# Patient Record
Sex: MALE | Race: WHITE | Employment: OTHER | URBAN - METROPOLITAN AREA
[De-identification: names, ages, dates, MRNs, and addresses within clinical notes are randomized per-mention and may not be internally consistent; named-entity substitution may affect disease eponyms.]

---

## 2017-01-20 ENCOUNTER — OFFICE VISIT (OUTPATIENT)
Dept: INTERNAL MEDICINE | Age: 82
End: 2017-01-20

## 2017-01-20 VITALS
BODY MASS INDEX: 30.11 KG/M2 | WEIGHT: 170 LBS | HEART RATE: 69 BPM | SYSTOLIC BLOOD PRESSURE: 124 MMHG | DIASTOLIC BLOOD PRESSURE: 62 MMHG | OXYGEN SATURATION: 97 %

## 2017-01-20 DIAGNOSIS — N40.1 BENIGN NON-NODULAR PROSTATIC HYPERPLASIA WITH LOWER URINARY TRACT SYMPTOMS: ICD-10-CM

## 2017-01-20 DIAGNOSIS — E78.5 HYPERLIPIDEMIA, UNSPECIFIED HYPERLIPIDEMIA TYPE: Chronic | ICD-10-CM

## 2017-01-20 DIAGNOSIS — E11.9 TYPE 2 DIABETES MELLITUS WITHOUT COMPLICATION, WITHOUT LONG-TERM CURRENT USE OF INSULIN (HCC): Primary | Chronic | ICD-10-CM

## 2017-01-20 DIAGNOSIS — I25.118 CORONARY ARTERY DISEASE OF NATIVE ARTERY OF NATIVE HEART WITH STABLE ANGINA PECTORIS (HCC): ICD-10-CM

## 2017-01-20 DIAGNOSIS — E11.9 TYPE 2 DIABETES MELLITUS WITHOUT COMPLICATION, WITHOUT LONG-TERM CURRENT USE OF INSULIN (HCC): Chronic | ICD-10-CM

## 2017-01-20 DIAGNOSIS — I10 ESSENTIAL HYPERTENSION: Chronic | ICD-10-CM

## 2017-01-20 PROCEDURE — 99214 OFFICE O/P EST MOD 30 MIN: CPT | Performed by: INTERNAL MEDICINE

## 2017-01-20 RX ORDER — CARVEDILOL 6.25 MG/1
6.25 TABLET ORAL 2 TIMES DAILY
Qty: 180 TABLET | Refills: 3 | Status: SHIPPED | OUTPATIENT
Start: 2017-01-20 | End: 2017-04-21 | Stop reason: SDUPTHER

## 2017-01-20 RX ORDER — TAMSULOSIN HYDROCHLORIDE 0.4 MG/1
0.8 CAPSULE ORAL NIGHTLY
Qty: 180 CAPSULE | Refills: 3 | Status: SHIPPED | OUTPATIENT
Start: 2017-01-20 | End: 2017-04-21 | Stop reason: SDUPTHER

## 2017-01-20 RX ORDER — GLIPIZIDE 5 MG/1
5 TABLET ORAL DAILY
Qty: 90 TABLET | Refills: 3 | Status: SHIPPED | OUTPATIENT
Start: 2017-01-20 | End: 2017-04-21 | Stop reason: SDUPTHER

## 2017-01-20 RX ORDER — ATORVASTATIN CALCIUM 40 MG/1
40 TABLET, FILM COATED ORAL DAILY
Qty: 90 TABLET | Refills: 3 | Status: SHIPPED | OUTPATIENT
Start: 2017-01-20 | End: 2017-04-21 | Stop reason: SDUPTHER

## 2017-01-20 RX ORDER — TAMSULOSIN HYDROCHLORIDE 0.4 MG/1
0.4 CAPSULE ORAL NIGHTLY
Qty: 90 CAPSULE | Refills: 3 | Status: SHIPPED | OUTPATIENT
Start: 2017-01-20 | End: 2017-01-20 | Stop reason: SDUPTHER

## 2017-01-20 RX ORDER — FINASTERIDE 5 MG/1
5 TABLET, FILM COATED ORAL DAILY
Qty: 30 TABLET | Refills: 12 | Status: SHIPPED | OUTPATIENT
Start: 2017-01-20 | End: 2017-04-21 | Stop reason: SDUPTHER

## 2017-01-20 RX ORDER — FLUTICASONE PROPIONATE 110 UG/1
2 AEROSOL, METERED RESPIRATORY (INHALATION) 2 TIMES DAILY
Qty: 1 INHALER | Refills: 12 | Status: SHIPPED | OUTPATIENT
Start: 2017-01-20 | End: 2019-04-09

## 2017-01-29 ASSESSMENT — ENCOUNTER SYMPTOMS
GASTROINTESTINAL NEGATIVE: 1
RESPIRATORY NEGATIVE: 1
EYES NEGATIVE: 1

## 2017-03-15 ENCOUNTER — TELEPHONE (OUTPATIENT)
Dept: INTERNAL MEDICINE | Age: 82
End: 2017-03-15

## 2017-03-15 DIAGNOSIS — E11.9 TYPE 2 DIABETES MELLITUS WITHOUT COMPLICATION, WITHOUT LONG-TERM CURRENT USE OF INSULIN (HCC): Chronic | ICD-10-CM

## 2017-03-22 DIAGNOSIS — E11.9 TYPE 2 DIABETES MELLITUS WITHOUT COMPLICATION, WITHOUT LONG-TERM CURRENT USE OF INSULIN (HCC): Chronic | ICD-10-CM

## 2017-04-14 LAB
BILIRUBIN URINE: NEGATIVE
BLOOD, URINE: NEGATIVE
CLARITY: CLEAR
COLOR: YELLOW
CREATININE URINE: 116.5 MG/DL (ref 39–259)
ESTIMATED AVERAGE GLUCOSE: 159.9 MG/DL
GLUCOSE URINE: NEGATIVE MG/DL
HBA1C MFR BLD: 7.2 %
KETONES, URINE: ABNORMAL MG/DL
LEUKOCYTE ESTERASE, URINE: NEGATIVE
MICROALBUMIN UR-MCNC: 1.7 MG/DL
MICROALBUMIN/CREAT UR-RTO: 14.6 MG/G (ref 0–30)
MICROSCOPIC EXAMINATION: ABNORMAL
NITRITE, URINE: NEGATIVE
PH UA: 6
PROTEIN UA: NEGATIVE MG/DL
SPECIFIC GRAVITY UA: 1.02
URINE TYPE: ABNORMAL
UROBILINOGEN, URINE: 0.2 E.U./DL

## 2017-04-17 RX ORDER — LANCETS
EACH MISCELLANEOUS
Qty: 100 EACH | Refills: 0 | Status: SHIPPED | OUTPATIENT
Start: 2017-04-17 | End: 2017-04-21 | Stop reason: SDUPTHER

## 2017-04-21 ENCOUNTER — OFFICE VISIT (OUTPATIENT)
Dept: INTERNAL MEDICINE | Age: 82
End: 2017-04-21

## 2017-04-21 VITALS
SYSTOLIC BLOOD PRESSURE: 116 MMHG | WEIGHT: 166 LBS | HEART RATE: 66 BPM | OXYGEN SATURATION: 98 % | DIASTOLIC BLOOD PRESSURE: 62 MMHG | BODY MASS INDEX: 29.41 KG/M2

## 2017-04-21 DIAGNOSIS — N40.1 BENIGN NON-NODULAR PROSTATIC HYPERPLASIA WITH LOWER URINARY TRACT SYMPTOMS: ICD-10-CM

## 2017-04-21 DIAGNOSIS — Z12.5 SPECIAL SCREENING FOR MALIGNANT NEOPLASM OF PROSTATE: Primary | ICD-10-CM

## 2017-04-21 DIAGNOSIS — E11.9 TYPE 2 DIABETES MELLITUS WITHOUT COMPLICATION, WITHOUT LONG-TERM CURRENT USE OF INSULIN (HCC): Chronic | ICD-10-CM

## 2017-04-21 DIAGNOSIS — E78.5 HYPERLIPIDEMIA, UNSPECIFIED HYPERLIPIDEMIA TYPE: Chronic | ICD-10-CM

## 2017-04-21 DIAGNOSIS — I10 ESSENTIAL HYPERTENSION: Chronic | ICD-10-CM

## 2017-04-21 DIAGNOSIS — I25.118 CORONARY ARTERY DISEASE OF NATIVE ARTERY OF NATIVE HEART WITH STABLE ANGINA PECTORIS (HCC): ICD-10-CM

## 2017-04-21 PROCEDURE — 99214 OFFICE O/P EST MOD 30 MIN: CPT | Performed by: INTERNAL MEDICINE

## 2017-04-21 PROCEDURE — G0444 DEPRESSION SCREEN ANNUAL: HCPCS | Performed by: INTERNAL MEDICINE

## 2017-04-21 RX ORDER — FINASTERIDE 5 MG/1
5 TABLET, FILM COATED ORAL DAILY
Qty: 90 TABLET | Refills: 3 | Status: SHIPPED | OUTPATIENT
Start: 2017-04-21 | End: 2018-05-03 | Stop reason: SDUPTHER

## 2017-04-21 RX ORDER — GLIPIZIDE 5 MG/1
5 TABLET ORAL DAILY
Qty: 90 TABLET | Refills: 3 | Status: SHIPPED | OUTPATIENT
Start: 2017-04-21 | End: 2017-08-18 | Stop reason: SDUPTHER

## 2017-04-21 RX ORDER — CARVEDILOL 6.25 MG/1
6.25 TABLET ORAL 2 TIMES DAILY
Qty: 180 TABLET | Refills: 3 | Status: SHIPPED | OUTPATIENT
Start: 2017-04-21 | End: 2017-12-19 | Stop reason: SDUPTHER

## 2017-04-21 RX ORDER — ATORVASTATIN CALCIUM 40 MG/1
40 TABLET, FILM COATED ORAL DAILY
Qty: 90 TABLET | Refills: 3 | Status: ON HOLD | OUTPATIENT
Start: 2017-04-21 | End: 2021-01-01 | Stop reason: HOSPADM

## 2017-04-21 RX ORDER — TAMSULOSIN HYDROCHLORIDE 0.4 MG/1
0.8 CAPSULE ORAL NIGHTLY
Qty: 180 CAPSULE | Refills: 3 | Status: SHIPPED | OUTPATIENT
Start: 2017-04-21 | End: 2018-06-06 | Stop reason: SDUPTHER

## 2017-04-21 RX ORDER — LANCETS
EACH MISCELLANEOUS
Qty: 100 EACH | Refills: 3 | Status: SHIPPED | OUTPATIENT
Start: 2017-04-21 | End: 2017-12-15 | Stop reason: CLARIF

## 2017-04-21 ASSESSMENT — PATIENT HEALTH QUESTIONNAIRE - PHQ9
4. FEELING TIRED OR HAVING LITTLE ENERGY: 3
6. FEELING BAD ABOUT YOURSELF - OR THAT YOU ARE A FAILURE OR HAVE LET YOURSELF OR YOUR FAMILY DOWN: 0
SUM OF ALL RESPONSES TO PHQ9 QUESTIONS 1 & 2: 6
5. POOR APPETITE OR OVEREATING: 0
8. MOVING OR SPEAKING SO SLOWLY THAT OTHER PEOPLE COULD HAVE NOTICED. OR THE OPPOSITE, BEING SO FIGETY OR RESTLESS THAT YOU HAVE BEEN MOVING AROUND A LOT MORE THAN USUAL: 0
SUM OF ALL RESPONSES TO PHQ QUESTIONS 1-9: 13
2. FEELING DOWN, DEPRESSED OR HOPELESS: 3
3. TROUBLE FALLING OR STAYING ASLEEP: 3
1. LITTLE INTEREST OR PLEASURE IN DOING THINGS: 3
10. IF YOU CHECKED OFF ANY PROBLEMS, HOW DIFFICULT HAVE THESE PROBLEMS MADE IT FOR YOU TO DO YOUR WORK, TAKE CARE OF THINGS AT HOME, OR GET ALONG WITH OTHER PEOPLE: 1
9. THOUGHTS THAT YOU WOULD BE BETTER OFF DEAD, OR OF HURTING YOURSELF: 0
7. TROUBLE CONCENTRATING ON THINGS, SUCH AS READING THE NEWSPAPER OR WATCHING TELEVISION: 1

## 2017-04-21 ASSESSMENT — ENCOUNTER SYMPTOMS
RESPIRATORY NEGATIVE: 1
EYES NEGATIVE: 1
GASTROINTESTINAL NEGATIVE: 1

## 2017-04-29 ASSESSMENT — ENCOUNTER SYMPTOMS
SHORTNESS OF BREATH: 0
ORTHOPNEA: 0
BLURRED VISION: 0

## 2017-05-22 ENCOUNTER — TELEPHONE (OUTPATIENT)
Dept: INTERNAL MEDICINE | Age: 82
End: 2017-05-22

## 2017-07-31 RX ORDER — LANCETS
EACH MISCELLANEOUS
Qty: 100 EACH | Refills: 0 | Status: SHIPPED | OUTPATIENT
Start: 2017-07-31 | End: 2017-12-15 | Stop reason: CLARIF

## 2017-08-10 DIAGNOSIS — Z12.5 SPECIAL SCREENING FOR MALIGNANT NEOPLASM OF PROSTATE: ICD-10-CM

## 2017-08-10 DIAGNOSIS — E78.5 HYPERLIPIDEMIA, UNSPECIFIED HYPERLIPIDEMIA TYPE: Chronic | ICD-10-CM

## 2017-08-10 DIAGNOSIS — I10 ESSENTIAL HYPERTENSION: Chronic | ICD-10-CM

## 2017-08-10 DIAGNOSIS — E11.9 TYPE 2 DIABETES MELLITUS WITHOUT COMPLICATION, WITHOUT LONG-TERM CURRENT USE OF INSULIN (HCC): Chronic | ICD-10-CM

## 2017-08-10 LAB
ALBUMIN SERPL-MCNC: 4.1 G/DL (ref 3.4–5)
ALP BLD-CCNC: 60 U/L (ref 40–129)
ALT SERPL-CCNC: 14 U/L (ref 10–40)
ANION GAP SERPL CALCULATED.3IONS-SCNC: 13 MMOL/L (ref 3–16)
AST SERPL-CCNC: 13 U/L (ref 15–37)
BASOPHILS ABSOLUTE: 0 K/UL (ref 0–0.2)
BASOPHILS RELATIVE PERCENT: 0.5 %
BILIRUB SERPL-MCNC: 0.7 MG/DL (ref 0–1)
BILIRUBIN DIRECT: <0.2 MG/DL (ref 0–0.3)
BILIRUBIN URINE: NEGATIVE
BILIRUBIN, INDIRECT: ABNORMAL MG/DL (ref 0–1)
BLOOD, URINE: NEGATIVE
BUN BLDV-MCNC: 23 MG/DL (ref 7–20)
CALCIUM SERPL-MCNC: 9.4 MG/DL (ref 8.3–10.6)
CHLORIDE BLD-SCNC: 101 MMOL/L (ref 99–110)
CHOLESTEROL, TOTAL: 155 MG/DL (ref 0–199)
CLARITY: CLEAR
CO2: 26 MMOL/L (ref 21–32)
COLOR: YELLOW
CREAT SERPL-MCNC: 1 MG/DL (ref 0.8–1.3)
CREATININE URINE: 167.3 MG/DL (ref 39–259)
EOSINOPHILS ABSOLUTE: 0.3 K/UL (ref 0–0.6)
EOSINOPHILS RELATIVE PERCENT: 3.5 %
ESTIMATED AVERAGE GLUCOSE: 154.2 MG/DL
GFR AFRICAN AMERICAN: >60
GFR NON-AFRICAN AMERICAN: >60
GLUCOSE BLD-MCNC: 154 MG/DL (ref 70–99)
GLUCOSE URINE: NEGATIVE MG/DL
HBA1C MFR BLD: 7 %
HCT VFR BLD CALC: 41.6 % (ref 40.5–52.5)
HDLC SERPL-MCNC: 53 MG/DL (ref 40–60)
HEMOGLOBIN: 14.3 G/DL (ref 13.5–17.5)
KETONES, URINE: NEGATIVE MG/DL
LDL CHOLESTEROL CALCULATED: 82 MG/DL
LEUKOCYTE ESTERASE, URINE: NEGATIVE
LYMPHOCYTES ABSOLUTE: 1.5 K/UL (ref 1–5.1)
LYMPHOCYTES RELATIVE PERCENT: 19.7 %
MCH RBC QN AUTO: 33 PG (ref 26–34)
MCHC RBC AUTO-ENTMCNC: 34.3 G/DL (ref 31–36)
MCV RBC AUTO: 96 FL (ref 80–100)
MICROALBUMIN UR-MCNC: 1.8 MG/DL
MICROALBUMIN/CREAT UR-RTO: 10.8 MG/G (ref 0–30)
MICROSCOPIC EXAMINATION: NORMAL
MONOCYTES ABSOLUTE: 0.5 K/UL (ref 0–1.3)
MONOCYTES RELATIVE PERCENT: 6.6 %
NEUTROPHILS ABSOLUTE: 5.3 K/UL (ref 1.7–7.7)
NEUTROPHILS RELATIVE PERCENT: 69.7 %
NITRITE, URINE: NEGATIVE
PDW BLD-RTO: 14 % (ref 12.4–15.4)
PH UA: 5
PHOSPHORUS: 3.8 MG/DL (ref 2.5–4.9)
PLATELET # BLD: 145 K/UL (ref 135–450)
PMV BLD AUTO: 8.6 FL (ref 5–10.5)
POTASSIUM SERPL-SCNC: 4.2 MMOL/L (ref 3.5–5.1)
PROSTATE SPECIFIC ANTIGEN: 1.57 NG/ML (ref 0–4)
PROTEIN UA: NEGATIVE MG/DL
RBC # BLD: 4.33 M/UL (ref 4.2–5.9)
SODIUM BLD-SCNC: 140 MMOL/L (ref 136–145)
SPECIFIC GRAVITY UA: 1.02
TOTAL PROTEIN: 6.6 G/DL (ref 6.4–8.2)
TRIGL SERPL-MCNC: 100 MG/DL (ref 0–150)
URINE TYPE: NORMAL
UROBILINOGEN, URINE: 0.2 E.U./DL
VLDLC SERPL CALC-MCNC: 20 MG/DL
WBC # BLD: 7.6 K/UL (ref 4–11)

## 2017-08-11 LAB — TSH, 3RD GENERATION: 2.03 MU/L (ref 0.3–4)

## 2017-08-18 ENCOUNTER — OFFICE VISIT (OUTPATIENT)
Dept: INTERNAL MEDICINE | Age: 82
End: 2017-08-18

## 2017-08-18 VITALS
DIASTOLIC BLOOD PRESSURE: 70 MMHG | BODY MASS INDEX: 29.59 KG/M2 | OXYGEN SATURATION: 98 % | HEIGHT: 63 IN | WEIGHT: 167 LBS | HEART RATE: 65 BPM | SYSTOLIC BLOOD PRESSURE: 110 MMHG

## 2017-08-18 DIAGNOSIS — E78.5 HYPERLIPIDEMIA, UNSPECIFIED HYPERLIPIDEMIA TYPE: Chronic | ICD-10-CM

## 2017-08-18 DIAGNOSIS — E11.9 TYPE 2 DIABETES MELLITUS WITHOUT COMPLICATION, WITHOUT LONG-TERM CURRENT USE OF INSULIN (HCC): Primary | Chronic | ICD-10-CM

## 2017-08-18 DIAGNOSIS — I10 ESSENTIAL HYPERTENSION: Chronic | ICD-10-CM

## 2017-08-18 PROCEDURE — 99214 OFFICE O/P EST MOD 30 MIN: CPT | Performed by: INTERNAL MEDICINE

## 2017-08-18 RX ORDER — GLIPIZIDE 5 MG/1
2.5 TABLET ORAL DAILY
Qty: 45 TABLET | Refills: 3 | Status: SHIPPED | OUTPATIENT
Start: 2017-08-18 | End: 2018-08-23 | Stop reason: SDUPTHER

## 2017-08-18 ASSESSMENT — ENCOUNTER SYMPTOMS
RESPIRATORY NEGATIVE: 1
ORTHOPNEA: 0
EYES NEGATIVE: 1
SHORTNESS OF BREATH: 0
BLURRED VISION: 0
GASTROINTESTINAL NEGATIVE: 1

## 2017-10-09 ENCOUNTER — TELEPHONE (OUTPATIENT)
Dept: INTERNAL MEDICINE | Age: 82
End: 2017-10-09

## 2017-10-09 DIAGNOSIS — M54.41 BILATERAL LOW BACK PAIN WITH RIGHT-SIDED SCIATICA, UNSPECIFIED CHRONICITY: ICD-10-CM

## 2017-11-13 DIAGNOSIS — E11.9 TYPE 2 DIABETES MELLITUS WITHOUT COMPLICATION, WITHOUT LONG-TERM CURRENT USE OF INSULIN (HCC): Chronic | ICD-10-CM

## 2017-11-17 ENCOUNTER — TELEPHONE (OUTPATIENT)
Dept: INTERNAL MEDICINE | Age: 82
End: 2017-11-17

## 2017-12-15 ENCOUNTER — OFFICE VISIT (OUTPATIENT)
Dept: INTERNAL MEDICINE | Age: 82
End: 2017-12-15

## 2017-12-15 VITALS
WEIGHT: 172 LBS | HEIGHT: 63 IN | BODY MASS INDEX: 30.48 KG/M2 | DIASTOLIC BLOOD PRESSURE: 70 MMHG | SYSTOLIC BLOOD PRESSURE: 130 MMHG

## 2017-12-15 DIAGNOSIS — Z12.5 PROSTATE CANCER SCREENING: ICD-10-CM

## 2017-12-15 DIAGNOSIS — N40.1 BENIGN PROSTATIC HYPERPLASIA WITH URINARY FREQUENCY: Chronic | ICD-10-CM

## 2017-12-15 DIAGNOSIS — E11.9 TYPE 2 DIABETES MELLITUS WITHOUT COMPLICATION, WITHOUT LONG-TERM CURRENT USE OF INSULIN (HCC): Primary | Chronic | ICD-10-CM

## 2017-12-15 DIAGNOSIS — E78.2 MIXED HYPERLIPIDEMIA: Chronic | ICD-10-CM

## 2017-12-15 DIAGNOSIS — R35.0 BENIGN PROSTATIC HYPERPLASIA WITH URINARY FREQUENCY: Chronic | ICD-10-CM

## 2017-12-15 DIAGNOSIS — I10 ESSENTIAL HYPERTENSION: Chronic | ICD-10-CM

## 2017-12-15 DIAGNOSIS — L03.032 CELLULITIS OF GREAT TOE OF LEFT FOOT: ICD-10-CM

## 2017-12-15 DIAGNOSIS — Z23 NEED FOR INFLUENZA VACCINATION: ICD-10-CM

## 2017-12-15 PROCEDURE — 90662 IIV NO PRSV INCREASED AG IM: CPT | Performed by: HOSPITALIST

## 2017-12-15 PROCEDURE — 99214 OFFICE O/P EST MOD 30 MIN: CPT | Performed by: HOSPITALIST

## 2017-12-15 PROCEDURE — G0439 PPPS, SUBSEQ VISIT: HCPCS | Performed by: HOSPITALIST

## 2017-12-15 PROCEDURE — G0008 ADMIN INFLUENZA VIRUS VAC: HCPCS | Performed by: HOSPITALIST

## 2017-12-15 RX ORDER — AMOXICILLIN AND CLAVULANATE POTASSIUM 875; 125 MG/1; MG/1
1 TABLET, FILM COATED ORAL 2 TIMES DAILY
Qty: 20 TABLET | Refills: 1 | Status: SHIPPED | OUTPATIENT
Start: 2017-12-15 | End: 2017-12-25

## 2017-12-15 ASSESSMENT — PATIENT HEALTH QUESTIONNAIRE - PHQ9: SUM OF ALL RESPONSES TO PHQ QUESTIONS 1-9: 2

## 2017-12-15 ASSESSMENT — ANXIETY QUESTIONNAIRES: GAD7 TOTAL SCORE: 2

## 2017-12-15 NOTE — PROGRESS NOTES
Medicare Annual Wellness Visit - Subsequent    Name: Frederick Prieto Date: 12/15/2017   MRN: [id number] Sex: Male   Age: 81 y.o. Ethnicity: Non-/Non    : 1935 Race: Shahla Daniel is here for   Chief Complaint   Patient presents with    Medicare AWV        Screenings for behavioral, psychosocial and functional/safety risks, and cognitive dysfunction are all negative except as indicated below. These results, as well as other patient data from the [address] form, are documented in Flowsheets linked to this Encounter. Liana Goodwin presents for AWV and establish his care. His previous PCP was Dr Tania Lopez, who recently resigned from our practice. His blood glucose is well controlled and ranging between 98 and 141 mg/dL. He is trying to adhere to low fat/low cholesterol diet. Checks his BP daily; SBP ranging between 116 and 152 mg/dL. No CP/SOB; no heart palpitations. + urinary frequency/urgency; follows up with Dr Tina Banerjee for BPH. Takes all his scheduled meds regularly. No Known Allergies    Prior to Visit Medications    Medication Sig Taking?  Authorizing Provider   diclofenac sodium (VOLTAREN) 1 % GEL Apply 3 g topically 3 times daily as needed for Pain Yes Nhan Blunt MD   glipiZIDE (GLUCOTROL) 5 MG tablet Take 0.5 tablets by mouth daily Yes Elie Titus MD   Liraglutide (VICTOZA) 18 MG/3ML SOPN SC injection Inject 1.2 mg into the skin daily  Patient taking differently: Inject 1.8 mg into the skin daily  Yes Elie Titus MD   carvedilol (COREG) 6.25 MG tablet Take 1 tablet by mouth 2 times daily Yes Elie Titus MD   finasteride (PROSCAR) 5 MG tablet Take 1 tablet by mouth daily Yes Elie Titus MD   tamsulosin (FLOMAX) 0.4 MG capsule Take 2 capsules by mouth nightly Yes Elie Titus MD   atorvastatin (LIPITOR) 40 MG tablet Take 1 tablet by mouth daily Yes Elie Titus MD   metFORMIN (GLUCOPHAGE) 500 MG tablet Take 2 tablets by mouth 2 times TURP       Family History   Problem Relation Age of Onset    Diabetes Mother     Diabetes Brother        CareTeam (Including outside providers/suppliers regularly involved in providing care):   Patient Care Team:  Tomas Connors MD as PCP - General (Internal Medicine)  MD Alexus Villarreal (Ophthalmology)  Florina Bird MD (Urology)  Mile Montilla DPM (Podiatry)  Sandra Gamez (Optometry)  Marycruz Reyes MD (Dermatology)    Wt Readings from Last 3 Encounters:   12/15/17 172 lb (78 kg)   08/18/17 167 lb (75.8 kg)   04/21/17 166 lb (75.3 kg)     Vitals:    12/15/17 1001   BP: 130/70   Weight: 172 lb (78 kg)   Height: 5' 3\" (1.6 m)       General Appearance: alert and oriented to person, place and time, well developed and well- nourished, in no acute distress  Skin: warm and dry, no rash or erythema  Head: normocephalic and atraumatic  Eyes: pupils equal, round, and reactive to light, extraocular eye movements intact, conjunctivae normal  ENT: tympanic membrane, external ear and ear canal normal bilaterally, nose without deformity, nasal mucosa and turbinates normal without polyps  Neck: supple and non-tender without mass, no thyromegaly or thyroid nodules, no cervical lymphadenopathy  Pulmonary/Chest: clear to auscultation bilaterally- no wheezes, rales or rhonchi, normal air movement, no respiratory distress  Cardiovascular: normal rate, regular rhythm, normal S1 and S2, no murmurs, rubs, clicks, or gallops, distal pulses intact, no carotid bruits  Abdomen: soft, non-tender, non-distended, normal bowel sounds, no masses or organomegaly  Extremities: no cyanosis, clubbing or edema. Some redness and tenderness to palpation of L great toe tip area.   Musculoskeletal: normal range of motion, no joint swelling, deformity or tenderness  Neurologic: reflexes normal and symmetric, no cranial nerve deficit, gait, coordination and speech normal    The following problems were reviewed today and where

## 2017-12-19 DIAGNOSIS — I10 ESSENTIAL HYPERTENSION: Chronic | ICD-10-CM

## 2017-12-19 DIAGNOSIS — I25.118 CORONARY ARTERY DISEASE OF NATIVE ARTERY OF NATIVE HEART WITH STABLE ANGINA PECTORIS (HCC): ICD-10-CM

## 2017-12-19 RX ORDER — CARVEDILOL 6.25 MG/1
6.25 TABLET ORAL 2 TIMES DAILY
Qty: 180 TABLET | Refills: 0 | Status: SHIPPED | OUTPATIENT
Start: 2017-12-19 | End: 2018-03-26 | Stop reason: SDUPTHER

## 2018-02-12 ENCOUNTER — TELEPHONE (OUTPATIENT)
Dept: INTERNAL MEDICINE | Age: 83
End: 2018-02-12

## 2018-02-12 RX ORDER — AMLODIPINE BESYLATE 2.5 MG/1
2.5 TABLET ORAL 2 TIMES DAILY
Qty: 60 TABLET | Refills: 3 | Status: SHIPPED | OUTPATIENT
Start: 2018-02-12 | End: 2018-02-12 | Stop reason: SDUPTHER

## 2018-02-13 RX ORDER — AMLODIPINE BESYLATE 2.5 MG/1
TABLET ORAL
Qty: 180 TABLET | Refills: 3 | Status: SHIPPED | OUTPATIENT
Start: 2018-02-13 | End: 2019-04-09

## 2018-03-06 ENCOUNTER — TELEPHONE (OUTPATIENT)
Dept: INTERNAL MEDICINE | Age: 83
End: 2018-03-06

## 2018-03-06 DIAGNOSIS — E11.9 TYPE 2 DIABETES MELLITUS WITHOUT COMPLICATION, WITHOUT LONG-TERM CURRENT USE OF INSULIN (HCC): Chronic | ICD-10-CM

## 2018-03-06 DIAGNOSIS — I10 ESSENTIAL HYPERTENSION: Chronic | ICD-10-CM

## 2018-03-06 DIAGNOSIS — E78.2 MIXED HYPERLIPIDEMIA: Primary | Chronic | ICD-10-CM

## 2018-03-09 DIAGNOSIS — M54.41 BILATERAL LOW BACK PAIN WITH RIGHT-SIDED SCIATICA, UNSPECIFIED CHRONICITY: ICD-10-CM

## 2018-03-12 DIAGNOSIS — E78.2 MIXED HYPERLIPIDEMIA: Chronic | ICD-10-CM

## 2018-03-12 DIAGNOSIS — E11.9 TYPE 2 DIABETES MELLITUS WITHOUT COMPLICATION, WITHOUT LONG-TERM CURRENT USE OF INSULIN (HCC): Chronic | ICD-10-CM

## 2018-03-12 DIAGNOSIS — I10 ESSENTIAL HYPERTENSION: Chronic | ICD-10-CM

## 2018-03-12 LAB
A/G RATIO: 1.8 (ref 1.1–2.2)
ALBUMIN SERPL-MCNC: 4.2 G/DL (ref 3.4–5)
ALP BLD-CCNC: 84 U/L (ref 40–129)
ALT SERPL-CCNC: 14 U/L (ref 10–40)
ANION GAP SERPL CALCULATED.3IONS-SCNC: 12 MMOL/L (ref 3–16)
AST SERPL-CCNC: 15 U/L (ref 15–37)
BASOPHILS ABSOLUTE: 0 K/UL (ref 0–0.2)
BASOPHILS RELATIVE PERCENT: 0.4 %
BILIRUB SERPL-MCNC: 0.5 MG/DL (ref 0–1)
BUN BLDV-MCNC: 21 MG/DL (ref 7–20)
CALCIUM SERPL-MCNC: 9.1 MG/DL (ref 8.3–10.6)
CHLORIDE BLD-SCNC: 103 MMOL/L (ref 99–110)
CHOLESTEROL, TOTAL: 174 MG/DL (ref 0–199)
CO2: 28 MMOL/L (ref 21–32)
CREAT SERPL-MCNC: 0.8 MG/DL (ref 0.8–1.3)
EOSINOPHILS ABSOLUTE: 0.3 K/UL (ref 0–0.6)
EOSINOPHILS RELATIVE PERCENT: 5.1 %
GFR AFRICAN AMERICAN: >60
GFR NON-AFRICAN AMERICAN: >60
GLOBULIN: 2.3 G/DL
GLUCOSE BLD-MCNC: 157 MG/DL (ref 70–99)
HCT VFR BLD CALC: 42.1 % (ref 40.5–52.5)
HDLC SERPL-MCNC: 51 MG/DL (ref 40–60)
HEMOGLOBIN: 14.2 G/DL (ref 13.5–17.5)
LDL CHOLESTEROL CALCULATED: 97 MG/DL
LYMPHOCYTES ABSOLUTE: 1.9 K/UL (ref 1–5.1)
LYMPHOCYTES RELATIVE PERCENT: 35.1 %
MCH RBC QN AUTO: 32.6 PG (ref 26–34)
MCHC RBC AUTO-ENTMCNC: 33.8 G/DL (ref 31–36)
MCV RBC AUTO: 96.4 FL (ref 80–100)
MONOCYTES ABSOLUTE: 0.3 K/UL (ref 0–1.3)
MONOCYTES RELATIVE PERCENT: 6.1 %
NEUTROPHILS ABSOLUTE: 2.9 K/UL (ref 1.7–7.7)
NEUTROPHILS RELATIVE PERCENT: 53.3 %
PDW BLD-RTO: 14.2 % (ref 12.4–15.4)
PLATELET # BLD: 145 K/UL (ref 135–450)
PMV BLD AUTO: 9.2 FL (ref 5–10.5)
POTASSIUM SERPL-SCNC: 4.4 MMOL/L (ref 3.5–5.1)
RBC # BLD: 4.37 M/UL (ref 4.2–5.9)
SODIUM BLD-SCNC: 143 MMOL/L (ref 136–145)
TOTAL PROTEIN: 6.5 G/DL (ref 6.4–8.2)
TRIGL SERPL-MCNC: 132 MG/DL (ref 0–150)
VLDLC SERPL CALC-MCNC: 26 MG/DL
WBC # BLD: 5.4 K/UL (ref 4–11)

## 2018-03-13 ENCOUNTER — TELEPHONE (OUTPATIENT)
Dept: INTERNAL MEDICINE | Age: 83
End: 2018-03-13

## 2018-03-13 LAB
ESTIMATED AVERAGE GLUCOSE: 162.8 MG/DL
HBA1C MFR BLD: 7.3 %

## 2018-03-16 ENCOUNTER — OFFICE VISIT (OUTPATIENT)
Dept: INTERNAL MEDICINE | Age: 83
End: 2018-03-16

## 2018-03-16 ENCOUNTER — TELEPHONE (OUTPATIENT)
Dept: ORTHOPEDIC SURGERY | Age: 83
End: 2018-03-16

## 2018-03-16 VITALS
WEIGHT: 167 LBS | SYSTOLIC BLOOD PRESSURE: 112 MMHG | DIASTOLIC BLOOD PRESSURE: 64 MMHG | BODY MASS INDEX: 29.59 KG/M2 | HEIGHT: 63 IN

## 2018-03-16 DIAGNOSIS — I10 ESSENTIAL HYPERTENSION: ICD-10-CM

## 2018-03-16 DIAGNOSIS — E78.2 MIXED HYPERLIPIDEMIA: Chronic | ICD-10-CM

## 2018-03-16 DIAGNOSIS — L03.032 CELLULITIS OF GREAT TOE OF LEFT FOOT: Primary | ICD-10-CM

## 2018-03-16 DIAGNOSIS — E11.9 TYPE 2 DIABETES MELLITUS WITHOUT COMPLICATION, WITHOUT LONG-TERM CURRENT USE OF INSULIN (HCC): Chronic | ICD-10-CM

## 2018-03-16 PROCEDURE — 99214 OFFICE O/P EST MOD 30 MIN: CPT | Performed by: HOSPITALIST

## 2018-03-16 ASSESSMENT — PATIENT HEALTH QUESTIONNAIRE - PHQ9
2. FEELING DOWN, DEPRESSED OR HOPELESS: 0
SUM OF ALL RESPONSES TO PHQ QUESTIONS 1-9: 0
SUM OF ALL RESPONSES TO PHQ9 QUESTIONS 1 & 2: 0
1. LITTLE INTEREST OR PLEASURE IN DOING THINGS: 0

## 2018-03-16 NOTE — PROGRESS NOTES
Follow Up Visit Established Patient Visit    Patient:  Joey Paris                                               : 1935  Age: 81 y.o. MRN: [id number]  Date : 3/16/2018    Joey Paris is a 81 y.o. male who presents for :  Scheduled follow-up appointment on his chronic medical problems, including diabetes mellitus type 2 and hypertension. Chief Complaint   Patient presents with    Diabetes    Hypertension     He is blood glucose ranging between 101 130 mg/dL. He doesn't check his blood pressure at home. Adheres to low-fat/low-cholesterol diet. Has ongoing left great toe cellulitis. Received several courses of oral antibiotics in the last 3 months, including cephalexin and doxycycline. He had x-ray of his left toe on 18 in [facility]:    No radiographic findings to suggest underlying osteomyelitis. Osteoarthritic degenerative changes are present involving the first MTP joint manifested by mild joint space narrowing, sclerosis and hypertrophic changes.   Minimal hallux valgus deformity present.  No acute fracture identified.  Atherosclerotic   calcification present. He was evaluated for this problem recently by his podiatrist who recommended to start Augmentin. Roberto reports insomnia; melatonin provides minimal symptom relief.     Past Medical History:   Diagnosis Date    Allergic rhinitis 2010    Back pain     Bilateral cataracts     Bilateral low back pain with right-sided sciatica 2015    BPH (benign prostatic hypertrophy)     Chest pain     Degeneration of lumbar or lumbosacral intervertebral disc     Diabetes mellitus, type 2 ([address] 2010    Diverticulosis     Eczema 3/11/2011    Essential hypertension 2015    Hyperlipidemia     Hypertension     Insomnia     Lumbar disc disease     Microalbuminuria 2013    Neck pain 2012    Obesity     Obstructive sleep apnea     Osteoarthritis     Post herpetic neuralgia 6/15/2012    Right hip pain 12/14/2012    Right shoulder pain 6/17/2011    Rosacea     Spinal stenosis     Transient global amnesia     [facility] 62/62/7180    Vitamin D deficiency 3/17/2012       Past Surgical History:   Procedure Laterality Date    PROSTATE SURGERY  June 13, 2000    TURP       Current Outpatient Prescriptions   Medication Sig Dispense Refill    diclofenac sodium 1 % GEL APPLY 3GRAMS TO THE AFFECTED AREA THREE TIMES DAILY AS NEEDED FOR PAIN 500 g 0    amLODIPine (NORVASC) 2.5 MG tablet TAKE 1 TABLET BY MOUTH TWICE DAILY HOLD FOR SYSTOLIC BLOOD PRESSURE LESS THAN 140 MM  tablet 3    carvedilol (COREG) 6.25 MG tablet Take 1 tablet by mouth 2 times daily 180 tablet 0    Liraglutide (VICTOZA) 18 MG/3ML SOPN SC injection Inject 1.8 mg into the skin daily 9 mL 5    glipiZIDE (GLUCOTROL) 5 MG tablet Take 0.5 tablets by mouth daily 45 tablet 3    finasteride (PROSCAR) 5 MG tablet Take 1 tablet by mouth daily 90 tablet 3    tamsulosin (FLOMAX) 0.4 MG capsule Take 2 capsules by mouth nightly 180 capsule 3    atorvastatin (LIPITOR) 40 MG tablet Take 1 tablet by mouth daily 90 tablet 3    metFORMIN (GLUCOPHAGE) 500 MG tablet Take 2 tablets by mouth 2 times daily (with meals) 360 tablet 3    VENTOLIN  (90 BASE) MCG/ACT inhaler Inhale 2 puffs into the lungs every 6 hours as needed for Wheezing or Shortness of Breath 1 Inhaler 12    fluticasone (FLOVENT HFA) 110 MCG/ACT inhaler Inhale 2 puffs into the lungs 2 times daily 1 Inhaler 12    nitroGLYCERIN (NITROSTAT) 0.4 MG SL tablet Place 1 tablet under the tongue every 5 minutes as needed for Chest pain May repeat every 5 minutes until relief is obtained. If pain persists after taking 3 tabs in a 15-minute period, call 9-1-1 immediately.  25 tablet 12    Cholecalciferol (VITAMIN D3) 5000 UNITS TABS Take 1 tablet by mouth daily 90 tablet 3    Melatonin 3 MG CAPS Take 1 capsule by mouth

## 2018-03-16 NOTE — TELEPHONE ENCOUNTER
Received a PA and was completed via CMM for:    Medication:Diclofenac Sodium     Dose: 1% gel    PA was completed on CMM     Date: 03/16/2018    PA Code: [email or web address]    Status: Pending

## 2018-03-23 ENCOUNTER — HOSPITAL ENCOUNTER (OUTPATIENT)
Dept: VASCULAR LAB | Age: 83
Discharge: HOME | End: 2018-03-23
Attending: INTERNAL MEDICINE | Admitting: INTERNAL MEDICINE

## 2018-03-23 ENCOUNTER — HOSPITAL ENCOUNTER (OUTPATIENT)
Dept: MRI IMAGING | Age: 83
Discharge: HOME | End: 2018-03-23
Attending: HOSPITALIST | Admitting: HOSPITALIST

## 2018-03-23 DIAGNOSIS — L03.032 CELLULITIS OF TOE OF LEFT FOOT: ICD-10-CM

## 2018-03-23 DIAGNOSIS — I73.9 PERIPHERAL VASCULAR DISEASE (HCC): ICD-10-CM

## 2018-03-23 DIAGNOSIS — L03.032 CELLULITIS OF GREAT TOE OF LEFT FOOT: ICD-10-CM

## 2018-03-26 DIAGNOSIS — I10 ESSENTIAL HYPERTENSION: Chronic | ICD-10-CM

## 2018-03-26 DIAGNOSIS — I25.118 CORONARY ARTERY DISEASE OF NATIVE ARTERY OF NATIVE HEART WITH STABLE ANGINA PECTORIS (HCC): ICD-10-CM

## 2018-03-26 RX ORDER — CARVEDILOL 6.25 MG/1
6.25 TABLET ORAL 2 TIMES DAILY
Qty: 180 TABLET | Refills: 1 | Status: SHIPPED | OUTPATIENT
Start: 2018-03-26 | End: 2018-10-01 | Stop reason: SDUPTHER

## 2018-03-30 ENCOUNTER — TELEPHONE (OUTPATIENT)
Dept: INTERNAL MEDICINE | Age: 83
End: 2018-03-30

## 2018-03-30 ENCOUNTER — TELEPHONE (OUTPATIENT)
Dept: ORTHOPEDIC SURGERY | Age: 83
End: 2018-03-30

## 2018-04-25 DIAGNOSIS — E11.9 TYPE 2 DIABETES MELLITUS WITHOUT COMPLICATION, WITHOUT LONG-TERM CURRENT USE OF INSULIN (HCC): Chronic | ICD-10-CM

## 2018-05-03 ENCOUNTER — TELEPHONE (OUTPATIENT)
Dept: INTERNAL MEDICINE | Age: 83
End: 2018-05-03

## 2018-05-03 DIAGNOSIS — N40.1 BENIGN NON-NODULAR PROSTATIC HYPERPLASIA WITH LOWER URINARY TRACT SYMPTOMS: ICD-10-CM

## 2018-05-03 RX ORDER — FINASTERIDE 5 MG/1
5 TABLET, FILM COATED ORAL DAILY
Qty: 90 TABLET | Refills: 3 | Status: SHIPPED | OUTPATIENT
Start: 2018-05-03 | End: 2019-05-17 | Stop reason: SDUPTHER

## 2018-05-11 DIAGNOSIS — E11.9 TYPE 2 DIABETES MELLITUS WITHOUT COMPLICATION, WITHOUT LONG-TERM CURRENT USE OF INSULIN (HCC): Chronic | ICD-10-CM

## 2018-05-31 DIAGNOSIS — M54.41 BILATERAL LOW BACK PAIN WITH RIGHT-SIDED SCIATICA, UNSPECIFIED CHRONICITY: ICD-10-CM

## 2018-06-06 DIAGNOSIS — N40.1 BENIGN NON-NODULAR PROSTATIC HYPERPLASIA WITH LOWER URINARY TRACT SYMPTOMS: ICD-10-CM

## 2018-06-07 ENCOUNTER — TELEPHONE (OUTPATIENT)
Dept: INTERNAL MEDICINE | Age: 83
End: 2018-06-07

## 2018-06-07 RX ORDER — TAMSULOSIN HYDROCHLORIDE 0.4 MG/1
0.8 CAPSULE ORAL NIGHTLY
Qty: 180 CAPSULE | Refills: 3 | Status: SHIPPED | OUTPATIENT
Start: 2018-06-07 | End: 2019-08-05 | Stop reason: SDUPTHER

## 2018-06-07 RX ORDER — LANCETS
EACH MISCELLANEOUS
Qty: 100 EACH | Refills: 3 | Status: SHIPPED | OUTPATIENT
Start: 2018-06-07 | End: 2018-06-12 | Stop reason: CLARIF

## 2018-06-12 ENCOUNTER — OFFICE VISIT (OUTPATIENT)
Dept: INTERNAL MEDICINE | Age: 83
End: 2018-06-12

## 2018-06-12 VITALS
DIASTOLIC BLOOD PRESSURE: 62 MMHG | BODY MASS INDEX: 29.95 KG/M2 | HEIGHT: 63 IN | SYSTOLIC BLOOD PRESSURE: 130 MMHG | WEIGHT: 169 LBS

## 2018-06-12 DIAGNOSIS — I10 ESSENTIAL HYPERTENSION: Chronic | ICD-10-CM

## 2018-06-12 DIAGNOSIS — G89.29 CHRONIC RIGHT-SIDED LOW BACK PAIN WITH RIGHT-SIDED SCIATICA: ICD-10-CM

## 2018-06-12 DIAGNOSIS — E11.9 TYPE 2 DIABETES MELLITUS WITHOUT COMPLICATION, WITHOUT LONG-TERM CURRENT USE OF INSULIN (HCC): Chronic | ICD-10-CM

## 2018-06-12 DIAGNOSIS — M54.41 CHRONIC RIGHT-SIDED LOW BACK PAIN WITH RIGHT-SIDED SCIATICA: ICD-10-CM

## 2018-06-12 DIAGNOSIS — L03.032 CELLULITIS OF GREAT TOE OF LEFT FOOT: Primary | ICD-10-CM

## 2018-06-12 PROCEDURE — 99214 OFFICE O/P EST MOD 30 MIN: CPT | Performed by: HOSPITALIST

## 2018-06-12 RX ORDER — DOXYCYCLINE HYCLATE 100 MG
100 TABLET ORAL 2 TIMES DAILY
Qty: 20 TABLET | Refills: 0 | Status: SHIPPED | OUTPATIENT
Start: 2018-06-12 | End: 2018-06-22

## 2018-06-21 DIAGNOSIS — E11.9 TYPE 2 DIABETES MELLITUS WITHOUT COMPLICATION, WITHOUT LONG-TERM CURRENT USE OF INSULIN (HCC): Chronic | ICD-10-CM

## 2018-08-13 DIAGNOSIS — E11.9 TYPE 2 DIABETES MELLITUS WITHOUT COMPLICATION, WITHOUT LONG-TERM CURRENT USE OF INSULIN (HCC): Chronic | ICD-10-CM

## 2018-08-21 DIAGNOSIS — E11.9 TYPE 2 DIABETES MELLITUS WITHOUT COMPLICATION, WITHOUT LONG-TERM CURRENT USE OF INSULIN (HCC): Chronic | ICD-10-CM

## 2018-08-21 RX ORDER — GLIPIZIDE 5 MG/1
TABLET ORAL
Qty: 45 TABLET | Refills: 0 | OUTPATIENT
Start: 2018-08-21

## 2018-08-23 ENCOUNTER — TELEPHONE (OUTPATIENT)
Dept: INTERNAL MEDICINE CLINIC | Age: 83
End: 2018-08-23

## 2018-08-23 DIAGNOSIS — E11.9 TYPE 2 DIABETES MELLITUS WITHOUT COMPLICATION, WITHOUT LONG-TERM CURRENT USE OF INSULIN (HCC): Chronic | ICD-10-CM

## 2018-08-23 RX ORDER — GLIPIZIDE 5 MG/1
2.5 TABLET ORAL DAILY
Qty: 45 TABLET | Refills: 1 | Status: SHIPPED | OUTPATIENT
Start: 2018-08-23 | End: 2018-10-05 | Stop reason: SDUPTHER

## 2018-08-28 DIAGNOSIS — M54.41 BILATERAL LOW BACK PAIN WITH RIGHT-SIDED SCIATICA, UNSPECIFIED CHRONICITY: ICD-10-CM

## 2018-09-14 ENCOUNTER — OFFICE VISIT (OUTPATIENT)
Dept: INTERNAL MEDICINE | Age: 83
End: 2018-09-14

## 2018-09-14 VITALS
WEIGHT: 173 LBS | BODY MASS INDEX: 30.65 KG/M2 | HEIGHT: 63 IN | DIASTOLIC BLOOD PRESSURE: 62 MMHG | TEMPERATURE: 98.1 F | SYSTOLIC BLOOD PRESSURE: 118 MMHG

## 2018-09-14 DIAGNOSIS — R06.2 WHEEZING: ICD-10-CM

## 2018-09-14 DIAGNOSIS — I10 ESSENTIAL HYPERTENSION: Chronic | ICD-10-CM

## 2018-09-14 DIAGNOSIS — E11.9 TYPE 2 DIABETES MELLITUS WITHOUT COMPLICATION, WITHOUT LONG-TERM CURRENT USE OF INSULIN (HCC): Primary | Chronic | ICD-10-CM

## 2018-09-14 DIAGNOSIS — E78.2 MIXED HYPERLIPIDEMIA: Chronic | ICD-10-CM

## 2018-09-14 PROCEDURE — 99214 OFFICE O/P EST MOD 30 MIN: CPT | Performed by: HOSPITALIST

## 2018-09-14 ASSESSMENT — PATIENT HEALTH QUESTIONNAIRE - PHQ9
SUM OF ALL RESPONSES TO PHQ QUESTIONS 1-9: 0
1. LITTLE INTEREST OR PLEASURE IN DOING THINGS: 0
SUM OF ALL RESPONSES TO PHQ QUESTIONS 1-9: 0
2. FEELING DOWN, DEPRESSED OR HOPELESS: 0
SUM OF ALL RESPONSES TO PHQ9 QUESTIONS 1 & 2: 0

## 2018-09-14 NOTE — PROGRESS NOTES
complication ([facility] 30/66/9560    Vitamin D deficiency 3/17/2012       Past Surgical History:   Procedure Laterality Date    PROSTATE SURGERY  June 13, 2000    TURP       Current Outpatient Prescriptions   Medication Sig Dispense Refill    diclofenac sodium 1 % GEL APPLY 3GRAMS TO THE AFFECTED AREA THREE TIMES DAILY AS NEEDED FOR PAIN 500 g 0    glipiZIDE (GLUCOTROL) 5 MG tablet Take 0.5 tablets by mouth daily 45 tablet 1    metFORMIN (GLUCOPHAGE) 500 MG tablet Take 2 tablets by mouth 2 times daily (with meals) 360 tablet 0    Liraglutide (VICTOZA) 18 MG/3ML SOPN SC injection Inject 1.8 mg into the skin daily 9 mL 5    tamsulosin (FLOMAX) 0.4 MG capsule Take 2 capsules by mouth nightly 180 capsule 3    finasteride (PROSCAR) 5 MG tablet Take 1 tablet by mouth daily 90 tablet 3    carvedilol (COREG) 6.25 MG tablet Take 1 tablet by mouth 2 times daily 180 tablet 1    amLODIPine (NORVASC) 2.5 MG tablet TAKE 1 TABLET BY MOUTH TWICE DAILY HOLD FOR SYSTOLIC BLOOD PRESSURE LESS THAN 140 MM  tablet 3    atorvastatin (LIPITOR) 40 MG tablet Take 1 tablet by mouth daily 90 tablet 3    VENTOLIN  (90 BASE) MCG/ACT inhaler Inhale 2 puffs into the lungs every 6 hours as needed for Wheezing or Shortness of Breath 1 Inhaler 12    fluticasone (FLOVENT HFA) 110 MCG/ACT inhaler Inhale 2 puffs into the lungs 2 times daily 1 Inhaler 12    nitroGLYCERIN (NITROSTAT) 0.4 MG SL tablet Place 1 tablet under the tongue every 5 minutes as needed for Chest pain May repeat every 5 minutes until relief is obtained. If pain persists after taking 3 tabs in a 15-minute period, call 9-1-1 immediately. 25 tablet 12    Cholecalciferol (VITAMIN D3) 5000 UNITS TABS Take 1 tablet by mouth daily 90 tablet 3    Melatonin 3 MG CAPS Take 1 capsule by mouth nightly as needed (for insomnia)      Naproxen Sodium (ALEVE) 220 MG CAPS Take 1 capsule by mouth daily. With food.  aspirin 81 MG EC tablet Take 81 mg by mouth daily. No current facility-administered medications for this visit. /62   Temp 98.1 °F (36.7 °C)   Ht 5' 3\" (1.6 m)   Wt 173 lb (78.5 kg)   BMI 30.65 kg/m²     Physical Exam  General Appearance: alert and oriented to person, place and time, well developed and well- nourished, in no acute distress  Skin: warm and dry, no rash or erythema  Head: normocephalic and atraumatic  Eyes: pupils equal, round, and reactive to light, extraocular eye movements intact, conjunctivae normal  ENT: tympanic membrane, external ear and ear canal normal bilaterally, nose without deformity, nasal mucosa and turbinates normal without polyps  Neck: supple and non-tender without mass, no thyromegaly or thyroid nodules, no cervical lymphadenopathy  Pulmonary/Chest: some scattered low pitch expiratory wheezes, normal air movement, no respiratory distress  Cardiovascular: normal rate, regular rhythm, normal S1 and S2, no murmurs, rubs, clicks, or gallops, distal pulses intact, no carotid bruits  Abdomen: soft, non-tender, non-distended, normal bowel sounds, no masses or organomegaly  Extremities: no cyanosis, clubbing or edema. Some redness and tenderness to palpation of distal phalang of the L great toe ( see a picture)  Musculoskeletal: reduced range of motion in L spine, no joint swelling, deformity or tenderness. Neurologic: reflexes normal and symmetric, no cranial nerve deficit, gait, coordination and speech normal  Foot exam benign; no signs of peripheral polyneuropathy; no skin ulcers/calluses    Assessment/ plan:     Kayli Fischer was seen today for diabetes, hypertension and hyperlipidemia. Diagnoses and all orders for this visit:    Type 2 diabetes mellitus without complication, without long-term current use of insulin (HCC)  -     Hemoglobin A1C; Future  -     Microalbumin / Creatinine Urine Ratio; Future  -     Comprehensive Metabolic Panel;  Future  -     CBC Auto Differential; Future    Mixed hyperlipidemia  - Lipid Panel; Future    Essential hypertension  -     Comprehensive Metabolic Panel;  Future  -     CBC Auto Differential; Future    Wheezing, possible asthma       -    Will start Breo Ellipta 100/25- 1 spray daily       -    Ventolin 2 sprays every 6 hours prn       -    Recommended to take Claritin 10 mg PO daily and Flonase nasal spray for nasal congestion    Follow up in 4 weeks      John Mcdonnell MD

## 2018-09-14 NOTE — LETTER
every 6 hours as needed for Wheezing or Shortness of Breath     fluticasone (FLOVENT HFA) 110 MCG/ACT inhaler Inhale 2 puffs into the lungs 2 times daily     nitroGLYCERIN (NITROSTAT) 0.4 MG SL tablet Place 1 tablet under the tongue every 5 minutes as needed for Chest pain May repeat every 5 minutes until relief is obtained. If pain persists after taking 3 tabs in a 15-minute period, call 9-1-1 immediately.  diclofenac sodium (VOLTAREN) 1 % GEL Apply 4 g topically 4 times daily     Cholecalciferol (VITAMIN D3) 5000 UNITS TABS Take 1 tablet by mouth daily     Melatonin 3 MG CAPS Take 1 capsule by mouth nightly as needed (for insomnia)     Naproxen Sodium (ALEVE) 220 MG CAPS Take 1 capsule by mouth daily. With food.  aspirin 81 MG EC tablet Take 81 mg by mouth daily.          If you have any questions or concerns, please don't hesitate to call.        Sincerely,     Felicity WILLS

## 2018-10-01 ENCOUNTER — TELEPHONE (OUTPATIENT)
Dept: INTERNAL MEDICINE CLINIC | Age: 83
End: 2018-10-01

## 2018-10-01 DIAGNOSIS — I10 ESSENTIAL HYPERTENSION: Chronic | ICD-10-CM

## 2018-10-01 DIAGNOSIS — I25.118 CORONARY ARTERY DISEASE OF NATIVE ARTERY OF NATIVE HEART WITH STABLE ANGINA PECTORIS (HCC): ICD-10-CM

## 2018-10-01 RX ORDER — CARVEDILOL 6.25 MG/1
6.25 TABLET ORAL 2 TIMES DAILY
Qty: 180 TABLET | Refills: 1 | Status: SHIPPED | OUTPATIENT
Start: 2018-10-01 | End: 2019-03-31 | Stop reason: SDUPTHER

## 2018-10-05 ENCOUNTER — OFFICE VISIT (OUTPATIENT)
Dept: INTERNAL MEDICINE CLINIC | Age: 83
End: 2018-10-05
Payer: COMMERCIAL

## 2018-10-05 VITALS — WEIGHT: 173 LBS | BODY MASS INDEX: 30.65 KG/M2 | SYSTOLIC BLOOD PRESSURE: 124 MMHG | DIASTOLIC BLOOD PRESSURE: 68 MMHG

## 2018-10-05 DIAGNOSIS — R07.9 CHEST PAIN, UNSPECIFIED TYPE: ICD-10-CM

## 2018-10-05 DIAGNOSIS — I25.118 CORONARY ARTERY DISEASE OF NATIVE ARTERY OF NATIVE HEART WITH STABLE ANGINA PECTORIS (HCC): ICD-10-CM

## 2018-10-05 DIAGNOSIS — E11.9 TYPE 2 DIABETES MELLITUS WITHOUT COMPLICATION, WITHOUT LONG-TERM CURRENT USE OF INSULIN (HCC): Primary | Chronic | ICD-10-CM

## 2018-10-05 DIAGNOSIS — B35.6 TINEA CRURIS: ICD-10-CM

## 2018-10-05 DIAGNOSIS — I10 ESSENTIAL HYPERTENSION: Chronic | ICD-10-CM

## 2018-10-05 DIAGNOSIS — J45.30 MILD PERSISTENT ASTHMA WITHOUT COMPLICATION: ICD-10-CM

## 2018-10-05 PROCEDURE — 99214 OFFICE O/P EST MOD 30 MIN: CPT | Performed by: HOSPITALIST

## 2018-10-05 PROCEDURE — 93000 ELECTROCARDIOGRAM COMPLETE: CPT | Performed by: HOSPITALIST

## 2018-10-05 RX ORDER — GLIPIZIDE 5 MG/1
2.5 TABLET ORAL DAILY
Qty: 45 TABLET | Refills: 5 | Status: SHIPPED | OUTPATIENT
Start: 2018-10-05 | End: 2019-11-13 | Stop reason: SDUPTHER

## 2018-10-05 RX ORDER — CLOTRIMAZOLE AND BETAMETHASONE DIPROPIONATE 10; .64 MG/G; MG/G
CREAM TOPICAL
Qty: 45 G | Refills: 1 | Status: SHIPPED | OUTPATIENT
Start: 2018-10-05 | End: 2019-04-09

## 2018-10-05 RX ORDER — FLUTICASONE PROPIONATE 50 MCG
2 SPRAY, SUSPENSION (ML) NASAL DAILY
Qty: 1 BOTTLE | Refills: 3 | Status: SHIPPED | OUTPATIENT
Start: 2018-10-05 | End: 2018-10-05 | Stop reason: SDUPTHER

## 2018-10-05 NOTE — PROGRESS NOTES
(ALEVE) 220 MG CAPS Take 1 capsule by mouth daily. With food.  aspirin 81 MG EC tablet Take 81 mg by mouth daily. No current facility-administered medications for this visit. /68   Wt 173 lb (78.5 kg)   BMI 30.65 kg/m²     Physical Exam  General Appearance: alert and oriented to person, place and time, well developed and well- nourished, in no acute distress  Skin: warm and dry, no rash or erythema  Head: normocephalic and atraumatic  Eyes: pupils equal, round, and reactive to light, extraocular eye movements intact, conjunctivae normal  ENT: tympanic membrane, external ear and ear canal normal bilaterally, nose without deformity, nasal mucosa is moderately swollen and somewhat cyanotic  Neck: supple and non-tender without mass, no thyromegaly or thyroid nodules, no cervical lymphadenopathy  Pulmonary/Chest: relatively CTA bilaterally, normal air movement, no respiratory distress  Cardiovascular: normal rate, regular rhythm, normal S1 and S2, no murmurs, rubs, clicks, or gallops, distal pulses intact, no carotid bruits  Abdomen: soft, non-tender, non-distended, normal bowel sounds, no masses or organomegaly  Extremities: no cyanosis, clubbing or edema. Some redness and tenderness to palpation of distal phalang of the L great toe and macular rash over greater saphenous vein distribution [id number] ( see a picture)  Musculoskeletal: reduced range of motion in L spine, no joint swelling, deformity or tenderness. Neurologic: reflexes normal and symmetric, no cranial nerve deficit, gait, coordination and speech normal  Foot exam benign; no signs of peripheral polyneuropathy; no skin ulcers/calluses      12 lead EKG- NSR, normal ECG    Assessment/ plan:     Yvonne Keith was seen today for other and rash. Diagnoses and all orders for this visit:    Type 2 diabetes mellitus without complication, without long-term current use of insulin (HCC)  -     metFORMIN (GLUCOPHAGE) 500 MG tablet;  Take 2 tablets by mouth 2 times daily (with meals)  -     glipiZIDE (GLUCOTROL) 5 MG tablet; Take 0.5 tablets by mouth daily  -     EKG 12 lead  -     Carb control diet + judicious exercise    Essential hypertension  -     EKG 12 lead  -     Continue Carvedilol and Amlodipine    Tinea cruris        -     Will try applications of Lotrisone BID    Mild persistent asthma without complication        -    Continue Breo Ellipta daily; Albuterol prn    Chest pain, unspecified type ( has history of CAD)- stable  -     EKG 12 lead  -     Continue anti-hypertensive meds  -     Continue  ASA 81 mg PO daily    Chronic allergic rhinitis  -     Flonase nasal spray; 2 sprays in each nostril daily    Other orders  -     clotrimazole-betamethasone (LOTRISONE) 1-0.05 % cream; Apply topically 2 times daily.   -     fluticasone (FLONASE) 50 MCG/ACT nasal spray; 2 sprays by Nasal route daily      Follow up in 3 months      Clemencia Black MD

## 2018-10-08 RX ORDER — FLUTICASONE PROPIONATE 50 MCG
SPRAY, SUSPENSION (ML) NASAL
Qty: 1 BOTTLE | Refills: 3 | Status: SHIPPED | OUTPATIENT
Start: 2018-10-08 | End: 2021-01-01 | Stop reason: CLARIF

## 2018-10-09 ENCOUNTER — TELEPHONE (OUTPATIENT)
Dept: INTERNAL MEDICINE CLINIC | Age: 83
End: 2018-10-09

## 2018-10-16 DIAGNOSIS — E55.9 VITAMIN D DEFICIENCY: Chronic | ICD-10-CM

## 2018-11-08 DIAGNOSIS — E11.9 TYPE 2 DIABETES MELLITUS WITHOUT COMPLICATION, WITHOUT LONG-TERM CURRENT USE OF INSULIN (HCC): Chronic | ICD-10-CM

## 2018-11-26 DIAGNOSIS — M54.41 BILATERAL LOW BACK PAIN WITH RIGHT-SIDED SCIATICA, UNSPECIFIED CHRONICITY: ICD-10-CM

## 2018-12-24 DIAGNOSIS — E11.9 TYPE 2 DIABETES MELLITUS WITHOUT COMPLICATION, WITHOUT LONG-TERM CURRENT USE OF INSULIN (HCC): Chronic | ICD-10-CM

## 2018-12-27 RX ORDER — LIRAGLUTIDE 6 MG/ML
INJECTION SUBCUTANEOUS
Qty: 9 ML | Refills: 0 | Status: SHIPPED | OUTPATIENT
Start: 2018-12-27 | End: 2019-01-31 | Stop reason: SDUPTHER

## 2019-01-15 DIAGNOSIS — E55.9 VITAMIN D DEFICIENCY: Chronic | ICD-10-CM

## 2019-01-25 ENCOUNTER — OFFICE VISIT (OUTPATIENT)
Dept: INTERNAL MEDICINE CLINIC | Age: 84
End: 2019-01-25
Payer: COMMERCIAL

## 2019-01-25 VITALS
BODY MASS INDEX: 31.54 KG/M2 | HEIGHT: 63 IN | WEIGHT: 178 LBS | DIASTOLIC BLOOD PRESSURE: 74 MMHG | SYSTOLIC BLOOD PRESSURE: 122 MMHG

## 2019-01-25 DIAGNOSIS — I25.118 CORONARY ARTERY DISEASE OF NATIVE ARTERY OF NATIVE HEART WITH STABLE ANGINA PECTORIS (HCC): ICD-10-CM

## 2019-01-25 DIAGNOSIS — E11.9 TYPE 2 DIABETES MELLITUS WITHOUT COMPLICATION, WITHOUT LONG-TERM CURRENT USE OF INSULIN (HCC): Primary | Chronic | ICD-10-CM

## 2019-01-25 DIAGNOSIS — I10 ESSENTIAL HYPERTENSION: Chronic | ICD-10-CM

## 2019-01-25 DIAGNOSIS — Z23 NEED FOR INFLUENZA VACCINATION: ICD-10-CM

## 2019-01-25 DIAGNOSIS — R35.0 BENIGN PROSTATIC HYPERPLASIA WITH URINARY FREQUENCY: Chronic | ICD-10-CM

## 2019-01-25 DIAGNOSIS — N40.1 BENIGN PROSTATIC HYPERPLASIA WITH URINARY FREQUENCY: Chronic | ICD-10-CM

## 2019-01-25 DIAGNOSIS — E78.2 MIXED HYPERLIPIDEMIA: Chronic | ICD-10-CM

## 2019-01-25 PROCEDURE — 90662 IIV NO PRSV INCREASED AG IM: CPT | Performed by: HOSPITALIST

## 2019-01-25 PROCEDURE — G0008 ADMIN INFLUENZA VIRUS VAC: HCPCS | Performed by: HOSPITALIST

## 2019-01-25 PROCEDURE — 99214 OFFICE O/P EST MOD 30 MIN: CPT | Performed by: HOSPITALIST

## 2019-01-25 RX ORDER — DOXYCYCLINE HYCLATE 100 MG/1
100 CAPSULE ORAL DAILY
COMMUNITY
End: 2019-03-29 | Stop reason: CLARIF

## 2019-01-25 ASSESSMENT — PATIENT HEALTH QUESTIONNAIRE - PHQ9
SUM OF ALL RESPONSES TO PHQ QUESTIONS 1-9: 0
SUM OF ALL RESPONSES TO PHQ9 QUESTIONS 1 & 2: 0
1. LITTLE INTEREST OR PLEASURE IN DOING THINGS: 0
2. FEELING DOWN, DEPRESSED OR HOPELESS: 0
SUM OF ALL RESPONSES TO PHQ QUESTIONS 1-9: 0

## 2019-01-31 DIAGNOSIS — E11.9 TYPE 2 DIABETES MELLITUS WITHOUT COMPLICATION, WITHOUT LONG-TERM CURRENT USE OF INSULIN (HCC): Chronic | ICD-10-CM

## 2019-02-01 RX ORDER — LIRAGLUTIDE 6 MG/ML
INJECTION SUBCUTANEOUS
Qty: 9 ML | Refills: 3 | Status: SHIPPED | OUTPATIENT
Start: 2019-02-01 | End: 2019-06-02 | Stop reason: SDUPTHER

## 2019-02-16 DIAGNOSIS — M54.41 BILATERAL LOW BACK PAIN WITH RIGHT-SIDED SCIATICA, UNSPECIFIED CHRONICITY: ICD-10-CM

## 2019-03-25 DIAGNOSIS — E11.9 TYPE 2 DIABETES MELLITUS WITHOUT COMPLICATION, WITHOUT LONG-TERM CURRENT USE OF INSULIN (HCC): Chronic | ICD-10-CM

## 2019-03-25 DIAGNOSIS — E78.2 MIXED HYPERLIPIDEMIA: Chronic | ICD-10-CM

## 2019-03-25 DIAGNOSIS — I10 ESSENTIAL HYPERTENSION: Chronic | ICD-10-CM

## 2019-03-25 LAB
A/G RATIO: 1.4 (ref 1.1–2.2)
ALBUMIN SERPL-MCNC: 4 G/DL (ref 3.4–5)
ALP BLD-CCNC: 69 U/L (ref 40–129)
ALT SERPL-CCNC: 19 U/L (ref 10–40)
ANION GAP SERPL CALCULATED.3IONS-SCNC: 14 MMOL/L (ref 3–16)
AST SERPL-CCNC: 16 U/L (ref 15–37)
BASOPHILS ABSOLUTE: 0 K/UL (ref 0–0.2)
BASOPHILS RELATIVE PERCENT: 0.6 %
BILIRUB SERPL-MCNC: 0.4 MG/DL (ref 0–1)
BUN BLDV-MCNC: 23 MG/DL (ref 7–20)
CALCIUM SERPL-MCNC: 9.5 MG/DL (ref 8.3–10.6)
CHLORIDE BLD-SCNC: 106 MMOL/L (ref 99–110)
CHOLESTEROL, TOTAL: 204 MG/DL (ref 0–199)
CO2: 25 MMOL/L (ref 21–32)
CREAT SERPL-MCNC: 1 MG/DL (ref 0.8–1.3)
CREATININE URINE: 146.4 MG/DL (ref 39–259)
EOSINOPHILS ABSOLUTE: 0.3 K/UL (ref 0–0.6)
EOSINOPHILS RELATIVE PERCENT: 5.4 %
GFR AFRICAN AMERICAN: >60
GFR NON-AFRICAN AMERICAN: >60
GLOBULIN: 2.9 G/DL
GLUCOSE BLD-MCNC: 141 MG/DL (ref 70–99)
HCT VFR BLD CALC: 43.8 % (ref 40.5–52.5)
HDLC SERPL-MCNC: 40 MG/DL (ref 40–60)
HEMOGLOBIN: 14.8 G/DL (ref 13.5–17.5)
LDL CHOLESTEROL CALCULATED: 135 MG/DL
LYMPHOCYTES ABSOLUTE: 2 K/UL (ref 1–5.1)
LYMPHOCYTES RELATIVE PERCENT: 32.8 %
MCH RBC QN AUTO: 32.7 PG (ref 26–34)
MCHC RBC AUTO-ENTMCNC: 33.8 G/DL (ref 31–36)
MCV RBC AUTO: 96.9 FL (ref 80–100)
MICROALBUMIN UR-MCNC: 2.7 MG/DL
MICROALBUMIN/CREAT UR-RTO: 18.4 MG/G (ref 0–30)
MONOCYTES ABSOLUTE: 0.4 K/UL (ref 0–1.3)
MONOCYTES RELATIVE PERCENT: 5.7 %
NEUTROPHILS ABSOLUTE: 3.4 K/UL (ref 1.7–7.7)
NEUTROPHILS RELATIVE PERCENT: 55.5 %
PDW BLD-RTO: 14.1 % (ref 12.4–15.4)
PLATELET # BLD: 162 K/UL (ref 135–450)
PMV BLD AUTO: 9.1 FL (ref 5–10.5)
POTASSIUM SERPL-SCNC: 4.6 MMOL/L (ref 3.5–5.1)
RBC # BLD: 4.52 M/UL (ref 4.2–5.9)
SODIUM BLD-SCNC: 145 MMOL/L (ref 136–145)
TOTAL PROTEIN: 6.9 G/DL (ref 6.4–8.2)
TRIGL SERPL-MCNC: 147 MG/DL (ref 0–150)
VLDLC SERPL CALC-MCNC: 29 MG/DL
WBC # BLD: 6.2 K/UL (ref 4–11)

## 2019-03-26 LAB
ESTIMATED AVERAGE GLUCOSE: 162.8 MG/DL
HBA1C MFR BLD: 7.3 %

## 2019-03-29 ENCOUNTER — OFFICE VISIT (OUTPATIENT)
Dept: INTERNAL MEDICINE CLINIC | Age: 84
End: 2019-03-29
Payer: COMMERCIAL

## 2019-03-29 VITALS
HEIGHT: 63 IN | DIASTOLIC BLOOD PRESSURE: 70 MMHG | WEIGHT: 176 LBS | SYSTOLIC BLOOD PRESSURE: 122 MMHG | BODY MASS INDEX: 31.18 KG/M2

## 2019-03-29 DIAGNOSIS — I10 ESSENTIAL HYPERTENSION: ICD-10-CM

## 2019-03-29 DIAGNOSIS — E11.9 TYPE 2 DIABETES MELLITUS WITHOUT COMPLICATION, WITHOUT LONG-TERM CURRENT USE OF INSULIN (HCC): ICD-10-CM

## 2019-03-29 DIAGNOSIS — J45.30 MILD PERSISTENT ASTHMA WITHOUT COMPLICATION: ICD-10-CM

## 2019-03-29 DIAGNOSIS — R13.12 OROPHARYNGEAL DYSPHAGIA: ICD-10-CM

## 2019-03-29 DIAGNOSIS — R49.0 VOICE HOARSENESS: Primary | ICD-10-CM

## 2019-03-29 PROCEDURE — 99214 OFFICE O/P EST MOD 30 MIN: CPT | Performed by: HOSPITALIST

## 2019-03-29 ASSESSMENT — PATIENT HEALTH QUESTIONNAIRE - PHQ9
SUM OF ALL RESPONSES TO PHQ QUESTIONS 1-9: 0
SUM OF ALL RESPONSES TO PHQ9 QUESTIONS 1 & 2: 0
SUM OF ALL RESPONSES TO PHQ QUESTIONS 1-9: 0
1. LITTLE INTEREST OR PLEASURE IN DOING THINGS: 0
2. FEELING DOWN, DEPRESSED OR HOPELESS: 0

## 2019-03-31 DIAGNOSIS — I10 ESSENTIAL HYPERTENSION: Chronic | ICD-10-CM

## 2019-03-31 DIAGNOSIS — I25.118 CORONARY ARTERY DISEASE OF NATIVE ARTERY OF NATIVE HEART WITH STABLE ANGINA PECTORIS (HCC): ICD-10-CM

## 2019-04-01 RX ORDER — CARVEDILOL 6.25 MG/1
TABLET ORAL
Qty: 180 TABLET | Refills: 2 | Status: SHIPPED | OUTPATIENT
Start: 2019-04-01 | End: 2019-12-30 | Stop reason: SDUPTHER

## 2019-04-06 ENCOUNTER — HOSPITAL ENCOUNTER (OUTPATIENT)
Dept: GENERAL RADIOLOGY | Age: 84
Discharge: HOME | End: 2019-04-06
Payer: COMMERCIAL

## 2019-04-06 DIAGNOSIS — R13.12 OROPHARYNGEAL DYSPHAGIA: ICD-10-CM

## 2019-04-06 PROCEDURE — 74220 X-RAY XM ESOPHAGUS 1CNTRST: CPT

## 2019-04-07 ENCOUNTER — TELEPHONE (OUTPATIENT)
Dept: INTERNAL MEDICINE CLINIC | Age: 84
End: 2019-04-07

## 2019-04-09 ENCOUNTER — OFFICE VISIT (OUTPATIENT)
Dept: ENT CLINIC | Age: 84
End: 2019-04-09
Payer: COMMERCIAL

## 2019-04-09 VITALS
BODY MASS INDEX: 31.25 KG/M2 | HEIGHT: 63 IN | WEIGHT: 176.4 LBS | HEART RATE: 68 BPM | TEMPERATURE: 99.3 F | DIASTOLIC BLOOD PRESSURE: 65 MMHG | SYSTOLIC BLOOD PRESSURE: 123 MMHG

## 2019-04-09 DIAGNOSIS — R49.9 HOARSENESS OR CHANGING VOICE: Primary | ICD-10-CM

## 2019-04-09 DIAGNOSIS — R13.10 DYSPHAGIA, UNSPECIFIED TYPE: ICD-10-CM

## 2019-04-09 PROCEDURE — 99203 OFFICE O/P NEW LOW 30 MIN: CPT | Performed by: OTOLARYNGOLOGY

## 2019-04-09 PROCEDURE — 31575 DIAGNOSTIC LARYNGOSCOPY: CPT | Performed by: OTOLARYNGOLOGY

## 2019-04-09 NOTE — PROGRESS NOTES
CHIEF COMPLAINT: Hoarseness    HISTORY OF PRESENT ILLNESS:  81 y.o. male who presents with hoarseness of 3 months duration. Gets worse after a short period of voice use. Insidious onset, progressive. Has problems swallowing his own secretions,eats and drinks OK. No throat pain. No smoking. PAST MEDICAL HISTORY:   Social History     Tobacco Use   Smoking Status Never Smoker   Smokeless Tobacco Never Used                                                    Social History     Substance and Sexual Activity   Alcohol Use Yes    Comment: occasionally                                                    Current Outpatient Medications:     carvedilol (COREG) 6.25 MG tablet, TAKE 1 TABLET BY MOUTH TWICE DAILY, Disp: 180 tablet, Rfl: 2    blood glucose test strips (ALBERT CONTOUR TEST) strip, 1 each by In Vitro route daily As needed. , Disp: 100 each, Rfl: 3    diclofenac sodium 1 % GEL, APPLY 3 GRAMS EXTERNALLY TO THE AFFECTED AREA THREE TIMES DAILY AS NEEDED FOR PAIN, Disp: 500 g, Rfl: 2    VICTOZA 18 MG/3ML SOPN SC injection, ADMINISTER 1.8 MG UNDER THE SKIN DAILY, Disp: 9 mL, Rfl: 3    Cholecalciferol (VITAMIN D3) 5000 units CAPS, TAKE ONE CAPSULE BY MOUTH EVERY DAY, Disp: 90 capsule, Rfl: 0    fluticasone (FLONASE) 50 MCG/ACT nasal spray, SHAKE LIQUID AND USE 2 SPRAYS IN EACH NOSTRIL DAILY, Disp: 1 Bottle, Rfl: 3    metFORMIN (GLUCOPHAGE) 500 MG tablet, Take 2 tablets by mouth 2 times daily (with meals), Disp: 360 tablet, Rfl: 5    glipiZIDE (GLUCOTROL) 5 MG tablet, Take 0.5 tablets by mouth daily, Disp: 45 tablet, Rfl: 5    tamsulosin (FLOMAX) 0.4 MG capsule, Take 2 capsules by mouth nightly, Disp: 180 capsule, Rfl: 3    finasteride (PROSCAR) 5 MG tablet, Take 1 tablet by mouth daily, Disp: 90 tablet, Rfl: 3    atorvastatin (LIPITOR) 40 MG tablet, Take 1 tablet by mouth daily, Disp: 90 tablet, Rfl: 3    nitroGLYCERIN (NITROSTAT) 0.4 MG SL tablet, Place 1 tablet under the tongue every 5 minutes as needed for Chest pain May repeat every 5 minutes until relief is obtained. If pain persists after taking 3 tabs in a 15-minute period, call 9-1-1 immediately. , Disp: 25 tablet, Rfl: 12    Melatonin 3 MG CAPS, Take 1 capsule by mouth nightly as needed (for insomnia), Disp: , Rfl:     Naproxen Sodium (ALEVE) 220 MG CAPS, Take 1 capsule by mouth daily. With food. , Disp: , Rfl:     aspirin 81 MG EC tablet, Take 81 mg by mouth daily. , Disp: , Rfl:                                                  Past Medical History:   Diagnosis Date    Allergic rhinitis 8/27/2010    Asthma     Back pain     Bilateral cataracts     Bilateral low back pain with right-sided sciatica 12/18/2015    BPH (benign prostatic hypertrophy)     Chest pain     Degeneration of lumbar or lumbosacral intervertebral disc     Dental disease     Diabetes mellitus, type 2 ([address] 5/28/2010    Diverticulosis     Dizziness     Eczema 3/11/2011    Essential hypertension 9/18/2015    Hearing loss     Hyperlipidemia     Hypertension     Insomnia     Lumbar disc disease     Microalbuminuria 6/13/2013    Neck pain 12/14/2012    Nosebleed     Obesity     Obstructive sleep apnea     Osteoarthritis     Post herpetic neuralgia 6/15/2012    Recurrent upper respiratory infection (URI)     Right hip pain 12/14/2012    Right shoulder pain 6/17/2011    Rosacea     Spinal stenosis     Transient global amnesia     Type 2 diabetes mellitus without complication ([address] 07/43/5777    Vitamin D deficiency 3/17/2012                                                    Past Surgical History:   Procedure Laterality Date    PROSTATE SURGERY  June 13, 2000    TURP         REVIEW OF SYSTEMS:  All pertinent positive and negative review of systems included in HPI. Otherwise, all systems are reviewed and negative.     PHYSICAL EXAMINATION:   GENERAL: wdwn- no acute distress  COMMUNICATION :  Normal voice  MENTAL STATUS:  Normal  HEAD AND FACE:  Normal  EXTERNAL EARS

## 2019-04-16 DIAGNOSIS — E55.9 VITAMIN D DEFICIENCY: Chronic | ICD-10-CM

## 2019-04-16 NOTE — TELEPHONE ENCOUNTER
Ry Abbasi @ [facility] calling for a refill on patient's :    Cholecalciferol (VITAMIN D3) 5000 units CAPS [[id number]      [facility] [address], [address] [phone] - F [phone]  [phone]

## 2019-05-08 DIAGNOSIS — E11.9 TYPE 2 DIABETES MELLITUS WITHOUT COMPLICATION, WITHOUT LONG-TERM CURRENT USE OF INSULIN (HCC): Chronic | ICD-10-CM

## 2019-05-15 DIAGNOSIS — N40.1 BENIGN NON-NODULAR PROSTATIC HYPERPLASIA WITH LOWER URINARY TRACT SYMPTOMS: ICD-10-CM

## 2019-05-17 RX ORDER — FINASTERIDE 5 MG/1
5 TABLET, FILM COATED ORAL DAILY
Qty: 90 TABLET | Refills: 3 | Status: SHIPPED | OUTPATIENT
Start: 2019-05-17 | End: 2020-05-13

## 2019-05-20 DIAGNOSIS — L03.032 CELLULITIS OF GREAT TOE OF LEFT FOOT: Primary | ICD-10-CM

## 2019-05-20 RX ORDER — LINEZOLID 600 MG/1
600 TABLET, FILM COATED ORAL 2 TIMES DAILY
Qty: 28 TABLET | Refills: 1 | Status: SHIPPED | OUTPATIENT
Start: 2019-05-20 | End: 2019-06-03

## 2019-05-29 ENCOUNTER — APPOINTMENT (OUTPATIENT)
Dept: CT IMAGING | Age: 84
End: 2019-05-29
Payer: COMMERCIAL

## 2019-05-29 ENCOUNTER — HOSPITAL ENCOUNTER (EMERGENCY)
Age: 84
Discharge: HOME | End: 2019-05-29
Attending: EMERGENCY MEDICINE
Payer: COMMERCIAL

## 2019-05-29 ENCOUNTER — APPOINTMENT (OUTPATIENT)
Dept: GENERAL RADIOLOGY | Age: 84
End: 2019-05-29
Payer: COMMERCIAL

## 2019-05-29 VITALS
SYSTOLIC BLOOD PRESSURE: 148 MMHG | TEMPERATURE: 98.1 F | OXYGEN SATURATION: 94 % | RESPIRATION RATE: 17 BRPM | DIASTOLIC BLOOD PRESSURE: 83 MMHG | HEART RATE: 73 BPM | WEIGHT: 160 LBS | BODY MASS INDEX: 28.35 KG/M2 | HEIGHT: 63 IN

## 2019-05-29 DIAGNOSIS — R10.84 GENERALIZED ABDOMINAL PAIN: Primary | ICD-10-CM

## 2019-05-29 DIAGNOSIS — R11.2 NON-INTRACTABLE VOMITING WITH NAUSEA, UNSPECIFIED VOMITING TYPE: ICD-10-CM

## 2019-05-29 LAB
ALBUMIN SERPL-MCNC: 4.4 G/DL (ref 3.4–5)
ALP BLD-CCNC: 66 U/L (ref 40–129)
ALT SERPL-CCNC: 48 U/L (ref 10–40)
AMORPHOUS: ABNORMAL /HPF
ANION GAP SERPL CALCULATED.3IONS-SCNC: 13 MMOL/L (ref 3–16)
AST SERPL-CCNC: 36 U/L (ref 15–37)
BACTERIA: ABNORMAL /HPF
BASOPHILS ABSOLUTE: 0.3 K/UL (ref 0–0.2)
BASOPHILS RELATIVE PERCENT: 3.5 %
BILIRUB SERPL-MCNC: 0.7 MG/DL (ref 0–1)
BILIRUBIN DIRECT: <0.2 MG/DL (ref 0–0.3)
BILIRUBIN URINE: NEGATIVE
BILIRUBIN, INDIRECT: ABNORMAL MG/DL (ref 0–1)
BLOOD, URINE: ABNORMAL
BUN BLDV-MCNC: 20 MG/DL (ref 7–20)
CALCIUM SERPL-MCNC: 9.2 MG/DL (ref 8.3–10.6)
CHLORIDE BLD-SCNC: 105 MMOL/L (ref 99–110)
CLARITY: CLEAR
CO2: 23 MMOL/L (ref 21–32)
COLOR: YELLOW
CREAT SERPL-MCNC: 0.9 MG/DL (ref 0.8–1.3)
EOSINOPHILS ABSOLUTE: 0.2 K/UL (ref 0–0.6)
EOSINOPHILS RELATIVE PERCENT: 2.2 %
GFR AFRICAN AMERICAN: >60
GFR NON-AFRICAN AMERICAN: >60
GLUCOSE BLD-MCNC: 105 MG/DL (ref 70–99)
GLUCOSE URINE: NEGATIVE MG/DL
HCT VFR BLD CALC: 41 % (ref 40.5–52.5)
HEMOGLOBIN: 13.9 G/DL (ref 13.5–17.5)
KETONES, URINE: 40 MG/DL
LACTATE: 1.17 MMOL/L (ref 0.4–2)
LEUKOCYTE ESTERASE, URINE: NEGATIVE
LIPASE: 21 U/L (ref 13–60)
LYMPHOCYTES ABSOLUTE: 1 K/UL (ref 1–5.1)
LYMPHOCYTES RELATIVE PERCENT: 13.1 %
MCH RBC QN AUTO: 32.5 PG (ref 26–34)
MCHC RBC AUTO-ENTMCNC: 34 G/DL (ref 31–36)
MCV RBC AUTO: 95.7 FL (ref 80–100)
MICROSCOPIC EXAMINATION: YES
MONOCYTES ABSOLUTE: 0.3 K/UL (ref 0–1.3)
MONOCYTES RELATIVE PERCENT: 3.7 %
MUCUS: ABNORMAL /LPF
NEUTROPHILS ABSOLUTE: 6.2 K/UL (ref 1.7–7.7)
NEUTROPHILS RELATIVE PERCENT: 77.5 %
NITRITE, URINE: NEGATIVE
PDW BLD-RTO: 13.9 % (ref 12.4–15.4)
PERFORMED ON: NORMAL
PH UA: 5.5 (ref 5–8)
PLATELET # BLD: 142 K/UL (ref 135–450)
PMV BLD AUTO: 7.8 FL (ref 5–10.5)
POC SAMPLE TYPE: NORMAL
POTASSIUM REFLEX MAGNESIUM: 4 MMOL/L (ref 3.5–5.1)
PRO-BNP: 114 PG/ML (ref 0–449)
PROTEIN UA: ABNORMAL MG/DL
RBC # BLD: 4.28 M/UL (ref 4.2–5.9)
RBC UA: ABNORMAL /HPF (ref 0–2)
SODIUM BLD-SCNC: 141 MMOL/L (ref 136–145)
SPECIFIC GRAVITY UA: 1.02 (ref 1–1.03)
TOTAL PROTEIN: 6.9 G/DL (ref 6.4–8.2)
TROPONIN: <0.01 NG/ML
URINE TYPE: ABNORMAL
UROBILINOGEN, URINE: 0.2 E.U./DL
WBC # BLD: 7.9 K/UL (ref 4–11)
WBC UA: ABNORMAL /HPF (ref 0–5)

## 2019-05-29 PROCEDURE — 81001 URINALYSIS AUTO W/SCOPE: CPT

## 2019-05-29 PROCEDURE — 96365 THER/PROPH/DIAG IV INF INIT: CPT

## 2019-05-29 PROCEDURE — 6360000002 HC RX W HCPCS: Performed by: EMERGENCY MEDICINE

## 2019-05-29 PROCEDURE — 96375 TX/PRO/DX INJ NEW DRUG ADDON: CPT

## 2019-05-29 PROCEDURE — 2580000003 HC RX 258: Performed by: PHYSICIAN ASSISTANT

## 2019-05-29 PROCEDURE — 80048 BASIC METABOLIC PNL TOTAL CA: CPT

## 2019-05-29 PROCEDURE — 83880 ASSAY OF NATRIURETIC PEPTIDE: CPT

## 2019-05-29 PROCEDURE — 71045 X-RAY EXAM CHEST 1 VIEW: CPT

## 2019-05-29 PROCEDURE — 83690 ASSAY OF LIPASE: CPT

## 2019-05-29 PROCEDURE — 83605 ASSAY OF LACTIC ACID: CPT

## 2019-05-29 PROCEDURE — 85025 COMPLETE CBC W/AUTO DIFF WBC: CPT

## 2019-05-29 PROCEDURE — 71260 CT THORAX DX C+: CPT

## 2019-05-29 PROCEDURE — 84484 ASSAY OF TROPONIN QUANT: CPT

## 2019-05-29 PROCEDURE — 96366 THER/PROPH/DIAG IV INF ADDON: CPT

## 2019-05-29 PROCEDURE — 93005 ELECTROCARDIOGRAM TRACING: CPT | Performed by: PHYSICIAN ASSISTANT

## 2019-05-29 PROCEDURE — 6360000004 HC RX CONTRAST MEDICATION: Performed by: EMERGENCY MEDICINE

## 2019-05-29 PROCEDURE — 80076 HEPATIC FUNCTION PANEL: CPT

## 2019-05-29 PROCEDURE — 96376 TX/PRO/DX INJ SAME DRUG ADON: CPT

## 2019-05-29 PROCEDURE — 6360000002 HC RX W HCPCS: Performed by: PHYSICIAN ASSISTANT

## 2019-05-29 PROCEDURE — 99284 EMERGENCY DEPT VISIT MOD MDM: CPT

## 2019-05-29 RX ORDER — SODIUM CHLORIDE, SODIUM LACTATE, POTASSIUM CHLORIDE, CALCIUM CHLORIDE 600; 310; 30; 20 MG/100ML; MG/100ML; MG/100ML; MG/100ML
1000 INJECTION, SOLUTION INTRAVENOUS ONCE
Status: COMPLETED | OUTPATIENT
Start: 2019-05-29 | End: 2019-05-29

## 2019-05-29 RX ORDER — ONDANSETRON 2 MG/ML
4 INJECTION INTRAMUSCULAR; INTRAVENOUS ONCE
Status: COMPLETED | OUTPATIENT
Start: 2019-05-29 | End: 2019-05-29

## 2019-05-29 RX ORDER — MORPHINE SULFATE 2 MG/ML
2 INJECTION, SOLUTION INTRAMUSCULAR; INTRAVENOUS ONCE
Status: COMPLETED | OUTPATIENT
Start: 2019-05-29 | End: 2019-05-29

## 2019-05-29 RX ORDER — MORPHINE SULFATE 4 MG/ML
4 INJECTION, SOLUTION INTRAMUSCULAR; INTRAVENOUS ONCE
Status: COMPLETED | OUTPATIENT
Start: 2019-05-29 | End: 2019-05-29

## 2019-05-29 RX ORDER — ONDANSETRON 4 MG/1
4 TABLET, FILM COATED ORAL EVERY 8 HOURS PRN
Qty: 12 TABLET | Refills: 0 | Status: SHIPPED | OUTPATIENT
Start: 2019-05-29 | End: 2021-01-01 | Stop reason: CLARIF

## 2019-05-29 RX ADMIN — MORPHINE SULFATE 2 MG: 2 INJECTION, SOLUTION INTRAMUSCULAR; INTRAVENOUS at 18:36

## 2019-05-29 RX ADMIN — IOPAMIDOL 80 ML: 755 INJECTION, SOLUTION INTRAVENOUS at 19:28

## 2019-05-29 RX ADMIN — ONDANSETRON 4 MG: 2 INJECTION INTRAMUSCULAR; INTRAVENOUS at 21:55

## 2019-05-29 RX ADMIN — SODIUM CHLORIDE, POTASSIUM CHLORIDE, SODIUM LACTATE AND CALCIUM CHLORIDE 1000 ML: 600; 310; 30; 20 INJECTION, SOLUTION INTRAVENOUS at 18:36

## 2019-05-29 RX ADMIN — MORPHINE SULFATE 1 MG: 4 INJECTION INTRAVENOUS at 21:54

## 2019-05-29 RX ADMIN — ONDANSETRON 4 MG: 2 INJECTION INTRAMUSCULAR; INTRAVENOUS at 18:36

## 2019-05-29 ASSESSMENT — ENCOUNTER SYMPTOMS
RECTAL PAIN: 0
SHORTNESS OF BREATH: 0
WHEEZING: 0
NAUSEA: 1
PHOTOPHOBIA: 0
ABDOMINAL PAIN: 1
BACK PAIN: 0
VOMITING: 1
COUGH: 0
FACIAL SWELLING: 0
TROUBLE SWALLOWING: 0
CHEST TIGHTNESS: 0
DIARRHEA: 0
RHINORRHEA: 0

## 2019-05-29 ASSESSMENT — PAIN DESCRIPTION - PAIN TYPE: TYPE: ACUTE PAIN

## 2019-05-29 ASSESSMENT — PAIN SCALES - GENERAL
PAINLEVEL_OUTOF10: 8
PAINLEVEL_OUTOF10: 6

## 2019-05-29 ASSESSMENT — PAIN DESCRIPTION - DESCRIPTORS: DESCRIPTORS: ACHING

## 2019-05-29 ASSESSMENT — PAIN DESCRIPTION - ORIENTATION: ORIENTATION: LEFT;LOWER

## 2019-05-29 ASSESSMENT — PAIN DESCRIPTION - LOCATION: LOCATION: ABDOMEN

## 2019-05-30 LAB
EKG ATRIAL RATE: 76 BPM
EKG ATRIAL RATE: 88 BPM
EKG DIAGNOSIS: NORMAL
EKG DIAGNOSIS: NORMAL
EKG P AXIS: 34 DEGREES
EKG P AXIS: 54 DEGREES
EKG P-R INTERVAL: 142 MS
EKG P-R INTERVAL: 190 MS
EKG Q-T INTERVAL: 358 MS
EKG Q-T INTERVAL: 386 MS
EKG QRS DURATION: 82 MS
EKG QRS DURATION: 92 MS
EKG QTC CALCULATION (BAZETT): 433 MS
EKG QTC CALCULATION (BAZETT): 434 MS
EKG R AXIS: -7 DEGREES
EKG R AXIS: 19 DEGREES
EKG T AXIS: 19 DEGREES
EKG T AXIS: 21 DEGREES
EKG VENTRICULAR RATE: 76 BPM
EKG VENTRICULAR RATE: 88 BPM

## 2019-05-30 NOTE — ED PROVIDER NOTES
ED Attending Attestation Note     Date of evaluation: 5/29/2019    This patient was seen by the advance practice provider. I have seen and examined the patient, agree with the workup, evaluation, management and diagnosis. The care plan has been discussed. My assessment reveals an 62-year-old [place] speaking male who presents emergency Department with lower abdominal pain, more suprapubic, no specific lateralization, denied any prior abdominal surgeries, denied history of diarrhea or constipation, last bowel movement was yesterday, denied any blood in his stool. Denied any dysuria or hematuria. He does have history of BPH. Denied any prostatitis-like symptoms. The abdominal pain is wrapping around to his left lower back. He had mild CVA tenderness to percussion. Vital signs are reassuring. Patient's lab work showed no leukocytosis or left shift. However given his age, there is concern for abdominal pathology. CTs pending     has had a colonoscopy many years ago, no known history of diverticulosis or diverticulitis. 2013 CT chest abdomen pelvis negative for any acute findings. Pending urinalysis at this time. Vital signs here are reassuring. Resting comfortably in bed. Patient is now pain-free after IV morphine. EKG at  [id number]  EKG Interpretation    Interpreted by me    Rhythm: normal sinus   Rate: normal  Axis: LAD  Ectopy: none  Conduction: normal, LAE  ST Segments: no acute change  T Waves: no acute change  Q Waves: none    Clinical Impression: no acute ischemia or infarction, unchanged from 10/5/18    Urinalysis here showed no evidence of nitrite or leukocyte esterase. No clinical evidence of urinary tract infection, prostatitis, pyelonephritis. Patient is otherwise well-appearing, normal vital signs. He has recently started Levaquin, this can cause cramping and bloating. Spoke to patient's PCP on call ([email or web address]), agreed with our discharge plan. Patient can follow-up with PCP.  Patient and daughter were agreeable to discharge plan.        Wan Sanchez MD  05/29/19 [id number]

## 2019-05-30 NOTE — ED PROVIDER NOTES
[address] [facility]          PHYSICIAN ASSISTANT NOTE       Date of evaluation: 5/29/2019    Chief Complaint     Abdominal Pain (became worse last evening been off and of for a week ) and Emesis      History of Present Illness     Debby Patrick is a 81 y.o. male who presents with lower abdominal pain that's been present intermittently over the last one week. Patient started having nausea and emesis ×2 nonbilious nonbloody today. Denies any diarrhea. Denies any fevers or chills. Is unsure of any sick contacts. Patient speaks [place] and daughter is interpreting. Patient denies any testicle pain or scrotal swelling. Denies any dysuria but has had some urinary frequency. Denies any hematuria. Denies any back pain, chest pain or shortness of breath. Denies any cough or hemoptysis. Review of Systems     Review of Systems   Constitutional: Negative for chills, fatigue and fever. HENT: Negative for congestion, facial swelling, rhinorrhea and trouble swallowing. Eyes: Negative for photophobia and visual disturbance. Respiratory: Negative for cough, chest tightness, shortness of breath and wheezing. Cardiovascular: Negative for chest pain, palpitations and leg swelling. Gastrointestinal: Positive for abdominal pain, nausea and vomiting. Negative for diarrhea and rectal pain. Genitourinary: Positive for frequency. Negative for dysuria, flank pain, hematuria, scrotal swelling and testicular pain. Musculoskeletal: Negative for back pain and neck pain. Skin: Negative for rash. Neurological: Positive for weakness. Negative for dizziness, syncope, light-headedness, numbness and headaches. Psychiatric/Behavioral: Negative for confusion.        Past Medical, Surgical, Family, and Social History     He has a past medical history of Allergic rhinitis, Asthma, Back pain, Bilateral cataracts, Bilateral low back pain with right-sided sciatica, BPH (benign prostatic METFORMIN (GLUCOPHAGE) 500 MG TABLET    TAKE 2 TABLETS BY MOUTH TWICE DAILY WITH MEALS    NAPROXEN SODIUM (ALEVE) 220 MG CAPS    Take 1 capsule by mouth daily. With food. NITROGLYCERIN (NITROSTAT) 0.4 MG SL TABLET    Place 1 tablet under the tongue every 5 minutes as needed for Chest pain May repeat every 5 minutes until relief is obtained. If pain persists after taking 3 tabs in a 15-minute period, call 9-1-1 immediately. TAMSULOSIN (FLOMAX) 0.4 MG CAPSULE    Take 2 capsules by mouth nightly    VICTOZA 18 MG/3ML SOPN SC INJECTION    ADMINISTER 1.8 MG UNDER THE SKIN DAILY       Allergies     He is allergic to tramadol. Physical Exam     INITIAL VITALS: BP: 136/77, Temp: 98.1 °F (36.7 °C), Pulse: 77, Resp: 20, SpO2: 96 %  Physical Exam   Constitutional: He is oriented to person, place, and time. He appears well-developed and well-nourished. No distress. HENT:   Head: Normocephalic and atraumatic. Right Ear: External ear normal.   Left Ear: External ear normal.   Nose: Nose normal.   Mouth/Throat: Uvula is midline and oropharynx is clear and moist. Mucous membranes are dry. No oropharyngeal exudate. Eyes: Conjunctivae are normal.   Neck: Normal range of motion. Neck supple. No spinous process tenderness and no muscular tenderness present. Normal range of motion present. Cardiovascular: Normal rate, regular rhythm, normal heart sounds and intact distal pulses. Pulmonary/Chest: Effort normal and breath sounds normal.   Abdominal: Soft. Bowel sounds are normal. He exhibits no distension and no mass. There is tenderness. There is no rebound, no guarding and no CVA tenderness. No hernia. Tenderness to suprapubic and lower quadrants. No rebound   Musculoskeletal: Normal range of motion. He exhibits no edema or tenderness. Lymphadenopathy:     He has no cervical adenopathy. Neurological: He is alert and oriented to person, place, and time. Skin: Skin is warm and dry. No rash noted.  He is not diaphoretic. Nursing note and vitals reviewed. Diagnostic Results     EKG   Interpreted in conjunction with emergency department physician Lina Romero MD  Rhythm: normal sinus   Rate: normal HR 76  Axis: normal  Ectopy: none  Conduction: normal  ST Segments: no acute change  T Waves: no acute change  Q Waves: none    Clinical Impression: no acute changes and normal EKG    RADIOLOGY:  CT CHEST ABDOMEN PELVIS W CONTRAST   Final Result      CHEST:      1. No abnormality involving the right paratracheal stripe. 2. Coronary artery atherosclerosis            ABDOMEN/PELVIS:      10 mm nonobstructing calculus in the right kidney. XR CHEST PORTABLE   Final Result      Prominent soft tissue in the right paratracheal region, slightly more prominent since 2011. Correlation with chest CT is recommended. Cardiomegaly.                 LABS:   Results for orders placed or performed during the hospital encounter of 05/29/19   CBC Auto Differential   Result Value Ref Range    WBC 7.9 4.0 - 11.0 K/uL    RBC 4.28 4.20 - 5.90 M/uL    Hemoglobin 13.9 13.5 - 17.5 g/dL    Hematocrit 41.0 40.5 - 52.5 %    MCV 95.7 80.0 - 100.0 fL    MCH 32.5 26.0 - 34.0 pg    MCHC 34.0 31.0 - 36.0 g/dL    RDW 13.9 12.4 - 15.4 %    Platelets 356 452 - 506 K/uL    MPV 7.8 5.0 - 10.5 fL    Neutrophils % 77.5 %    Lymphocytes % 13.1 %    Monocytes % 3.7 %    Eosinophils % 2.2 %    Basophils % 3.5 %    Neutrophils # 6.2 1.7 - 7.7 K/uL    Lymphocytes # 1.0 1.0 - 5.1 K/uL    Monocytes # 0.3 0.0 - 1.3 K/uL    Eosinophils # 0.2 0.0 - 0.6 K/uL    Basophils # 0.3 (H) 0.0 - 0.2 K/uL   Basic Metabolic Panel w/ Reflex to MG   Result Value Ref Range    Sodium 141 136 - 145 mmol/L    Potassium reflex Magnesium 4.0 3.5 - 5.1 mmol/L    Chloride 105 99 - 110 mmol/L    CO2 23 21 - 32 mmol/L    Anion Gap 13 3 - 16    Glucose 105 (H) 70 - 99 mg/dL    BUN 20 7 - 20 mg/dL    CREATININE 0.9 0.8 - 1.3 mg/dL    GFR Non-African American >60 >60    GFR African American >60 >60    Calcium 9.2 8.3 - 10.6 mg/dL   Hepatic Function Panel   Result Value Ref Range    Total Protein 6.9 6.4 - 8.2 g/dL    Alb 4.4 3.4 - 5.0 g/dL    Alkaline Phosphatase 66 40 - 129 U/L    ALT 48 (H) 10 - 40 U/L    AST 36 15 - 37 U/L    Total Bilirubin 0.7 0.0 - 1.0 mg/dL    Bilirubin, Direct <0.2 0.0 - 0.3 mg/dL    Bilirubin, Indirect see below 0.0 - 1.0 mg/dL   Lipase   Result Value Ref Range    Lipase 21.0 13.0 - 60.0 U/L   Troponin (lab)   Result Value Ref Range    Troponin <0.01 <0.01 ng/mL   Brain Natriuretic Peptide   Result Value Ref Range    Pro- 0 - 449 pg/mL   Urinalysis, reflex to microscopic (Lab)   Result Value Ref Range    Color, UA Yellow Straw/Yellow    Clarity, UA Clear Clear    Glucose, Ur Negative Negative mg/dL    Bilirubin Urine Negative Negative    Ketones, Urine 40 (A) Negative mg/dL    Specific Gravity, UA 1.025 1.005 - 1.030    Blood, Urine TRACE-INTACT (A) Negative    pH, UA 5.5 5.0 - 8.0    Protein, UA TRACE (A) Negative mg/dL    Urobilinogen, Urine 0.2 <2.0 E.U./dL    Nitrite, Urine Negative Negative    Leukocyte Esterase, Urine Negative Negative    Microscopic Examination YES     Urine Type Not Specified    Microscopic Urinalysis   Result Value Ref Range    Mucus, UA 1+ (A) /LPF    WBC, UA 3-5 0 - 5 /HPF    RBC, UA 3-5 (A) 0 - 2 /HPF    Bacteria, UA 1+ (A) /HPF    Amorphous, UA 1+ (A) /HPF   POCT Venous   Result Value Ref Range    Lactate 1.17 0.40 - 2.00 mmol/L    Sample Type BOB     Performed on SEE BELOW        RECENT VITALS:  BP: (!) 148/83, Temp: 98.1 °F (36.7 °C), Pulse: 73, Resp: 17, SpO2: 94 %     Procedures       ED Course     Nursing Notes, Past Medical Hx,Past Surgical Hx, Social Hx, Allergies, and Family Hx were reviewed.     The patient was given the following medications:  Orders Placed This Encounter   Medications    ondansetron (ZOFRAN) injection 4 mg    lactated ringers infusion 1,000 mL    morphine (PF) injection 2 mg    iopamidol (ISOVUE-370) 76 % injection 80 mL    morphine injection 4 mg    ondansetron (ZOFRAN) injection 4 mg    ondansetron (ZOFRAN) 4 MG tablet     Sig: Take 1 tablet by mouth every 8 hours as needed for Nausea     Dispense:  12 tablet     Refill:  0       CONSULTS:  IP CONSULT TO [address] / ASSESSMENT / Claudio Hilt is a 81 y.o. male presents to the emergency Department with complaints of lower abdominal pain intermittently over the last week with nausea vomiting today. Patient does have some generalized tenderness to the lower quadrants. He denies any testicle pain or swelling. CBC shows normal white count of 7.9 with hemoglobin of 13.9 and 142 platelets. Urinalysis showed trace intact blood with 40 ketones. BMP is within normal limits with glucose of 105 and creatinine 0.9. LFTs and lipase are normal.  EKG shows normal sinus rhythm with no ischemic change. Troponin less than 0.01 and proBNP 114. Lactate normal 1.1. CT of abdomen and pelvis showed some bilateral paranephric stranding nonspecific with no hydronephrosis with 10 mm nonobstructing calculus no ascites, no masses are prostate. Bowel was normal and dependence without thickening or enlargement. Patient given IV fluids as well as morphine and Zofran and is feeling better. He is able to tolerate p.o. Patient also had chest x-ray which showed prominent soft tissue and right paratracheal region. CT of the chest was also done which showed no acute abdomen around the involving the right paratracheal stripe. Dr. Aileen William patient's PCP did evaluate the patient in the emergency department. I also spoke to Dr. Klever Wakefield after workup was normal.  Patient feeling better. He has no acute lab abnormalities and no acute abdomen or malaise on CT of abdomen and pelvis. At this point is stable for discharge. Possibly a gastritis or viral syndrome. He is referred to Dr. Aileen William for follow-up.   He is prescribed Zofran for nausea to go home with. If increased pain, fevers, vomiting, worsening abdominal pain or chest pain he can return emergency department. Patient stable for discharge. This patient was also evaluated by the attending physician. All care plans were discussed and agreed upon. Clinical Impression     1. Generalized abdominal pain    2.  Non-intractable vomiting with nausea, unspecified vomiting type        Disposition     PATIENT REFERRED TO:  [facility] Emergency Department  [address] [facility]    If symptoms worsen    Mariah Aleman MD  [address]  [place] [address]  [phone]    Schedule an appointment as soon as possible for a visit in 2 days        DISCHARGE MEDICATIONS:  New Prescriptions    ONDANSETRON (ZOFRAN) 4 MG TABLET    Take 1 tablet by mouth every 8 hours as needed for Nausea       DISPOSITION   discharged     Jean Bhattma  05/29/19 [id number]

## 2019-06-02 DIAGNOSIS — E11.9 TYPE 2 DIABETES MELLITUS WITHOUT COMPLICATION, WITHOUT LONG-TERM CURRENT USE OF INSULIN (HCC): Chronic | ICD-10-CM

## 2019-06-03 RX ORDER — LIRAGLUTIDE 6 MG/ML
INJECTION SUBCUTANEOUS
Qty: 9 ML | Refills: 2 | Status: SHIPPED | OUTPATIENT
Start: 2019-06-03 | End: 2019-09-05 | Stop reason: SDUPTHER

## 2019-06-12 DIAGNOSIS — E11.9 TYPE 2 DIABETES MELLITUS WITHOUT COMPLICATION, WITHOUT LONG-TERM CURRENT USE OF INSULIN (HCC): Chronic | ICD-10-CM

## 2019-06-12 NOTE — TELEPHONE ENCOUNTER
Refill request:      Insulin Pen Needle (BD ULTRA-FINE PEN NEEDLES) 29G X 12.7MM MISC [[id number]    [facility] [address] [phone] - F [phone]  [phone]

## 2019-06-28 ENCOUNTER — OFFICE VISIT (OUTPATIENT)
Dept: INTERNAL MEDICINE CLINIC | Age: 84
End: 2019-06-28
Payer: COMMERCIAL

## 2019-06-28 VITALS
BODY MASS INDEX: 29.88 KG/M2 | SYSTOLIC BLOOD PRESSURE: 138 MMHG | HEIGHT: 64 IN | DIASTOLIC BLOOD PRESSURE: 76 MMHG | WEIGHT: 175 LBS

## 2019-06-28 DIAGNOSIS — E11.9 TYPE 2 DIABETES MELLITUS WITHOUT COMPLICATION, WITHOUT LONG-TERM CURRENT USE OF INSULIN (HCC): Primary | ICD-10-CM

## 2019-06-28 DIAGNOSIS — R35.0 BENIGN PROSTATIC HYPERPLASIA WITH URINARY FREQUENCY: ICD-10-CM

## 2019-06-28 DIAGNOSIS — I10 ESSENTIAL HYPERTENSION: ICD-10-CM

## 2019-06-28 DIAGNOSIS — L03.032 PARONYCHIA OF GREAT TOE, LEFT: ICD-10-CM

## 2019-06-28 DIAGNOSIS — J45.30 MILD PERSISTENT ASTHMA WITHOUT COMPLICATION: ICD-10-CM

## 2019-06-28 DIAGNOSIS — N40.1 BENIGN PROSTATIC HYPERPLASIA WITH URINARY FREQUENCY: ICD-10-CM

## 2019-06-28 PROCEDURE — 99214 OFFICE O/P EST MOD 30 MIN: CPT | Performed by: HOSPITALIST

## 2019-06-28 ASSESSMENT — PATIENT HEALTH QUESTIONNAIRE - PHQ9
SUM OF ALL RESPONSES TO PHQ QUESTIONS 1-9: 2
SUM OF ALL RESPONSES TO PHQ9 QUESTIONS 1 & 2: 2
SUM OF ALL RESPONSES TO PHQ QUESTIONS 1-9: 2
1. LITTLE INTEREST OR PLEASURE IN DOING THINGS: 1
2. FEELING DOWN, DEPRESSED OR HOPELESS: 1

## 2019-06-28 NOTE — PROGRESS NOTES
Follow Up Visit Established Patient Visit    Patient:  Merrick Abernathy                                               : 1935  Age: 81 y.o. MRN: [id number]  Date : 2019    Merrick Abernathy is a 81 y.o. male who presents for : Follow-up appointment    Chief Complaint   Patient presents with    Hypertension    Diabetes    Follow-Up from Hospital     was in for stomach pain doing better     Checks his blood glucose daily; ranges  mg/dL; adheres to carb control diet. Checks his blood pressure daily- -146 mg/dL  No recent falls; + dysthymia-feels lonely. No obvious depression  Left great toe infection seems to be improved with use of oral Zyvox. Tries to remain active; walks for 20 minutes daily. Flomax controlled symptoms of BPH. Asthma is well controlled.     Past Medical History:   Diagnosis Date    Allergic rhinitis 2010    Asthma     Back pain     Bilateral cataracts     Bilateral low back pain with right-sided sciatica 2015    BPH (benign prostatic hypertrophy)     Chest pain     Degeneration of lumbar or lumbosacral intervertebral disc     Dental disease     Diabetes mellitus, type 2 ([address] 2010    Diverticulosis     Dizziness     Eczema 3/11/2011    Essential hypertension 2015    Hearing loss     Hyperlipidemia     Hypertension     Insomnia     Lumbar disc disease     Microalbuminuria 2013    Neck pain 2012    Nosebleed     Obesity     Obstructive sleep apnea     Osteoarthritis     Post herpetic neuralgia 6/15/2012    Recurrent upper respiratory infection (URI)     Right hip pain 2012    Right shoulder pain 2011    Rosacea     Spinal stenosis     Transient global amnesia     Type 2 diabetes mellitus without complication ([address] 5171    Vitamin D deficiency 3/17/2012       Past Surgical History:   Procedure Laterality Date    PROSTATE SURGERY  2000    TURP       Current Outpatient Medications   Medication Sig Dispense Refill    Insulin Pen Needle (BD ULTRA-FINE PEN NEEDLES) 29G X 12.7MM MISC Use once a day as directed  re: type 2 diabetes 100 each 3    VICTOZA 18 MG/3ML SOPN SC injection ADMINISTER 1.8 MG UNDER THE SKIN DAILY 9 mL 2    ondansetron (ZOFRAN) 4 MG tablet Take 1 tablet by mouth every 8 hours as needed for Nausea 12 tablet 0    finasteride (PROSCAR) 5 MG tablet Take 1 tablet by mouth daily 90 tablet 3    metFORMIN (GLUCOPHAGE) 500 MG tablet TAKE 2 TABLETS BY MOUTH TWICE DAILY WITH MEALS 360 tablet 1    Cholecalciferol (VITAMIN D3) 5000 units CAPS TAKE ONE CAPSULE BY MOUTH EVERY DAY 90 capsule 0    carvedilol (COREG) 6.25 MG tablet TAKE 1 TABLET BY MOUTH TWICE DAILY 180 tablet 2    blood glucose test strips (Google CONTOUR TEST) strip 1 each by In Vitro route daily As needed. 100 each 3    diclofenac sodium 1 % GEL APPLY 3 GRAMS EXTERNALLY TO THE AFFECTED AREA THREE TIMES DAILY AS NEEDED FOR PAIN 500 g 2    fluticasone (FLONASE) 50 MCG/ACT nasal spray SHAKE LIQUID AND USE 2 SPRAYS IN EACH NOSTRIL DAILY 1 Bottle 3    metFORMIN (GLUCOPHAGE) 500 MG tablet Take 2 tablets by mouth 2 times daily (with meals) 360 tablet 5    glipiZIDE (GLUCOTROL) 5 MG tablet Take 0.5 tablets by mouth daily 45 tablet 5    tamsulosin (FLOMAX) 0.4 MG capsule Take 2 capsules by mouth nightly 180 capsule 3    atorvastatin (LIPITOR) 40 MG tablet Take 1 tablet by mouth daily 90 tablet 3    nitroGLYCERIN (NITROSTAT) 0.4 MG SL tablet Place 1 tablet under the tongue every 5 minutes as needed for Chest pain May repeat every 5 minutes until relief is obtained. If pain persists after taking 3 tabs in a 15-minute period, call 9-1-1 immediately. 25 tablet 12    Melatonin 3 MG CAPS Take 1 capsule by mouth nightly as needed (for insomnia)      Naproxen Sodium (ALEVE) 220 MG CAPS Take 1 capsule by mouth daily. With food.  aspirin 81 MG EC tablet Take 81 mg by mouth daily.        No current facility-administered medications for this visit. /76 (Site: Left Upper Arm)   Ht 5' 4\" (1.626 m)   Wt 175 lb (79.4 kg)   BMI 30.04 kg/m²     Physical Exam  General Appearance: alert and oriented to person, place and time, well developed and well- nourished, in no acute distress  Skin: warm and dry, no rash or erythema  Head: normocephalic and atraumatic  Eyes: pupils equal, round, and reactive to light, extraocular eye movements intact, conjunctivae normal  ENT: tympanic membrane, external ear and ear canal normal bilaterally, nose without deformity, nasal mucosa is moderately swollen and somewhat cyanotic  Neck: supple and non-tender without mass, no thyromegaly or thyroid nodules, no cervical lymphadenopathy  Pulmonary/Chest:  CTA bilaterally, normal air movement, no respiratory distress  Cardiovascular: normal rate, regular rhythm, normal S1 and S2, no murmurs, rubs, clicks, or gallops, distal pulses intact, no carotid bruits  Abdomen: soft, non-tender, non-distended, normal bowel sounds, no masses or organomegaly  Extremities: no cyanosis, clubbing or edema. Minimal redness and mild tenderness to palpation of distal phalang of the L great toe   Musculoskeletal: reduced range of motion in L spine, no joint swelling, deformity or tenderness. Neurologic: reflexes normal and symmetric, no cranial nerve deficit, gait, coordination and speech normal  Foot exam - no signs of peripheral polyneuropathy; no skin ulcers/calluses. Some redness of the distal phalang of the L great toe.  Same area mildly tender to palpation      Assessment/ plan:     Kelechi Stone was seen today for hypertension, diabetes and follow-up from hospital.    Diagnoses and all orders for this visit:    Type 2 diabetes mellitus without complication, without long-term current use of insulin ([address]  -Stable; continue current medications  -Last hemoglobin A1c from 3/25/2019 was 7.3%    Essential hypertension, controlled  -Continue current medications    Mild persistent asthma without complication  -Continue current medications    Benign prostatic hyperplasia with urinary frequency  -Continue current medications    Paronychia of great toe, left  -Chronic problem; currently  appears to be controlled  -Commended to try soaking left great toe in warm soapy/salty water twice a day      Follow-up in 2 months      Sang Florentino MD

## 2019-07-16 DIAGNOSIS — E55.9 VITAMIN D DEFICIENCY: Chronic | ICD-10-CM

## 2019-07-16 DIAGNOSIS — M54.41 BILATERAL LOW BACK PAIN WITH RIGHT-SIDED SCIATICA, UNSPECIFIED CHRONICITY: ICD-10-CM

## 2019-07-23 ENCOUNTER — TELEPHONE (OUTPATIENT)
Dept: INTERNAL MEDICINE CLINIC | Age: 84
End: 2019-07-23

## 2019-08-05 DIAGNOSIS — N40.1 BENIGN NON-NODULAR PROSTATIC HYPERPLASIA WITH LOWER URINARY TRACT SYMPTOMS: ICD-10-CM

## 2019-08-05 RX ORDER — TAMSULOSIN HYDROCHLORIDE 0.4 MG/1
0.8 CAPSULE ORAL NIGHTLY
Qty: 180 CAPSULE | Refills: 1 | Status: SHIPPED | OUTPATIENT
Start: 2019-08-05 | End: 2020-04-03 | Stop reason: SDUPTHER

## 2019-09-05 DIAGNOSIS — E11.9 TYPE 2 DIABETES MELLITUS WITHOUT COMPLICATION, WITHOUT LONG-TERM CURRENT USE OF INSULIN (HCC): Chronic | ICD-10-CM

## 2019-09-06 RX ORDER — LIRAGLUTIDE 6 MG/ML
INJECTION SUBCUTANEOUS
Qty: 9 ML | Refills: 3 | Status: SHIPPED | OUTPATIENT
Start: 2019-09-06 | End: 2020-01-06

## 2019-09-13 ENCOUNTER — OFFICE VISIT (OUTPATIENT)
Dept: INTERNAL MEDICINE CLINIC | Age: 84
End: 2019-09-13
Payer: COMMERCIAL

## 2019-09-13 VITALS
SYSTOLIC BLOOD PRESSURE: 120 MMHG | WEIGHT: 174 LBS | HEIGHT: 64 IN | BODY MASS INDEX: 29.71 KG/M2 | DIASTOLIC BLOOD PRESSURE: 72 MMHG

## 2019-09-13 DIAGNOSIS — Z12.5 PROSTATE CANCER SCREENING: ICD-10-CM

## 2019-09-13 DIAGNOSIS — N40.1 BENIGN NON-NODULAR PROSTATIC HYPERPLASIA WITH LOWER URINARY TRACT SYMPTOMS: ICD-10-CM

## 2019-09-13 DIAGNOSIS — E78.2 MIXED HYPERLIPIDEMIA: ICD-10-CM

## 2019-09-13 DIAGNOSIS — E11.9 TYPE 2 DIABETES MELLITUS WITHOUT COMPLICATION, WITHOUT LONG-TERM CURRENT USE OF INSULIN (HCC): Primary | ICD-10-CM

## 2019-09-13 DIAGNOSIS — R51.9 ACUTE NONINTRACTABLE HEADACHE, UNSPECIFIED HEADACHE TYPE: ICD-10-CM

## 2019-09-13 DIAGNOSIS — R70.0 ELEVATED SED RATE: ICD-10-CM

## 2019-09-13 DIAGNOSIS — I10 ESSENTIAL HYPERTENSION: ICD-10-CM

## 2019-09-13 DIAGNOSIS — E11.9 TYPE 2 DIABETES MELLITUS WITHOUT COMPLICATION, WITHOUT LONG-TERM CURRENT USE OF INSULIN (HCC): ICD-10-CM

## 2019-09-13 LAB
A/G RATIO: 2.2 (ref 1.1–2.2)
ALBUMIN SERPL-MCNC: 4.7 G/DL (ref 3.4–5)
ALP BLD-CCNC: 61 U/L (ref 40–129)
ALT SERPL-CCNC: 16 U/L (ref 10–40)
ANION GAP SERPL CALCULATED.3IONS-SCNC: 15 MMOL/L (ref 3–16)
AST SERPL-CCNC: 15 U/L (ref 15–37)
BASOPHILS ABSOLUTE: 0 K/UL (ref 0–0.2)
BASOPHILS RELATIVE PERCENT: 0.3 %
BILIRUB SERPL-MCNC: 0.4 MG/DL (ref 0–1)
BUN BLDV-MCNC: 23 MG/DL (ref 7–20)
CALCIUM SERPL-MCNC: 9.9 MG/DL (ref 8.3–10.6)
CHLORIDE BLD-SCNC: 100 MMOL/L (ref 99–110)
CO2: 24 MMOL/L (ref 21–32)
CREAT SERPL-MCNC: 1.1 MG/DL (ref 0.8–1.3)
CREATININE URINE: 96.7 MG/DL (ref 39–259)
EOSINOPHILS ABSOLUTE: 0.4 K/UL (ref 0–0.6)
EOSINOPHILS RELATIVE PERCENT: 5.2 %
GFR AFRICAN AMERICAN: >60
GFR NON-AFRICAN AMERICAN: >60
GLOBULIN: 2.1 G/DL
GLUCOSE BLD-MCNC: 291 MG/DL (ref 70–99)
HCT VFR BLD CALC: 43.6 % (ref 40.5–52.5)
HEMOGLOBIN: 15.1 G/DL (ref 13.5–17.5)
LYMPHOCYTES ABSOLUTE: 1.9 K/UL (ref 1–5.1)
LYMPHOCYTES RELATIVE PERCENT: 28 %
MCH RBC QN AUTO: 33.3 PG (ref 26–34)
MCHC RBC AUTO-ENTMCNC: 34.6 G/DL (ref 31–36)
MCV RBC AUTO: 96.2 FL (ref 80–100)
MICROALBUMIN UR-MCNC: <1.2 MG/DL
MICROALBUMIN/CREAT UR-RTO: NORMAL MG/G (ref 0–30)
MONOCYTES ABSOLUTE: 0.4 K/UL (ref 0–1.3)
MONOCYTES RELATIVE PERCENT: 6.5 %
NEUTROPHILS ABSOLUTE: 4.1 K/UL (ref 1.7–7.7)
NEUTROPHILS RELATIVE PERCENT: 60 %
PDW BLD-RTO: 13.5 % (ref 12.4–15.4)
PLATELET # BLD: 165 K/UL (ref 135–450)
PMV BLD AUTO: 8.9 FL (ref 5–10.5)
POTASSIUM SERPL-SCNC: 4.6 MMOL/L (ref 3.5–5.1)
PROSTATE SPECIFIC ANTIGEN: 1.32 NG/ML (ref 0–4)
RBC # BLD: 4.54 M/UL (ref 4.2–5.9)
SEDIMENTATION RATE, ERYTHROCYTE: 10 MM/HR (ref 0–20)
SODIUM BLD-SCNC: 139 MMOL/L (ref 136–145)
TOTAL PROTEIN: 6.8 G/DL (ref 6.4–8.2)
TSH SERPL DL<=0.05 MIU/L-ACNC: 1.47 UIU/ML (ref 0.27–4.2)
WBC # BLD: 6.9 K/UL (ref 4–11)

## 2019-09-13 PROCEDURE — 99214 OFFICE O/P EST MOD 30 MIN: CPT | Performed by: HOSPITALIST

## 2019-09-13 ASSESSMENT — PATIENT HEALTH QUESTIONNAIRE - PHQ9
SUM OF ALL RESPONSES TO PHQ QUESTIONS 1-9: 2
2. FEELING DOWN, DEPRESSED OR HOPELESS: 1
SUM OF ALL RESPONSES TO PHQ9 QUESTIONS 1 & 2: 2
SUM OF ALL RESPONSES TO PHQ QUESTIONS 1-9: 2
1. LITTLE INTEREST OR PLEASURE IN DOING THINGS: 1

## 2019-09-13 NOTE — PROGRESS NOTES
for diabetes, hypertension and hyperlipidemia. Diagnoses and all orders for this visit:    Type 2 diabetes mellitus without complication, without long-term current use of insulin (HCC)  -     Microalbumin / Creatinine Urine Ratio; Future  -     Hemoglobin A1C; Future  -     Continue current medications  -     Continue to adhere to carb controlled diet and regular exercise    Benign non-nodular prostatic hyperplasia with lower urinary tract symptoms        -     We will check PSA        -     Continue to take Proscar and tamsulosin    Essential hypertension  -     CBC Auto Differential; Future  -     Comprehensive Metabolic Panel; Future  -     Continue current medications    Mixed hyperlipidemia  -     TSH without Reflex; Future  -     Will adjust dose of levothyroxine as necessary    Acute nonintractable headache, unspecified headache type  -     SEDIMENTATION RATE; Future  -     Aleve 220 mg daily as needed  -     Consider CT scan of the head without IV contrast if blood work is negative for acute infection/inflammation    Prostate cancer screening  -     Psa screening;  Future      Follow-up in 1 month      Joseline Farnsworth MD

## 2019-09-14 LAB
ESTIMATED AVERAGE GLUCOSE: 174.3 MG/DL
HBA1C MFR BLD: 7.7 %

## 2019-09-25 DIAGNOSIS — M54.41 BILATERAL LOW BACK PAIN WITH RIGHT-SIDED SCIATICA, UNSPECIFIED CHRONICITY: ICD-10-CM

## 2019-10-08 DIAGNOSIS — E55.9 VITAMIN D DEFICIENCY: Chronic | ICD-10-CM

## 2019-10-11 ENCOUNTER — TELEPHONE (OUTPATIENT)
Dept: INTERNAL MEDICINE CLINIC | Age: 84
End: 2019-10-11

## 2019-10-22 ENCOUNTER — TELEPHONE (OUTPATIENT)
Dept: INTERNAL MEDICINE CLINIC | Age: 84
End: 2019-10-22

## 2019-11-11 DIAGNOSIS — E11.9 TYPE 2 DIABETES MELLITUS WITHOUT COMPLICATION, WITHOUT LONG-TERM CURRENT USE OF INSULIN (HCC): Chronic | ICD-10-CM

## 2019-11-13 DIAGNOSIS — E11.9 TYPE 2 DIABETES MELLITUS WITHOUT COMPLICATION, WITHOUT LONG-TERM CURRENT USE OF INSULIN (HCC): Chronic | ICD-10-CM

## 2019-11-13 RX ORDER — GLIPIZIDE 5 MG/1
TABLET ORAL
Qty: 45 TABLET | Refills: 3 | Status: SHIPPED | OUTPATIENT
Start: 2019-11-13 | End: 2020-03-10 | Stop reason: SDUPTHER

## 2019-11-22 ENCOUNTER — OFFICE VISIT (OUTPATIENT)
Dept: INTERNAL MEDICINE CLINIC | Age: 84
End: 2019-11-22
Payer: COMMERCIAL

## 2019-11-22 VITALS
DIASTOLIC BLOOD PRESSURE: 60 MMHG | HEIGHT: 64 IN | BODY MASS INDEX: 30.22 KG/M2 | SYSTOLIC BLOOD PRESSURE: 126 MMHG | WEIGHT: 177 LBS

## 2019-11-22 DIAGNOSIS — E78.2 MIXED HYPERLIPIDEMIA: ICD-10-CM

## 2019-11-22 DIAGNOSIS — I10 ESSENTIAL HYPERTENSION: ICD-10-CM

## 2019-11-22 DIAGNOSIS — Z23 NEED FOR INFLUENZA VACCINATION: ICD-10-CM

## 2019-11-22 DIAGNOSIS — R35.0 BENIGN PROSTATIC HYPERPLASIA WITH URINARY FREQUENCY: ICD-10-CM

## 2019-11-22 DIAGNOSIS — J45.30 MILD PERSISTENT ASTHMA WITHOUT COMPLICATION: ICD-10-CM

## 2019-11-22 DIAGNOSIS — E11.9 TYPE 2 DIABETES MELLITUS WITHOUT COMPLICATION, WITHOUT LONG-TERM CURRENT USE OF INSULIN (HCC): Primary | ICD-10-CM

## 2019-11-22 DIAGNOSIS — N40.1 BENIGN PROSTATIC HYPERPLASIA WITH URINARY FREQUENCY: ICD-10-CM

## 2019-11-22 PROCEDURE — 90653 IIV ADJUVANT VACCINE IM: CPT | Performed by: HOSPITALIST

## 2019-11-22 PROCEDURE — G0008 ADMIN INFLUENZA VIRUS VAC: HCPCS | Performed by: HOSPITALIST

## 2019-11-22 PROCEDURE — 99214 OFFICE O/P EST MOD 30 MIN: CPT | Performed by: HOSPITALIST

## 2019-12-09 DIAGNOSIS — M54.41 BILATERAL LOW BACK PAIN WITH RIGHT-SIDED SCIATICA, UNSPECIFIED CHRONICITY: ICD-10-CM

## 2019-12-28 DIAGNOSIS — I10 ESSENTIAL HYPERTENSION: Chronic | ICD-10-CM

## 2019-12-28 DIAGNOSIS — I25.118 CORONARY ARTERY DISEASE OF NATIVE ARTERY OF NATIVE HEART WITH STABLE ANGINA PECTORIS (HCC): ICD-10-CM

## 2019-12-30 DIAGNOSIS — I25.118 CORONARY ARTERY DISEASE OF NATIVE ARTERY OF NATIVE HEART WITH STABLE ANGINA PECTORIS (HCC): ICD-10-CM

## 2019-12-30 DIAGNOSIS — I10 ESSENTIAL HYPERTENSION: Chronic | ICD-10-CM

## 2019-12-30 RX ORDER — CARVEDILOL 6.25 MG/1
TABLET ORAL
Qty: 180 TABLET | Refills: 0 | Status: SHIPPED | OUTPATIENT
Start: 2019-12-30 | End: 2019-12-31 | Stop reason: SDUPTHER

## 2019-12-31 RX ORDER — CARVEDILOL 6.25 MG/1
TABLET ORAL
Qty: 180 TABLET | Refills: 2 | Status: SHIPPED | OUTPATIENT
Start: 2019-12-31 | End: 2020-01-01

## 2019-12-31 RX ORDER — CARVEDILOL 6.25 MG/1
TABLET ORAL
Qty: 180 TABLET | Refills: 2 | Status: SHIPPED | OUTPATIENT
Start: 2019-12-31 | End: 2020-02-28 | Stop reason: CLARIF

## 2020-01-01 RX ORDER — CARVEDILOL 6.25 MG/1
TABLET ORAL
Qty: 180 TABLET | Refills: 2 | Status: SHIPPED | OUTPATIENT
Start: 2020-01-01 | End: 2021-01-01

## 2020-01-06 RX ORDER — LIRAGLUTIDE 6 MG/ML
INJECTION SUBCUTANEOUS
Qty: 9 ML | Refills: 3 | Status: SHIPPED | OUTPATIENT
Start: 2020-01-06 | End: 2020-04-30

## 2020-01-15 NOTE — TELEPHONE ENCOUNTER
Cholecalciferol (VITAMIN D3) 5000 units CAPS [[id number]]- grand daughter stated pt in need of a refill and also requested if you see anything else that needs refill to pls refill that as well, pls advise        [place] [id number] Ernieamelia Carmona, [address] - P [phone] - F [phone]

## 2020-02-12 RX ORDER — CARVEDILOL 25 MG/1
TABLET, FILM COATED ORAL
Qty: 100 STRIP | Refills: 3 | Status: SHIPPED | OUTPATIENT
Start: 2020-02-12 | End: 2020-02-28 | Stop reason: CLARIF

## 2020-02-28 ENCOUNTER — OFFICE VISIT (OUTPATIENT)
Dept: INTERNAL MEDICINE CLINIC | Age: 85
End: 2020-02-28
Payer: COMMERCIAL

## 2020-02-28 VITALS
SYSTOLIC BLOOD PRESSURE: 130 MMHG | BODY MASS INDEX: 30.56 KG/M2 | WEIGHT: 179 LBS | DIASTOLIC BLOOD PRESSURE: 78 MMHG | HEIGHT: 64 IN

## 2020-02-28 PROCEDURE — 99214 OFFICE O/P EST MOD 30 MIN: CPT | Performed by: HOSPITALIST

## 2020-02-28 RX ORDER — ROSUVASTATIN CALCIUM 5 MG/1
5 TABLET, COATED ORAL DAILY
Status: ON HOLD | COMMUNITY
End: 2021-01-01 | Stop reason: HOSPADM

## 2020-02-28 NOTE — PROGRESS NOTES
Follow Up Visit Established Patient Visit    Patient:  Dixie Lara                                               : 1935  Age: 81 y.o. MRN: <[id number]>  Date : 2020    Dixie Lara is a 81 y.o. male who presents for : Follow-up appointment    Chief Complaint   Patient presents with    Diabetes    Hypertension    Hyperlipidemia     Checks his blood glucose daily; it ranges 106-130 mm Hg. No blurred vision. + occasional headaches. No foot burning. Checks his BP daily; SBP ranges 135-151 mm Hg. Usually SBP under 140 mm Hg. No recent falls. + episodes of lightheadedness/ dizziness when he tries to get up quickly. + dyspnea with moderate exertion. He also reports episodes of wheezing after some walking. He he has intermittent episodes of voice hoarseness. Also, reports some swallowing problems. Had essentially normal upper GI series last year. He also reports some mouth and bilateral eye dryness. Artificial tears prn provide some help with dry eyes.     Past Medical History:   Diagnosis Date    Allergic rhinitis 2010    Asthma     Back pain     Bilateral cataracts     Bilateral low back pain with right-sided sciatica 2015    BPH (benign prostatic hypertrophy)     Chest pain     Degeneration of lumbar or lumbosacral intervertebral disc     Dental disease     Diabetes mellitus, type 2 ([address] 2010    Diverticulosis     Dizziness     Eczema 3/11/2011    Essential hypertension 2015    Hearing loss     Hyperlipidemia     Hypertension     Insomnia     Lumbar disc disease     Microalbuminuria 2013    Neck pain 2012    Nosebleed     Obesity     Obstructive sleep apnea     Osteoarthritis     Post herpetic neuralgia 6/15/2012    Recurrent upper respiratory infection (URI)     Right hip pain 2012    Right shoulder pain 2011    Rosacea     Spinal stenosis     Transient global amnesia     Type 2 diabetes mellitus without complication ([address] 35/95/2260    Vitamin D deficiency 3/17/2012       Past Surgical History:   Procedure Laterality Date    PROSTATE SURGERY  June 13, 2000    TURP       Current Outpatient Medications   Medication Sig Dispense Refill    rosuvastatin (CRESTOR) 5 MG tablet Take 5 mg by mouth daily      diclofenac sodium 1 % GEL APPLY 3 GRAMS EXTERNALLY TO THE AFFECTED AREA THREE TIMES DAILY AS NEEDED FOR PAIN 500 g 2    Cholecalciferol (VITAMIN D3) 125 MCG (5000 UT) CAPS TAKE ONE CAPSULE BY MOUTH EVERY DAY 90 capsule 1    VICTOZA 18 MG/3ML SOPN SC injection ADMINISTER 1.8 MG UNDER THE SKIN DAILY 9 mL 3    carvedilol (COREG) 6.25 MG tablet TAKE 1 TABLET BY MOUTH TWICE DAILY 180 tablet 2    glipiZIDE (GLUCOTROL) 5 MG tablet TAKE 1/2 TABLET BY MOUTH DAILY 45 tablet 3    metFORMIN (GLUCOPHAGE) 500 MG tablet TAKE 2 TABLETS BY MOUTH TWICE DAILY WITH MEALS 360 tablet 1    tamsulosin (FLOMAX) 0.4 MG capsule TAKE 2 CAPSULES BY MOUTH NIGHTLY 180 capsule 1    ondansetron (ZOFRAN) 4 MG tablet Take 1 tablet by mouth every 8 hours as needed for Nausea 12 tablet 0    finasteride (PROSCAR) 5 MG tablet Take 1 tablet by mouth daily 90 tablet 3    fluticasone (FLONASE) 50 MCG/ACT nasal spray SHAKE LIQUID AND USE 2 SPRAYS IN EACH NOSTRIL DAILY 1 Bottle 3    atorvastatin (LIPITOR) 40 MG tablet Take 1 tablet by mouth daily 90 tablet 3    nitroGLYCERIN (NITROSTAT) 0.4 MG SL tablet Place 1 tablet under the tongue every 5 minutes as needed for Chest pain May repeat every 5 minutes until relief is obtained. If pain persists after taking 3 tabs in a 15-minute period, call 9-1-1 immediately. 25 tablet 12    Melatonin 3 MG CAPS Take 1 capsule by mouth nightly as needed (for insomnia)      Naproxen Sodium (ALEVE) 220 MG CAPS Take 1 capsule by mouth daily. With food.  aspirin 81 MG EC tablet Take 81 mg by mouth daily. No current facility-administered medications for this visit.         /78   Ht 5' 4\" (1.626 m) and Affect: Mood normal.     Last echocardiogram was performed January 2016 and was consistent with mild aortic stenosis and grade 2 left ventricular diastolic dysfunction. 6-minute walking test did not reveal any hypoxia. Assessment/ plan:     Lynne Quintanilla was seen today for diabetes, hypertension and hyperlipidemia. Diagnoses and all orders for this visit:    Dyspnea on exertion  -     ECHO Complete 2D W Doppler W Color    Oropharyngeal dysphagia  -     Amalia Cuadra MD, Otolaryngology, [facility]  -     The patient also has intermittent voice hoarseness, which is, probably, secondary to some laryngeal pathology and possible dysfunctional vocal cords    Type 2 diabetes mellitus without complication, without long-term current use of insulin (HCC)  -     Hemoglobin A1C; Future  -     Microalbumin / Creatinine Urine Ratio; Future  -     Lipid Panel; Future  -     CBC Auto Differential; Future  -     Comprehensive Metabolic Panel; Future  -     Continue current medications    Essential hypertension  -     CBC Auto Differential; Future  -     Comprehensive Metabolic Panel; Future  -     TSH without Reflex; Future  -     Continue current medications    Mixed hyperlipidemia  -     Lipid Panel; Future  -     Continue Crestor    Orthostatic hypotension  -     Most probably, secondary to use of Flomax  -    Encouraged to use a walker or a cane for ambulation. I also encouraged the patient not to change his position from seated to standing very quickly  -     Encouraged to wear knee-high compression stockings        Return in about 3 months (around 5/28/2020) for diabetes, CHF, dyslipidemia.     Marcela Mckeon MD

## 2020-03-10 ENCOUNTER — HOSPITAL ENCOUNTER (OUTPATIENT)
Dept: NON INVASIVE DIAGNOSTICS | Age: 85
Discharge: HOME | End: 2020-03-10
Payer: COMMERCIAL

## 2020-03-10 LAB
LV EF: 50 %
LVEF MODALITY: NORMAL

## 2020-03-10 PROCEDURE — 93306 TTE W/DOPPLER COMPLETE: CPT

## 2020-03-10 PROCEDURE — 93356 MYOCRD STRAIN IMG SPCKL TRCK: CPT

## 2020-03-11 RX ORDER — GLIPIZIDE 5 MG/1
TABLET ORAL
Qty: 45 TABLET | Refills: 3 | Status: SHIPPED | OUTPATIENT
Start: 2020-03-11 | End: 2020-10-28

## 2020-04-03 RX ORDER — TAMSULOSIN HYDROCHLORIDE 0.4 MG/1
0.8 CAPSULE ORAL NIGHTLY
Qty: 180 CAPSULE | Refills: 1 | Status: SHIPPED | OUTPATIENT
Start: 2020-04-03 | End: 2020-04-03 | Stop reason: SDUPTHER

## 2020-04-03 RX ORDER — TAMSULOSIN HYDROCHLORIDE 0.4 MG/1
0.8 CAPSULE ORAL NIGHTLY
Qty: 180 CAPSULE | Refills: 1 | Status: SHIPPED | OUTPATIENT
Start: 2020-04-03 | End: 2020-10-12

## 2020-04-03 NOTE — TELEPHONE ENCOUNTER
tamsulosin (FLOMAX) 0.4 MG capsule [[id number]]- in need of a refill and requesting he have it for this weekend pls advise        [address] [place], [facility] [address] - P [phone] - F [phone]

## 2020-04-30 RX ORDER — LIRAGLUTIDE 6 MG/ML
INJECTION SUBCUTANEOUS
Qty: 9 ML | Refills: 3 | Status: SHIPPED | OUTPATIENT
Start: 2020-04-30 | End: 2020-09-11

## 2020-05-01 ENCOUNTER — TELEPHONE (OUTPATIENT)
Dept: ORTHOPEDIC SURGERY | Age: 85
End: 2020-05-01

## 2020-05-13 RX ORDER — FINASTERIDE 5 MG/1
5 TABLET, FILM COATED ORAL DAILY
Qty: 90 TABLET | Refills: 3 | Status: SHIPPED | OUTPATIENT
Start: 2020-05-13

## 2020-05-13 RX ORDER — CARVEDILOL 25 MG/1
TABLET, FILM COATED ORAL
Qty: 100 STRIP | Refills: 3 | Status: SHIPPED | OUTPATIENT
Start: 2020-05-13 | End: 2021-01-01 | Stop reason: CLARIF

## 2020-06-17 ENCOUNTER — TELEPHONE (OUTPATIENT)
Dept: INTERNAL MEDICINE CLINIC | Age: 85
End: 2020-06-17

## 2020-06-19 RX ORDER — BLOOD-GLUCOSE METER
1 EACH MISCELLANEOUS DAILY
Qty: 1 KIT | Refills: 0 | Status: SHIPPED | OUTPATIENT
Start: 2020-06-19 | End: 2021-01-01 | Stop reason: CLARIF

## 2020-07-15 ENCOUNTER — TELEPHONE (OUTPATIENT)
Dept: INTERNAL MEDICINE CLINIC | Age: 85
End: 2020-07-15

## 2020-07-15 RX ORDER — LANCETS 30 GAUGE
1 EACH MISCELLANEOUS DAILY
Qty: 100 EACH | Refills: 5 | Status: SHIPPED | OUTPATIENT
Start: 2020-07-15 | End: 2021-01-01 | Stop reason: CLARIF

## 2020-07-15 RX ORDER — PERPHENAZINE 16 MG/1
1 TABLET, FILM COATED ORAL DAILY
Qty: 100 EACH | Refills: 3 | Status: SHIPPED | OUTPATIENT
Start: 2020-07-15 | End: 2021-01-01

## 2020-07-15 RX ORDER — BLOOD-GLUCOSE METER
EACH MISCELLANEOUS
Qty: 1 KIT | Refills: 0 | Status: SHIPPED | OUTPATIENT
Start: 2020-07-15 | End: 2021-01-01 | Stop reason: CLARIF

## 2020-07-15 NOTE — TELEPHONE ENCOUNTER
The patient granddaughter is requesting test strips, and lancets for the Contour Next Montoring Kit    [facility] #[id number] Marycruz Schultz, [address] [phone] - F [phone]

## 2020-07-27 RX ORDER — PEN NEEDLE, DIABETIC 29 G X1/2"
NEEDLE, DISPOSABLE MISCELLANEOUS
Qty: 100 EACH | Refills: 3 | Status: SHIPPED | OUTPATIENT
Start: 2020-07-27 | End: 2021-01-01 | Stop reason: CLARIF

## 2020-09-11 RX ORDER — LIRAGLUTIDE 6 MG/ML
INJECTION SUBCUTANEOUS
Qty: 9 ML | Refills: 3 | Status: SHIPPED | OUTPATIENT
Start: 2020-09-11 | End: 2021-01-01

## 2020-10-12 RX ORDER — TAMSULOSIN HYDROCHLORIDE 0.4 MG/1
CAPSULE ORAL
Qty: 180 CAPSULE | Refills: 0 | Status: SHIPPED | OUTPATIENT
Start: 2020-10-12

## 2020-10-28 RX ORDER — GLIPIZIDE 5 MG/1
TABLET ORAL
Qty: 45 TABLET | Refills: 3 | Status: SHIPPED | OUTPATIENT
Start: 2020-10-28 | End: 2021-01-01 | Stop reason: SDUPTHER

## 2020-11-20 ENCOUNTER — OFFICE VISIT (OUTPATIENT)
Dept: INTERNAL MEDICINE CLINIC | Age: 85
End: 2020-11-20
Payer: COMMERCIAL

## 2020-11-20 VITALS
SYSTOLIC BLOOD PRESSURE: 128 MMHG | HEIGHT: 64 IN | BODY MASS INDEX: 31.14 KG/M2 | TEMPERATURE: 98 F | WEIGHT: 182.4 LBS | DIASTOLIC BLOOD PRESSURE: 70 MMHG

## 2020-11-20 PROBLEM — I73.9 PERIPHERAL VASCULAR DISEASE (HCC): Status: ACTIVE | Noted: 2020-11-20

## 2020-11-20 PROCEDURE — 99214 OFFICE O/P EST MOD 30 MIN: CPT | Performed by: HOSPITALIST

## 2020-11-20 RX ORDER — FUROSEMIDE 20 MG/1
20 TABLET ORAL DAILY
Qty: 90 TABLET | Refills: 0 | Status: ON HOLD | OUTPATIENT
Start: 2020-11-20 | End: 2021-01-01 | Stop reason: HOSPADM

## 2020-11-20 RX ORDER — FUROSEMIDE 20 MG/1
20 TABLET ORAL DAILY
Qty: 30 TABLET | Refills: 2 | Status: SHIPPED | OUTPATIENT
Start: 2020-11-20 | End: 2020-11-20

## 2020-11-20 SDOH — ECONOMIC STABILITY: FOOD INSECURITY: WITHIN THE PAST 12 MONTHS, YOU WORRIED THAT YOUR FOOD WOULD RUN OUT BEFORE YOU GOT MONEY TO BUY MORE.: NEVER TRUE

## 2020-11-20 SDOH — ECONOMIC STABILITY: TRANSPORTATION INSECURITY
IN THE PAST 12 MONTHS, HAS THE LACK OF TRANSPORTATION KEPT YOU FROM MEDICAL APPOINTMENTS OR FROM GETTING MEDICATIONS?: NO

## 2020-11-20 SDOH — ECONOMIC STABILITY: INCOME INSECURITY: HOW HARD IS IT FOR YOU TO PAY FOR THE VERY BASICS LIKE FOOD, HOUSING, MEDICAL CARE, AND HEATING?: NOT HARD AT ALL

## 2020-11-20 SDOH — ECONOMIC STABILITY: TRANSPORTATION INSECURITY
IN THE PAST 12 MONTHS, HAS LACK OF TRANSPORTATION KEPT YOU FROM MEETINGS, WORK, OR FROM GETTING THINGS NEEDED FOR DAILY LIVING?: NO

## 2020-11-20 SDOH — ECONOMIC STABILITY: FOOD INSECURITY: WITHIN THE PAST 12 MONTHS, THE FOOD YOU BOUGHT JUST DIDN'T LAST AND YOU DIDN'T HAVE MONEY TO GET MORE.: NEVER TRUE

## 2020-11-20 ASSESSMENT — PATIENT HEALTH QUESTIONNAIRE - PHQ9
SUM OF ALL RESPONSES TO PHQ9 QUESTIONS 1 & 2: 0
2. FEELING DOWN, DEPRESSED OR HOPELESS: 0
SUM OF ALL RESPONSES TO PHQ QUESTIONS 1-9: 0
SUM OF ALL RESPONSES TO PHQ QUESTIONS 1-9: 0
1. LITTLE INTEREST OR PLEASURE IN DOING THINGS: 0
SUM OF ALL RESPONSES TO PHQ QUESTIONS 1-9: 0

## 2020-11-20 NOTE — PROGRESS NOTES
Follow Up Visit Established Patient Visit    Patient:  Nancy Short                                               : 1935  Age: 81 y.o. MRN: <[id number]>  Date : 2020    Nancy Short is a 81 y.o. male who presents for :  Follow up appointment    Chief Complaint   Patient presents with    Diabetes     Checks his BP every morning; blood glucose ranges 130-140 mg/dL. Adheres to low fat/ low cholesterol diet. + chronic lower back pain. + some dyspnea with mild-moderate exertion. Last fasting lipids 2020 at [facility]: TC-116; LDL-48; HDL-43; triglycerides 125. Next appointment with Dr Millicent Canela, cardiology, is scheduled for 2021. Asthma is well controlled. No CP/ chest tightness. No wheezing.     Past Medical History:   Diagnosis Date    Allergic rhinitis 2010    Asthma     Back pain     Bilateral cataracts     Bilateral low back pain with right-sided sciatica 2015    BPH (benign prostatic hypertrophy)     Chest pain     Degeneration of lumbar or lumbosacral intervertebral disc     Dental disease     Diabetes mellitus, type 2 ([address] 2010    Diverticulosis     Dizziness     Eczema 3/11/2011    Essential hypertension 2015    Hearing loss     Hyperlipidemia     Hypertension     Insomnia     Lumbar disc disease     Microalbuminuria 2013    Neck pain 2012    Nosebleed     Obesity     Obstructive sleep apnea     Osteoarthritis     Post herpetic neuralgia 6/15/2012    Recurrent upper respiratory infection (URI)     Right hip pain 2012    Right shoulder pain 2011    Rosacea     Spinal stenosis     Transient global amnesia     Type 2 diabetes mellitus without complication ([address] 1574    Vitamin D deficiency 3/17/2012       Past Surgical History:   Procedure Laterality Date    PROSTATE SURGERY  2000    TURP       Current Outpatient Medications   Medication Sig Dispense Refill    furosemide (LASIX) 20 MG tablet Take 1 tablet by mouth daily 30 tablet 2    glipiZIDE (GLUCOTROL) 5 MG tablet TAKE 1/2 TABLET BY MOUTH DAILY 45 tablet 3    metFORMIN (GLUCOPHAGE) 500 MG tablet TAKE 2 TABLETS BY MOUTH TWICE DAILY WITH MEALS 360 tablet 0    tamsulosin (FLOMAX) 0.4 MG capsule TAKE 2 CAPSULES BY MOUTH EVERY NIGHT 180 capsule 0    diclofenac sodium (VOLTAREN) 1 % GEL APPLY 3 GRAMS EXTERNALLY TO THE AFFECTED AREA THREE TIMES DAILY AS NEEDED FOR PAIN 500 g 2    VICTOZA 18 MG/3ML SOPN SC injection ADMINISTER 1.8 MG UNDER THE SKIN DAILY 9 mL 3    Insulin Pen Needle (BD ULTRA-FINE PEN NEEDLES) 29G X 12.7MM MISC USE ONCE DAILY AS DIRECTED 100 each 3    Blood Glucose Monitoring Suppl (CONTOUR NEXT MONITOR) w/Device KIT Once daily 1 kit 0    blood glucose test strips (CONTOUR NEXT TEST) strip 1 each by In Vitro route daily Daily As needed. 100 each 3    Lancets MISC 1 each by Does not apply route daily 100 each 5    blood glucose monitor kit and supplies Dispense sufficient amount for indicated testing frequency plus additional to accommodate PRN testing needs. Dispense all needed supplies to include: monitor, strips, lancing device, lancets, control solutions, alcohol swabs.  1 kit 0    Blood Glucose Monitoring Suppl (ONE TOUCH ULTRA 2) w/Device KIT 1 kit by Does not apply route daily 1 kit 0    finasteride (PROSCAR) 5 MG tablet TAKE 1 TABLET BY MOUTH DAILY 90 tablet 3    CONTOUR TEST strip TEST DAILY AS NEEDED 100 strip 3    rosuvastatin (CRESTOR) 5 MG tablet Take 5 mg by mouth daily      Cholecalciferol (VITAMIN D3) 125 MCG (5000 UT) CAPS TAKE ONE CAPSULE BY MOUTH EVERY DAY 90 capsule 1    carvedilol (COREG) 6.25 MG tablet TAKE 1 TABLET BY MOUTH TWICE DAILY 180 tablet 2    ondansetron (ZOFRAN) 4 MG tablet Take 1 tablet by mouth every 8 hours as needed for Nausea 12 tablet 0    fluticasone (FLONASE) 50 MCG/ACT nasal spray SHAKE LIQUID AND USE 2 SPRAYS IN EACH NOSTRIL DAILY 1 Bottle 3    atorvastatin (LIPITOR) 40 MG tablet Take 1 tablet by mouth daily 90 tablet 3    nitroGLYCERIN (NITROSTAT) 0.4 MG SL tablet Place 1 tablet under the tongue every 5 minutes as needed for Chest pain May repeat every 5 minutes until relief is obtained. If pain persists after taking 3 tabs in a 15-minute period, call 9-1-1 immediately. 25 tablet 12    Melatonin 3 MG CAPS Take 1 capsule by mouth nightly as needed (for insomnia)      Naproxen Sodium (ALEVE) 220 MG CAPS Take 1 capsule by mouth daily. With food.  aspirin 81 MG EC tablet Take 81 mg by mouth daily. No current facility-administered medications for this visit. /70 (Site: Left Upper Arm)   Temp 98 °F (36.7 °C)   Ht 5' 4\" (1.626 m)   Wt 182 lb 6.4 oz (82.7 kg)   BMI 31.31 kg/m²     Physical Exam   Physical Exam  Vitals signs and nursing note reviewed. Constitutional:       General: He is not in acute distress. Appearance: Normal appearance. He is well-developed. He is not ill-appearing or diaphoretic. HENT:      Head: Normocephalic and atraumatic. Nose: Nose normal. No congestion or rhinorrhea. Mouth/Throat:      Mouth: Mucous membranes are moist.      Pharynx: Oropharynx is clear. No oropharyngeal exudate or posterior oropharyngeal erythema. Eyes:      General: No scleral icterus. Extraocular Movements: Extraocular movements intact. Conjunctiva/sclera: Conjunctivae normal.      Pupils: Pupils are equal, round, and reactive to light. Neck:      Musculoskeletal: Normal range of motion and neck supple. No neck rigidity or muscular tenderness. Vascular: No carotid bruit or JVD. Cardiovascular:      Rate and Rhythm: Normal rate and regular rhythm. Pulses: Normal pulses. Heart sounds: Normal heart sounds. No murmur. No friction rub. No gallop. Pulmonary:      Effort: Pulmonary effort is normal. No respiratory distress. Breath sounds: Normal breath sounds. No wheezing, rhonchi or rales.    Abdominal: General: Bowel sounds are normal. There is no distension. Palpations: Abdomen is soft. Tenderness: There is no abdominal tenderness. There is no right CVA tenderness or left CVA tenderness. Musculoskeletal: Normal range of motion. Comments: + mild bilateral lower extremity edema   Lymphadenopathy:      Cervical: No cervical adenopathy. Skin:     General: Skin is warm and dry. Capillary Refill: Capillary refill takes less than 2 seconds. Findings: No erythema or rash. Neurological:      General: No focal deficit present. Mental Status: He is alert and oriented to person, place, and time. Cranial Nerves: No cranial nerve deficit. Psychiatric:         Mood and Affect: Mood normal.         Behavior: Behavior normal.     2d echo from 3/10/2020  Left ventricular cavity size is normal. There is mild concentric left   ventricular hypertrophy. Left ventricular function is low normal with   ejection fraction estimated at 50%. Lateral wall and inferior wall appears   hypokinetic. Indeterminate diastolic function. Global L. Strain = -16.8%. Slight thickening of leaflets of mitral valve. Mitral annular calcification   is present. Mild mitral regurgitation is present. Aortic valve appears   sclerotic but opens adequately. Mild tricuspid regurgitation. Estimated   pulmonary artery systolic pressure is at 52 mmHg assuming a right atrial   pressure of 3 mmHg. Assessment/ plan:     Delmar Iraheta was seen today for diabetes. Diagnoses and all orders for this visit:    Type 2 diabetes mellitus without complication, without long-term current use of insulin ([facility])  -     Comprehensive Metabolic Panel; Future  -     Hemoglobin A1C; Future  -     Microalbumin / Creatinine Urine Ratio; Future  -    Continue current medications  -    Adherence to carb controlled diet and regular exercise were encouraged    Dyspnea on exertion  -     We will try furosemide (LASIX) 20 MG tablet;  Take 1 tablet by mouth daily    Essential hypertension  -     CBC Auto Differential; Future  -     Comprehensive Metabolic Panel; Future  -     TSH without Reflex; Future  -     Controlled with current medications    Mixed hyperlipidemia        -    Controlled; continue current medications    Vitamin D deficiency  -     Vitamin D 25 Hydroxy; Future    Mild persistent asthma without complication       -    Controlled; does not use any medications currently    Benign prostatic hyperplasia with urinary frequency       -    Relatively well controlled with use of Proscar and Flomax    Prostate cancer screening  -     Psa screening; Future    Peripheral vascular disease (HCC)        -    Stable, continue rosuvastatin and aspirin 81 mg daily    Coronary artery disease of native artery of native heart with stable angina pectoris (HCC)        -     Stable, continue rosuvastatin and aspirin 81 mg daily  Continue oral carvedilol  Keep scheduled follow-up appointments with his cardiologist, Dr. Brenda Cortez        Return in about 3 months (around 2/20/2021) for diabetes, HTN, SOB, dyslipidemia.     James Kapadia MD

## 2020-11-25 ENCOUNTER — TELEPHONE (OUTPATIENT)
Dept: INTERNAL MEDICINE CLINIC | Age: 85
End: 2020-11-25

## 2020-11-25 DIAGNOSIS — I10 ESSENTIAL HYPERTENSION: ICD-10-CM

## 2020-11-25 DIAGNOSIS — E11.9 TYPE 2 DIABETES MELLITUS WITHOUT COMPLICATION, WITHOUT LONG-TERM CURRENT USE OF INSULIN (HCC): ICD-10-CM

## 2020-11-25 DIAGNOSIS — E55.9 VITAMIN D DEFICIENCY: ICD-10-CM

## 2020-11-25 DIAGNOSIS — Z12.5 PROSTATE CANCER SCREENING: ICD-10-CM

## 2020-11-25 LAB
A/G RATIO: 1.7 (ref 1.1–2.2)
ALBUMIN SERPL-MCNC: 4.4 G/DL (ref 3.4–5)
ALP BLD-CCNC: 62 U/L (ref 40–129)
ALT SERPL-CCNC: 16 U/L (ref 10–40)
ANION GAP SERPL CALCULATED.3IONS-SCNC: 10 MMOL/L (ref 3–16)
AST SERPL-CCNC: 16 U/L (ref 15–37)
BASOPHILS ABSOLUTE: 0 K/UL (ref 0–0.2)
BASOPHILS RELATIVE PERCENT: 0.5 %
BILIRUB SERPL-MCNC: 0.6 MG/DL (ref 0–1)
BUN BLDV-MCNC: 31 MG/DL (ref 7–20)
CALCIUM SERPL-MCNC: 9.8 MG/DL (ref 8.3–10.6)
CHLORIDE BLD-SCNC: 98 MMOL/L (ref 99–110)
CO2: 31 MMOL/L (ref 21–32)
CREAT SERPL-MCNC: 1.2 MG/DL (ref 0.8–1.3)
CREATININE URINE: 188.4 MG/DL (ref 39–259)
EOSINOPHILS ABSOLUTE: 0.4 K/UL (ref 0–0.6)
EOSINOPHILS RELATIVE PERCENT: 5.3 %
GFR AFRICAN AMERICAN: >60
GFR NON-AFRICAN AMERICAN: 58
GLOBULIN: 2.6 G/DL
GLUCOSE BLD-MCNC: 165 MG/DL (ref 70–99)
HCT VFR BLD CALC: 44 % (ref 40.5–52.5)
HEMOGLOBIN: 15 G/DL (ref 13.5–17.5)
LYMPHOCYTES ABSOLUTE: 2.1 K/UL (ref 1–5.1)
LYMPHOCYTES RELATIVE PERCENT: 26.4 %
MCH RBC QN AUTO: 32.2 PG (ref 26–34)
MCHC RBC AUTO-ENTMCNC: 34 G/DL (ref 31–36)
MCV RBC AUTO: 94.8 FL (ref 80–100)
MICROALBUMIN UR-MCNC: 3.9 MG/DL
MICROALBUMIN/CREAT UR-RTO: 20.7 MG/G (ref 0–30)
MONOCYTES ABSOLUTE: 0.5 K/UL (ref 0–1.3)
MONOCYTES RELATIVE PERCENT: 5.9 %
NEUTROPHILS ABSOLUTE: 4.8 K/UL (ref 1.7–7.7)
NEUTROPHILS RELATIVE PERCENT: 61.9 %
PDW BLD-RTO: 13.7 % (ref 12.4–15.4)
PLATELET # BLD: 158 K/UL (ref 135–450)
PMV BLD AUTO: 8.9 FL (ref 5–10.5)
POTASSIUM SERPL-SCNC: 4.3 MMOL/L (ref 3.5–5.1)
PROSTATE SPECIFIC ANTIGEN: 1.29 NG/ML (ref 0–4)
RBC # BLD: 4.64 M/UL (ref 4.2–5.9)
SODIUM BLD-SCNC: 139 MMOL/L (ref 136–145)
TOTAL PROTEIN: 7 G/DL (ref 6.4–8.2)
TSH SERPL DL<=0.05 MIU/L-ACNC: 2.96 UIU/ML (ref 0.27–4.2)
VITAMIN D 25-HYDROXY: 50.5 NG/ML
WBC # BLD: 7.8 K/UL (ref 4–11)

## 2020-11-26 LAB
ESTIMATED AVERAGE GLUCOSE: 177.2 MG/DL
HBA1C MFR BLD: 7.8 %

## 2021-01-01 ENCOUNTER — OFFICE VISIT (OUTPATIENT)
Dept: INTERNAL MEDICINE CLINIC | Age: 86
End: 2021-01-01
Payer: COMMERCIAL

## 2021-01-01 ENCOUNTER — APPOINTMENT (OUTPATIENT)
Dept: GENERAL RADIOLOGY | Age: 86
DRG: 177 | End: 2021-01-01
Payer: COMMERCIAL

## 2021-01-01 ENCOUNTER — HOSPITAL ENCOUNTER (OUTPATIENT)
Dept: ONCOLOGY | Age: 86
Setting detail: INFUSION SERIES
Discharge: HOME | End: 2021-11-05
Payer: COMMERCIAL

## 2021-01-01 ENCOUNTER — APPOINTMENT (OUTPATIENT)
Dept: GENERAL RADIOLOGY | Age: 86
DRG: 640 | End: 2021-01-01
Payer: COMMERCIAL

## 2021-01-01 ENCOUNTER — HOSPITAL ENCOUNTER (OUTPATIENT)
Dept: GENERAL RADIOLOGY | Age: 86
Discharge: HOME | End: 2021-11-02
Payer: COMMERCIAL

## 2021-01-01 ENCOUNTER — TELEPHONE (OUTPATIENT)
Dept: INTERNAL MEDICINE CLINIC | Age: 86
End: 2021-01-01

## 2021-01-01 ENCOUNTER — APPOINTMENT (OUTPATIENT)
Dept: CT IMAGING | Age: 86
DRG: 177 | End: 2021-01-01
Payer: COMMERCIAL

## 2021-01-01 ENCOUNTER — CARE COORDINATION (OUTPATIENT)
Dept: CASE MANAGEMENT | Age: 86
End: 2021-01-01

## 2021-01-01 ENCOUNTER — HOSPITAL ENCOUNTER (INPATIENT)
Age: 86
LOS: 22 days | Discharge: NURSING, REHAB OR LONG-TERM CARE | DRG: 177 | End: 2021-12-02
Attending: EMERGENCY MEDICINE | Admitting: HOSPITALIST
Payer: COMMERCIAL

## 2021-01-01 ENCOUNTER — APPOINTMENT (OUTPATIENT)
Dept: CT IMAGING | Age: 86
DRG: 640 | End: 2021-01-01
Payer: COMMERCIAL

## 2021-01-01 ENCOUNTER — HOSPITAL ENCOUNTER (INPATIENT)
Age: 86
LOS: 9 days | Discharge: HOME HEALTH | DRG: 177 | End: 2021-10-21
Attending: EMERGENCY MEDICINE | Admitting: HOSPITALIST
Payer: COMMERCIAL

## 2021-01-01 ENCOUNTER — CARE COORDINATION (OUTPATIENT)
Dept: CARE COORDINATION | Age: 86
End: 2021-01-01

## 2021-01-01 ENCOUNTER — HOSPITAL ENCOUNTER (OUTPATIENT)
Age: 86
Discharge: HOME | End: 2021-10-08
Payer: COMMERCIAL

## 2021-01-01 ENCOUNTER — HOSPITAL ENCOUNTER (INPATIENT)
Age: 86
LOS: 6 days | Discharge: NURSING, REHAB OR LONG-TERM CARE | DRG: 640 | End: 2021-08-27
Attending: EMERGENCY MEDICINE | Admitting: INTERNAL MEDICINE
Payer: COMMERCIAL

## 2021-01-01 ENCOUNTER — HOSPITAL ENCOUNTER (OUTPATIENT)
Dept: ONCOLOGY | Age: 86
Setting detail: INFUSION SERIES
Discharge: HOME | End: 2021-11-04
Payer: COMMERCIAL

## 2021-01-01 ENCOUNTER — TELEPHONE (OUTPATIENT)
Dept: ORTHOPEDIC SURGERY | Age: 86
End: 2021-01-01

## 2021-01-01 ENCOUNTER — HOSPITAL ENCOUNTER (OUTPATIENT)
Dept: GENERAL RADIOLOGY | Age: 86
Discharge: HOME | End: 2021-10-08
Payer: COMMERCIAL

## 2021-01-01 VITALS
WEIGHT: 180.3 LBS | BODY MASS INDEX: 30.78 KG/M2 | HEIGHT: 64 IN | TEMPERATURE: 97.3 F | DIASTOLIC BLOOD PRESSURE: 70 MMHG | SYSTOLIC BLOOD PRESSURE: 130 MMHG

## 2021-01-01 VITALS
TEMPERATURE: 97.5 F | BODY MASS INDEX: 30.86 KG/M2 | HEIGHT: 63 IN | RESPIRATION RATE: 22 BRPM | OXYGEN SATURATION: 98 % | WEIGHT: 174.16 LBS | SYSTOLIC BLOOD PRESSURE: 110 MMHG | HEART RATE: 69 BPM | DIASTOLIC BLOOD PRESSURE: 56 MMHG

## 2021-01-01 VITALS
SYSTOLIC BLOOD PRESSURE: 134 MMHG | HEART RATE: 73 BPM | TEMPERATURE: 97.9 F | OXYGEN SATURATION: 94 % | WEIGHT: 179.3 LBS | DIASTOLIC BLOOD PRESSURE: 80 MMHG | HEIGHT: 66 IN | BODY MASS INDEX: 28.82 KG/M2 | RESPIRATION RATE: 18 BRPM

## 2021-01-01 VITALS
HEIGHT: 64 IN | WEIGHT: 177 LBS | SYSTOLIC BLOOD PRESSURE: 119 MMHG | BODY MASS INDEX: 30.22 KG/M2 | DIASTOLIC BLOOD PRESSURE: 62 MMHG | HEART RATE: 63 BPM

## 2021-01-01 VITALS
DIASTOLIC BLOOD PRESSURE: 76 MMHG | TEMPERATURE: 97.6 F | HEART RATE: 65 BPM | RESPIRATION RATE: 16 BRPM | OXYGEN SATURATION: 92 % | WEIGHT: 147.49 LBS | SYSTOLIC BLOOD PRESSURE: 134 MMHG | HEIGHT: 62 IN | BODY MASS INDEX: 27.14 KG/M2

## 2021-01-01 VITALS
DIASTOLIC BLOOD PRESSURE: 54 MMHG | RESPIRATION RATE: 18 BRPM | OXYGEN SATURATION: 93 % | SYSTOLIC BLOOD PRESSURE: 103 MMHG | HEART RATE: 72 BPM | TEMPERATURE: 97.5 F

## 2021-01-01 VITALS
DIASTOLIC BLOOD PRESSURE: 58 MMHG | HEIGHT: 64 IN | SYSTOLIC BLOOD PRESSURE: 121 MMHG | BODY MASS INDEX: 30.78 KG/M2 | HEART RATE: 81 BPM

## 2021-01-01 VITALS
RESPIRATION RATE: 16 BRPM | TEMPERATURE: 97.4 F | HEART RATE: 71 BPM | SYSTOLIC BLOOD PRESSURE: 102 MMHG | DIASTOLIC BLOOD PRESSURE: 42 MMHG | OXYGEN SATURATION: 96 %

## 2021-01-01 VITALS
OXYGEN SATURATION: 97 % | HEIGHT: 64 IN | BODY MASS INDEX: 25.32 KG/M2 | SYSTOLIC BLOOD PRESSURE: 109 MMHG | DIASTOLIC BLOOD PRESSURE: 53 MMHG | HEART RATE: 83 BPM

## 2021-01-01 DIAGNOSIS — M19.90 OSTEOARTHRITIS, UNSPECIFIED OSTEOARTHRITIS TYPE, UNSPECIFIED SITE: Chronic | ICD-10-CM

## 2021-01-01 DIAGNOSIS — E11.9 TYPE 2 DIABETES MELLITUS WITHOUT COMPLICATION, WITHOUT LONG-TERM CURRENT USE OF INSULIN (HCC): Chronic | ICD-10-CM

## 2021-01-01 DIAGNOSIS — I10 ESSENTIAL HYPERTENSION: ICD-10-CM

## 2021-01-01 DIAGNOSIS — R41.82 ALTERED MENTAL STATUS, UNSPECIFIED ALTERED MENTAL STATUS TYPE: Primary | ICD-10-CM

## 2021-01-01 DIAGNOSIS — E86.0 DEHYDRATION: Primary | ICD-10-CM

## 2021-01-01 DIAGNOSIS — I73.9 PERIPHERAL VASCULAR DISEASE (HCC): ICD-10-CM

## 2021-01-01 DIAGNOSIS — I10 ESSENTIAL HYPERTENSION: Primary | ICD-10-CM

## 2021-01-01 DIAGNOSIS — F34.1 DYSTHYMIA: Primary | ICD-10-CM

## 2021-01-01 DIAGNOSIS — R06.02 SOB (SHORTNESS OF BREATH): ICD-10-CM

## 2021-01-01 DIAGNOSIS — E78.2 MIXED HYPERLIPIDEMIA: ICD-10-CM

## 2021-01-01 DIAGNOSIS — G89.29 CHRONIC RIGHT-SIDED LOW BACK PAIN WITH RIGHT-SIDED SCIATICA: ICD-10-CM

## 2021-01-01 DIAGNOSIS — R53.81 PHYSICAL DECONDITIONING: ICD-10-CM

## 2021-01-01 DIAGNOSIS — J96.01 ACUTE HYPOXEMIC RESPIRATORY FAILURE (HCC): ICD-10-CM

## 2021-01-01 DIAGNOSIS — E11.9 TYPE 2 DIABETES MELLITUS WITHOUT COMPLICATION, WITHOUT LONG-TERM CURRENT USE OF INSULIN (HCC): ICD-10-CM

## 2021-01-01 DIAGNOSIS — R29.6 MULTIPLE FALLS: ICD-10-CM

## 2021-01-01 DIAGNOSIS — M54.41 CHRONIC RIGHT-SIDED LOW BACK PAIN WITH RIGHT-SIDED SCIATICA: ICD-10-CM

## 2021-01-01 DIAGNOSIS — I25.118 CORONARY ARTERY DISEASE OF NATIVE ARTERY OF NATIVE HEART WITH STABLE ANGINA PECTORIS (HCC): ICD-10-CM

## 2021-01-01 DIAGNOSIS — I50.32 CHRONIC DIASTOLIC HF (HEART FAILURE) (HCC): Primary | ICD-10-CM

## 2021-01-01 DIAGNOSIS — J69.0 ASPIRATION PNEUMONIA OF BOTH LUNGS, UNSPECIFIED ASPIRATION PNEUMONIA TYPE, UNSPECIFIED PART OF LUNG (HCC): ICD-10-CM

## 2021-01-01 DIAGNOSIS — E11.9 TYPE 2 DIABETES MELLITUS WITHOUT COMPLICATION, WITHOUT LONG-TERM CURRENT USE OF INSULIN (HCC): Primary | ICD-10-CM

## 2021-01-01 DIAGNOSIS — Z09 HOSPITAL DISCHARGE FOLLOW-UP: Primary | ICD-10-CM

## 2021-01-01 DIAGNOSIS — J15.9 BACTERIAL PNEUMONIA: Primary | ICD-10-CM

## 2021-01-01 DIAGNOSIS — R06.02 SOB (SHORTNESS OF BREATH): Primary | ICD-10-CM

## 2021-01-01 DIAGNOSIS — E11.9 DIABETES MELLITUS TYPE 2 IN NONOBESE (HCC): ICD-10-CM

## 2021-01-01 DIAGNOSIS — N17.9 AKI (ACUTE KIDNEY INJURY) (HCC): ICD-10-CM

## 2021-01-01 DIAGNOSIS — Z00.00 ROUTINE GENERAL MEDICAL EXAMINATION AT A HEALTH CARE FACILITY: Primary | ICD-10-CM

## 2021-01-01 DIAGNOSIS — R63.0 POOR APPETITE: ICD-10-CM

## 2021-01-01 DIAGNOSIS — R09.02 HYPOXIA: ICD-10-CM

## 2021-01-01 DIAGNOSIS — B37.0 ORAL THRUSH: ICD-10-CM

## 2021-01-01 DIAGNOSIS — I10 ESSENTIAL HYPERTENSION: Chronic | ICD-10-CM

## 2021-01-01 DIAGNOSIS — F34.1 DYSTHYMIA: ICD-10-CM

## 2021-01-01 DIAGNOSIS — R41.82 ALTERED MENTAL STATUS, UNSPECIFIED ALTERED MENTAL STATUS TYPE: ICD-10-CM

## 2021-01-01 LAB
A/G RATIO: 0.7 (ref 1.1–2.2)
A/G RATIO: 0.8 (ref 1.1–2.2)
A/G RATIO: 1.3 (ref 1.1–2.2)
A/G RATIO: 2 (ref 1.1–2.2)
ALBUMIN SERPL-MCNC: 1.7 G/DL (ref 3.4–5)
ALBUMIN SERPL-MCNC: 2.2 G/DL (ref 3.4–5)
ALBUMIN SERPL-MCNC: 2.4 G/DL (ref 3.4–5)
ALBUMIN SERPL-MCNC: 2.6 G/DL (ref 3.4–5)
ALBUMIN SERPL-MCNC: 2.6 G/DL (ref 3.4–5)
ALBUMIN SERPL-MCNC: 3.2 G/DL (ref 3.4–5)
ALBUMIN SERPL-MCNC: 3.3 G/DL (ref 3.4–5)
ALBUMIN SERPL-MCNC: 3.4 G/DL (ref 3.4–5)
ALBUMIN SERPL-MCNC: 3.4 G/DL (ref 3.4–5)
ALBUMIN SERPL-MCNC: 3.5 G/DL (ref 3.4–5)
ALBUMIN SERPL-MCNC: 3.9 G/DL (ref 3.4–5)
ALBUMIN SERPL-MCNC: 4.4 G/DL (ref 3.4–5)
ALP BLD-CCNC: 130 U/L (ref 40–129)
ALP BLD-CCNC: 152 U/L (ref 40–129)
ALP BLD-CCNC: 51 U/L (ref 40–129)
ALP BLD-CCNC: 589 U/L (ref 40–129)
ALP BLD-CCNC: 67 U/L (ref 40–129)
ALP BLD-CCNC: 86 U/L (ref 40–129)
ALT SERPL-CCNC: 15 U/L (ref 10–40)
ALT SERPL-CCNC: 17 U/L (ref 10–40)
ALT SERPL-CCNC: 31 U/L (ref 10–40)
ALT SERPL-CCNC: 32 U/L (ref 10–40)
ALT SERPL-CCNC: 35 U/L (ref 10–40)
ALT SERPL-CCNC: 37 U/L (ref 10–40)
AMMONIA: 55 UMOL/L (ref 16–60)
AMORPHOUS: ABNORMAL /HPF
AMORPHOUS: ABNORMAL /HPF
ANION GAP SERPL CALCULATED.3IONS-SCNC: 10 MMOL/L (ref 3–16)
ANION GAP SERPL CALCULATED.3IONS-SCNC: 11 MMOL/L (ref 3–16)
ANION GAP SERPL CALCULATED.3IONS-SCNC: 12 MMOL/L (ref 3–16)
ANION GAP SERPL CALCULATED.3IONS-SCNC: 13 MMOL/L (ref 3–16)
ANION GAP SERPL CALCULATED.3IONS-SCNC: 14 MMOL/L (ref 3–16)
ANION GAP SERPL CALCULATED.3IONS-SCNC: 15 MMOL/L (ref 3–16)
ANION GAP SERPL CALCULATED.3IONS-SCNC: 15 MMOL/L (ref 3–16)
ANION GAP SERPL CALCULATED.3IONS-SCNC: 16 MMOL/L (ref 3–16)
ANION GAP SERPL CALCULATED.3IONS-SCNC: 4 MMOL/L (ref 3–16)
ANION GAP SERPL CALCULATED.3IONS-SCNC: 5 MMOL/L (ref 3–16)
ANION GAP SERPL CALCULATED.3IONS-SCNC: 6 MMOL/L (ref 3–16)
ANION GAP SERPL CALCULATED.3IONS-SCNC: 7 MMOL/L (ref 3–16)
ANION GAP SERPL CALCULATED.3IONS-SCNC: 8 MMOL/L (ref 3–16)
ANION GAP SERPL CALCULATED.3IONS-SCNC: 8 MMOL/L (ref 3–16)
ANION GAP SERPL CALCULATED.3IONS-SCNC: 9 MMOL/L (ref 3–16)
ANION GAP SERPL CALCULATED.3IONS-SCNC: 9 MMOL/L (ref 3–16)
ANISOCYTOSIS: ABNORMAL
ANISOCYTOSIS: ABNORMAL
APTT: 28.6 SEC (ref 26.2–38.6)
AST SERPL-CCNC: 14 U/L (ref 15–37)
AST SERPL-CCNC: 25 U/L (ref 15–37)
AST SERPL-CCNC: 27 U/L (ref 15–37)
AST SERPL-CCNC: 30 U/L (ref 15–37)
AST SERPL-CCNC: 39 U/L (ref 15–37)
AST SERPL-CCNC: 47 U/L (ref 15–37)
ATYPICAL LYMPHOCYTE RELATIVE PERCENT: 15 % (ref 0–6)
ATYPICAL LYMPHOCYTE RELATIVE PERCENT: 3 % (ref 0–6)
ATYPICAL LYMPHOCYTE RELATIVE PERCENT: 5 % (ref 0–6)
ATYPICAL LYMPHOCYTE RELATIVE PERCENT: 9 % (ref 0–6)
BACTERIA: ABNORMAL /HPF
BANDED NEUTROPHILS RELATIVE PERCENT: 1 % (ref 0–7)
BANDED NEUTROPHILS RELATIVE PERCENT: 14 % (ref 0–7)
BANDED NEUTROPHILS RELATIVE PERCENT: 6 % (ref 0–7)
BANDED NEUTROPHILS RELATIVE PERCENT: 8 % (ref 0–7)
BANDED NEUTROPHILS RELATIVE PERCENT: 9 % (ref 0–7)
BASE EXCESS ARTERIAL: 5.8 MMOL/L (ref -3–3)
BASE EXCESS VENOUS: 1.7 MMOL/L (ref -2–3)
BASE EXCESS VENOUS: 3.1 MMOL/L (ref -2–3)
BASE EXCESS VENOUS: 4.9 MMOL/L (ref -2–3)
BASOPHILS ABSOLUTE: 0 K/UL (ref 0–0.2)
BASOPHILS ABSOLUTE: 0.1 K/UL (ref 0–0.2)
BASOPHILS RELATIVE PERCENT: 0 %
BASOPHILS RELATIVE PERCENT: 0.1 %
BASOPHILS RELATIVE PERCENT: 0.3 %
BASOPHILS RELATIVE PERCENT: 0.4 %
BASOPHILS RELATIVE PERCENT: 0.5 %
BASOPHILS RELATIVE PERCENT: 0.6 %
BASOPHILS RELATIVE PERCENT: 0.7 %
BASOPHILS RELATIVE PERCENT: 0.8 %
BASOPHILS RELATIVE PERCENT: 0.8 %
BASOPHILS RELATIVE PERCENT: 0.9 %
BASOPHILS RELATIVE PERCENT: 1 %
BASOPHILS RELATIVE PERCENT: 1.1 %
BASOPHILS RELATIVE PERCENT: 1.1 %
BASOPHILS RELATIVE PERCENT: 2 %
BILIRUB SERPL-MCNC: 0.4 MG/DL (ref 0–1)
BILIRUB SERPL-MCNC: 0.4 MG/DL (ref 0–1)
BILIRUB SERPL-MCNC: 0.5 MG/DL (ref 0–1)
BILIRUB SERPL-MCNC: 0.6 MG/DL (ref 0–1)
BILIRUBIN DIRECT: <0.2 MG/DL (ref 0–0.3)
BILIRUBIN DIRECT: <0.2 MG/DL (ref 0–0.3)
BILIRUBIN URINE: NEGATIVE
BILIRUBIN, INDIRECT: ABNORMAL MG/DL (ref 0–1)
BILIRUBIN, INDIRECT: ABNORMAL MG/DL (ref 0–1)
BLOOD CULTURE, ROUTINE: NORMAL
BLOOD SMEAR REVIEW: NORMAL
BLOOD, URINE: ABNORMAL
BLOOD, URINE: NEGATIVE
BUN BLDV-MCNC: 16 MG/DL (ref 7–20)
BUN BLDV-MCNC: 16 MG/DL (ref 7–20)
BUN BLDV-MCNC: 18 MG/DL (ref 7–20)
BUN BLDV-MCNC: 18 MG/DL (ref 7–20)
BUN BLDV-MCNC: 19 MG/DL (ref 7–20)
BUN BLDV-MCNC: 19 MG/DL (ref 7–20)
BUN BLDV-MCNC: 21 MG/DL (ref 7–20)
BUN BLDV-MCNC: 21 MG/DL (ref 7–20)
BUN BLDV-MCNC: 22 MG/DL (ref 7–20)
BUN BLDV-MCNC: 23 MG/DL (ref 7–20)
BUN BLDV-MCNC: 23 MG/DL (ref 7–20)
BUN BLDV-MCNC: 24 MG/DL (ref 7–20)
BUN BLDV-MCNC: 25 MG/DL (ref 7–20)
BUN BLDV-MCNC: 26 MG/DL (ref 7–20)
BUN BLDV-MCNC: 27 MG/DL (ref 7–20)
BUN BLDV-MCNC: 28 MG/DL (ref 7–20)
BUN BLDV-MCNC: 28 MG/DL (ref 7–20)
BUN BLDV-MCNC: 29 MG/DL (ref 7–20)
BUN BLDV-MCNC: 30 MG/DL (ref 7–20)
BUN BLDV-MCNC: 31 MG/DL (ref 7–20)
BUN BLDV-MCNC: 32 MG/DL (ref 7–20)
BUN BLDV-MCNC: 34 MG/DL (ref 7–20)
BUN BLDV-MCNC: 37 MG/DL (ref 7–20)
BUN BLDV-MCNC: 39 MG/DL (ref 7–20)
BUN BLDV-MCNC: 39 MG/DL (ref 7–20)
BUN BLDV-MCNC: 41 MG/DL (ref 7–20)
BUN BLDV-MCNC: 47 MG/DL (ref 7–20)
C-REACTIVE PROTEIN: 116.5 MG/L (ref 0–5.1)
C-REACTIVE PROTEIN: 128.1 MG/L (ref 0–5.1)
CALCIUM SERPL-MCNC: 10 MG/DL (ref 8.3–10.6)
CALCIUM SERPL-MCNC: 10.1 MG/DL (ref 8.3–10.6)
CALCIUM SERPL-MCNC: 10.2 MG/DL (ref 8.3–10.6)
CALCIUM SERPL-MCNC: 10.3 MG/DL (ref 8.3–10.6)
CALCIUM SERPL-MCNC: 10.3 MG/DL (ref 8.3–10.6)
CALCIUM SERPL-MCNC: 10.4 MG/DL (ref 8.3–10.6)
CALCIUM SERPL-MCNC: 10.4 MG/DL (ref 8.3–10.6)
CALCIUM SERPL-MCNC: 10.5 MG/DL (ref 8.3–10.6)
CALCIUM SERPL-MCNC: 10.8 MG/DL (ref 8.3–10.6)
CALCIUM SERPL-MCNC: 10.9 MG/DL (ref 8.3–10.6)
CALCIUM SERPL-MCNC: 11.4 MG/DL (ref 8.3–10.6)
CALCIUM SERPL-MCNC: 12.3 MG/DL (ref 8.3–10.6)
CALCIUM SERPL-MCNC: 8.6 MG/DL (ref 8.3–10.6)
CALCIUM SERPL-MCNC: 8.7 MG/DL (ref 8.3–10.6)
CALCIUM SERPL-MCNC: 8.8 MG/DL (ref 8.3–10.6)
CALCIUM SERPL-MCNC: 9 MG/DL (ref 8.3–10.6)
CALCIUM SERPL-MCNC: 9 MG/DL (ref 8.3–10.6)
CALCIUM SERPL-MCNC: 9.1 MG/DL (ref 8.3–10.6)
CALCIUM SERPL-MCNC: 9.1 MG/DL (ref 8.3–10.6)
CALCIUM SERPL-MCNC: 9.3 MG/DL (ref 8.3–10.6)
CALCIUM SERPL-MCNC: 9.4 MG/DL (ref 8.3–10.6)
CALCIUM SERPL-MCNC: 9.4 MG/DL (ref 8.3–10.6)
CALCIUM SERPL-MCNC: 9.5 MG/DL (ref 8.3–10.6)
CALCIUM SERPL-MCNC: 9.5 MG/DL (ref 8.3–10.6)
CALCIUM SERPL-MCNC: 9.6 MG/DL (ref 8.3–10.6)
CALCIUM SERPL-MCNC: 9.7 MG/DL (ref 8.3–10.6)
CALCIUM SERPL-MCNC: 9.7 MG/DL (ref 8.3–10.6)
CALCIUM SERPL-MCNC: 9.8 MG/DL (ref 8.3–10.6)
CALCIUM SERPL-MCNC: 9.9 MG/DL (ref 8.3–10.6)
CARBOXYHEMOGLOBIN ARTERIAL: 1.3 % (ref 0–1.5)
CARBOXYHEMOGLOBIN: 1.1 % (ref 0–1.5)
CARBOXYHEMOGLOBIN: 1.3 % (ref 0–1.5)
CARBOXYHEMOGLOBIN: 1.5 % (ref 0–1.5)
CHLORIDE BLD-SCNC: 100 MMOL/L (ref 99–110)
CHLORIDE BLD-SCNC: 100 MMOL/L (ref 99–110)
CHLORIDE BLD-SCNC: 101 MMOL/L (ref 99–110)
CHLORIDE BLD-SCNC: 106 MMOL/L (ref 99–110)
CHLORIDE BLD-SCNC: 106 MMOL/L (ref 99–110)
CHLORIDE BLD-SCNC: 107 MMOL/L (ref 99–110)
CHLORIDE BLD-SCNC: 107 MMOL/L (ref 99–110)
CHLORIDE BLD-SCNC: 108 MMOL/L (ref 99–110)
CHLORIDE BLD-SCNC: 109 MMOL/L (ref 99–110)
CHLORIDE BLD-SCNC: 110 MMOL/L (ref 99–110)
CHLORIDE BLD-SCNC: 111 MMOL/L (ref 99–110)
CHLORIDE BLD-SCNC: 112 MMOL/L (ref 99–110)
CHLORIDE BLD-SCNC: 113 MMOL/L (ref 99–110)
CHLORIDE BLD-SCNC: 113 MMOL/L (ref 99–110)
CHLORIDE BLD-SCNC: 116 MMOL/L (ref 99–110)
CHLORIDE BLD-SCNC: 117 MMOL/L (ref 99–110)
CHLORIDE BLD-SCNC: 95 MMOL/L (ref 99–110)
CHLORIDE BLD-SCNC: 97 MMOL/L (ref 99–110)
CHLORIDE BLD-SCNC: 98 MMOL/L (ref 99–110)
CHLORIDE BLD-SCNC: 99 MMOL/L (ref 99–110)
CHLORIDE BLD-SCNC: 99 MMOL/L (ref 99–110)
CHOLESTEROL, TOTAL: 152 MG/DL (ref 0–199)
CLARITY: CLEAR
CO2: 21 MMOL/L (ref 21–32)
CO2: 22 MMOL/L (ref 21–32)
CO2: 23 MMOL/L (ref 21–32)
CO2: 24 MMOL/L (ref 21–32)
CO2: 25 MMOL/L (ref 21–32)
CO2: 26 MMOL/L (ref 21–32)
CO2: 27 MMOL/L (ref 21–32)
CO2: 28 MMOL/L (ref 21–32)
CO2: 29 MMOL/L (ref 21–32)
COLLAGEN ADENOSINE-5'-DIPHOSPHATE (ADP) TIME: 118 SEC (ref 56–110)
COLLAGEN ADENOSINE-5'-DIPHOSPHATE (ADP) TIME: 217 SEC (ref 56–110)
COLLAGEN ADENOSINE-5'-DIPHOSPHATE (ADP) TIME: 299 SEC (ref 56–110)
COLLAGEN EPINEPHRINE TIME: 104 SEC (ref 86–194)
COLLAGEN EPINEPHRINE TIME: 106 SEC (ref 86–194)
COLLAGEN EPINEPHRINE TIME: 75 SEC (ref 86–194)
COLOR: YELLOW
CORTISOL TOTAL: 17.2 UG/DL
CREAT SERPL-MCNC: 0.9 MG/DL (ref 0.8–1.3)
CREAT SERPL-MCNC: 1 MG/DL (ref 0.8–1.3)
CREAT SERPL-MCNC: 1.1 MG/DL (ref 0.8–1.3)
CREAT SERPL-MCNC: 1.2 MG/DL (ref 0.8–1.3)
CREAT SERPL-MCNC: 1.3 MG/DL (ref 0.8–1.3)
CREAT SERPL-MCNC: 1.4 MG/DL (ref 0.8–1.3)
CREAT SERPL-MCNC: 1.5 MG/DL (ref 0.8–1.3)
CREAT SERPL-MCNC: 1.6 MG/DL (ref 0.8–1.3)
CREAT SERPL-MCNC: 1.6 MG/DL (ref 0.8–1.3)
CREATININE URINE: 123.7 MG/DL (ref 39–259)
CRYSTALS, UA: ABNORMAL /HPF
CULTURE, BLOOD 2: NORMAL
D DIMER: 488 NG/ML DDU (ref 0–229)
D DIMER: 651 NG/ML DDU (ref 0–229)
DIGOXIN LEVEL: 1.6 NG/ML (ref 0.8–2)
EKG ATRIAL RATE: 122 BPM
EKG ATRIAL RATE: 192 BPM
EKG ATRIAL RATE: 300 BPM
EKG ATRIAL RATE: 77 BPM
EKG ATRIAL RATE: 83 BPM
EKG ATRIAL RATE: 86 BPM
EKG ATRIAL RATE: 88 BPM
EKG ATRIAL RATE: 95 BPM
EKG DIAGNOSIS: NORMAL
EKG P AXIS: 26 DEGREES
EKG P AXIS: 29 DEGREES
EKG P AXIS: 32 DEGREES
EKG P AXIS: 36 DEGREES
EKG P AXIS: 38 DEGREES
EKG P AXIS: 46 DEGREES
EKG P-R INTERVAL: 128 MS
EKG P-R INTERVAL: 130 MS
EKG P-R INTERVAL: 130 MS
EKG P-R INTERVAL: 142 MS
EKG P-R INTERVAL: 152 MS
EKG P-R INTERVAL: 160 MS
EKG Q-T INTERVAL: 240 MS
EKG Q-T INTERVAL: 292 MS
EKG Q-T INTERVAL: 324 MS
EKG Q-T INTERVAL: 326 MS
EKG Q-T INTERVAL: 348 MS
EKG Q-T INTERVAL: 356 MS
EKG Q-T INTERVAL: 374 MS
EKG Q-T INTERVAL: 382 MS
EKG QRS DURATION: 78 MS
EKG QRS DURATION: 78 MS
EKG QRS DURATION: 80 MS
EKG QRS DURATION: 82 MS
EKG QRS DURATION: 82 MS
EKG QRS DURATION: 88 MS
EKG QRS DURATION: 92 MS
EKG QRS DURATION: 96 MS
EKG QTC CALCULATION (BAZETT): 342 MS
EKG QTC CALCULATION (BAZETT): 394 MS
EKG QTC CALCULATION (BAZETT): 426 MS
EKG QTC CALCULATION (BAZETT): 432 MS
EKG QTC CALCULATION (BAZETT): 437 MS
EKG QTC CALCULATION (BAZETT): 438 MS
EKG QTC CALCULATION (BAZETT): 439 MS
EKG QTC CALCULATION (BAZETT): 487 MS
EKG R AXIS: -7 DEGREES
EKG R AXIS: 21 DEGREES
EKG R AXIS: 21 DEGREES
EKG R AXIS: 30 DEGREES
EKG R AXIS: 31 DEGREES
EKG R AXIS: 34 DEGREES
EKG R AXIS: 42 DEGREES
EKG R AXIS: 54 DEGREES
EKG T AXIS: -42 DEGREES
EKG T AXIS: 203 DEGREES
EKG T AXIS: 43 DEGREES
EKG T AXIS: 44 DEGREES
EKG T AXIS: 58 DEGREES
EKG T AXIS: 60 DEGREES
EKG T AXIS: 60 DEGREES
EKG T AXIS: 74 DEGREES
EKG VENTRICULAR RATE: 122 BPM
EKG VENTRICULAR RATE: 135 BPM
EKG VENTRICULAR RATE: 136 BPM
EKG VENTRICULAR RATE: 77 BPM
EKG VENTRICULAR RATE: 83 BPM
EKG VENTRICULAR RATE: 86 BPM
EKG VENTRICULAR RATE: 88 BPM
EKG VENTRICULAR RATE: 95 BPM
EOSINOPHILS ABSOLUTE: 0 K/UL (ref 0–0.6)
EOSINOPHILS ABSOLUTE: 0.1 K/UL (ref 0–0.6)
EOSINOPHILS ABSOLUTE: 0.2 K/UL (ref 0–0.6)
EOSINOPHILS ABSOLUTE: 0.3 K/UL (ref 0–0.6)
EOSINOPHILS ABSOLUTE: 0.4 K/UL (ref 0–0.6)
EOSINOPHILS RELATIVE PERCENT: 0 %
EOSINOPHILS RELATIVE PERCENT: 0.1 %
EOSINOPHILS RELATIVE PERCENT: 0.4 %
EOSINOPHILS RELATIVE PERCENT: 0.6 %
EOSINOPHILS RELATIVE PERCENT: 1 %
EOSINOPHILS RELATIVE PERCENT: 1.1 %
EOSINOPHILS RELATIVE PERCENT: 1.2 %
EOSINOPHILS RELATIVE PERCENT: 1.2 %
EOSINOPHILS RELATIVE PERCENT: 1.4 %
EOSINOPHILS RELATIVE PERCENT: 1.7 %
EOSINOPHILS RELATIVE PERCENT: 1.9 %
EOSINOPHILS RELATIVE PERCENT: 2 %
EOSINOPHILS RELATIVE PERCENT: 2.2 %
EOSINOPHILS RELATIVE PERCENT: 2.4 %
EOSINOPHILS RELATIVE PERCENT: 2.5 %
EOSINOPHILS RELATIVE PERCENT: 2.6 %
EOSINOPHILS RELATIVE PERCENT: 2.6 %
EOSINOPHILS RELATIVE PERCENT: 2.7 %
EOSINOPHILS RELATIVE PERCENT: 2.9 %
EOSINOPHILS RELATIVE PERCENT: 3 %
EOSINOPHILS RELATIVE PERCENT: 3 %
EOSINOPHILS RELATIVE PERCENT: 3.1 %
EOSINOPHILS RELATIVE PERCENT: 3.6 %
EOSINOPHILS RELATIVE PERCENT: 3.7 %
EOSINOPHILS RELATIVE PERCENT: 4.3 %
EOSINOPHILS RELATIVE PERCENT: 5.9 %
EOSINOPHILS RELATIVE PERCENT: 6.1 %
EOSINOPHILS RELATIVE PERCENT: 6.4 %
EOSINOPHILS RELATIVE PERCENT: 7.2 %
EOSINOPHILS RELATIVE PERCENT: 9.7 %
EPITHELIAL CELLS, UA: ABNORMAL /HPF (ref 0–5)
EPITHELIAL CELLS, UA: ABNORMAL /HPF (ref 0–5)
ESTIMATED AVERAGE GLUCOSE: 168.6 MG/DL
FERRITIN: 577.5 NG/ML (ref 30–400)
FIBRINOGEN: 139 MG/DL (ref 200–397)
GFR AFRICAN AMERICAN: 50
GFR AFRICAN AMERICAN: 50
GFR AFRICAN AMERICAN: 54
GFR AFRICAN AMERICAN: 58
GFR AFRICAN AMERICAN: >60
GFR NON-AFRICAN AMERICAN: 41
GFR NON-AFRICAN AMERICAN: 41
GFR NON-AFRICAN AMERICAN: 44
GFR NON-AFRICAN AMERICAN: 48
GFR NON-AFRICAN AMERICAN: 52
GFR NON-AFRICAN AMERICAN: 57
GFR NON-AFRICAN AMERICAN: >60
GLOBULIN: 2.2 G/DL
GLOBULIN: 3.1 G/DL
GLOBULIN: 3.9 G/DL
GLUCOSE BLD-MCNC: 101 MG/DL (ref 70–99)
GLUCOSE BLD-MCNC: 101 MG/DL (ref 70–99)
GLUCOSE BLD-MCNC: 103 MG/DL (ref 70–99)
GLUCOSE BLD-MCNC: 105 MG/DL (ref 70–99)
GLUCOSE BLD-MCNC: 105 MG/DL (ref 70–99)
GLUCOSE BLD-MCNC: 107 MG/DL (ref 70–99)
GLUCOSE BLD-MCNC: 108 MG/DL (ref 70–99)
GLUCOSE BLD-MCNC: 112 MG/DL (ref 70–99)
GLUCOSE BLD-MCNC: 115 MG/DL (ref 70–99)
GLUCOSE BLD-MCNC: 117 MG/DL (ref 70–99)
GLUCOSE BLD-MCNC: 125 MG/DL (ref 70–99)
GLUCOSE BLD-MCNC: 126 MG/DL (ref 70–99)
GLUCOSE BLD-MCNC: 127 MG/DL (ref 70–99)
GLUCOSE BLD-MCNC: 128 MG/DL (ref 70–99)
GLUCOSE BLD-MCNC: 128 MG/DL (ref 70–99)
GLUCOSE BLD-MCNC: 129 MG/DL (ref 70–99)
GLUCOSE BLD-MCNC: 129 MG/DL (ref 70–99)
GLUCOSE BLD-MCNC: 130 MG/DL (ref 70–99)
GLUCOSE BLD-MCNC: 131 MG/DL (ref 70–99)
GLUCOSE BLD-MCNC: 133 MG/DL (ref 70–99)
GLUCOSE BLD-MCNC: 133 MG/DL (ref 70–99)
GLUCOSE BLD-MCNC: 134 MG/DL (ref 70–99)
GLUCOSE BLD-MCNC: 136 MG/DL (ref 70–99)
GLUCOSE BLD-MCNC: 138 MG/DL (ref 70–99)
GLUCOSE BLD-MCNC: 138 MG/DL (ref 70–99)
GLUCOSE BLD-MCNC: 139 MG/DL (ref 70–99)
GLUCOSE BLD-MCNC: 141 MG/DL (ref 70–99)
GLUCOSE BLD-MCNC: 143 MG/DL (ref 70–99)
GLUCOSE BLD-MCNC: 143 MG/DL (ref 70–99)
GLUCOSE BLD-MCNC: 144 MG/DL (ref 70–99)
GLUCOSE BLD-MCNC: 144 MG/DL (ref 70–99)
GLUCOSE BLD-MCNC: 145 MG/DL (ref 70–99)
GLUCOSE BLD-MCNC: 146 MG/DL (ref 70–99)
GLUCOSE BLD-MCNC: 146 MG/DL (ref 70–99)
GLUCOSE BLD-MCNC: 147 MG/DL (ref 70–99)
GLUCOSE BLD-MCNC: 147 MG/DL (ref 70–99)
GLUCOSE BLD-MCNC: 148 MG/DL (ref 70–99)
GLUCOSE BLD-MCNC: 148 MG/DL (ref 70–99)
GLUCOSE BLD-MCNC: 149 MG/DL (ref 70–99)
GLUCOSE BLD-MCNC: 150 MG/DL (ref 70–99)
GLUCOSE BLD-MCNC: 152 MG/DL (ref 70–99)
GLUCOSE BLD-MCNC: 152 MG/DL (ref 70–99)
GLUCOSE BLD-MCNC: 153 MG/DL (ref 70–99)
GLUCOSE BLD-MCNC: 155 MG/DL (ref 70–99)
GLUCOSE BLD-MCNC: 155 MG/DL (ref 70–99)
GLUCOSE BLD-MCNC: 157 MG/DL (ref 70–99)
GLUCOSE BLD-MCNC: 158 MG/DL (ref 70–99)
GLUCOSE BLD-MCNC: 158 MG/DL (ref 70–99)
GLUCOSE BLD-MCNC: 159 MG/DL (ref 70–99)
GLUCOSE BLD-MCNC: 160 MG/DL (ref 70–99)
GLUCOSE BLD-MCNC: 161 MG/DL (ref 70–99)
GLUCOSE BLD-MCNC: 161 MG/DL (ref 70–99)
GLUCOSE BLD-MCNC: 162 MG/DL (ref 70–99)
GLUCOSE BLD-MCNC: 163 MG/DL (ref 70–99)
GLUCOSE BLD-MCNC: 163 MG/DL (ref 70–99)
GLUCOSE BLD-MCNC: 164 MG/DL (ref 70–99)
GLUCOSE BLD-MCNC: 164 MG/DL (ref 70–99)
GLUCOSE BLD-MCNC: 165 MG/DL (ref 70–99)
GLUCOSE BLD-MCNC: 167 MG/DL (ref 70–99)
GLUCOSE BLD-MCNC: 168 MG/DL (ref 70–99)
GLUCOSE BLD-MCNC: 168 MG/DL (ref 70–99)
GLUCOSE BLD-MCNC: 169 MG/DL (ref 70–99)
GLUCOSE BLD-MCNC: 169 MG/DL (ref 70–99)
GLUCOSE BLD-MCNC: 170 MG/DL (ref 70–99)
GLUCOSE BLD-MCNC: 173 MG/DL (ref 70–99)
GLUCOSE BLD-MCNC: 173 MG/DL (ref 70–99)
GLUCOSE BLD-MCNC: 177 MG/DL (ref 70–99)
GLUCOSE BLD-MCNC: 178 MG/DL (ref 70–99)
GLUCOSE BLD-MCNC: 179 MG/DL (ref 70–99)
GLUCOSE BLD-MCNC: 180 MG/DL (ref 70–99)
GLUCOSE BLD-MCNC: 181 MG/DL (ref 70–99)
GLUCOSE BLD-MCNC: 183 MG/DL (ref 70–99)
GLUCOSE BLD-MCNC: 183 MG/DL (ref 70–99)
GLUCOSE BLD-MCNC: 185 MG/DL (ref 70–99)
GLUCOSE BLD-MCNC: 189 MG/DL (ref 70–99)
GLUCOSE BLD-MCNC: 190 MG/DL (ref 70–99)
GLUCOSE BLD-MCNC: 191 MG/DL (ref 70–99)
GLUCOSE BLD-MCNC: 192 MG/DL (ref 70–99)
GLUCOSE BLD-MCNC: 192 MG/DL (ref 70–99)
GLUCOSE BLD-MCNC: 193 MG/DL (ref 70–99)
GLUCOSE BLD-MCNC: 194 MG/DL (ref 70–99)
GLUCOSE BLD-MCNC: 195 MG/DL (ref 70–99)
GLUCOSE BLD-MCNC: 195 MG/DL (ref 70–99)
GLUCOSE BLD-MCNC: 197 MG/DL (ref 70–99)
GLUCOSE BLD-MCNC: 198 MG/DL (ref 70–99)
GLUCOSE BLD-MCNC: 198 MG/DL (ref 70–99)
GLUCOSE BLD-MCNC: 199 MG/DL (ref 70–99)
GLUCOSE BLD-MCNC: 200 MG/DL (ref 70–99)
GLUCOSE BLD-MCNC: 201 MG/DL (ref 70–99)
GLUCOSE BLD-MCNC: 201 MG/DL (ref 70–99)
GLUCOSE BLD-MCNC: 202 MG/DL (ref 70–99)
GLUCOSE BLD-MCNC: 203 MG/DL (ref 70–99)
GLUCOSE BLD-MCNC: 204 MG/DL (ref 70–99)
GLUCOSE BLD-MCNC: 204 MG/DL (ref 70–99)
GLUCOSE BLD-MCNC: 205 MG/DL (ref 70–99)
GLUCOSE BLD-MCNC: 206 MG/DL (ref 70–99)
GLUCOSE BLD-MCNC: 207 MG/DL (ref 70–99)
GLUCOSE BLD-MCNC: 207 MG/DL (ref 70–99)
GLUCOSE BLD-MCNC: 208 MG/DL (ref 70–99)
GLUCOSE BLD-MCNC: 208 MG/DL (ref 70–99)
GLUCOSE BLD-MCNC: 209 MG/DL (ref 70–99)
GLUCOSE BLD-MCNC: 213 MG/DL (ref 70–99)
GLUCOSE BLD-MCNC: 214 MG/DL (ref 70–99)
GLUCOSE BLD-MCNC: 215 MG/DL (ref 70–99)
GLUCOSE BLD-MCNC: 216 MG/DL (ref 70–99)
GLUCOSE BLD-MCNC: 216 MG/DL (ref 70–99)
GLUCOSE BLD-MCNC: 217 MG/DL (ref 70–99)
GLUCOSE BLD-MCNC: 217 MG/DL (ref 70–99)
GLUCOSE BLD-MCNC: 218 MG/DL (ref 70–99)
GLUCOSE BLD-MCNC: 218 MG/DL (ref 70–99)
GLUCOSE BLD-MCNC: 219 MG/DL (ref 70–99)
GLUCOSE BLD-MCNC: 220 MG/DL (ref 70–99)
GLUCOSE BLD-MCNC: 221 MG/DL (ref 70–99)
GLUCOSE BLD-MCNC: 222 MG/DL (ref 70–99)
GLUCOSE BLD-MCNC: 223 MG/DL (ref 70–99)
GLUCOSE BLD-MCNC: 224 MG/DL (ref 70–99)
GLUCOSE BLD-MCNC: 225 MG/DL (ref 70–99)
GLUCOSE BLD-MCNC: 226 MG/DL (ref 70–99)
GLUCOSE BLD-MCNC: 227 MG/DL (ref 70–99)
GLUCOSE BLD-MCNC: 227 MG/DL (ref 70–99)
GLUCOSE BLD-MCNC: 228 MG/DL (ref 70–99)
GLUCOSE BLD-MCNC: 228 MG/DL (ref 70–99)
GLUCOSE BLD-MCNC: 229 MG/DL (ref 70–99)
GLUCOSE BLD-MCNC: 230 MG/DL (ref 70–99)
GLUCOSE BLD-MCNC: 231 MG/DL (ref 70–99)
GLUCOSE BLD-MCNC: 232 MG/DL (ref 70–99)
GLUCOSE BLD-MCNC: 233 MG/DL (ref 70–99)
GLUCOSE BLD-MCNC: 235 MG/DL (ref 70–99)
GLUCOSE BLD-MCNC: 235 MG/DL (ref 70–99)
GLUCOSE BLD-MCNC: 236 MG/DL (ref 70–99)
GLUCOSE BLD-MCNC: 237 MG/DL (ref 70–99)
GLUCOSE BLD-MCNC: 238 MG/DL (ref 70–99)
GLUCOSE BLD-MCNC: 238 MG/DL (ref 70–99)
GLUCOSE BLD-MCNC: 239 MG/DL (ref 70–99)
GLUCOSE BLD-MCNC: 239 MG/DL (ref 70–99)
GLUCOSE BLD-MCNC: 240 MG/DL (ref 70–99)
GLUCOSE BLD-MCNC: 242 MG/DL (ref 70–99)
GLUCOSE BLD-MCNC: 242 MG/DL (ref 70–99)
GLUCOSE BLD-MCNC: 245 MG/DL (ref 70–99)
GLUCOSE BLD-MCNC: 249 MG/DL (ref 70–99)
GLUCOSE BLD-MCNC: 250 MG/DL (ref 70–99)
GLUCOSE BLD-MCNC: 250 MG/DL (ref 70–99)
GLUCOSE BLD-MCNC: 254 MG/DL (ref 70–99)
GLUCOSE BLD-MCNC: 255 MG/DL (ref 70–99)
GLUCOSE BLD-MCNC: 257 MG/DL (ref 70–99)
GLUCOSE BLD-MCNC: 257 MG/DL (ref 70–99)
GLUCOSE BLD-MCNC: 262 MG/DL (ref 70–99)
GLUCOSE BLD-MCNC: 263 MG/DL (ref 70–99)
GLUCOSE BLD-MCNC: 264 MG/DL (ref 70–99)
GLUCOSE BLD-MCNC: 265 MG/DL (ref 70–99)
GLUCOSE BLD-MCNC: 266 MG/DL (ref 70–99)
GLUCOSE BLD-MCNC: 269 MG/DL (ref 70–99)
GLUCOSE BLD-MCNC: 272 MG/DL (ref 70–99)
GLUCOSE BLD-MCNC: 277 MG/DL (ref 70–99)
GLUCOSE BLD-MCNC: 277 MG/DL (ref 70–99)
GLUCOSE BLD-MCNC: 285 MG/DL (ref 70–99)
GLUCOSE BLD-MCNC: 297 MG/DL (ref 70–99)
GLUCOSE BLD-MCNC: 301 MG/DL (ref 70–99)
GLUCOSE BLD-MCNC: 304 MG/DL (ref 70–99)
GLUCOSE BLD-MCNC: 316 MG/DL (ref 70–99)
GLUCOSE BLD-MCNC: 320 MG/DL (ref 70–99)
GLUCOSE BLD-MCNC: 321 MG/DL (ref 70–99)
GLUCOSE BLD-MCNC: 332 MG/DL (ref 70–99)
GLUCOSE BLD-MCNC: 337 MG/DL (ref 70–99)
GLUCOSE BLD-MCNC: 345 MG/DL (ref 70–99)
GLUCOSE BLD-MCNC: 352 MG/DL (ref 70–99)
GLUCOSE BLD-MCNC: 88 MG/DL (ref 70–99)
GLUCOSE BLD-MCNC: 93 MG/DL (ref 70–99)
GLUCOSE BLD-MCNC: 95 MG/DL (ref 70–99)
GLUCOSE BLD-MCNC: 97 MG/DL (ref 70–99)
GLUCOSE BLD-MCNC: 99 MG/DL (ref 70–99)
GLUCOSE URINE: 500 MG/DL
GLUCOSE URINE: 500 MG/DL
GLUCOSE URINE: >=1000 MG/DL
GLUCOSE URINE: >=1000 MG/DL
GLUCOSE URINE: NEGATIVE MG/DL
GLUCOSE URINE: NEGATIVE MG/DL
HBA1C MFR BLD: 7.5 %
HCO3 ARTERIAL: 30 MMOL/L (ref 21–29)
HCO3 VENOUS: 26.3 MMOL/L (ref 24–28)
HCO3 VENOUS: 27.7 MMOL/L (ref 24–28)
HCO3 VENOUS: 28.5 MMOL/L (ref 24–28)
HCT VFR BLD CALC: 27.8 % (ref 40.5–52.5)
HCT VFR BLD CALC: 27.8 % (ref 40.5–52.5)
HCT VFR BLD CALC: 28 % (ref 40.5–52.5)
HCT VFR BLD CALC: 28.4 % (ref 40.5–52.5)
HCT VFR BLD CALC: 28.7 % (ref 40.5–52.5)
HCT VFR BLD CALC: 29.1 % (ref 40.5–52.5)
HCT VFR BLD CALC: 29.1 % (ref 40.5–52.5)
HCT VFR BLD CALC: 29.2 % (ref 40.5–52.5)
HCT VFR BLD CALC: 29.3 % (ref 40.5–52.5)
HCT VFR BLD CALC: 29.3 % (ref 40.5–52.5)
HCT VFR BLD CALC: 29.5 % (ref 40.5–52.5)
HCT VFR BLD CALC: 29.5 % (ref 40.5–52.5)
HCT VFR BLD CALC: 29.6 % (ref 40.5–52.5)
HCT VFR BLD CALC: 29.8 % (ref 40.5–52.5)
HCT VFR BLD CALC: 29.8 % (ref 40.5–52.5)
HCT VFR BLD CALC: 30.2 % (ref 40.5–52.5)
HCT VFR BLD CALC: 30.4 % (ref 40.5–52.5)
HCT VFR BLD CALC: 30.7 % (ref 40.5–52.5)
HCT VFR BLD CALC: 30.9 % (ref 40.5–52.5)
HCT VFR BLD CALC: 31.3 % (ref 40.5–52.5)
HCT VFR BLD CALC: 31.7 % (ref 40.5–52.5)
HCT VFR BLD CALC: 31.8 % (ref 40.5–52.5)
HCT VFR BLD CALC: 32.4 % (ref 40.5–52.5)
HCT VFR BLD CALC: 32.5 % (ref 40.5–52.5)
HCT VFR BLD CALC: 33 % (ref 40.5–52.5)
HCT VFR BLD CALC: 33.6 % (ref 40.5–52.5)
HCT VFR BLD CALC: 33.8 % (ref 40.5–52.5)
HCT VFR BLD CALC: 33.8 % (ref 40.5–52.5)
HCT VFR BLD CALC: 34.1 % (ref 40.5–52.5)
HCT VFR BLD CALC: 34.3 % (ref 40.5–52.5)
HCT VFR BLD CALC: 34.4 % (ref 40.5–52.5)
HCT VFR BLD CALC: 34.4 % (ref 40.5–52.5)
HCT VFR BLD CALC: 35.4 % (ref 40.5–52.5)
HCT VFR BLD CALC: 36.1 % (ref 40.5–52.5)
HCT VFR BLD CALC: 36.9 % (ref 40.5–52.5)
HCT VFR BLD CALC: 37.2 % (ref 40.5–52.5)
HCT VFR BLD CALC: 37.6 % (ref 40.5–52.5)
HCT VFR BLD CALC: 38.3 % (ref 40.5–52.5)
HCT VFR BLD CALC: 39 % (ref 40.5–52.5)
HCT VFR BLD CALC: 39.4 % (ref 40.5–52.5)
HCT VFR BLD CALC: 39.7 % (ref 40.5–52.5)
HCT VFR BLD CALC: 39.9 % (ref 40.5–52.5)
HCT VFR BLD CALC: 42.4 % (ref 40.5–52.5)
HDLC SERPL-MCNC: 41 MG/DL (ref 40–60)
HEMATOLOGY PATH CONSULT: NORMAL
HEMATOLOGY PATH CONSULT: YES
HEMOGLOBIN, ART, EXTENDED: 9.8 G/DL
HEMOGLOBIN, VEN, REDUCED: 11.7 %
HEMOGLOBIN, VEN, REDUCED: 16.6 %
HEMOGLOBIN, VEN, REDUCED: 5.8 %
HEMOGLOBIN: 10 G/DL (ref 13.5–17.5)
HEMOGLOBIN: 10.1 G/DL (ref 13.5–17.5)
HEMOGLOBIN: 10.1 G/DL (ref 13.5–17.5)
HEMOGLOBIN: 10.3 G/DL (ref 13.5–17.5)
HEMOGLOBIN: 10.6 G/DL (ref 13.5–17.5)
HEMOGLOBIN: 10.8 G/DL (ref 13.5–17.5)
HEMOGLOBIN: 10.9 G/DL (ref 13.5–17.5)
HEMOGLOBIN: 11.1 G/DL (ref 13.5–17.5)
HEMOGLOBIN: 11.3 G/DL (ref 13.5–17.5)
HEMOGLOBIN: 11.5 G/DL (ref 13.5–17.5)
HEMOGLOBIN: 11.6 G/DL (ref 13.5–17.5)
HEMOGLOBIN: 12.2 G/DL (ref 13.5–17.5)
HEMOGLOBIN: 12.5 G/DL (ref 13.5–17.5)
HEMOGLOBIN: 12.8 G/DL (ref 13.5–17.5)
HEMOGLOBIN: 13.3 G/DL (ref 13.5–17.5)
HEMOGLOBIN: 13.4 G/DL (ref 13.5–17.5)
HEMOGLOBIN: 13.8 G/DL (ref 13.5–17.5)
HEMOGLOBIN: 13.9 G/DL (ref 13.5–17.5)
HEMOGLOBIN: 14.4 G/DL (ref 13.5–17.5)
HEMOGLOBIN: 9 G/DL (ref 13.5–17.5)
HEMOGLOBIN: 9 G/DL (ref 13.5–17.5)
HEMOGLOBIN: 9.1 G/DL (ref 13.5–17.5)
HEMOGLOBIN: 9.2 G/DL (ref 13.5–17.5)
HEMOGLOBIN: 9.5 G/DL (ref 13.5–17.5)
HEMOGLOBIN: 9.5 G/DL (ref 13.5–17.5)
HEMOGLOBIN: 9.7 G/DL (ref 13.5–17.5)
HEMOGLOBIN: 9.8 G/DL (ref 13.5–17.5)
HEMOGLOBIN: 9.9 G/DL (ref 13.5–17.5)
HEMOGLOBIN: 9.9 G/DL (ref 13.5–17.5)
HYPOCHROMIA: ABNORMAL
HYPOCHROMIA: ABNORMAL
INR BLD: 1.11 (ref 0.88–1.12)
INR BLD: 1.15 (ref 0.88–1.12)
INR BLD: 1.16 (ref 0.88–1.12)
INR BLD: 1.19 (ref 0.88–1.12)
KETONES, URINE: 15 MG/DL
KETONES, URINE: ABNORMAL MG/DL
KETONES, URINE: NEGATIVE MG/DL
LACTATE DEHYDROGENASE: 184 U/L (ref 100–190)
LACTIC ACID, SEPSIS: 1.6 MMOL/L (ref 0.4–1.9)
LACTIC ACID, SEPSIS: 2.7 MMOL/L (ref 0.4–1.9)
LACTIC ACID, SEPSIS: 3.4 MMOL/L (ref 0.4–1.9)
LACTIC ACID: 1.3 MMOL/L (ref 0.4–2)
LACTIC ACID: 1.7 MMOL/L (ref 0.4–2)
LACTIC ACID: 1.7 MMOL/L (ref 0.4–2)
LACTIC ACID: 1.8 MMOL/L (ref 0.4–2)
LACTIC ACID: 2 MMOL/L (ref 0.4–2)
LACTIC ACID: 2.2 MMOL/L (ref 0.4–2)
LACTIC ACID: 2.3 MMOL/L (ref 0.4–2)
LACTIC ACID: 2.5 MMOL/L (ref 0.4–2)
LACTIC ACID: 3 MMOL/L (ref 0.4–2)
LACTIC ACID: 4.1 MMOL/L (ref 0.4–2)
LACTIC ACID: 4.5 MMOL/L (ref 0.4–2)
LDL CHOLESTEROL CALCULATED: 90 MG/DL
LEUKOCYTE ESTERASE, URINE: NEGATIVE
LYMPHOCYTES ABSOLUTE: 0.3 K/UL (ref 1–5.1)
LYMPHOCYTES ABSOLUTE: 0.4 K/UL (ref 1–5.1)
LYMPHOCYTES ABSOLUTE: 0.4 K/UL (ref 1–5.1)
LYMPHOCYTES ABSOLUTE: 0.5 K/UL (ref 1–5.1)
LYMPHOCYTES ABSOLUTE: 0.6 K/UL (ref 1–5.1)
LYMPHOCYTES ABSOLUTE: 0.7 K/UL (ref 1–5.1)
LYMPHOCYTES ABSOLUTE: 0.8 K/UL (ref 1–5.1)
LYMPHOCYTES ABSOLUTE: 0.9 K/UL (ref 1–5.1)
LYMPHOCYTES ABSOLUTE: 1 K/UL (ref 1–5.1)
LYMPHOCYTES ABSOLUTE: 1.1 K/UL (ref 1–5.1)
LYMPHOCYTES ABSOLUTE: 1.2 K/UL (ref 1–5.1)
LYMPHOCYTES ABSOLUTE: 1.2 K/UL (ref 1–5.1)
LYMPHOCYTES ABSOLUTE: 1.3 K/UL (ref 1–5.1)
LYMPHOCYTES ABSOLUTE: 2.2 K/UL (ref 1–5.1)
LYMPHOCYTES ABSOLUTE: 2.3 K/UL (ref 1–5.1)
LYMPHOCYTES ABSOLUTE: 3.2 K/UL (ref 1–5.1)
LYMPHOCYTES ABSOLUTE: 4 K/UL (ref 1–5.1)
LYMPHOCYTES RELATIVE PERCENT: 11 %
LYMPHOCYTES RELATIVE PERCENT: 11 %
LYMPHOCYTES RELATIVE PERCENT: 13.8 %
LYMPHOCYTES RELATIVE PERCENT: 13.8 %
LYMPHOCYTES RELATIVE PERCENT: 15 %
LYMPHOCYTES RELATIVE PERCENT: 15.1 %
LYMPHOCYTES RELATIVE PERCENT: 15.9 %
LYMPHOCYTES RELATIVE PERCENT: 16.4 %
LYMPHOCYTES RELATIVE PERCENT: 17 %
LYMPHOCYTES RELATIVE PERCENT: 18 %
LYMPHOCYTES RELATIVE PERCENT: 18.1 %
LYMPHOCYTES RELATIVE PERCENT: 18.2 %
LYMPHOCYTES RELATIVE PERCENT: 18.4 %
LYMPHOCYTES RELATIVE PERCENT: 18.5 %
LYMPHOCYTES RELATIVE PERCENT: 19 %
LYMPHOCYTES RELATIVE PERCENT: 19.4 %
LYMPHOCYTES RELATIVE PERCENT: 19.5 %
LYMPHOCYTES RELATIVE PERCENT: 19.7 %
LYMPHOCYTES RELATIVE PERCENT: 20 %
LYMPHOCYTES RELATIVE PERCENT: 21 %
LYMPHOCYTES RELATIVE PERCENT: 22 %
LYMPHOCYTES RELATIVE PERCENT: 22.4 %
LYMPHOCYTES RELATIVE PERCENT: 22.6 %
LYMPHOCYTES RELATIVE PERCENT: 22.8 %
LYMPHOCYTES RELATIVE PERCENT: 22.8 %
LYMPHOCYTES RELATIVE PERCENT: 23.1 %
LYMPHOCYTES RELATIVE PERCENT: 24 %
LYMPHOCYTES RELATIVE PERCENT: 24.5 %
LYMPHOCYTES RELATIVE PERCENT: 24.6 %
LYMPHOCYTES RELATIVE PERCENT: 24.9 %
LYMPHOCYTES RELATIVE PERCENT: 25 %
LYMPHOCYTES RELATIVE PERCENT: 25.3 %
LYMPHOCYTES RELATIVE PERCENT: 26 %
LYMPHOCYTES RELATIVE PERCENT: 26.1 %
LYMPHOCYTES RELATIVE PERCENT: 27.1 %
LYMPHOCYTES RELATIVE PERCENT: 27.1 %
LYMPHOCYTES RELATIVE PERCENT: 28.3 %
LYMPHOCYTES RELATIVE PERCENT: 30 %
LYMPHOCYTES RELATIVE PERCENT: 32.1 %
LYMPHOCYTES RELATIVE PERCENT: 38 %
LYMPHOCYTES RELATIVE PERCENT: 39.8 %
LYMPHOCYTES RELATIVE PERCENT: 40 %
LYMPHOCYTES RELATIVE PERCENT: 7.2 %
MAGNESIUM: 1.6 MG/DL (ref 1.8–2.4)
MAGNESIUM: 1.6 MG/DL (ref 1.8–2.4)
MAGNESIUM: 1.7 MG/DL (ref 1.8–2.4)
MAGNESIUM: 1.8 MG/DL (ref 1.8–2.4)
MAGNESIUM: 1.9 MG/DL (ref 1.8–2.4)
MAGNESIUM: 2 MG/DL (ref 1.8–2.4)
MAGNESIUM: 2.1 MG/DL (ref 1.8–2.4)
MAGNESIUM: 2.2 MG/DL (ref 1.8–2.4)
MCH RBC QN AUTO: 29.6 PG (ref 26–34)
MCH RBC QN AUTO: 29.9 PG (ref 26–34)
MCH RBC QN AUTO: 30 PG (ref 26–34)
MCH RBC QN AUTO: 30 PG (ref 26–34)
MCH RBC QN AUTO: 30.2 PG (ref 26–34)
MCH RBC QN AUTO: 30.4 PG (ref 26–34)
MCH RBC QN AUTO: 30.4 PG (ref 26–34)
MCH RBC QN AUTO: 30.5 PG (ref 26–34)
MCH RBC QN AUTO: 30.6 PG (ref 26–34)
MCH RBC QN AUTO: 30.7 PG (ref 26–34)
MCH RBC QN AUTO: 30.7 PG (ref 26–34)
MCH RBC QN AUTO: 30.8 PG (ref 26–34)
MCH RBC QN AUTO: 30.8 PG (ref 26–34)
MCH RBC QN AUTO: 30.9 PG (ref 26–34)
MCH RBC QN AUTO: 31 PG (ref 26–34)
MCH RBC QN AUTO: 31.1 PG (ref 26–34)
MCH RBC QN AUTO: 31.3 PG (ref 26–34)
MCH RBC QN AUTO: 31.3 PG (ref 26–34)
MCH RBC QN AUTO: 31.5 PG (ref 26–34)
MCH RBC QN AUTO: 31.5 PG (ref 26–34)
MCH RBC QN AUTO: 31.6 PG (ref 26–34)
MCH RBC QN AUTO: 31.7 PG (ref 26–34)
MCH RBC QN AUTO: 31.8 PG (ref 26–34)
MCH RBC QN AUTO: 31.9 PG (ref 26–34)
MCH RBC QN AUTO: 32.5 PG (ref 26–34)
MCH RBC QN AUTO: 32.6 PG (ref 26–34)
MCH RBC QN AUTO: 32.7 PG (ref 26–34)
MCH RBC QN AUTO: 32.8 PG (ref 26–34)
MCH RBC QN AUTO: 32.8 PG (ref 26–34)
MCH RBC QN AUTO: 32.9 PG (ref 26–34)
MCH RBC QN AUTO: 33 PG (ref 26–34)
MCH RBC QN AUTO: 33.4 PG (ref 26–34)
MCHC RBC AUTO-ENTMCNC: 32.3 G/DL (ref 31–36)
MCHC RBC AUTO-ENTMCNC: 32.3 G/DL (ref 31–36)
MCHC RBC AUTO-ENTMCNC: 32.5 G/DL (ref 31–36)
MCHC RBC AUTO-ENTMCNC: 32.6 G/DL (ref 31–36)
MCHC RBC AUTO-ENTMCNC: 32.7 G/DL (ref 31–36)
MCHC RBC AUTO-ENTMCNC: 32.7 G/DL (ref 31–36)
MCHC RBC AUTO-ENTMCNC: 32.8 G/DL (ref 31–36)
MCHC RBC AUTO-ENTMCNC: 32.9 G/DL (ref 31–36)
MCHC RBC AUTO-ENTMCNC: 33 G/DL (ref 31–36)
MCHC RBC AUTO-ENTMCNC: 33 G/DL (ref 31–36)
MCHC RBC AUTO-ENTMCNC: 33.2 G/DL (ref 31–36)
MCHC RBC AUTO-ENTMCNC: 33.3 G/DL (ref 31–36)
MCHC RBC AUTO-ENTMCNC: 33.4 G/DL (ref 31–36)
MCHC RBC AUTO-ENTMCNC: 33.5 G/DL (ref 31–36)
MCHC RBC AUTO-ENTMCNC: 33.6 G/DL (ref 31–36)
MCHC RBC AUTO-ENTMCNC: 33.7 G/DL (ref 31–36)
MCHC RBC AUTO-ENTMCNC: 33.7 G/DL (ref 31–36)
MCHC RBC AUTO-ENTMCNC: 33.8 G/DL (ref 31–36)
MCHC RBC AUTO-ENTMCNC: 34 G/DL (ref 31–36)
MCHC RBC AUTO-ENTMCNC: 34.1 G/DL (ref 31–36)
MCHC RBC AUTO-ENTMCNC: 34.5 G/DL (ref 31–36)
MCHC RBC AUTO-ENTMCNC: 34.9 G/DL (ref 31–36)
MCHC RBC AUTO-ENTMCNC: 34.9 G/DL (ref 31–36)
MCHC RBC AUTO-ENTMCNC: 35 G/DL (ref 31–36)
MCHC RBC AUTO-ENTMCNC: 35.3 G/DL (ref 31–36)
MCHC RBC AUTO-ENTMCNC: 35.4 G/DL (ref 31–36)
MCHC RBC AUTO-ENTMCNC: 35.7 G/DL (ref 31–36)
MCV RBC AUTO: 90.7 FL (ref 80–100)
MCV RBC AUTO: 90.7 FL (ref 80–100)
MCV RBC AUTO: 91 FL (ref 80–100)
MCV RBC AUTO: 91 FL (ref 80–100)
MCV RBC AUTO: 91.3 FL (ref 80–100)
MCV RBC AUTO: 91.4 FL (ref 80–100)
MCV RBC AUTO: 91.5 FL (ref 80–100)
MCV RBC AUTO: 91.6 FL (ref 80–100)
MCV RBC AUTO: 91.7 FL (ref 80–100)
MCV RBC AUTO: 91.8 FL (ref 80–100)
MCV RBC AUTO: 92 FL (ref 80–100)
MCV RBC AUTO: 92.1 FL (ref 80–100)
MCV RBC AUTO: 92.2 FL (ref 80–100)
MCV RBC AUTO: 92.3 FL (ref 80–100)
MCV RBC AUTO: 92.4 FL (ref 80–100)
MCV RBC AUTO: 92.5 FL (ref 80–100)
MCV RBC AUTO: 92.5 FL (ref 80–100)
MCV RBC AUTO: 92.7 FL (ref 80–100)
MCV RBC AUTO: 92.8 FL (ref 80–100)
MCV RBC AUTO: 92.9 FL (ref 80–100)
MCV RBC AUTO: 93 FL (ref 80–100)
MCV RBC AUTO: 93 FL (ref 80–100)
MCV RBC AUTO: 93.3 FL (ref 80–100)
MCV RBC AUTO: 93.7 FL (ref 80–100)
MCV RBC AUTO: 94 FL (ref 80–100)
MCV RBC AUTO: 94.1 FL (ref 80–100)
MCV RBC AUTO: 94.5 FL (ref 80–100)
MCV RBC AUTO: 94.6 FL (ref 80–100)
MCV RBC AUTO: 95 FL (ref 80–100)
MCV RBC AUTO: 95.1 FL (ref 80–100)
MCV RBC AUTO: 95.2 FL (ref 80–100)
MCV RBC AUTO: 95.2 FL (ref 80–100)
MCV RBC AUTO: 95.7 FL (ref 80–100)
METHEMOGLOBIN ARTERIAL: 0.1 % (ref 0–1.4)
METHEMOGLOBIN VENOUS: 0 % (ref 0–1.5)
METHEMOGLOBIN VENOUS: 0.1 % (ref 0–1.5)
METHEMOGLOBIN VENOUS: 0.2 % (ref 0–1.5)
MICROALBUMIN UR-MCNC: 3.2 MG/DL
MICROALBUMIN/CREAT UR-RTO: 25.9 MG/G (ref 0–30)
MICROSCOPIC EXAMINATION: ABNORMAL
MICROSCOPIC EXAMINATION: YES
MONOCYTES ABSOLUTE: 0 K/UL (ref 0–1.3)
MONOCYTES ABSOLUTE: 0.1 K/UL (ref 0–1.3)
MONOCYTES ABSOLUTE: 0.2 K/UL (ref 0–1.3)
MONOCYTES ABSOLUTE: 0.3 K/UL (ref 0–1.3)
MONOCYTES ABSOLUTE: 0.4 K/UL (ref 0–1.3)
MONOCYTES ABSOLUTE: 0.5 K/UL (ref 0–1.3)
MONOCYTES ABSOLUTE: 0.7 K/UL (ref 0–1.3)
MONOCYTES ABSOLUTE: 0.8 K/UL (ref 0–1.3)
MONOCYTES RELATIVE PERCENT: 0 %
MONOCYTES RELATIVE PERCENT: 10 %
MONOCYTES RELATIVE PERCENT: 2 %
MONOCYTES RELATIVE PERCENT: 4.8 %
MONOCYTES RELATIVE PERCENT: 4.9 %
MONOCYTES RELATIVE PERCENT: 5.2 %
MONOCYTES RELATIVE PERCENT: 5.3 %
MONOCYTES RELATIVE PERCENT: 5.3 %
MONOCYTES RELATIVE PERCENT: 5.4 %
MONOCYTES RELATIVE PERCENT: 5.6 %
MONOCYTES RELATIVE PERCENT: 5.7 %
MONOCYTES RELATIVE PERCENT: 5.8 %
MONOCYTES RELATIVE PERCENT: 5.9 %
MONOCYTES RELATIVE PERCENT: 5.9 %
MONOCYTES RELATIVE PERCENT: 6 %
MONOCYTES RELATIVE PERCENT: 6.2 %
MONOCYTES RELATIVE PERCENT: 6.2 %
MONOCYTES RELATIVE PERCENT: 6.4 %
MONOCYTES RELATIVE PERCENT: 6.5 %
MONOCYTES RELATIVE PERCENT: 6.5 %
MONOCYTES RELATIVE PERCENT: 6.6 %
MONOCYTES RELATIVE PERCENT: 6.9 %
MONOCYTES RELATIVE PERCENT: 7.2 %
MONOCYTES RELATIVE PERCENT: 7.3 %
MONOCYTES RELATIVE PERCENT: 7.5 %
MONOCYTES RELATIVE PERCENT: 7.5 %
MONOCYTES RELATIVE PERCENT: 7.8 %
MONOCYTES RELATIVE PERCENT: 8 %
MONOCYTES RELATIVE PERCENT: 8 %
MONOCYTES RELATIVE PERCENT: 8.4 %
MONOCYTES RELATIVE PERCENT: 8.6 %
MONOCYTES RELATIVE PERCENT: 9 %
MONOCYTES RELATIVE PERCENT: 9.2 %
MRSA SCREEN RT-PCR: NORMAL
MRSA SCREEN RT-PCR: NORMAL
MYELOCYTE PERCENT: 1 %
NEUTROPHILS ABSOLUTE: 1.8 K/UL (ref 1.7–7.7)
NEUTROPHILS ABSOLUTE: 1.8 K/UL (ref 1.7–7.7)
NEUTROPHILS ABSOLUTE: 1.9 K/UL (ref 1.7–7.7)
NEUTROPHILS ABSOLUTE: 2 K/UL (ref 1.7–7.7)
NEUTROPHILS ABSOLUTE: 2 K/UL (ref 1.7–7.7)
NEUTROPHILS ABSOLUTE: 2.1 K/UL (ref 1.7–7.7)
NEUTROPHILS ABSOLUTE: 2.1 K/UL (ref 1.7–7.7)
NEUTROPHILS ABSOLUTE: 2.2 K/UL (ref 1.7–7.7)
NEUTROPHILS ABSOLUTE: 2.2 K/UL (ref 1.7–7.7)
NEUTROPHILS ABSOLUTE: 2.3 K/UL (ref 1.7–7.7)
NEUTROPHILS ABSOLUTE: 2.4 K/UL (ref 1.7–7.7)
NEUTROPHILS ABSOLUTE: 2.5 K/UL (ref 1.7–7.7)
NEUTROPHILS ABSOLUTE: 2.6 K/UL (ref 1.7–7.7)
NEUTROPHILS ABSOLUTE: 2.7 K/UL (ref 1.7–7.7)
NEUTROPHILS ABSOLUTE: 2.8 K/UL (ref 1.7–7.7)
NEUTROPHILS ABSOLUTE: 2.8 K/UL (ref 1.7–7.7)
NEUTROPHILS ABSOLUTE: 2.9 K/UL (ref 1.7–7.7)
NEUTROPHILS ABSOLUTE: 3 K/UL (ref 1.7–7.7)
NEUTROPHILS ABSOLUTE: 3.1 K/UL (ref 1.7–7.7)
NEUTROPHILS ABSOLUTE: 3.2 K/UL (ref 1.7–7.7)
NEUTROPHILS ABSOLUTE: 3.3 K/UL (ref 1.7–7.7)
NEUTROPHILS ABSOLUTE: 3.4 K/UL (ref 1.7–7.7)
NEUTROPHILS ABSOLUTE: 3.5 K/UL (ref 1.7–7.7)
NEUTROPHILS ABSOLUTE: 3.7 K/UL (ref 1.7–7.7)
NEUTROPHILS ABSOLUTE: 3.7 K/UL (ref 1.7–7.7)
NEUTROPHILS ABSOLUTE: 3.9 K/UL (ref 1.7–7.7)
NEUTROPHILS ABSOLUTE: 4 K/UL (ref 1.7–7.7)
NEUTROPHILS ABSOLUTE: 4.3 K/UL (ref 1.7–7.7)
NEUTROPHILS RELATIVE PERCENT: 32 %
NEUTROPHILS RELATIVE PERCENT: 43 %
NEUTROPHILS RELATIVE PERCENT: 51 %
NEUTROPHILS RELATIVE PERCENT: 56 %
NEUTROPHILS RELATIVE PERCENT: 56.3 %
NEUTROPHILS RELATIVE PERCENT: 57.9 %
NEUTROPHILS RELATIVE PERCENT: 59.5 %
NEUTROPHILS RELATIVE PERCENT: 60 %
NEUTROPHILS RELATIVE PERCENT: 60.6 %
NEUTROPHILS RELATIVE PERCENT: 61.4 %
NEUTROPHILS RELATIVE PERCENT: 62.4 %
NEUTROPHILS RELATIVE PERCENT: 63.9 %
NEUTROPHILS RELATIVE PERCENT: 64 %
NEUTROPHILS RELATIVE PERCENT: 65.2 %
NEUTROPHILS RELATIVE PERCENT: 65.4 %
NEUTROPHILS RELATIVE PERCENT: 65.8 %
NEUTROPHILS RELATIVE PERCENT: 66 %
NEUTROPHILS RELATIVE PERCENT: 66 %
NEUTROPHILS RELATIVE PERCENT: 66.1 %
NEUTROPHILS RELATIVE PERCENT: 66.4 %
NEUTROPHILS RELATIVE PERCENT: 66.7 %
NEUTROPHILS RELATIVE PERCENT: 68.4 %
NEUTROPHILS RELATIVE PERCENT: 68.4 %
NEUTROPHILS RELATIVE PERCENT: 68.9 %
NEUTROPHILS RELATIVE PERCENT: 69.2 %
NEUTROPHILS RELATIVE PERCENT: 70 %
NEUTROPHILS RELATIVE PERCENT: 70 %
NEUTROPHILS RELATIVE PERCENT: 70.3 %
NEUTROPHILS RELATIVE PERCENT: 70.8 %
NEUTROPHILS RELATIVE PERCENT: 70.9 %
NEUTROPHILS RELATIVE PERCENT: 71.8 %
NEUTROPHILS RELATIVE PERCENT: 71.8 %
NEUTROPHILS RELATIVE PERCENT: 72.5 %
NEUTROPHILS RELATIVE PERCENT: 72.6 %
NEUTROPHILS RELATIVE PERCENT: 73.1 %
NEUTROPHILS RELATIVE PERCENT: 73.5 %
NEUTROPHILS RELATIVE PERCENT: 74 %
NEUTROPHILS RELATIVE PERCENT: 76.4 %
NEUTROPHILS RELATIVE PERCENT: 77 %
NEUTROPHILS RELATIVE PERCENT: 78 %
NEUTROPHILS RELATIVE PERCENT: 78.8 %
NEUTROPHILS RELATIVE PERCENT: 79.9 %
NEUTROPHILS RELATIVE PERCENT: 87.7 %
NITRITE, URINE: NEGATIVE
O2 SAT, ARTERIAL: 97 % (ref 93–100)
O2 SAT, VEN: 83 %
O2 SAT, VEN: 88 %
O2 SAT, VEN: 94 %
OVALOCYTES: ABNORMAL
PCO2 ARTERIAL: 43.7 MMHG (ref 35–45)
PCO2, VEN: 37.8 MMHG (ref 41–51)
PCO2, VEN: 40 MMHG (ref 41–51)
PCO2, VEN: 41.4 MMHG (ref 41–51)
PDW BLD-RTO: 13.3 % (ref 12.4–15.4)
PDW BLD-RTO: 13.4 % (ref 12.4–15.4)
PDW BLD-RTO: 13.6 % (ref 12.4–15.4)
PDW BLD-RTO: 13.6 % (ref 12.4–15.4)
PDW BLD-RTO: 13.7 % (ref 12.4–15.4)
PDW BLD-RTO: 14 % (ref 12.4–15.4)
PDW BLD-RTO: 15.3 % (ref 12.4–15.4)
PDW BLD-RTO: 15.4 % (ref 12.4–15.4)
PDW BLD-RTO: 15.5 % (ref 12.4–15.4)
PDW BLD-RTO: 15.6 % (ref 12.4–15.4)
PDW BLD-RTO: 15.7 % (ref 12.4–15.4)
PDW BLD-RTO: 15.8 % (ref 12.4–15.4)
PDW BLD-RTO: 15.9 % (ref 12.4–15.4)
PDW BLD-RTO: 15.9 % (ref 12.4–15.4)
PDW BLD-RTO: 16 % (ref 12.4–15.4)
PDW BLD-RTO: 16.1 % (ref 12.4–15.4)
PDW BLD-RTO: 16.2 % (ref 12.4–15.4)
PDW BLD-RTO: 16.2 % (ref 12.4–15.4)
PDW BLD-RTO: 16.5 % (ref 12.4–15.4)
PDW BLD-RTO: 16.6 % (ref 12.4–15.4)
PDW BLD-RTO: 16.7 % (ref 12.4–15.4)
PDW BLD-RTO: 16.8 % (ref 12.4–15.4)
PDW BLD-RTO: 16.8 % (ref 12.4–15.4)
PDW BLD-RTO: 16.9 % (ref 12.4–15.4)
PDW BLD-RTO: 17 % (ref 12.4–15.4)
PDW BLD-RTO: 17.1 % (ref 12.4–15.4)
PDW BLD-RTO: 17.3 % (ref 12.4–15.4)
PDW BLD-RTO: 17.4 % (ref 12.4–15.4)
PDW BLD-RTO: 17.4 % (ref 12.4–15.4)
PDW BLD-RTO: 18.1 % (ref 12.4–15.4)
PERFORMED ON: ABNORMAL
PERFORMED ON: NORMAL
PH ARTERIAL: 7.45 (ref 7.35–7.45)
PH UA: 5.5 (ref 5–8)
PH UA: 6 (ref 5–8)
PH VENOUS: 7.43 (ref 7.35–7.45)
PH VENOUS: 7.43 (ref 7.35–7.45)
PH VENOUS: 7.49 (ref 7.35–7.45)
PHOSPHORUS: 2.6 MG/DL (ref 2.5–4.9)
PHOSPHORUS: 2.9 MG/DL (ref 2.5–4.9)
PHOSPHORUS: 2.9 MG/DL (ref 2.5–4.9)
PHOSPHORUS: 3.2 MG/DL (ref 2.5–4.9)
PHOSPHORUS: 3.2 MG/DL (ref 2.5–4.9)
PHOSPHORUS: 3.5 MG/DL (ref 2.5–4.9)
PHOSPHORUS: 3.6 MG/DL (ref 2.5–4.9)
PLATELET # BLD: 100 K/UL (ref 135–450)
PLATELET # BLD: 101 K/UL (ref 135–450)
PLATELET # BLD: 102 K/UL (ref 135–450)
PLATELET # BLD: 103 K/UL (ref 135–450)
PLATELET # BLD: 103 K/UL (ref 135–450)
PLATELET # BLD: 104 K/UL (ref 135–450)
PLATELET # BLD: 108 K/UL (ref 135–450)
PLATELET # BLD: 109 K/UL (ref 135–450)
PLATELET # BLD: 111 K/UL (ref 135–450)
PLATELET # BLD: 112 K/UL (ref 135–450)
PLATELET # BLD: 114 K/UL (ref 135–450)
PLATELET # BLD: 118 K/UL (ref 135–450)
PLATELET # BLD: 121 K/UL (ref 135–450)
PLATELET # BLD: 125 K/UL (ref 135–450)
PLATELET # BLD: 127 K/UL (ref 135–450)
PLATELET # BLD: 131 K/UL (ref 135–450)
PLATELET # BLD: 133 K/UL (ref 135–450)
PLATELET # BLD: 136 K/UL (ref 135–450)
PLATELET # BLD: 142 K/UL (ref 135–450)
PLATELET # BLD: 147 K/UL (ref 135–450)
PLATELET # BLD: 150 K/UL (ref 135–450)
PLATELET # BLD: 156 K/UL (ref 135–450)
PLATELET # BLD: 157 K/UL (ref 135–450)
PLATELET # BLD: 159 K/UL (ref 135–450)
PLATELET # BLD: 160 K/UL (ref 135–450)
PLATELET # BLD: 163 K/UL (ref 135–450)
PLATELET # BLD: 166 K/UL (ref 135–450)
PLATELET # BLD: 172 K/UL (ref 135–450)
PLATELET # BLD: 180 K/UL (ref 135–450)
PLATELET # BLD: 70 K/UL (ref 135–450)
PLATELET # BLD: 76 K/UL (ref 135–450)
PLATELET # BLD: 77 K/UL (ref 135–450)
PLATELET # BLD: 78 K/UL (ref 135–450)
PLATELET # BLD: 83 K/UL (ref 135–450)
PLATELET # BLD: 88 K/UL (ref 135–450)
PLATELET # BLD: 94 K/UL (ref 135–450)
PLATELET # BLD: 95 K/UL (ref 135–450)
PLATELET # BLD: 96 K/UL (ref 135–450)
PLATELET # BLD: ABNORMAL K/UL (ref 135–450)
PLATELET FUNCTION INTERPRETATION: ABNORMAL
PLATELET SLIDE REVIEW: ABNORMAL
PMV BLD AUTO: 10 FL (ref 5–10.5)
PMV BLD AUTO: 10.2 FL (ref 5–10.5)
PMV BLD AUTO: 10.2 FL (ref 5–10.5)
PMV BLD AUTO: 10.3 FL (ref 5–10.5)
PMV BLD AUTO: 11.6 FL (ref 5–10.5)
PMV BLD AUTO: 7.7 FL (ref 5–10.5)
PMV BLD AUTO: 7.9 FL (ref 5–10.5)
PMV BLD AUTO: 8 FL (ref 5–10.5)
PMV BLD AUTO: 8.1 FL (ref 5–10.5)
PMV BLD AUTO: 8.3 FL (ref 5–10.5)
PMV BLD AUTO: 8.6 FL (ref 5–10.5)
PMV BLD AUTO: 8.7 FL (ref 5–10.5)
PMV BLD AUTO: 8.8 FL (ref 5–10.5)
PMV BLD AUTO: 8.9 FL (ref 5–10.5)
PMV BLD AUTO: 9.1 FL (ref 5–10.5)
PMV BLD AUTO: 9.2 FL (ref 5–10.5)
PMV BLD AUTO: 9.3 FL (ref 5–10.5)
PMV BLD AUTO: 9.3 FL (ref 5–10.5)
PMV BLD AUTO: 9.4 FL (ref 5–10.5)
PMV BLD AUTO: 9.5 FL (ref 5–10.5)
PMV BLD AUTO: 9.6 FL (ref 5–10.5)
PMV BLD AUTO: 9.6 FL (ref 5–10.5)
PMV BLD AUTO: 9.7 FL (ref 5–10.5)
PMV BLD AUTO: 9.8 FL (ref 5–10.5)
PMV BLD AUTO: 9.8 FL (ref 5–10.5)
PMV BLD AUTO: 9.9 FL (ref 5–10.5)
PMV BLD AUTO: ABNORMAL FL (ref 5–10.5)
PO2 ARTERIAL: 82.9 MMHG (ref 75–108)
PO2, VEN: 45.8 MMHG (ref 25–40)
PO2, VEN: 54.9 MMHG (ref 25–40)
PO2, VEN: 71.7 MMHG (ref 25–40)
POTASSIUM REFLEX MAGNESIUM: 2.9 MMOL/L (ref 3.5–5.1)
POTASSIUM REFLEX MAGNESIUM: 3 MMOL/L (ref 3.5–5.1)
POTASSIUM REFLEX MAGNESIUM: 3.2 MMOL/L (ref 3.5–5.1)
POTASSIUM REFLEX MAGNESIUM: 3.4 MMOL/L (ref 3.5–5.1)
POTASSIUM REFLEX MAGNESIUM: 3.5 MMOL/L (ref 3.5–5.1)
POTASSIUM REFLEX MAGNESIUM: 3.6 MMOL/L (ref 3.5–5.1)
POTASSIUM REFLEX MAGNESIUM: 3.7 MMOL/L (ref 3.5–5.1)
POTASSIUM REFLEX MAGNESIUM: 3.8 MMOL/L (ref 3.5–5.1)
POTASSIUM REFLEX MAGNESIUM: 3.9 MMOL/L (ref 3.5–5.1)
POTASSIUM REFLEX MAGNESIUM: 4 MMOL/L (ref 3.5–5.1)
POTASSIUM REFLEX MAGNESIUM: 4.1 MMOL/L (ref 3.5–5.1)
POTASSIUM REFLEX MAGNESIUM: 4.2 MMOL/L (ref 3.5–5.1)
POTASSIUM SERPL-SCNC: 3.5 MMOL/L (ref 3.5–5.1)
POTASSIUM SERPL-SCNC: 3.5 MMOL/L (ref 3.5–5.1)
POTASSIUM SERPL-SCNC: 3.6 MMOL/L (ref 3.5–5.1)
POTASSIUM SERPL-SCNC: 3.6 MMOL/L (ref 3.5–5.1)
POTASSIUM SERPL-SCNC: 3.7 MMOL/L (ref 3.5–5.1)
POTASSIUM SERPL-SCNC: 4 MMOL/L (ref 3.5–5.1)
POTASSIUM SERPL-SCNC: 4 MMOL/L (ref 3.5–5.1)
POTASSIUM SERPL-SCNC: 4.1 MMOL/L (ref 3.5–5.1)
POTASSIUM SERPL-SCNC: 4.3 MMOL/L (ref 3.5–5.1)
PRO-BNP: 1289 PG/ML (ref 0–449)
PRO-BNP: 434 PG/ML (ref 0–449)
PROCALCITONIN: 0.35 NG/ML (ref 0–0.15)
PROCALCITONIN: 0.38 NG/ML (ref 0–0.15)
PROCALCITONIN: 0.43 NG/ML (ref 0–0.15)
PROCALCITONIN: 0.44 NG/ML (ref 0–0.15)
PROCALCITONIN: 0.55 NG/ML (ref 0–0.15)
PROTEIN UA: 30 MG/DL
PROTEIN UA: ABNORMAL MG/DL
PROTEIN UA: NEGATIVE MG/DL
PROTHROMBIN TIME: 12.6 SEC (ref 9.9–12.7)
PROTHROMBIN TIME: 13.1 SEC (ref 9.9–12.7)
PROTHROMBIN TIME: 13.2 SEC (ref 9.9–12.7)
PROTHROMBIN TIME: 13.5 SEC (ref 9.9–12.7)
RAPID INFLUENZA  B AGN: NEGATIVE
RAPID INFLUENZA A AGN: NEGATIVE
RBC # BLD: 3.02 M/UL (ref 4.2–5.9)
RBC # BLD: 3.03 M/UL (ref 4.2–5.9)
RBC # BLD: 3.03 M/UL (ref 4.2–5.9)
RBC # BLD: 3.06 M/UL (ref 4.2–5.9)
RBC # BLD: 3.16 M/UL (ref 4.2–5.9)
RBC # BLD: 3.16 M/UL (ref 4.2–5.9)
RBC # BLD: 3.17 M/UL (ref 4.2–5.9)
RBC # BLD: 3.18 M/UL (ref 4.2–5.9)
RBC # BLD: 3.19 M/UL (ref 4.2–5.9)
RBC # BLD: 3.19 M/UL (ref 4.2–5.9)
RBC # BLD: 3.2 M/UL (ref 4.2–5.9)
RBC # BLD: 3.21 M/UL (ref 4.2–5.9)
RBC # BLD: 3.22 M/UL (ref 4.2–5.9)
RBC # BLD: 3.24 M/UL (ref 4.2–5.9)
RBC # BLD: 3.25 M/UL (ref 4.2–5.9)
RBC # BLD: 3.31 M/UL (ref 4.2–5.9)
RBC # BLD: 3.31 M/UL (ref 4.2–5.9)
RBC # BLD: 3.34 M/UL (ref 4.2–5.9)
RBC # BLD: 3.35 M/UL (ref 4.2–5.9)
RBC # BLD: 3.4 M/UL (ref 4.2–5.9)
RBC # BLD: 3.41 M/UL (ref 4.2–5.9)
RBC # BLD: 3.46 M/UL (ref 4.2–5.9)
RBC # BLD: 3.49 M/UL (ref 4.2–5.9)
RBC # BLD: 3.49 M/UL (ref 4.2–5.9)
RBC # BLD: 3.51 M/UL (ref 4.2–5.9)
RBC # BLD: 3.55 M/UL (ref 4.2–5.9)
RBC # BLD: 3.58 M/UL (ref 4.2–5.9)
RBC # BLD: 3.62 M/UL (ref 4.2–5.9)
RBC # BLD: 3.66 M/UL (ref 4.2–5.9)
RBC # BLD: 3.66 M/UL (ref 4.2–5.9)
RBC # BLD: 3.67 M/UL (ref 4.2–5.9)
RBC # BLD: 3.67 M/UL (ref 4.2–5.9)
RBC # BLD: 3.72 M/UL (ref 4.2–5.9)
RBC # BLD: 3.88 M/UL (ref 4.2–5.9)
RBC # BLD: 3.89 M/UL (ref 4.2–5.9)
RBC # BLD: 4.01 M/UL (ref 4.2–5.9)
RBC # BLD: 4.05 M/UL (ref 4.2–5.9)
RBC # BLD: 4.05 M/UL (ref 4.2–5.9)
RBC # BLD: 4.17 M/UL (ref 4.2–5.9)
RBC # BLD: 4.21 M/UL (ref 4.2–5.9)
RBC # BLD: 4.22 M/UL (ref 4.2–5.9)
RBC # BLD: 4.22 M/UL (ref 4.2–5.9)
RBC # BLD: 4.43 M/UL (ref 4.2–5.9)
RBC # BLD: NORMAL 10*6/UL
RBC UA: ABNORMAL /HPF (ref 0–4)
RBC UA: NORMAL /HPF (ref 0–4)
REASON FOR REJECTION: NORMAL
REASON FOR REJECTION: NORMAL
REJECTED TEST: NORMAL
REJECTED TEST: NORMAL
REPORT: NORMAL
REPORT: NORMAL
RESPIRATORY PANEL PCR: NORMAL
RESPIRATORY PANEL PCR: NORMAL
SARS-COV-2, NAAT: NOT DETECTED
SARS-COV-2: NOT DETECTED
SEDIMENTATION RATE, ERYTHROCYTE: 36 MM/HR (ref 0–20)
SLIDE REVIEW: ABNORMAL
SODIUM BLD-SCNC: 132 MMOL/L (ref 136–145)
SODIUM BLD-SCNC: 132 MMOL/L (ref 136–145)
SODIUM BLD-SCNC: 133 MMOL/L (ref 136–145)
SODIUM BLD-SCNC: 133 MMOL/L (ref 136–145)
SODIUM BLD-SCNC: 134 MMOL/L (ref 136–145)
SODIUM BLD-SCNC: 136 MMOL/L (ref 136–145)
SODIUM BLD-SCNC: 137 MMOL/L (ref 136–145)
SODIUM BLD-SCNC: 138 MMOL/L (ref 136–145)
SODIUM BLD-SCNC: 139 MMOL/L (ref 136–145)
SODIUM BLD-SCNC: 140 MMOL/L (ref 136–145)
SODIUM BLD-SCNC: 140 MMOL/L (ref 136–145)
SODIUM BLD-SCNC: 141 MMOL/L (ref 136–145)
SODIUM BLD-SCNC: 142 MMOL/L (ref 136–145)
SODIUM BLD-SCNC: 143 MMOL/L (ref 136–145)
SODIUM BLD-SCNC: 144 MMOL/L (ref 136–145)
SODIUM BLD-SCNC: 145 MMOL/L (ref 136–145)
SODIUM BLD-SCNC: 146 MMOL/L (ref 136–145)
SODIUM BLD-SCNC: 146 MMOL/L (ref 136–145)
SODIUM BLD-SCNC: 148 MMOL/L (ref 136–145)
SODIUM BLD-SCNC: 148 MMOL/L (ref 136–145)
SODIUM BLD-SCNC: 149 MMOL/L (ref 136–145)
SODIUM BLD-SCNC: 151 MMOL/L (ref 136–145)
SODIUM BLD-SCNC: 154 MMOL/L (ref 136–145)
SPECIFIC GRAVITY UA: 1.02 (ref 1–1.03)
SPECIFIC GRAVITY UA: >=1.03 (ref 1–1.03)
SPECIFIC GRAVITY UA: >=1.03 (ref 1–1.03)
SPHEROCYTES: ABNORMAL
T4 FREE: 0.8 NG/DL (ref 0.9–1.8)
TCO2 ARTERIAL: 32 MMOL/L
TCO2 CALC VENOUS: 28 MMOL/L
TCO2 CALC VENOUS: 29 MMOL/L
TCO2 CALC VENOUS: 30 MMOL/L
TOTAL CK: 1486 U/L (ref 39–308)
TOTAL CK: 181 U/L (ref 39–308)
TOTAL CK: 362 U/L (ref 39–308)
TOTAL CK: 799 U/L (ref 39–308)
TOTAL CK: 923 U/L (ref 39–308)
TOTAL PROTEIN: 5.9 G/DL (ref 6.4–8.2)
TOTAL PROTEIN: 6.3 G/DL (ref 6.4–8.2)
TOTAL PROTEIN: 6.5 G/DL (ref 6.4–8.2)
TOTAL PROTEIN: 6.6 G/DL (ref 6.4–8.2)
TOTAL PROTEIN: 7 G/DL (ref 6.4–8.2)
TOTAL PROTEIN: 7.2 G/DL (ref 6.4–8.2)
TRIGL SERPL-MCNC: 104 MG/DL (ref 0–150)
TROPONIN: 0.01 NG/ML
TROPONIN: 0.02 NG/ML
TROPONIN: 0.15 NG/ML
TROPONIN: 0.16 NG/ML
TROPONIN: 0.24 NG/ML
TROPONIN: 0.3 NG/ML
TROPONIN: <0.01 NG/ML
TSH REFLEX: 0.68 UIU/ML (ref 0.27–4.2)
TSH SERPL DL<=0.05 MIU/L-ACNC: 1.98 UIU/ML (ref 0.27–4.2)
URINE REFLEX TO CULTURE: ABNORMAL
URINE TYPE: 4
URINE TYPE: ABNORMAL
UROBILINOGEN, URINE: 0.2 E.U./DL
VANCOMYCIN RANDOM: 10.9 UG/ML
VANCOMYCIN RANDOM: 15.6 UG/ML
VANCOMYCIN RANDOM: 18.9 UG/ML
VANCOMYCIN RANDOM: 26.8 UG/ML
VANCOMYCIN RANDOM: 7.3 UG/ML
VITAMIN B-12: 369 PG/ML (ref 211–911)
VLDLC SERPL CALC-MCNC: 21 MG/DL
WBC # BLD: 2.5 K/UL (ref 4–11)
WBC # BLD: 2.8 K/UL (ref 4–11)
WBC # BLD: 3 K/UL (ref 4–11)
WBC # BLD: 3 K/UL (ref 4–11)
WBC # BLD: 3.1 K/UL (ref 4–11)
WBC # BLD: 3.2 K/UL (ref 4–11)
WBC # BLD: 3.3 K/UL (ref 4–11)
WBC # BLD: 3.3 K/UL (ref 4–11)
WBC # BLD: 3.4 K/UL (ref 4–11)
WBC # BLD: 3.5 K/UL (ref 4–11)
WBC # BLD: 3.6 K/UL (ref 4–11)
WBC # BLD: 3.8 K/UL (ref 4–11)
WBC # BLD: 3.9 K/UL (ref 4–11)
WBC # BLD: 4 K/UL (ref 4–11)
WBC # BLD: 4 K/UL (ref 4–11)
WBC # BLD: 4.1 K/UL (ref 4–11)
WBC # BLD: 4.2 K/UL (ref 4–11)
WBC # BLD: 4.3 K/UL (ref 4–11)
WBC # BLD: 4.4 K/UL (ref 4–11)
WBC # BLD: 4.4 K/UL (ref 4–11)
WBC # BLD: 4.9 K/UL (ref 4–11)
WBC # BLD: 5.1 K/UL (ref 4–11)
WBC # BLD: 5.2 K/UL (ref 4–11)
WBC # BLD: 5.3 K/UL (ref 4–11)
WBC # BLD: 5.8 K/UL (ref 4–11)
WBC # BLD: 6.9 K/UL (ref 4–11)
WBC # BLD: 7.9 K/UL (ref 4–11)
WBC # BLD: 8.5 K/UL (ref 4–11)
WBC UA: ABNORMAL /HPF (ref 0–5)
WBC UA: NORMAL /HPF (ref 0–5)

## 2021-01-01 PROCEDURE — 2580000003 HC RX 258: Performed by: INTERNAL MEDICINE

## 2021-01-01 PROCEDURE — 92526 ORAL FUNCTION THERAPY: CPT

## 2021-01-01 PROCEDURE — 6360000002 HC RX W HCPCS: Performed by: STUDENT IN AN ORGANIZED HEALTH CARE EDUCATION/TRAINING PROGRAM

## 2021-01-01 PROCEDURE — 80202 ASSAY OF VANCOMYCIN: CPT

## 2021-01-01 PROCEDURE — 97535 SELF CARE MNGMENT TRAINING: CPT

## 2021-01-01 PROCEDURE — 93010 ELECTROCARDIOGRAM REPORT: CPT | Performed by: INTERNAL MEDICINE

## 2021-01-01 PROCEDURE — 2580000003 HC RX 258: Performed by: STUDENT IN AN ORGANIZED HEALTH CARE EDUCATION/TRAINING PROGRAM

## 2021-01-01 PROCEDURE — 83735 ASSAY OF MAGNESIUM: CPT

## 2021-01-01 PROCEDURE — 80048 BASIC METABOLIC PNL TOTAL CA: CPT

## 2021-01-01 PROCEDURE — 94640 AIRWAY INHALATION TREATMENT: CPT

## 2021-01-01 PROCEDURE — 6370000000 HC RX 637 (ALT 250 FOR IP): Performed by: STUDENT IN AN ORGANIZED HEALTH CARE EDUCATION/TRAINING PROGRAM

## 2021-01-01 PROCEDURE — 82550 ASSAY OF CK (CPK): CPT

## 2021-01-01 PROCEDURE — 31720 CLEARANCE OF AIRWAYS: CPT

## 2021-01-01 PROCEDURE — 94761 N-INVAS EAR/PLS OXIMETRY MLT: CPT

## 2021-01-01 PROCEDURE — 2700000000 HC OXYGEN THERAPY PER DAY

## 2021-01-01 PROCEDURE — 6360000002 HC RX W HCPCS: Performed by: INTERNAL MEDICINE

## 2021-01-01 PROCEDURE — 97116 GAIT TRAINING THERAPY: CPT

## 2021-01-01 PROCEDURE — 36415 COLL VENOUS BLD VENIPUNCTURE: CPT

## 2021-01-01 PROCEDURE — 71045 X-RAY EXAM CHEST 1 VIEW: CPT

## 2021-01-01 PROCEDURE — C9113 INJ PANTOPRAZOLE SODIUM, VIA: HCPCS | Performed by: STUDENT IN AN ORGANIZED HEALTH CARE EDUCATION/TRAINING PROGRAM

## 2021-01-01 PROCEDURE — 99233 SBSQ HOSP IP/OBS HIGH 50: CPT | Performed by: NURSE PRACTITIONER

## 2021-01-01 PROCEDURE — 6360000002 HC RX W HCPCS: Performed by: HOSPITALIST

## 2021-01-01 PROCEDURE — 6370000000 HC RX 637 (ALT 250 FOR IP): Performed by: HOSPITALIST

## 2021-01-01 PROCEDURE — 97530 THERAPEUTIC ACTIVITIES: CPT

## 2021-01-01 PROCEDURE — 97162 PT EVAL MOD COMPLEX 30 MIN: CPT

## 2021-01-01 PROCEDURE — 36569 INSJ PICC 5 YR+ W/O IMAGING: CPT

## 2021-01-01 PROCEDURE — 87040 BLOOD CULTURE FOR BACTERIA: CPT

## 2021-01-01 PROCEDURE — 94668 MNPJ CHEST WALL SBSQ: CPT

## 2021-01-01 PROCEDURE — 85025 COMPLETE CBC W/AUTO DIFF WBC: CPT

## 2021-01-01 PROCEDURE — 2580000003 HC RX 258

## 2021-01-01 PROCEDURE — 2580000003 HC RX 258: Performed by: EMERGENCY MEDICINE

## 2021-01-01 PROCEDURE — 1200000000 HC SEMI PRIVATE

## 2021-01-01 PROCEDURE — 83605 ASSAY OF LACTIC ACID: CPT

## 2021-01-01 PROCEDURE — 6370000000 HC RX 637 (ALT 250 FOR IP): Performed by: INTERNAL MEDICINE

## 2021-01-01 PROCEDURE — 94669 MECHANICAL CHEST WALL OSCILL: CPT

## 2021-01-01 PROCEDURE — 83880 ASSAY OF NATRIURETIC PEPTIDE: CPT

## 2021-01-01 PROCEDURE — 81001 URINALYSIS AUTO W/SCOPE: CPT

## 2021-01-01 PROCEDURE — 51798 US URINE CAPACITY MEASURE: CPT

## 2021-01-01 PROCEDURE — 99232 SBSQ HOSP IP/OBS MODERATE 35: CPT | Performed by: INTERNAL MEDICINE

## 2021-01-01 PROCEDURE — 93005 ELECTROCARDIOGRAM TRACING: CPT | Performed by: INTERNAL MEDICINE

## 2021-01-01 PROCEDURE — 6370000000 HC RX 637 (ALT 250 FOR IP): Performed by: NURSE PRACTITIONER

## 2021-01-01 PROCEDURE — 80069 RENAL FUNCTION PANEL: CPT

## 2021-01-01 PROCEDURE — 86140 C-REACTIVE PROTEIN: CPT

## 2021-01-01 PROCEDURE — 6370000000 HC RX 637 (ALT 250 FOR IP)

## 2021-01-01 PROCEDURE — 71250 CT THORAX DX C-: CPT

## 2021-01-01 PROCEDURE — 93005 ELECTROCARDIOGRAM TRACING: CPT | Performed by: EMERGENCY MEDICINE

## 2021-01-01 PROCEDURE — 2580000003 HC RX 258: Performed by: HOSPITALIST

## 2021-01-01 PROCEDURE — 99232 SBSQ HOSP IP/OBS MODERATE 35: CPT | Performed by: HOSPITALIST

## 2021-01-01 PROCEDURE — 83615 LACTATE (LD) (LDH) ENZYME: CPT

## 2021-01-01 PROCEDURE — 84484 ASSAY OF TROPONIN QUANT: CPT

## 2021-01-01 PROCEDURE — 85610 PROTHROMBIN TIME: CPT

## 2021-01-01 PROCEDURE — 85576 BLOOD PLATELET AGGREGATION: CPT

## 2021-01-01 PROCEDURE — 92611 MOTION FLUOROSCOPY/SWALLOW: CPT

## 2021-01-01 PROCEDURE — 2500000003 HC RX 250 WO HCPCS: Performed by: STUDENT IN AN ORGANIZED HEALTH CARE EDUCATION/TRAINING PROGRAM

## 2021-01-01 PROCEDURE — 96360 HYDRATION IV INFUSION INIT: CPT

## 2021-01-01 PROCEDURE — 82607 VITAMIN B-12: CPT

## 2021-01-01 PROCEDURE — 85652 RBC SED RATE AUTOMATED: CPT

## 2021-01-01 PROCEDURE — 94762 N-INVAS EAR/PLS OXIMTRY CONT: CPT

## 2021-01-01 PROCEDURE — 94150 VITAL CAPACITY TEST: CPT

## 2021-01-01 PROCEDURE — 97110 THERAPEUTIC EXERCISES: CPT

## 2021-01-01 PROCEDURE — 6360000002 HC RX W HCPCS

## 2021-01-01 PROCEDURE — 82803 BLOOD GASES ANY COMBINATION: CPT

## 2021-01-01 PROCEDURE — 96361 HYDRATE IV INFUSION ADD-ON: CPT

## 2021-01-01 PROCEDURE — 99223 1ST HOSP IP/OBS HIGH 75: CPT | Performed by: INTERNAL MEDICINE

## 2021-01-01 PROCEDURE — 99284 EMERGENCY DEPT VISIT MOD MDM: CPT

## 2021-01-01 PROCEDURE — 2060000000 HC ICU INTERMEDIATE R&B

## 2021-01-01 PROCEDURE — 36600 WITHDRAWAL OF ARTERIAL BLOOD: CPT

## 2021-01-01 PROCEDURE — 84443 ASSAY THYROID STIM HORMONE: CPT

## 2021-01-01 PROCEDURE — 93325 DOPPLER ECHO COLOR FLOW MAPG: CPT

## 2021-01-01 PROCEDURE — 99233 SBSQ HOSP IP/OBS HIGH 50: CPT | Performed by: HOSPITALIST

## 2021-01-01 PROCEDURE — 93005 ELECTROCARDIOGRAM TRACING: CPT | Performed by: STUDENT IN AN ORGANIZED HEALTH CARE EDUCATION/TRAINING PROGRAM

## 2021-01-01 PROCEDURE — 93320 DOPPLER ECHO COMPLETE: CPT

## 2021-01-01 PROCEDURE — 99221 1ST HOSP IP/OBS SF/LOW 40: CPT | Performed by: NURSE PRACTITIONER

## 2021-01-01 PROCEDURE — 3051F HG A1C>EQUAL 7.0%<8.0%: CPT | Performed by: HOSPITALIST

## 2021-01-01 PROCEDURE — 96365 THER/PROPH/DIAG IV INF INIT: CPT

## 2021-01-01 PROCEDURE — 85379 FIBRIN DEGRADATION QUANT: CPT

## 2021-01-01 PROCEDURE — 72125 CT NECK SPINE W/O DYE: CPT

## 2021-01-01 PROCEDURE — 82728 ASSAY OF FERRITIN: CPT

## 2021-01-01 PROCEDURE — 99232 SBSQ HOSP IP/OBS MODERATE 35: CPT | Performed by: NURSE PRACTITIONER

## 2021-01-01 PROCEDURE — 99233 SBSQ HOSP IP/OBS HIGH 50: CPT | Performed by: INTERNAL MEDICINE

## 2021-01-01 PROCEDURE — 1111F DSCHRG MED/CURRENT MED MERGE: CPT | Performed by: HOSPITALIST

## 2021-01-01 PROCEDURE — 6360000002 HC RX W HCPCS: Performed by: EMERGENCY MEDICINE

## 2021-01-01 PROCEDURE — 92610 EVALUATE SWALLOWING FUNCTION: CPT

## 2021-01-01 PROCEDURE — 99239 HOSP IP/OBS DSCHRG MGMT >30: CPT | Performed by: HOSPITALIST

## 2021-01-01 PROCEDURE — 71046 X-RAY EXAM CHEST 2 VIEWS: CPT

## 2021-01-01 PROCEDURE — 80053 COMPREHEN METABOLIC PANEL: CPT

## 2021-01-01 PROCEDURE — 80076 HEPATIC FUNCTION PANEL: CPT

## 2021-01-01 PROCEDURE — 72128 CT CHEST SPINE W/O DYE: CPT

## 2021-01-01 PROCEDURE — 74230 X-RAY XM SWLNG FUNCJ C+: CPT

## 2021-01-01 PROCEDURE — 84145 PROCALCITONIN (PCT): CPT

## 2021-01-01 PROCEDURE — 87631 RESP VIRUS 3-5 TARGETS: CPT

## 2021-01-01 PROCEDURE — 82140 ASSAY OF AMMONIA: CPT

## 2021-01-01 PROCEDURE — 97166 OT EVAL MOD COMPLEX 45 MIN: CPT

## 2021-01-01 PROCEDURE — 70450 CT HEAD/BRAIN W/O DYE: CPT

## 2021-01-01 PROCEDURE — 99223 1ST HOSP IP/OBS HIGH 75: CPT | Performed by: HOSPITALIST

## 2021-01-01 PROCEDURE — 72131 CT LUMBAR SPINE W/O DYE: CPT

## 2021-01-01 PROCEDURE — 93005 ELECTROCARDIOGRAM TRACING: CPT | Performed by: HOSPITALIST

## 2021-01-01 PROCEDURE — 0202U NFCT DS 22 TRGT SARS-COV-2: CPT

## 2021-01-01 PROCEDURE — C1769 GUIDE WIRE: HCPCS

## 2021-01-01 PROCEDURE — 51702 INSERT TEMP BLADDER CATH: CPT

## 2021-01-01 PROCEDURE — 94664 DEMO&/EVAL PT USE INHALER: CPT

## 2021-01-01 PROCEDURE — U0005 INFEC AGEN DETEC AMPLI PROBE: HCPCS

## 2021-01-01 PROCEDURE — 85384 FIBRINOGEN ACTIVITY: CPT

## 2021-01-01 PROCEDURE — 2500000003 HC RX 250 WO HCPCS: Performed by: HOSPITALIST

## 2021-01-01 PROCEDURE — 81003 URINALYSIS AUTO W/O SCOPE: CPT

## 2021-01-01 PROCEDURE — 97163 PT EVAL HIGH COMPLEX 45 MIN: CPT

## 2021-01-01 PROCEDURE — 93308 TTE F-UP OR LMTD: CPT

## 2021-01-01 PROCEDURE — 85730 THROMBOPLASTIN TIME PARTIAL: CPT

## 2021-01-01 PROCEDURE — 74018 RADEX ABDOMEN 1 VIEW: CPT

## 2021-01-01 PROCEDURE — 84439 ASSAY OF FREE THYROXINE: CPT

## 2021-01-01 PROCEDURE — 80162 ASSAY OF DIGOXIN TOTAL: CPT

## 2021-01-01 PROCEDURE — 99214 OFFICE O/P EST MOD 30 MIN: CPT | Performed by: HOSPITALIST

## 2021-01-01 PROCEDURE — 99222 1ST HOSP IP/OBS MODERATE 55: CPT | Performed by: INTERNAL MEDICINE

## 2021-01-01 PROCEDURE — 6360000004 HC RX CONTRAST MEDICATION: Performed by: STUDENT IN AN ORGANIZED HEALTH CARE EDUCATION/TRAINING PROGRAM

## 2021-01-01 PROCEDURE — 84100 ASSAY OF PHOSPHORUS: CPT

## 2021-01-01 PROCEDURE — G0439 PPPS, SUBSEQ VISIT: HCPCS | Performed by: HOSPITALIST

## 2021-01-01 PROCEDURE — U0003 INFECTIOUS AGENT DETECTION BY NUCLEIC ACID (DNA OR RNA); SEVERE ACUTE RESPIRATORY SYNDROME CORONAVIRUS 2 (SARS-COV-2) (CORONAVIRUS DISEASE [COVID-19]), AMPLIFIED PROBE TECHNIQUE, MAKING USE OF HIGH THROUGHPUT TECHNOLOGIES AS DESCRIBED BY CMS-2020-01-R: HCPCS

## 2021-01-01 PROCEDURE — 87641 MR-STAPH DNA AMP PROBE: CPT

## 2021-01-01 PROCEDURE — 97167 OT EVAL HIGH COMPLEX 60 MIN: CPT

## 2021-01-01 PROCEDURE — 94667 MNPJ CHEST WALL 1ST: CPT

## 2021-01-01 PROCEDURE — 94799 UNLISTED PULMONARY SVC/PX: CPT

## 2021-01-01 PROCEDURE — 87635 SARS-COV-2 COVID-19 AMP PRB: CPT

## 2021-01-01 PROCEDURE — 87804 INFLUENZA ASSAY W/OPTIC: CPT

## 2021-01-01 PROCEDURE — 93005 ELECTROCARDIOGRAM TRACING: CPT | Performed by: NURSE PRACTITIONER

## 2021-01-01 PROCEDURE — C1751 CATH, INF, PER/CENT/MIDLINE: HCPCS

## 2021-01-01 PROCEDURE — C8923 2D TTE W OR W/O FOL W/CON,CO: HCPCS

## 2021-01-01 PROCEDURE — 02HV33Z INSERTION OF INFUSION DEVICE INTO SUPERIOR VENA CAVA, PERCUTANEOUS APPROACH: ICD-10-PCS | Performed by: STUDENT IN AN ORGANIZED HEALTH CARE EDUCATION/TRAINING PROGRAM

## 2021-01-01 PROCEDURE — 82533 TOTAL CORTISOL: CPT

## 2021-01-01 RX ORDER — SODIUM CHLORIDE 9 MG/ML
INJECTION, SOLUTION INTRAVENOUS CONTINUOUS
Status: CANCELLED
Start: 2021-01-01 | End: 2021-01-01

## 2021-01-01 RX ORDER — DIGOXIN 125 MCG
250 TABLET ORAL DAILY
Status: DISCONTINUED | OUTPATIENT
Start: 2021-01-01 | End: 2021-01-01

## 2021-01-01 RX ORDER — PANTOPRAZOLE SODIUM 40 MG/1
40 TABLET, DELAYED RELEASE ORAL
Status: DISCONTINUED | OUTPATIENT
Start: 2021-01-01 | End: 2021-01-01 | Stop reason: HOSPADM

## 2021-01-01 RX ORDER — LIRAGLUTIDE 6 MG/ML
INJECTION SUBCUTANEOUS
Qty: 9 ML | Refills: 3 | Status: SHIPPED | OUTPATIENT
Start: 2021-01-01 | End: 2021-01-01

## 2021-01-01 RX ORDER — POTASSIUM CHLORIDE 7.45 MG/ML
10 INJECTION INTRAVENOUS
Status: COMPLETED | OUTPATIENT
Start: 2021-01-01 | End: 2021-01-01

## 2021-01-01 RX ORDER — 0.9 % SODIUM CHLORIDE 0.9 %
1000 INTRAVENOUS SOLUTION INTRAVENOUS ONCE
Status: COMPLETED | OUTPATIENT
Start: 2021-01-01 | End: 2021-01-01

## 2021-01-01 RX ORDER — IPRATROPIUM BROMIDE AND ALBUTEROL SULFATE 2.5; .5 MG/3ML; MG/3ML
1 SOLUTION RESPIRATORY (INHALATION) 2 TIMES DAILY
Status: DISCONTINUED | OUTPATIENT
Start: 2021-01-01 | End: 2021-01-01

## 2021-01-01 RX ORDER — SODIUM CHLORIDE 9 MG/ML
25 INJECTION, SOLUTION INTRAVENOUS PRN
Status: DISCONTINUED | OUTPATIENT
Start: 2021-01-01 | End: 2021-01-01 | Stop reason: HOSPADM

## 2021-01-01 RX ORDER — AMOXICILLIN AND CLAVULANATE POTASSIUM 875; 125 MG/1; MG/1
1 TABLET, FILM COATED ORAL EVERY 12 HOURS SCHEDULED
Status: DISCONTINUED | OUTPATIENT
Start: 2021-01-01 | End: 2021-01-01 | Stop reason: HOSPADM

## 2021-01-01 RX ORDER — SODIUM CHLORIDE 9 MG/ML
INJECTION, SOLUTION INTRAVENOUS CONTINUOUS
Status: DISCONTINUED | OUTPATIENT
Start: 2021-01-01 | End: 2021-01-01 | Stop reason: HOSPADM

## 2021-01-01 RX ORDER — DEXTROSE MONOHYDRATE 25 G/50ML
12.5 INJECTION, SOLUTION INTRAVENOUS PRN
Status: DISCONTINUED | OUTPATIENT
Start: 2021-01-01 | End: 2021-01-01 | Stop reason: HOSPADM

## 2021-01-01 RX ORDER — IPRATROPIUM BROMIDE AND ALBUTEROL SULFATE 2.5; .5 MG/3ML; MG/3ML
1 SOLUTION RESPIRATORY (INHALATION)
Status: DISCONTINUED | OUTPATIENT
Start: 2021-01-01 | End: 2021-01-01

## 2021-01-01 RX ORDER — MAGNESIUM SULFATE IN WATER 40 MG/ML
2000 INJECTION, SOLUTION INTRAVENOUS ONCE
Status: COMPLETED | OUTPATIENT
Start: 2021-01-01 | End: 2021-01-01

## 2021-01-01 RX ORDER — DEXAMETHASONE 6 MG/1
6 TABLET ORAL DAILY
Qty: 7 TABLET | Refills: 0 | Status: SHIPPED | OUTPATIENT
Start: 2021-01-01 | End: 2021-01-01

## 2021-01-01 RX ORDER — ACETAMINOPHEN 325 MG/1
650 TABLET ORAL EVERY 6 HOURS PRN
Status: DISCONTINUED | OUTPATIENT
Start: 2021-01-01 | End: 2021-01-01 | Stop reason: HOSPADM

## 2021-01-01 RX ORDER — 0.9 % SODIUM CHLORIDE 0.9 %
500 INTRAVENOUS SOLUTION INTRAVENOUS ONCE
Status: COMPLETED | OUTPATIENT
Start: 2021-01-01 | End: 2021-01-01

## 2021-01-01 RX ORDER — MIDODRINE HYDROCHLORIDE 5 MG/1
5 TABLET ORAL
Status: DISCONTINUED | OUTPATIENT
Start: 2021-01-01 | End: 2021-01-01 | Stop reason: HOSPADM

## 2021-01-01 RX ORDER — ALBUTEROL SULFATE 2.5 MG/3ML
2.5 SOLUTION RESPIRATORY (INHALATION) EVERY 4 HOURS PRN
Status: DISCONTINUED | OUTPATIENT
Start: 2021-01-01 | End: 2021-01-01 | Stop reason: HOSPADM

## 2021-01-01 RX ORDER — IPRATROPIUM BROMIDE AND ALBUTEROL SULFATE 2.5; .5 MG/3ML; MG/3ML
1 SOLUTION RESPIRATORY (INHALATION) 3 TIMES DAILY
Status: DISCONTINUED | OUTPATIENT
Start: 2021-01-01 | End: 2021-01-01 | Stop reason: HOSPADM

## 2021-01-01 RX ORDER — TAMSULOSIN HYDROCHLORIDE 0.4 MG/1
0.4 CAPSULE ORAL DAILY
Status: DISCONTINUED | OUTPATIENT
Start: 2021-01-01 | End: 2021-01-01 | Stop reason: HOSPADM

## 2021-01-01 RX ORDER — SENNA PLUS 8.6 MG/1
1 TABLET ORAL DAILY PRN
Status: DISCONTINUED | OUTPATIENT
Start: 2021-01-01 | End: 2021-01-01 | Stop reason: HOSPADM

## 2021-01-01 RX ORDER — NICOTINE POLACRILEX 4 MG
15 LOZENGE BUCCAL PRN
Status: DISCONTINUED | OUTPATIENT
Start: 2021-01-01 | End: 2021-01-01

## 2021-01-01 RX ORDER — 0.9 % SODIUM CHLORIDE 0.9 %
250 INTRAVENOUS SOLUTION INTRAVENOUS ONCE
Status: COMPLETED | OUTPATIENT
Start: 2021-01-01 | End: 2021-01-01

## 2021-01-01 RX ORDER — ACETAMINOPHEN 650 MG/1
650 SUPPOSITORY RECTAL EVERY 6 HOURS PRN
Status: DISCONTINUED | OUTPATIENT
Start: 2021-01-01 | End: 2021-01-01 | Stop reason: HOSPADM

## 2021-01-01 RX ORDER — SENNA AND DOCUSATE SODIUM 50; 8.6 MG/1; MG/1
1 TABLET, FILM COATED ORAL DAILY PRN
Status: DISCONTINUED | OUTPATIENT
Start: 2021-01-01 | End: 2021-01-01

## 2021-01-01 RX ORDER — ACETAMINOPHEN 325 MG/1
650 TABLET ORAL EVERY 6 HOURS PRN
Status: DISCONTINUED | OUTPATIENT
Start: 2021-01-01 | End: 2021-01-01

## 2021-01-01 RX ORDER — DIPHENHYDRAMINE HYDROCHLORIDE, ZINC ACETATE 2; .1 G/100G; G/100G
CREAM TOPICAL
Qty: 28 G | Refills: 0 | Status: SHIPPED | OUTPATIENT
Start: 2021-01-01 | End: 2021-01-01 | Stop reason: CLARIF

## 2021-01-01 RX ORDER — SODIUM CHLORIDE 0.9 % (FLUSH) 0.9 %
5-40 SYRINGE (ML) INJECTION EVERY 12 HOURS SCHEDULED
Status: DISCONTINUED | OUTPATIENT
Start: 2021-01-01 | End: 2021-01-01 | Stop reason: HOSPADM

## 2021-01-01 RX ORDER — DOCUSATE SODIUM 100 MG/1
100 CAPSULE, LIQUID FILLED ORAL DAILY
Status: DISCONTINUED | OUTPATIENT
Start: 2021-01-01 | End: 2021-01-01 | Stop reason: HOSPADM

## 2021-01-01 RX ORDER — GLIPIZIDE 5 MG/1
TABLET ORAL
Qty: 90 TABLET | Refills: 0 | Status: ON HOLD | OUTPATIENT
Start: 2021-01-01 | End: 2021-01-01 | Stop reason: HOSPADM

## 2021-01-01 RX ORDER — CARVEDILOL 6.25 MG/1
6.25 TABLET ORAL 2 TIMES DAILY WITH MEALS
Status: DISCONTINUED | OUTPATIENT
Start: 2021-01-01 | End: 2021-01-01 | Stop reason: HOSPADM

## 2021-01-01 RX ORDER — FUROSEMIDE 10 MG/ML
20 INJECTION INTRAMUSCULAR; INTRAVENOUS 2 TIMES DAILY
Status: DISCONTINUED | OUTPATIENT
Start: 2021-01-01 | End: 2021-01-01 | Stop reason: HOSPADM

## 2021-01-01 RX ORDER — SODIUM CHLORIDE 0.9 % (FLUSH) 0.9 %
5-40 SYRINGE (ML) INJECTION PRN
Status: DISCONTINUED | OUTPATIENT
Start: 2021-01-01 | End: 2021-01-01 | Stop reason: HOSPADM

## 2021-01-01 RX ORDER — SODIUM CHLORIDE 450 MG/100ML
INJECTION, SOLUTION INTRAVENOUS CONTINUOUS
Status: DISCONTINUED | OUTPATIENT
Start: 2021-01-01 | End: 2021-01-01 | Stop reason: HOSPADM

## 2021-01-01 RX ORDER — ONDANSETRON 4 MG/1
4 TABLET, ORALLY DISINTEGRATING ORAL EVERY 8 HOURS PRN
Status: DISCONTINUED | OUTPATIENT
Start: 2021-01-01 | End: 2021-01-01 | Stop reason: HOSPADM

## 2021-01-01 RX ORDER — ACETAMINOPHEN 160 MG/5ML
650 SOLUTION ORAL EVERY 6 HOURS PRN
Status: DISCONTINUED | OUTPATIENT
Start: 2021-01-01 | End: 2021-01-01 | Stop reason: HOSPADM

## 2021-01-01 RX ORDER — CARVEDILOL 6.25 MG/1
TABLET ORAL
Qty: 180 TABLET | Refills: 2 | Status: SHIPPED | OUTPATIENT
Start: 2021-01-01 | End: 2021-01-01

## 2021-01-01 RX ORDER — INSULIN LISPRO 100 [IU]/ML
0-12 INJECTION, SOLUTION INTRAVENOUS; SUBCUTANEOUS
Status: DISCONTINUED | OUTPATIENT
Start: 2021-01-01 | End: 2021-01-01

## 2021-01-01 RX ORDER — ETOH/EUC OIL/MENTH/PEP/WINTERG
SPRAY, NON-AEROSOL (ML) MUCOUS MEMBRANE
Qty: 100 EACH | Refills: 5 | Status: SHIPPED | OUTPATIENT
Start: 2021-01-01 | End: 2021-01-01 | Stop reason: CLARIF

## 2021-01-01 RX ORDER — HYDROCODONE BITARTRATE AND ACETAMINOPHEN 7.5; 325 MG/1; MG/1
1 TABLET ORAL EVERY 6 HOURS PRN
Status: DISCONTINUED | OUTPATIENT
Start: 2021-01-01 | End: 2021-01-01

## 2021-01-01 RX ORDER — LIDOCAINE 4 G/G
1 PATCH TOPICAL DAILY PRN
Qty: 15 PATCH | Refills: 1 | Status: SHIPPED | OUTPATIENT
Start: 2021-01-01 | End: 2021-01-01 | Stop reason: CLARIF

## 2021-01-01 RX ORDER — INSULIN LISPRO 100 [IU]/ML
0-9 INJECTION, SOLUTION INTRAVENOUS; SUBCUTANEOUS NIGHTLY
Status: DISCONTINUED | OUTPATIENT
Start: 2021-01-01 | End: 2021-01-01 | Stop reason: HOSPADM

## 2021-01-01 RX ORDER — FUROSEMIDE 10 MG/ML
10 INJECTION INTRAMUSCULAR; INTRAVENOUS ONCE
Status: COMPLETED | OUTPATIENT
Start: 2021-01-01 | End: 2021-01-01

## 2021-01-01 RX ORDER — PANTOPRAZOLE SODIUM 40 MG/1
40 TABLET, DELAYED RELEASE ORAL
Qty: 30 TABLET | Refills: 0 | Status: SHIPPED | OUTPATIENT
Start: 2021-01-01 | End: 2021-01-01 | Stop reason: CLARIF

## 2021-01-01 RX ORDER — BUPROPION HYDROCHLORIDE 100 MG/1
100 TABLET, EXTENDED RELEASE ORAL DAILY
Qty: 30 TABLET | Refills: 3 | Status: SHIPPED | OUTPATIENT
Start: 2021-01-01 | End: 2021-01-01 | Stop reason: ALTCHOICE

## 2021-01-01 RX ORDER — LIDOCAINE HYDROCHLORIDE 10 MG/ML
5 INJECTION, SOLUTION EPIDURAL; INFILTRATION; INTRACAUDAL; PERINEURAL ONCE
Status: COMPLETED | OUTPATIENT
Start: 2021-01-01 | End: 2021-01-01

## 2021-01-01 RX ORDER — DIGOXIN 0.25 MG/ML
250 INJECTION INTRAMUSCULAR; INTRAVENOUS EVERY 6 HOURS
Status: DISCONTINUED | OUTPATIENT
Start: 2021-01-01 | End: 2021-01-01

## 2021-01-01 RX ORDER — ATORVASTATIN CALCIUM 40 MG/1
40 TABLET, FILM COATED ORAL DAILY
Status: DISCONTINUED | OUTPATIENT
Start: 2021-01-01 | End: 2021-01-01

## 2021-01-01 RX ORDER — DEXTROSE, SODIUM CHLORIDE, AND POTASSIUM CHLORIDE 5; .45; .15 G/100ML; G/100ML; G/100ML
INJECTION INTRAVENOUS CONTINUOUS
Status: DISCONTINUED | OUTPATIENT
Start: 2021-01-01 | End: 2021-01-01

## 2021-01-01 RX ORDER — DIGOXIN 125 MCG
125 TABLET ORAL DAILY
Status: DISCONTINUED | OUTPATIENT
Start: 2021-01-01 | End: 2021-01-01

## 2021-01-01 RX ORDER — CARVEDILOL 6.25 MG/1
TABLET ORAL
Qty: 180 TABLET | Refills: 2 | Status: SHIPPED | OUTPATIENT
Start: 2021-01-01

## 2021-01-01 RX ORDER — SODIUM CHLORIDE 9 MG/ML
INJECTION, SOLUTION INTRAVENOUS CONTINUOUS
Status: DISCONTINUED | OUTPATIENT
Start: 2021-01-01 | End: 2021-01-01

## 2021-01-01 RX ORDER — FINASTERIDE 5 MG/1
5 TABLET, FILM COATED ORAL DAILY
Status: DISCONTINUED | OUTPATIENT
Start: 2021-01-01 | End: 2021-01-01 | Stop reason: HOSPADM

## 2021-01-01 RX ORDER — INSULIN LISPRO 100 [IU]/ML
0-6 INJECTION, SOLUTION INTRAVENOUS; SUBCUTANEOUS EVERY 4 HOURS
Status: DISCONTINUED | OUTPATIENT
Start: 2021-01-01 | End: 2021-01-01 | Stop reason: HOSPADM

## 2021-01-01 RX ORDER — INSULIN GLARGINE 100 [IU]/ML
10 INJECTION, SOLUTION SUBCUTANEOUS 2 TIMES DAILY
Qty: 5 PEN | Refills: 1 | Status: SHIPPED | OUTPATIENT
Start: 2021-01-01

## 2021-01-01 RX ORDER — POTASSIUM CHLORIDE 20 MEQ/1
20 TABLET, EXTENDED RELEASE ORAL ONCE
Status: COMPLETED | OUTPATIENT
Start: 2021-01-01 | End: 2021-01-01

## 2021-01-01 RX ORDER — HEPARIN SODIUM 5000 [USP'U]/ML
5000 INJECTION, SOLUTION INTRAVENOUS; SUBCUTANEOUS EVERY 8 HOURS SCHEDULED
Status: DISCONTINUED | OUTPATIENT
Start: 2021-01-01 | End: 2021-01-01 | Stop reason: HOSPADM

## 2021-01-01 RX ORDER — DIMETHICONE, CAMPHOR (SYNTHETIC), MENTHOL, AND PHENOL 1.1; .5; .625; .5 G/100G; G/100G; G/100G; G/100G
OINTMENT TOPICAL PRN
Status: DISCONTINUED | OUTPATIENT
Start: 2021-01-01 | End: 2021-01-01 | Stop reason: HOSPADM

## 2021-01-01 RX ORDER — NITROGLYCERIN 0.4 MG/1
0.4 TABLET SUBLINGUAL EVERY 5 MIN PRN
Status: DISCONTINUED | OUTPATIENT
Start: 2021-01-01 | End: 2021-01-01 | Stop reason: HOSPADM

## 2021-01-01 RX ORDER — DIGOXIN 0.25 MG/ML
125 INJECTION INTRAMUSCULAR; INTRAVENOUS DAILY
Status: DISCONTINUED | OUTPATIENT
Start: 2021-01-01 | End: 2021-01-01 | Stop reason: HOSPADM

## 2021-01-01 RX ORDER — SODIUM CHLORIDE, SODIUM LACTATE, POTASSIUM CHLORIDE, CALCIUM CHLORIDE 600; 310; 30; 20 MG/100ML; MG/100ML; MG/100ML; MG/100ML
1000 INJECTION, SOLUTION INTRAVENOUS ONCE
Status: COMPLETED | OUTPATIENT
Start: 2021-01-01 | End: 2021-01-01

## 2021-01-01 RX ORDER — FOLIC ACID 1 MG/1
1 TABLET ORAL DAILY
COMMUNITY

## 2021-01-01 RX ORDER — BLOOD SUGAR DIAGNOSTIC
STRIP MISCELLANEOUS
Qty: 100 STRIP | Refills: 1 | Status: SHIPPED | OUTPATIENT
Start: 2021-01-01

## 2021-01-01 RX ORDER — IPRATROPIUM BROMIDE AND ALBUTEROL SULFATE 2.5; .5 MG/3ML; MG/3ML
3 SOLUTION RESPIRATORY (INHALATION) EVERY 4 HOURS PRN
Qty: 360 ML | Refills: 1 | Status: CANCELLED | OUTPATIENT
Start: 2021-01-01

## 2021-01-01 RX ORDER — AZITHROMYCIN 250 MG/1
250 TABLET, FILM COATED ORAL DAILY
Status: DISCONTINUED | OUTPATIENT
Start: 2021-01-01 | End: 2021-01-01

## 2021-01-01 RX ORDER — DIGOXIN 0.25 MG/ML
125 INJECTION INTRAMUSCULAR; INTRAVENOUS DAILY
Qty: 16 ML | Refills: 0 | Status: SHIPPED | OUTPATIENT
Start: 2021-01-01

## 2021-01-01 RX ORDER — AMOXICILLIN AND CLAVULANATE POTASSIUM 875; 125 MG/1; MG/1
1 TABLET, FILM COATED ORAL EVERY 12 HOURS SCHEDULED
Qty: 13 TABLET | Refills: 0 | Status: SHIPPED | OUTPATIENT
Start: 2021-01-01 | End: 2021-01-01

## 2021-01-01 RX ORDER — MORPHINE SULFATE 2 MG/ML
1 INJECTION, SOLUTION INTRAMUSCULAR; INTRAVENOUS ONCE
Status: COMPLETED | OUTPATIENT
Start: 2021-01-01 | End: 2021-01-01

## 2021-01-01 RX ORDER — PEN NEEDLE, DIABETIC 31 G X1/4"
1 NEEDLE, DISPOSABLE MISCELLANEOUS DAILY
Qty: 100 EACH | Refills: 3 | Status: SHIPPED | OUTPATIENT
Start: 2021-01-01

## 2021-01-01 RX ORDER — INSULIN LISPRO 100 [IU]/ML
0-6 INJECTION, SOLUTION INTRAVENOUS; SUBCUTANEOUS
Status: DISCONTINUED | OUTPATIENT
Start: 2021-01-01 | End: 2021-01-01

## 2021-01-01 RX ORDER — POLYETHYLENE GLYCOL 3350 17 G/17G
17 POWDER, FOR SOLUTION ORAL DAILY PRN
Status: DISCONTINUED | OUTPATIENT
Start: 2021-01-01 | End: 2021-01-01 | Stop reason: HOSPADM

## 2021-01-01 RX ORDER — UNDERPADS 23" X 36"
EACH MISCELLANEOUS
Qty: 100 EACH | Refills: 5 | Status: SHIPPED | OUTPATIENT
Start: 2021-01-01 | End: 2021-01-01 | Stop reason: CLARIF

## 2021-01-01 RX ORDER — LINEZOLID 2 MG/ML
600 INJECTION, SOLUTION INTRAVENOUS EVERY 12 HOURS
Status: DISCONTINUED | OUTPATIENT
Start: 2021-01-01 | End: 2021-01-01

## 2021-01-01 RX ORDER — DEXTROSE MONOHYDRATE 25 G/50ML
12.5 INJECTION, SOLUTION INTRAVENOUS PRN
Status: DISCONTINUED | OUTPATIENT
Start: 2021-01-01 | End: 2021-01-01

## 2021-01-01 RX ORDER — POTASSIUM CHLORIDE 20 MEQ/1
40 TABLET, EXTENDED RELEASE ORAL ONCE
Status: DISCONTINUED | OUTPATIENT
Start: 2021-01-01 | End: 2021-01-01 | Stop reason: HOSPADM

## 2021-01-01 RX ORDER — NICOTINE POLACRILEX 4 MG
15 LOZENGE BUCCAL PRN
Status: DISCONTINUED | OUTPATIENT
Start: 2021-01-01 | End: 2021-01-01 | Stop reason: HOSPADM

## 2021-01-01 RX ORDER — SODIUM CHLORIDE, SODIUM LACTATE, POTASSIUM CHLORIDE, AND CALCIUM CHLORIDE .6; .31; .03; .02 G/100ML; G/100ML; G/100ML; G/100ML
500 INJECTION, SOLUTION INTRAVENOUS ONCE
Status: COMPLETED | OUTPATIENT
Start: 2021-01-01 | End: 2021-01-01

## 2021-01-01 RX ORDER — SENNA PLUS 8.6 MG/1
1 TABLET ORAL NIGHTLY
Status: DISCONTINUED | OUTPATIENT
Start: 2021-01-01 | End: 2021-01-01 | Stop reason: HOSPADM

## 2021-01-01 RX ORDER — SENNA AND DOCUSATE SODIUM 50; 8.6 MG/1; MG/1
1 TABLET, FILM COATED ORAL PRN
COMMUNITY

## 2021-01-01 RX ORDER — FLASH GLUCOSE SENSOR
KIT MISCELLANEOUS
Qty: 1 EACH | Refills: 5 | Status: SHIPPED | OUTPATIENT
Start: 2021-01-01 | End: 2021-01-01 | Stop reason: CLARIF

## 2021-01-01 RX ORDER — FLASH GLUCOSE SCANNING READER
EACH MISCELLANEOUS
Qty: 1 EACH | Refills: 0 | Status: SHIPPED | OUTPATIENT
Start: 2021-01-01 | End: 2021-01-01 | Stop reason: CLARIF

## 2021-01-01 RX ORDER — DEXTROSE MONOHYDRATE 50 MG/ML
100 INJECTION, SOLUTION INTRAVENOUS PRN
Status: DISCONTINUED | OUTPATIENT
Start: 2021-01-01 | End: 2021-01-01 | Stop reason: HOSPADM

## 2021-01-01 RX ORDER — ONDANSETRON 2 MG/ML
4 INJECTION INTRAMUSCULAR; INTRAVENOUS EVERY 6 HOURS PRN
Status: DISCONTINUED | OUTPATIENT
Start: 2021-01-01 | End: 2021-01-01 | Stop reason: HOSPADM

## 2021-01-01 RX ORDER — FUROSEMIDE 10 MG/ML
20 INJECTION INTRAMUSCULAR; INTRAVENOUS ONCE
Status: COMPLETED | OUTPATIENT
Start: 2021-01-01 | End: 2021-01-01

## 2021-01-01 RX ORDER — DIGOXIN 0.25 MG/ML
250 INJECTION INTRAMUSCULAR; INTRAVENOUS
Status: DISCONTINUED | OUTPATIENT
Start: 2021-01-01 | End: 2021-01-01

## 2021-01-01 RX ORDER — BISACODYL 10 MG
10 SUPPOSITORY, RECTAL RECTAL DAILY PRN
Status: DISCONTINUED | OUTPATIENT
Start: 2021-01-01 | End: 2021-01-01 | Stop reason: HOSPADM

## 2021-01-01 RX ORDER — IPRATROPIUM BROMIDE AND ALBUTEROL SULFATE 2.5; .5 MG/3ML; MG/3ML
1 SOLUTION RESPIRATORY (INHALATION) EVERY 4 HOURS PRN
Status: DISCONTINUED | OUTPATIENT
Start: 2021-01-01 | End: 2021-01-01 | Stop reason: HOSPADM

## 2021-01-01 RX ORDER — ALBUTEROL SULFATE 2.5 MG/3ML
2.5 SOLUTION RESPIRATORY (INHALATION) 2 TIMES DAILY
Status: DISCONTINUED | OUTPATIENT
Start: 2021-01-01 | End: 2021-01-01

## 2021-01-01 RX ORDER — POTASSIUM CHLORIDE 7.45 MG/ML
10 INJECTION INTRAVENOUS
Status: DISCONTINUED | OUTPATIENT
Start: 2021-01-01 | End: 2021-01-01

## 2021-01-01 RX ORDER — LIRAGLUTIDE 6 MG/ML
INJECTION SUBCUTANEOUS
Qty: 9 ML | Refills: 3 | Status: ON HOLD | OUTPATIENT
Start: 2021-01-01 | End: 2021-01-01 | Stop reason: HOSPADM

## 2021-01-01 RX ORDER — INSULIN LISPRO 100 [IU]/ML
0-6 INJECTION, SOLUTION INTRAVENOUS; SUBCUTANEOUS NIGHTLY
Status: DISCONTINUED | OUTPATIENT
Start: 2021-01-01 | End: 2021-01-01

## 2021-01-01 RX ORDER — BUPROPION HYDROCHLORIDE 100 MG/1
50 TABLET ORAL 2 TIMES DAILY
Status: DISCONTINUED | OUTPATIENT
Start: 2021-01-01 | End: 2021-01-01

## 2021-01-01 RX ORDER — BLOOD SUGAR DIAGNOSTIC
STRIP MISCELLANEOUS
Qty: 100 STRIP | Refills: 1 | Status: SHIPPED | OUTPATIENT
Start: 2021-01-01 | End: 2021-01-01 | Stop reason: CLARIF

## 2021-01-01 RX ORDER — BUPROPION HYDROCHLORIDE 100 MG/1
100 TABLET, EXTENDED RELEASE ORAL DAILY
Status: DISCONTINUED | OUTPATIENT
Start: 2021-01-01 | End: 2021-01-01

## 2021-01-01 RX ORDER — DIPHENHYDRAMINE HYDROCHLORIDE, ZINC ACETATE 2; .1 G/100G; G/100G
CREAM TOPICAL 3 TIMES DAILY PRN
Status: DISCONTINUED | OUTPATIENT
Start: 2021-01-01 | End: 2021-01-01 | Stop reason: HOSPADM

## 2021-01-01 RX ORDER — CASTOR OIL AND BALSAM, PERU 788; 87 MG/G; MG/G
OINTMENT TOPICAL 2 TIMES DAILY
Status: DISCONTINUED | OUTPATIENT
Start: 2021-01-01 | End: 2021-01-01 | Stop reason: HOSPADM

## 2021-01-01 RX ORDER — LIDOCAINE 4 G/G
1 PATCH TOPICAL DAILY
Status: DISCONTINUED | OUTPATIENT
Start: 2021-01-01 | End: 2021-01-01 | Stop reason: HOSPADM

## 2021-01-01 RX ORDER — FUROSEMIDE 10 MG/ML
20 INJECTION INTRAMUSCULAR; INTRAVENOUS 2 TIMES DAILY
Qty: 40 ML | Refills: 0 | Status: SHIPPED | OUTPATIENT
Start: 2021-01-01

## 2021-01-01 RX ORDER — INSULIN LISPRO 100 [IU]/ML
0-18 INJECTION, SOLUTION INTRAVENOUS; SUBCUTANEOUS
Status: DISCONTINUED | OUTPATIENT
Start: 2021-01-01 | End: 2021-01-01 | Stop reason: HOSPADM

## 2021-01-01 RX ORDER — ASPIRIN 81 MG/1
81 TABLET, CHEWABLE ORAL DAILY
Status: DISCONTINUED | OUTPATIENT
Start: 2021-01-01 | End: 2021-01-01 | Stop reason: HOSPADM

## 2021-01-01 RX ORDER — GLIPIZIDE 5 MG/1
TABLET ORAL
Qty: 45 TABLET | Refills: 3 | Status: SHIPPED | OUTPATIENT
Start: 2021-01-01 | End: 2021-01-01 | Stop reason: SDUPTHER

## 2021-01-01 RX ORDER — DEXTROSE MONOHYDRATE 50 MG/ML
100 INJECTION, SOLUTION INTRAVENOUS PRN
Status: DISCONTINUED | OUTPATIENT
Start: 2021-01-01 | End: 2021-01-01

## 2021-01-01 RX ORDER — PANTOPRAZOLE SODIUM 40 MG/10ML
40 INJECTION, POWDER, LYOPHILIZED, FOR SOLUTION INTRAVENOUS DAILY
Status: DISCONTINUED | OUTPATIENT
Start: 2021-01-01 | End: 2021-01-01 | Stop reason: ALTCHOICE

## 2021-01-01 RX ORDER — ACETAMINOPHEN 325 MG/1
650 TABLET ORAL ONCE
Status: COMPLETED | OUTPATIENT
Start: 2021-01-01 | End: 2021-01-01

## 2021-01-01 RX ORDER — DRONABINOL 2.5 MG/1
2.5 CAPSULE ORAL
Qty: 60 CAPSULE | Refills: 0 | Status: SHIPPED | OUTPATIENT
Start: 2021-01-01 | End: 2021-01-01

## 2021-01-01 RX ORDER — SODIUM CHLORIDE 9 MG/ML
INJECTION, SOLUTION INTRAVENOUS CONTINUOUS
Status: ACTIVE | OUTPATIENT
Start: 2021-01-01 | End: 2021-01-01

## 2021-01-01 RX ORDER — ZINC SULFATE 50(220)MG
50 CAPSULE ORAL DAILY
Status: DISCONTINUED | OUTPATIENT
Start: 2021-01-01 | End: 2021-01-01 | Stop reason: HOSPADM

## 2021-01-01 RX ORDER — ASPIRIN 81 MG/1
81 TABLET, CHEWABLE ORAL DAILY
COMMUNITY

## 2021-01-01 RX ORDER — INSULIN LISPRO 100 [IU]/ML
0-6 INJECTION, SOLUTION INTRAVENOUS; SUBCUTANEOUS
Status: DISCONTINUED | OUTPATIENT
Start: 2021-01-01 | End: 2021-01-01 | Stop reason: HOSPADM

## 2021-01-01 RX ORDER — INSULIN GLARGINE 100 [IU]/ML
10 INJECTION, SOLUTION SUBCUTANEOUS 2 TIMES DAILY
Qty: 5 PEN | Refills: 5 | Status: SHIPPED | OUTPATIENT
Start: 2021-01-01 | End: 2021-01-01 | Stop reason: ALTCHOICE

## 2021-01-01 RX ORDER — INSULIN LISPRO 100 [IU]/ML
0-3 INJECTION, SOLUTION INTRAVENOUS; SUBCUTANEOUS NIGHTLY
Status: DISCONTINUED | OUTPATIENT
Start: 2021-01-01 | End: 2021-01-01 | Stop reason: HOSPADM

## 2021-01-01 RX ORDER — AZITHROMYCIN 250 MG/1
500 TABLET, FILM COATED ORAL DAILY
Status: COMPLETED | OUTPATIENT
Start: 2021-01-01 | End: 2021-01-01

## 2021-01-01 RX ORDER — HEPARIN SODIUM 5000 [USP'U]/ML
5000 INJECTION, SOLUTION INTRAVENOUS; SUBCUTANEOUS EVERY 8 HOURS SCHEDULED
Status: DISCONTINUED | OUTPATIENT
Start: 2021-01-01 | End: 2021-01-01

## 2021-01-01 RX ORDER — ASCORBIC ACID 500 MG
500 TABLET ORAL DAILY
Status: DISCONTINUED | OUTPATIENT
Start: 2021-01-01 | End: 2021-01-01 | Stop reason: HOSPADM

## 2021-01-01 RX ORDER — DIGOXIN 0.25 MG/ML
250 INJECTION INTRAMUSCULAR; INTRAVENOUS EVERY 6 HOURS
Status: COMPLETED | OUTPATIENT
Start: 2021-01-01 | End: 2021-01-01

## 2021-01-01 RX ORDER — DEXAMETHASONE 4 MG/1
6 TABLET ORAL DAILY
Status: DISCONTINUED | OUTPATIENT
Start: 2021-01-01 | End: 2021-01-01 | Stop reason: HOSPADM

## 2021-01-01 RX ORDER — SODIUM CHLORIDE, SODIUM LACTATE, POTASSIUM CHLORIDE, CALCIUM CHLORIDE 600; 310; 30; 20 MG/100ML; MG/100ML; MG/100ML; MG/100ML
INJECTION, SOLUTION INTRAVENOUS CONTINUOUS
Status: DISCONTINUED | OUTPATIENT
Start: 2021-01-01 | End: 2021-01-01

## 2021-01-01 RX ORDER — INSULIN LISPRO 100 [IU]/ML
0-3 INJECTION, SOLUTION INTRAVENOUS; SUBCUTANEOUS NIGHTLY
Status: DISCONTINUED | OUTPATIENT
Start: 2021-01-01 | End: 2021-01-01

## 2021-01-01 RX ORDER — ALBUTEROL SULFATE 2.5 MG/3ML
2.5 SOLUTION RESPIRATORY (INHALATION) EVERY 4 HOURS PRN
Status: DISCONTINUED | OUTPATIENT
Start: 2021-01-01 | End: 2021-01-01

## 2021-01-01 RX ORDER — SODIUM CHLORIDE 450 MG/100ML
INJECTION, SOLUTION INTRAVENOUS CONTINUOUS
Status: DISCONTINUED | OUTPATIENT
Start: 2021-01-01 | End: 2021-01-01

## 2021-01-01 RX ORDER — BUPROPION HYDROCHLORIDE 100 MG/1
100 TABLET ORAL DAILY
Qty: 30 TABLET | Refills: 3 | Status: SHIPPED | OUTPATIENT
Start: 2021-01-01

## 2021-01-01 RX ORDER — SODIUM CHLORIDE FOR INHALATION 3 %
15 VIAL, NEBULIZER (ML) INHALATION 2 TIMES DAILY
Status: DISCONTINUED | OUTPATIENT
Start: 2021-01-01 | End: 2021-01-01 | Stop reason: HOSPADM

## 2021-01-01 RX ORDER — MORPHINE SULFATE 2 MG/ML
1 INJECTION, SOLUTION INTRAMUSCULAR; INTRAVENOUS EVERY 4 HOURS PRN
Status: DISCONTINUED | OUTPATIENT
Start: 2021-01-01 | End: 2021-01-01 | Stop reason: HOSPADM

## 2021-01-01 RX ORDER — FLASH GLUCOSE SENSOR
KIT MISCELLANEOUS
Qty: 2 EACH | Refills: 3 | Status: SHIPPED | OUTPATIENT
Start: 2021-01-01

## 2021-01-01 RX ORDER — LANOLIN ALCOHOL/MO/W.PET/CERES
3 CREAM (GRAM) TOPICAL NIGHTLY PRN
Status: DISCONTINUED | OUTPATIENT
Start: 2021-01-01 | End: 2021-01-01 | Stop reason: HOSPADM

## 2021-01-01 RX ORDER — POLYETHYLENE GLYCOL 3350 17 G/17G
17 POWDER, FOR SOLUTION ORAL DAILY PRN
COMMUNITY

## 2021-01-01 RX ORDER — WHEELCHAIR
EACH MISCELLANEOUS
Qty: 1 EACH | Refills: 0 | Status: SHIPPED | OUTPATIENT
Start: 2021-01-01 | End: 2021-01-01 | Stop reason: CLARIF

## 2021-01-01 RX ADMIN — HEPARIN SODIUM 5000 UNITS: 5000 INJECTION INTRAVENOUS; SUBCUTANEOUS at 12:53

## 2021-01-01 RX ADMIN — MORPHINE SULFATE 1 MG: 2 INJECTION, SOLUTION INTRAMUSCULAR; INTRAVENOUS at 19:02

## 2021-01-01 RX ADMIN — INSULIN LISPRO 2 UNITS: 100 INJECTION, SOLUTION INTRAVENOUS; SUBCUTANEOUS at 18:34

## 2021-01-01 RX ADMIN — Medication: at 22:01

## 2021-01-01 RX ADMIN — SODIUM CHLORIDE, PRESERVATIVE FREE 10 ML: 5 INJECTION INTRAVENOUS at 22:19

## 2021-01-01 RX ADMIN — AMOXICILLIN AND CLAVULANATE POTASSIUM 1 TABLET: 875; 125 TABLET, FILM COATED ORAL at 09:07

## 2021-01-01 RX ADMIN — IPRATROPIUM BROMIDE AND ALBUTEROL SULFATE 1 AMPULE: .5; 2.5 SOLUTION RESPIRATORY (INHALATION) at 21:35

## 2021-01-01 RX ADMIN — Medication: at 08:23

## 2021-01-01 RX ADMIN — ALBUTEROL SULFATE 2.5 MG: 2.5 SOLUTION RESPIRATORY (INHALATION) at 14:54

## 2021-01-01 RX ADMIN — POTASSIUM BICARBONATE 40 MEQ: 782 TABLET, EFFERVESCENT ORAL at 09:07

## 2021-01-01 RX ADMIN — SODIUM CHLORIDE: 9 INJECTION, SOLUTION INTRAVENOUS at 22:27

## 2021-01-01 RX ADMIN — DIGOXIN 125 MCG: 250 INJECTION, SOLUTION INTRAMUSCULAR; INTRAVENOUS; PARENTERAL at 08:36

## 2021-01-01 RX ADMIN — Medication: at 10:11

## 2021-01-01 RX ADMIN — CARVEDILOL 6.25 MG: 6.25 TABLET, FILM COATED ORAL at 09:01

## 2021-01-01 RX ADMIN — DIGOXIN 125 MCG: 125 TABLET ORAL at 08:51

## 2021-01-01 RX ADMIN — INSULIN GLARGINE 10 UNITS: 100 INJECTION, SOLUTION SUBCUTANEOUS at 09:35

## 2021-01-01 RX ADMIN — SODIUM CHLORIDE: 9 INJECTION, SOLUTION INTRAVENOUS at 12:56

## 2021-01-01 RX ADMIN — FUROSEMIDE 20 MG: 10 INJECTION, SOLUTION INTRAVENOUS at 09:46

## 2021-01-01 RX ADMIN — MEROPENEM 1000 MG: 1 INJECTION, POWDER, FOR SOLUTION INTRAVENOUS at 04:34

## 2021-01-01 RX ADMIN — INSULIN GLARGINE 10 UNITS: 100 INJECTION, SOLUTION SUBCUTANEOUS at 12:54

## 2021-01-01 RX ADMIN — IPRATROPIUM BROMIDE AND ALBUTEROL SULFATE 1 AMPULE: .5; 2.5 SOLUTION RESPIRATORY (INHALATION) at 09:19

## 2021-01-01 RX ADMIN — IPRATROPIUM BROMIDE AND ALBUTEROL SULFATE 1 AMPULE: .5; 2.5 SOLUTION RESPIRATORY (INHALATION) at 03:00

## 2021-01-01 RX ADMIN — SODIUM CHLORIDE, PRESERVATIVE FREE 10 ML: 5 INJECTION INTRAVENOUS at 10:10

## 2021-01-01 RX ADMIN — VANCOMYCIN HYDROCHLORIDE 1000 MG: 10 INJECTION, POWDER, LYOPHILIZED, FOR SOLUTION INTRAVENOUS at 12:45

## 2021-01-01 RX ADMIN — VANCOMYCIN HYDROCHLORIDE 1250 MG: 10 INJECTION, POWDER, LYOPHILIZED, FOR SOLUTION INTRAVENOUS at 12:37

## 2021-01-01 RX ADMIN — FINASTERIDE 5 MG: 5 TABLET, FILM COATED ORAL at 11:29

## 2021-01-01 RX ADMIN — SODIUM CHLORIDE 30 MG/ML INHALATION SOLUTION 15 ML: 30 SOLUTION INHALANT at 00:39

## 2021-01-01 RX ADMIN — OXYCODONE HYDROCHLORIDE AND ACETAMINOPHEN 500 MG: 500 TABLET ORAL at 08:11

## 2021-01-01 RX ADMIN — SODIUM CHLORIDE, PRESERVATIVE FREE 10 ML: 5 INJECTION INTRAVENOUS at 21:59

## 2021-01-01 RX ADMIN — MIDODRINE HYDROCHLORIDE 5 MG: 5 TABLET ORAL at 14:11

## 2021-01-01 RX ADMIN — HEPARIN SODIUM 5000 UNITS: 5000 INJECTION INTRAVENOUS; SUBCUTANEOUS at 05:49

## 2021-01-01 RX ADMIN — HEPARIN SODIUM 5000 UNITS: 5000 INJECTION INTRAVENOUS; SUBCUTANEOUS at 22:15

## 2021-01-01 RX ADMIN — PIPERACILLIN AND TAZOBACTAM 3375 MG: 3; .375 INJECTION, POWDER, LYOPHILIZED, FOR SOLUTION INTRAVENOUS at 13:54

## 2021-01-01 RX ADMIN — ENOXAPARIN SODIUM 40 MG: 40 INJECTION SUBCUTANEOUS at 09:20

## 2021-01-01 RX ADMIN — MIDODRINE HYDROCHLORIDE 5 MG: 5 TABLET ORAL at 12:20

## 2021-01-01 RX ADMIN — IPRATROPIUM BROMIDE AND ALBUTEROL SULFATE 1 AMPULE: .5; 2.5 SOLUTION RESPIRATORY (INHALATION) at 23:28

## 2021-01-01 RX ADMIN — Medication: at 08:31

## 2021-01-01 RX ADMIN — ACETAMINOPHEN 650 MG: 650 SUPPOSITORY RECTAL at 02:36

## 2021-01-01 RX ADMIN — INSULIN LISPRO 2 UNITS: 100 INJECTION, SOLUTION INTRAVENOUS; SUBCUTANEOUS at 17:31

## 2021-01-01 RX ADMIN — PIPERACILLIN AND TAZOBACTAM 3375 MG: 3; .375 INJECTION, POWDER, LYOPHILIZED, FOR SOLUTION INTRAVENOUS at 01:48

## 2021-01-01 RX ADMIN — CARVEDILOL 6.25 MG: 6.25 TABLET, FILM COATED ORAL at 08:12

## 2021-01-01 RX ADMIN — AZITHROMYCIN MONOHYDRATE 500 MG: 500 INJECTION, POWDER, LYOPHILIZED, FOR SOLUTION INTRAVENOUS at 05:36

## 2021-01-01 RX ADMIN — INSULIN LISPRO 2 UNITS: 100 INJECTION, SOLUTION INTRAVENOUS; SUBCUTANEOUS at 21:03

## 2021-01-01 RX ADMIN — SODIUM CHLORIDE, PRESERVATIVE FREE 10 ML: 5 INJECTION INTRAVENOUS at 08:44

## 2021-01-01 RX ADMIN — SODIUM CHLORIDE, PRESERVATIVE FREE 10 ML: 5 INJECTION INTRAVENOUS at 09:42

## 2021-01-01 RX ADMIN — Medication 5000 UNITS: at 09:01

## 2021-01-01 RX ADMIN — SODIUM CHLORIDE, PRESERVATIVE FREE 10 ML: 5 INJECTION INTRAVENOUS at 08:23

## 2021-01-01 RX ADMIN — MEROPENEM 1000 MG: 1 INJECTION, POWDER, FOR SOLUTION INTRAVENOUS at 00:31

## 2021-01-01 RX ADMIN — MORPHINE SULFATE 1 MG: 2 INJECTION, SOLUTION INTRAMUSCULAR; INTRAVENOUS at 08:24

## 2021-01-01 RX ADMIN — INSULIN LISPRO 2 UNITS: 100 INJECTION, SOLUTION INTRAVENOUS; SUBCUTANEOUS at 14:03

## 2021-01-01 RX ADMIN — NYSTATIN 500000 UNITS: 100000 SUSPENSION ORAL at 08:29

## 2021-01-01 RX ADMIN — INSULIN LISPRO 2 UNITS: 100 INJECTION, SOLUTION INTRAVENOUS; SUBCUTANEOUS at 21:23

## 2021-01-01 RX ADMIN — IPRATROPIUM BROMIDE AND ALBUTEROL SULFATE 1 AMPULE: .5; 2.5 SOLUTION RESPIRATORY (INHALATION) at 21:40

## 2021-01-01 RX ADMIN — Medication: at 21:57

## 2021-01-01 RX ADMIN — Medication 5 ML: at 20:21

## 2021-01-01 RX ADMIN — PANTOPRAZOLE SODIUM 40 MG: 40 INJECTION, POWDER, FOR SOLUTION INTRAVENOUS at 13:46

## 2021-01-01 RX ADMIN — DIGOXIN 125 MCG: 250 INJECTION, SOLUTION INTRAMUSCULAR; INTRAVENOUS; PARENTERAL at 08:41

## 2021-01-01 RX ADMIN — SODIUM CHLORIDE 25 ML: 9 INJECTION, SOLUTION INTRAVENOUS at 01:32

## 2021-01-01 RX ADMIN — NYSTATIN 500000 UNITS: 100000 SUSPENSION ORAL at 22:36

## 2021-01-01 RX ADMIN — SODIUM CHLORIDE 25 ML: 9 INJECTION, SOLUTION INTRAVENOUS at 12:57

## 2021-01-01 RX ADMIN — MEROPENEM 1000 MG: 1 INJECTION, POWDER, FOR SOLUTION INTRAVENOUS at 00:06

## 2021-01-01 RX ADMIN — PIPERACILLIN AND TAZOBACTAM 3375 MG: 3; .375 INJECTION, POWDER, LYOPHILIZED, FOR SOLUTION INTRAVENOUS at 03:10

## 2021-01-01 RX ADMIN — AZITHROMYCIN MONOHYDRATE 500 MG: 250 TABLET ORAL at 08:13

## 2021-01-01 RX ADMIN — SODIUM CHLORIDE 30 MG/ML INHALATION SOLUTION 15 ML: 30 SOLUTION INHALANT at 13:42

## 2021-01-01 RX ADMIN — NYSTATIN 500000 UNITS: 100000 SUSPENSION ORAL at 09:02

## 2021-01-01 RX ADMIN — SENNOSIDES 8.6 MG: 8.6 TABLET, FILM COATED ORAL at 18:12

## 2021-01-01 RX ADMIN — INSULIN LISPRO 2 UNITS: 100 INJECTION, SOLUTION INTRAVENOUS; SUBCUTANEOUS at 14:23

## 2021-01-01 RX ADMIN — PIPERACILLIN AND TAZOBACTAM 3375 MG: 3; .375 INJECTION, POWDER, LYOPHILIZED, FOR SOLUTION INTRAVENOUS at 02:32

## 2021-01-01 RX ADMIN — INSULIN LISPRO 1 UNITS: 100 INJECTION, SOLUTION INTRAVENOUS; SUBCUTANEOUS at 17:54

## 2021-01-01 RX ADMIN — ACETAMINOPHEN 650 MG: 325 TABLET ORAL at 14:04

## 2021-01-01 RX ADMIN — FINASTERIDE 5 MG: 5 TABLET, FILM COATED ORAL at 08:14

## 2021-01-01 RX ADMIN — SODIUM CHLORIDE: 9 INJECTION, SOLUTION INTRAVENOUS at 23:56

## 2021-01-01 RX ADMIN — INSULIN LISPRO 3 UNITS: 100 INJECTION, SOLUTION INTRAVENOUS; SUBCUTANEOUS at 13:40

## 2021-01-01 RX ADMIN — AZITHROMYCIN MONOHYDRATE 500 MG: 500 INJECTION, POWDER, LYOPHILIZED, FOR SOLUTION INTRAVENOUS at 07:01

## 2021-01-01 RX ADMIN — LINEZOLID 600 MG: 600 INJECTION, SOLUTION INTRAVENOUS at 00:48

## 2021-01-01 RX ADMIN — HEPARIN SODIUM 5000 UNITS: 5000 INJECTION INTRAVENOUS; SUBCUTANEOUS at 22:10

## 2021-01-01 RX ADMIN — Medication: at 20:53

## 2021-01-01 RX ADMIN — ACETAMINOPHEN 650 MG: 650 SUPPOSITORY RECTAL at 15:53

## 2021-01-01 RX ADMIN — CARVEDILOL 6.25 MG: 6.25 TABLET, FILM COATED ORAL at 17:18

## 2021-01-01 RX ADMIN — INSULIN GLARGINE 10 UNITS: 100 INJECTION, SOLUTION SUBCUTANEOUS at 21:03

## 2021-01-01 RX ADMIN — ACETAMINOPHEN 650 MG: 325 TABLET ORAL at 23:41

## 2021-01-01 RX ADMIN — SODIUM CHLORIDE, PRESERVATIVE FREE 10 ML: 5 INJECTION INTRAVENOUS at 20:46

## 2021-01-01 RX ADMIN — IPRATROPIUM BROMIDE AND ALBUTEROL SULFATE 1 AMPULE: .5; 2.5 SOLUTION RESPIRATORY (INHALATION) at 09:22

## 2021-01-01 RX ADMIN — MEROPENEM 1000 MG: 1 INJECTION, POWDER, FOR SOLUTION INTRAVENOUS at 02:35

## 2021-01-01 RX ADMIN — INSULIN LISPRO 3 UNITS: 100 INJECTION, SOLUTION INTRAVENOUS; SUBCUTANEOUS at 08:13

## 2021-01-01 RX ADMIN — SODIUM CHLORIDE 30 MG/ML INHALATION SOLUTION 15 ML: 30 SOLUTION INHALANT at 20:50

## 2021-01-01 RX ADMIN — ACETAMINOPHEN 650 MG: 325 TABLET ORAL at 23:47

## 2021-01-01 RX ADMIN — POTASSIUM CHLORIDE 10 MEQ: 7.46 INJECTION, SOLUTION INTRAVENOUS at 13:23

## 2021-01-01 RX ADMIN — SODIUM CHLORIDE: 450 INJECTION, SOLUTION INTRAVENOUS at 05:13

## 2021-01-01 RX ADMIN — INSULIN LISPRO 4 UNITS: 100 INJECTION, SOLUTION INTRAVENOUS; SUBCUTANEOUS at 16:33

## 2021-01-01 RX ADMIN — FINASTERIDE 5 MG: 5 TABLET, FILM COATED ORAL at 10:34

## 2021-01-01 RX ADMIN — INSULIN LISPRO 2 UNITS: 100 INJECTION, SOLUTION INTRAVENOUS; SUBCUTANEOUS at 17:34

## 2021-01-01 RX ADMIN — HEPARIN SODIUM 5000 UNITS: 5000 INJECTION INTRAVENOUS; SUBCUTANEOUS at 13:13

## 2021-01-01 RX ADMIN — INSULIN LISPRO 1 UNITS: 100 INJECTION, SOLUTION INTRAVENOUS; SUBCUTANEOUS at 22:03

## 2021-01-01 RX ADMIN — INSULIN LISPRO 1 UNITS: 100 INJECTION, SOLUTION INTRAVENOUS; SUBCUTANEOUS at 09:27

## 2021-01-01 RX ADMIN — IPRATROPIUM BROMIDE AND ALBUTEROL SULFATE 1 AMPULE: .5; 2.5 SOLUTION RESPIRATORY (INHALATION) at 22:05

## 2021-01-01 RX ADMIN — ALBUTEROL SULFATE 2.5 MG: 2.5 SOLUTION RESPIRATORY (INHALATION) at 12:08

## 2021-01-01 RX ADMIN — OXYCODONE HYDROCHLORIDE AND ACETAMINOPHEN 500 MG: 500 TABLET ORAL at 09:01

## 2021-01-01 RX ADMIN — INSULIN LISPRO 1 UNITS: 100 INJECTION, SOLUTION INTRAVENOUS; SUBCUTANEOUS at 00:37

## 2021-01-01 RX ADMIN — VANCOMYCIN HYDROCHLORIDE 1500 MG: 10 INJECTION, POWDER, LYOPHILIZED, FOR SOLUTION INTRAVENOUS at 20:42

## 2021-01-01 RX ADMIN — Medication 10 ML: at 07:52

## 2021-01-01 RX ADMIN — Medication: at 09:25

## 2021-01-01 RX ADMIN — CARVEDILOL 6.25 MG: 6.25 TABLET, FILM COATED ORAL at 08:44

## 2021-01-01 RX ADMIN — POTASSIUM CHLORIDE 10 MEQ: 10 INJECTION, SOLUTION INTRAVENOUS at 02:52

## 2021-01-01 RX ADMIN — IPRATROPIUM BROMIDE AND ALBUTEROL SULFATE 1 AMPULE: .5; 2.5 SOLUTION RESPIRATORY (INHALATION) at 22:20

## 2021-01-01 RX ADMIN — POTASSIUM CHLORIDE 10 MEQ: 7.45 INJECTION INTRAVENOUS at 18:39

## 2021-01-01 RX ADMIN — MEROPENEM 1000 MG: 1 INJECTION, POWDER, FOR SOLUTION INTRAVENOUS at 12:31

## 2021-01-01 RX ADMIN — DIGOXIN 125 MCG: 250 INJECTION, SOLUTION INTRAMUSCULAR; INTRAVENOUS; PARENTERAL at 09:34

## 2021-01-01 RX ADMIN — INSULIN LISPRO 4 UNITS: 100 INJECTION, SOLUTION INTRAVENOUS; SUBCUTANEOUS at 13:34

## 2021-01-01 RX ADMIN — HEPARIN SODIUM 5000 UNITS: 5000 INJECTION INTRAVENOUS; SUBCUTANEOUS at 15:05

## 2021-01-01 RX ADMIN — DOCUSATE SODIUM 50 MG: 50 LIQUID ORAL at 13:59

## 2021-01-01 RX ADMIN — INSULIN GLARGINE 10 UNITS: 100 INJECTION, SOLUTION SUBCUTANEOUS at 20:20

## 2021-01-01 RX ADMIN — Medication 10 ML: at 21:00

## 2021-01-01 RX ADMIN — MIDODRINE HYDROCHLORIDE 5 MG: 5 TABLET ORAL at 17:48

## 2021-01-01 RX ADMIN — Medication 5000 UNITS: at 08:44

## 2021-01-01 RX ADMIN — IPRATROPIUM BROMIDE AND ALBUTEROL SULFATE 1 AMPULE: .5; 2.5 SOLUTION RESPIRATORY (INHALATION) at 09:20

## 2021-01-01 RX ADMIN — SODIUM CHLORIDE 30 MG/ML INHALATION SOLUTION 15 ML: 30 SOLUTION INHALANT at 08:24

## 2021-01-01 RX ADMIN — CEFTRIAXONE 1000 MG: 1 INJECTION, POWDER, FOR SOLUTION INTRAMUSCULAR; INTRAVENOUS at 21:32

## 2021-01-01 RX ADMIN — INSULIN GLARGINE 5 UNITS: 100 INJECTION, SOLUTION SUBCUTANEOUS at 22:07

## 2021-01-01 RX ADMIN — INSULIN GLARGINE 5 UNITS: 100 INJECTION, SOLUTION SUBCUTANEOUS at 21:17

## 2021-01-01 RX ADMIN — POTASSIUM CHLORIDE 10 MEQ: 7.45 INJECTION INTRAVENOUS at 06:53

## 2021-01-01 RX ADMIN — HEPARIN SODIUM 5000 UNITS: 5000 INJECTION INTRAVENOUS; SUBCUTANEOUS at 06:55

## 2021-01-01 RX ADMIN — NYSTATIN 500000 UNITS: 100000 SUSPENSION ORAL at 14:06

## 2021-01-01 RX ADMIN — CARVEDILOL 6.25 MG: 6.25 TABLET, FILM COATED ORAL at 10:34

## 2021-01-01 RX ADMIN — SODIUM CHLORIDE, PRESERVATIVE FREE 10 ML: 5 INJECTION INTRAVENOUS at 09:46

## 2021-01-01 RX ADMIN — MEROPENEM 1000 MG: 1 INJECTION, POWDER, FOR SOLUTION INTRAVENOUS at 05:18

## 2021-01-01 RX ADMIN — PANTOPRAZOLE SODIUM 40 MG: 40 TABLET, DELAYED RELEASE ORAL at 06:45

## 2021-01-01 RX ADMIN — SODIUM CHLORIDE: 9 INJECTION, SOLUTION INTRAVENOUS at 19:12

## 2021-01-01 RX ADMIN — NYSTATIN 500000 UNITS: 100000 SUSPENSION ORAL at 20:21

## 2021-01-01 RX ADMIN — SODIUM CHLORIDE 30 MG/ML INHALATION SOLUTION 15 ML: 30 SOLUTION INHALANT at 23:27

## 2021-01-01 RX ADMIN — SODIUM CHLORIDE, POTASSIUM CHLORIDE, SODIUM LACTATE AND CALCIUM CHLORIDE: 600; 310; 30; 20 INJECTION, SOLUTION INTRAVENOUS at 10:47

## 2021-01-01 RX ADMIN — POTASSIUM BICARBONATE 40 MEQ: 782 TABLET, EFFERVESCENT ORAL at 13:42

## 2021-01-01 RX ADMIN — POTASSIUM CHLORIDE 10 MEQ: 7.45 INJECTION INTRAVENOUS at 15:22

## 2021-01-01 RX ADMIN — DIGOXIN 250 MCG: 250 INJECTION, SOLUTION INTRAMUSCULAR; INTRAVENOUS; PARENTERAL at 12:20

## 2021-01-01 RX ADMIN — IPRATROPIUM BROMIDE AND ALBUTEROL SULFATE 1 AMPULE: .5; 2.5 SOLUTION RESPIRATORY (INHALATION) at 22:15

## 2021-01-01 RX ADMIN — HEPARIN SODIUM 5000 UNITS: 5000 INJECTION INTRAVENOUS; SUBCUTANEOUS at 13:59

## 2021-01-01 RX ADMIN — Medication 10 ML: at 08:14

## 2021-01-01 RX ADMIN — INSULIN GLARGINE 5 UNITS: 100 INJECTION, SOLUTION SUBCUTANEOUS at 20:32

## 2021-01-01 RX ADMIN — CARVEDILOL 6.25 MG: 6.25 TABLET, FILM COATED ORAL at 18:11

## 2021-01-01 RX ADMIN — POTASSIUM CHLORIDE 10 MEQ: 7.45 INJECTION INTRAVENOUS at 13:33

## 2021-01-01 RX ADMIN — DIGOXIN 250 MCG: 250 INJECTION, SOLUTION INTRAMUSCULAR; INTRAVENOUS; PARENTERAL at 20:46

## 2021-01-01 RX ADMIN — ENOXAPARIN SODIUM 40 MG: 40 INJECTION SUBCUTANEOUS at 08:12

## 2021-01-01 RX ADMIN — PIPERACILLIN AND TAZOBACTAM 3375 MG: 3; .375 INJECTION, POWDER, LYOPHILIZED, FOR SOLUTION INTRAVENOUS at 01:10

## 2021-01-01 RX ADMIN — PIPERACILLIN AND TAZOBACTAM 3375 MG: 3; .375 INJECTION, POWDER, LYOPHILIZED, FOR SOLUTION INTRAVENOUS at 18:39

## 2021-01-01 RX ADMIN — INSULIN LISPRO 2 UNITS: 100 INJECTION, SOLUTION INTRAVENOUS; SUBCUTANEOUS at 13:43

## 2021-01-01 RX ADMIN — HEPARIN SODIUM 5000 UNITS: 5000 INJECTION INTRAVENOUS; SUBCUTANEOUS at 05:12

## 2021-01-01 RX ADMIN — IPRATROPIUM BROMIDE AND ALBUTEROL SULFATE 1 AMPULE: .5; 2.5 SOLUTION RESPIRATORY (INHALATION) at 21:09

## 2021-01-01 RX ADMIN — SODIUM CHLORIDE 30 MG/ML INHALATION SOLUTION 15 ML: 30 SOLUTION INHALANT at 23:53

## 2021-01-01 RX ADMIN — SODIUM CHLORIDE 30 MG/ML INHALATION SOLUTION 15 ML: 30 SOLUTION INHALANT at 08:35

## 2021-01-01 RX ADMIN — DOCUSATE SODIUM 100 MG: 100 CAPSULE ORAL at 09:00

## 2021-01-01 RX ADMIN — SODIUM CHLORIDE 30 MG/ML INHALATION SOLUTION 15 ML: 30 SOLUTION INHALANT at 18:24

## 2021-01-01 RX ADMIN — DOCUSATE SODIUM 100 MG: 100 CAPSULE ORAL at 08:11

## 2021-01-01 RX ADMIN — Medication 5000 UNITS: at 09:56

## 2021-01-01 RX ADMIN — HEPARIN SODIUM 5000 UNITS: 5000 INJECTION INTRAVENOUS; SUBCUTANEOUS at 06:13

## 2021-01-01 RX ADMIN — INSULIN LISPRO 1 UNITS: 100 INJECTION, SOLUTION INTRAVENOUS; SUBCUTANEOUS at 21:25

## 2021-01-01 RX ADMIN — AMIODARONE HYDROCHLORIDE 1 MG/MIN: 50 INJECTION, SOLUTION INTRAVENOUS at 07:44

## 2021-01-01 RX ADMIN — HEPARIN SODIUM 5000 UNITS: 5000 INJECTION INTRAVENOUS; SUBCUTANEOUS at 06:37

## 2021-01-01 RX ADMIN — TAMSULOSIN HYDROCHLORIDE 0.4 MG: 0.4 CAPSULE ORAL at 09:01

## 2021-01-01 RX ADMIN — PIPERACILLIN AND TAZOBACTAM 3375 MG: 3; .375 INJECTION, POWDER, LYOPHILIZED, FOR SOLUTION INTRAVENOUS at 12:01

## 2021-01-01 RX ADMIN — SODIUM CHLORIDE 25 ML: 9 INJECTION, SOLUTION INTRAVENOUS at 20:17

## 2021-01-01 RX ADMIN — SODIUM CHLORIDE: 4.5 INJECTION, SOLUTION INTRAVENOUS at 18:07

## 2021-01-01 RX ADMIN — INSULIN LISPRO 1 UNITS: 100 INJECTION, SOLUTION INTRAVENOUS; SUBCUTANEOUS at 18:01

## 2021-01-01 RX ADMIN — IPRATROPIUM BROMIDE AND ALBUTEROL SULFATE 1 AMPULE: .5; 2.5 SOLUTION RESPIRATORY (INHALATION) at 08:36

## 2021-01-01 RX ADMIN — INSULIN GLARGINE 5 UNITS: 100 INJECTION, SOLUTION SUBCUTANEOUS at 22:19

## 2021-01-01 RX ADMIN — DIGOXIN 125 MCG: 250 INJECTION, SOLUTION INTRAMUSCULAR; INTRAVENOUS; PARENTERAL at 08:23

## 2021-01-01 RX ADMIN — SODIUM CHLORIDE: 9 INJECTION, SOLUTION INTRAVENOUS at 00:04

## 2021-01-01 RX ADMIN — PIPERACILLIN AND TAZOBACTAM 3375 MG: 3; .375 INJECTION, POWDER, LYOPHILIZED, FOR SOLUTION INTRAVENOUS at 21:29

## 2021-01-01 RX ADMIN — SODIUM CHLORIDE 30 MG/ML INHALATION SOLUTION 15 ML: 30 SOLUTION INHALANT at 07:58

## 2021-01-01 RX ADMIN — SODIUM CHLORIDE, PRESERVATIVE FREE 10 ML: 5 INJECTION INTRAVENOUS at 14:27

## 2021-01-01 RX ADMIN — INSULIN LISPRO 1 UNITS: 100 INJECTION, SOLUTION INTRAVENOUS; SUBCUTANEOUS at 08:53

## 2021-01-01 RX ADMIN — ALBUTEROL SULFATE 2.5 MG: 2.5 SOLUTION RESPIRATORY (INHALATION) at 15:23

## 2021-01-01 RX ADMIN — INSULIN LISPRO 1 UNITS: 100 INJECTION, SOLUTION INTRAVENOUS; SUBCUTANEOUS at 22:08

## 2021-01-01 RX ADMIN — ALBUTEROL SULFATE 2.5 MG: 2.5 SOLUTION RESPIRATORY (INHALATION) at 22:35

## 2021-01-01 RX ADMIN — INSULIN LISPRO 1 UNITS: 100 INJECTION, SOLUTION INTRAVENOUS; SUBCUTANEOUS at 17:12

## 2021-01-01 RX ADMIN — Medication: at 08:21

## 2021-01-01 RX ADMIN — HEPARIN SODIUM 5000 UNITS: 5000 INJECTION INTRAVENOUS; SUBCUTANEOUS at 20:21

## 2021-01-01 RX ADMIN — ACETAMINOPHEN 650 MG: 650 SUPPOSITORY RECTAL at 05:20

## 2021-01-01 RX ADMIN — FINASTERIDE 5 MG: 5 TABLET, FILM COATED ORAL at 08:26

## 2021-01-01 RX ADMIN — INSULIN GLARGINE 5 UNITS: 100 INJECTION, SOLUTION SUBCUTANEOUS at 23:18

## 2021-01-01 RX ADMIN — INSULIN LISPRO 2 UNITS: 100 INJECTION, SOLUTION INTRAVENOUS; SUBCUTANEOUS at 06:52

## 2021-01-01 RX ADMIN — INSULIN LISPRO 3 UNITS: 100 INJECTION, SOLUTION INTRAVENOUS; SUBCUTANEOUS at 18:02

## 2021-01-01 RX ADMIN — POTASSIUM CHLORIDE 10 MEQ: 7.45 INJECTION INTRAVENOUS at 05:11

## 2021-01-01 RX ADMIN — SODIUM CHLORIDE: 9 INJECTION, SOLUTION INTRAVENOUS at 14:05

## 2021-01-01 RX ADMIN — MEROPENEM 1000 MG: 1 INJECTION, POWDER, FOR SOLUTION INTRAVENOUS at 17:52

## 2021-01-01 RX ADMIN — SODIUM CHLORIDE, PRESERVATIVE FREE 10 ML: 5 INJECTION INTRAVENOUS at 20:10

## 2021-01-01 RX ADMIN — SODIUM CHLORIDE: 9 INJECTION, SOLUTION INTRAVENOUS at 14:07

## 2021-01-01 RX ADMIN — CARVEDILOL 6.25 MG: 6.25 TABLET, FILM COATED ORAL at 17:14

## 2021-01-01 RX ADMIN — INSULIN LISPRO 2 UNITS: 100 INJECTION, SOLUTION INTRAVENOUS; SUBCUTANEOUS at 17:54

## 2021-01-01 RX ADMIN — SODIUM CHLORIDE 1000 ML: 9 INJECTION, SOLUTION INTRAVENOUS at 17:16

## 2021-01-01 RX ADMIN — INSULIN LISPRO 12 UNITS: 100 INJECTION, SOLUTION INTRAVENOUS; SUBCUTANEOUS at 12:15

## 2021-01-01 RX ADMIN — SODIUM CHLORIDE, PRESERVATIVE FREE 10 ML: 5 INJECTION INTRAVENOUS at 20:43

## 2021-01-01 RX ADMIN — HEPARIN SODIUM 5000 UNITS: 5000 INJECTION INTRAVENOUS; SUBCUTANEOUS at 12:08

## 2021-01-01 RX ADMIN — POTASSIUM BICARBONATE 40 MEQ: 782 TABLET, EFFERVESCENT ORAL at 06:26

## 2021-01-01 RX ADMIN — SODIUM CHLORIDE, PRESERVATIVE FREE 10 ML: 5 INJECTION INTRAVENOUS at 20:30

## 2021-01-01 RX ADMIN — CEFTRIAXONE 1000 MG: 1 INJECTION, POWDER, FOR SOLUTION INTRAMUSCULAR; INTRAVENOUS at 20:19

## 2021-01-01 RX ADMIN — PIPERACILLIN AND TAZOBACTAM 3375 MG: 3; .375 INJECTION, POWDER, LYOPHILIZED, FOR SOLUTION INTRAVENOUS at 18:21

## 2021-01-01 RX ADMIN — POTASSIUM CHLORIDE 10 MEQ: 7.45 INJECTION INTRAVENOUS at 11:01

## 2021-01-01 RX ADMIN — CEFTRIAXONE 1000 MG: 1 INJECTION, POWDER, FOR SOLUTION INTRAMUSCULAR; INTRAVENOUS at 21:09

## 2021-01-01 RX ADMIN — PIPERACILLIN AND TAZOBACTAM 3375 MG: 3; .375 INJECTION, POWDER, LYOPHILIZED, FOR SOLUTION INTRAVENOUS at 22:00

## 2021-01-01 RX ADMIN — TAMSULOSIN HYDROCHLORIDE 0.4 MG: 0.4 CAPSULE ORAL at 08:26

## 2021-01-01 RX ADMIN — Medication: at 21:58

## 2021-01-01 RX ADMIN — CARVEDILOL 6.25 MG: 6.25 TABLET, FILM COATED ORAL at 18:37

## 2021-01-01 RX ADMIN — INSULIN LISPRO 6 UNITS: 100 INJECTION, SOLUTION INTRAVENOUS; SUBCUTANEOUS at 16:47

## 2021-01-01 RX ADMIN — ACETAMINOPHEN 325 MG: 325 TABLET ORAL at 11:36

## 2021-01-01 RX ADMIN — STANDARDIZED SENNA CONCENTRATE 8.6 MG: 8.6 TABLET ORAL at 21:03

## 2021-01-01 RX ADMIN — ACETAMINOPHEN 650 MG: 325 TABLET ORAL at 05:23

## 2021-01-01 RX ADMIN — INSULIN LISPRO 1 UNITS: 100 INJECTION, SOLUTION INTRAVENOUS; SUBCUTANEOUS at 22:37

## 2021-01-01 RX ADMIN — MEROPENEM 1000 MG: 1 INJECTION, POWDER, FOR SOLUTION INTRAVENOUS at 17:17

## 2021-01-01 RX ADMIN — SODIUM CHLORIDE: 9 INJECTION, SOLUTION INTRAVENOUS at 05:48

## 2021-01-01 RX ADMIN — POTASSIUM CHLORIDE 10 MEQ: 7.45 INJECTION INTRAVENOUS at 09:51

## 2021-01-01 RX ADMIN — TAMSULOSIN HYDROCHLORIDE 0.4 MG: 0.4 CAPSULE ORAL at 09:24

## 2021-01-01 RX ADMIN — Medication: at 20:11

## 2021-01-01 RX ADMIN — SODIUM CHLORIDE 30 MG/ML INHALATION SOLUTION 15 ML: 30 SOLUTION INHALANT at 20:24

## 2021-01-01 RX ADMIN — HEPARIN SODIUM 5000 UNITS: 5000 INJECTION INTRAVENOUS; SUBCUTANEOUS at 06:28

## 2021-01-01 RX ADMIN — Medication 5000 UNITS: at 08:14

## 2021-01-01 RX ADMIN — ACETAMINOPHEN 650 MG: 325 TABLET ORAL at 01:40

## 2021-01-01 RX ADMIN — PIPERACILLIN AND TAZOBACTAM 3375 MG: 3; .375 INJECTION, POWDER, LYOPHILIZED, FOR SOLUTION INTRAVENOUS at 06:35

## 2021-01-01 RX ADMIN — HEPARIN SODIUM 5000 UNITS: 5000 INJECTION INTRAVENOUS; SUBCUTANEOUS at 21:49

## 2021-01-01 RX ADMIN — INSULIN LISPRO 1 UNITS: 100 INJECTION, SOLUTION INTRAVENOUS; SUBCUTANEOUS at 21:57

## 2021-01-01 RX ADMIN — SODIUM CHLORIDE 250 ML: 9 INJECTION, SOLUTION INTRAVENOUS at 22:17

## 2021-01-01 RX ADMIN — FINASTERIDE 5 MG: 5 TABLET, FILM COATED ORAL at 09:18

## 2021-01-01 RX ADMIN — MEROPENEM 1000 MG: 1 INJECTION, POWDER, FOR SOLUTION INTRAVENOUS at 05:07

## 2021-01-01 RX ADMIN — ACETAMINOPHEN 650 MG: 325 TABLET ORAL at 14:32

## 2021-01-01 RX ADMIN — POTASSIUM CHLORIDE 10 MEQ: 7.45 INJECTION INTRAVENOUS at 04:07

## 2021-01-01 RX ADMIN — PIPERACILLIN AND TAZOBACTAM 3375 MG: 3; .375 INJECTION, POWDER, LYOPHILIZED, FOR SOLUTION INTRAVENOUS at 00:28

## 2021-01-01 RX ADMIN — SODIUM CHLORIDE: 450 INJECTION, SOLUTION INTRAVENOUS at 00:26

## 2021-01-01 RX ADMIN — POTASSIUM CHLORIDE, DEXTROSE MONOHYDRATE AND SODIUM CHLORIDE: 150; 5; 450 INJECTION, SOLUTION INTRAVENOUS at 10:56

## 2021-01-01 RX ADMIN — ENOXAPARIN SODIUM 40 MG: 40 INJECTION SUBCUTANEOUS at 07:55

## 2021-01-01 RX ADMIN — INSULIN LISPRO 15 UNITS: 100 INJECTION, SOLUTION INTRAVENOUS; SUBCUTANEOUS at 17:13

## 2021-01-01 RX ADMIN — DIGOXIN 125 MCG: 250 INJECTION, SOLUTION INTRAMUSCULAR; INTRAVENOUS; PARENTERAL at 10:35

## 2021-01-01 RX ADMIN — INSULIN LISPRO 2 UNITS: 100 INJECTION, SOLUTION INTRAVENOUS; SUBCUTANEOUS at 12:22

## 2021-01-01 RX ADMIN — BISACODYL 10 MG: 10 SUPPOSITORY RECTAL at 09:44

## 2021-01-01 RX ADMIN — Medication 5000 UNITS: at 09:02

## 2021-01-01 RX ADMIN — Medication 5000 UNITS: at 08:29

## 2021-01-01 RX ADMIN — MORPHINE SULFATE 1 MG: 2 INJECTION, SOLUTION INTRAMUSCULAR; INTRAVENOUS at 21:51

## 2021-01-01 RX ADMIN — Medication 10 ML: at 21:28

## 2021-01-01 RX ADMIN — SODIUM CHLORIDE: 9 INJECTION, SOLUTION INTRAVENOUS at 06:49

## 2021-01-01 RX ADMIN — HEPARIN SODIUM 5000 UNITS: 5000 INJECTION INTRAVENOUS; SUBCUTANEOUS at 07:00

## 2021-01-01 RX ADMIN — SODIUM CHLORIDE, PRESERVATIVE FREE 10 ML: 5 INJECTION INTRAVENOUS at 09:34

## 2021-01-01 RX ADMIN — MEROPENEM 1000 MG: 1 INJECTION, POWDER, FOR SOLUTION INTRAVENOUS at 06:39

## 2021-01-01 RX ADMIN — MEROPENEM 1000 MG: 1 INJECTION, POWDER, FOR SOLUTION INTRAVENOUS at 01:04

## 2021-01-01 RX ADMIN — PANTOPRAZOLE SODIUM 40 MG: 40 INJECTION, POWDER, FOR SOLUTION INTRAVENOUS at 08:45

## 2021-01-01 RX ADMIN — IPRATROPIUM BROMIDE AND ALBUTEROL SULFATE 1 AMPULE: .5; 2.5 SOLUTION RESPIRATORY (INHALATION) at 23:15

## 2021-01-01 RX ADMIN — HEPARIN SODIUM 5000 UNITS: 5000 INJECTION INTRAVENOUS; SUBCUTANEOUS at 23:13

## 2021-01-01 RX ADMIN — FINASTERIDE 5 MG: 5 TABLET, FILM COATED ORAL at 08:11

## 2021-01-01 RX ADMIN — Medication: at 10:37

## 2021-01-01 RX ADMIN — VANCOMYCIN HYDROCHLORIDE 1250 MG: 10 INJECTION, POWDER, LYOPHILIZED, FOR SOLUTION INTRAVENOUS at 14:46

## 2021-01-01 RX ADMIN — LIDOCAINE HYDROCHLORIDE 5 ML: 10 INJECTION, SOLUTION EPIDURAL; INFILTRATION; INTRACAUDAL; PERINEURAL at 12:30

## 2021-01-01 RX ADMIN — SODIUM CHLORIDE 25 ML: 9 INJECTION, SOLUTION INTRAVENOUS at 11:29

## 2021-01-01 RX ADMIN — MEROPENEM 1000 MG: 1 INJECTION, POWDER, FOR SOLUTION INTRAVENOUS at 12:52

## 2021-01-01 RX ADMIN — SODIUM CHLORIDE 25 ML: 9 INJECTION, SOLUTION INTRAVENOUS at 14:18

## 2021-01-01 RX ADMIN — INSULIN LISPRO 2 UNITS: 100 INJECTION, SOLUTION INTRAVENOUS; SUBCUTANEOUS at 21:16

## 2021-01-01 RX ADMIN — INSULIN LISPRO 1 UNITS: 100 INJECTION, SOLUTION INTRAVENOUS; SUBCUTANEOUS at 21:02

## 2021-01-01 RX ADMIN — SODIUM CHLORIDE 30 MG/ML INHALATION SOLUTION 15 ML: 30 SOLUTION INHALANT at 21:40

## 2021-01-01 RX ADMIN — SODIUM CHLORIDE 30 MG/ML INHALATION SOLUTION 15 ML: 30 SOLUTION INHALANT at 08:59

## 2021-01-01 RX ADMIN — DIGOXIN 125 MCG: 250 INJECTION, SOLUTION INTRAMUSCULAR; INTRAVENOUS; PARENTERAL at 08:24

## 2021-01-01 RX ADMIN — SODIUM CHLORIDE 1000 ML: 9 INJECTION, SOLUTION INTRAVENOUS at 17:12

## 2021-01-01 RX ADMIN — SODIUM CHLORIDE 500 ML: 9 INJECTION, SOLUTION INTRAVENOUS at 11:51

## 2021-01-01 RX ADMIN — Medication: at 08:25

## 2021-01-01 RX ADMIN — INSULIN GLARGINE 5 UNITS: 100 INJECTION, SOLUTION SUBCUTANEOUS at 20:17

## 2021-01-01 RX ADMIN — Medication: at 21:10

## 2021-01-01 RX ADMIN — POTASSIUM CHLORIDE 10 MEQ: 7.45 INJECTION INTRAVENOUS at 12:12

## 2021-01-01 RX ADMIN — POTASSIUM CHLORIDE 10 MEQ: 7.46 INJECTION, SOLUTION INTRAVENOUS at 11:13

## 2021-01-01 RX ADMIN — POTASSIUM CHLORIDE 10 MEQ: 7.46 INJECTION, SOLUTION INTRAVENOUS at 14:36

## 2021-01-01 RX ADMIN — Medication 5 ML: at 21:02

## 2021-01-01 RX ADMIN — SODIUM CHLORIDE 30 MG/ML INHALATION SOLUTION 15 ML: 30 SOLUTION INHALANT at 10:24

## 2021-01-01 RX ADMIN — MEROPENEM 1000 MG: 1 INJECTION, POWDER, FOR SOLUTION INTRAVENOUS at 18:27

## 2021-01-01 RX ADMIN — SODIUM CHLORIDE 1000 ML: 9 INJECTION, SOLUTION INTRAVENOUS at 21:11

## 2021-01-01 RX ADMIN — INSULIN GLARGINE 10 UNITS: 100 INJECTION, SOLUTION SUBCUTANEOUS at 21:37

## 2021-01-01 RX ADMIN — SODIUM CHLORIDE 30 MG/ML INHALATION SOLUTION 15 ML: 30 SOLUTION INHALANT at 08:44

## 2021-01-01 RX ADMIN — OXYCODONE HYDROCHLORIDE AND ACETAMINOPHEN 500 MG: 500 TABLET ORAL at 12:05

## 2021-01-01 RX ADMIN — Medication 10 ML: at 21:10

## 2021-01-01 RX ADMIN — INSULIN GLARGINE 10 UNITS: 100 INJECTION, SOLUTION SUBCUTANEOUS at 21:09

## 2021-01-01 RX ADMIN — FINASTERIDE 5 MG: 5 TABLET, FILM COATED ORAL at 10:01

## 2021-01-01 RX ADMIN — SODIUM CHLORIDE 30 MG/ML INHALATION SOLUTION 15 ML: 30 SOLUTION INHALANT at 22:20

## 2021-01-01 RX ADMIN — DIGOXIN 125 MCG: 250 INJECTION, SOLUTION INTRAMUSCULAR; INTRAVENOUS; PARENTERAL at 10:02

## 2021-01-01 RX ADMIN — Medication: at 21:25

## 2021-01-01 RX ADMIN — INSULIN LISPRO 2 UNITS: 100 INJECTION, SOLUTION INTRAVENOUS; SUBCUTANEOUS at 13:46

## 2021-01-01 RX ADMIN — INSULIN LISPRO 1 UNITS: 100 INJECTION, SOLUTION INTRAVENOUS; SUBCUTANEOUS at 08:32

## 2021-01-01 RX ADMIN — ASPIRIN 81 MG: 81 TABLET, CHEWABLE ORAL at 08:26

## 2021-01-01 RX ADMIN — SODIUM CHLORIDE 30 MG/ML INHALATION SOLUTION 15 ML: 30 SOLUTION INHALANT at 08:37

## 2021-01-01 RX ADMIN — Medication: at 08:27

## 2021-01-01 RX ADMIN — INSULIN LISPRO 4 UNITS: 100 INJECTION, SOLUTION INTRAVENOUS; SUBCUTANEOUS at 08:28

## 2021-01-01 RX ADMIN — POTASSIUM BICARBONATE 40 MEQ: 782 TABLET, EFFERVESCENT ORAL at 21:33

## 2021-01-01 RX ADMIN — NYSTATIN 500000 UNITS: 100000 SUSPENSION ORAL at 21:04

## 2021-01-01 RX ADMIN — SODIUM CHLORIDE: 9 INJECTION, SOLUTION INTRAVENOUS at 05:09

## 2021-01-01 RX ADMIN — ENOXAPARIN SODIUM 40 MG: 40 INJECTION SUBCUTANEOUS at 08:54

## 2021-01-01 RX ADMIN — INSULIN LISPRO 1 UNITS: 100 INJECTION, SOLUTION INTRAVENOUS; SUBCUTANEOUS at 21:55

## 2021-01-01 RX ADMIN — HEPARIN SODIUM 5000 UNITS: 5000 INJECTION INTRAVENOUS; SUBCUTANEOUS at 05:57

## 2021-01-01 RX ADMIN — NYSTATIN 500000 UNITS: 100000 SUSPENSION ORAL at 17:18

## 2021-01-01 RX ADMIN — FINASTERIDE 5 MG: 5 TABLET, FILM COATED ORAL at 08:29

## 2021-01-01 RX ADMIN — ACETAMINOPHEN 650 MG: 325 TABLET ORAL at 22:24

## 2021-01-01 RX ADMIN — VANCOMYCIN HYDROCHLORIDE 1250 MG: 10 INJECTION, POWDER, LYOPHILIZED, FOR SOLUTION INTRAVENOUS at 14:25

## 2021-01-01 RX ADMIN — Medication 5000 UNITS: at 08:11

## 2021-01-01 RX ADMIN — Medication: at 08:14

## 2021-01-01 RX ADMIN — Medication: at 09:26

## 2021-01-01 RX ADMIN — PIPERACILLIN AND TAZOBACTAM 3375 MG: 3; .375 INJECTION, POWDER, LYOPHILIZED, FOR SOLUTION INTRAVENOUS at 10:15

## 2021-01-01 RX ADMIN — IPRATROPIUM BROMIDE AND ALBUTEROL SULFATE 1 AMPULE: .5; 2.5 SOLUTION RESPIRATORY (INHALATION) at 10:16

## 2021-01-01 RX ADMIN — HEPARIN SODIUM 5000 UNITS: 5000 INJECTION INTRAVENOUS; SUBCUTANEOUS at 21:24

## 2021-01-01 RX ADMIN — SODIUM CHLORIDE 25 ML: 9 INJECTION, SOLUTION INTRAVENOUS at 18:36

## 2021-01-01 RX ADMIN — IPRATROPIUM BROMIDE AND ALBUTEROL SULFATE 1 AMPULE: .5; 2.5 SOLUTION RESPIRATORY (INHALATION) at 08:11

## 2021-01-01 RX ADMIN — PIPERACILLIN AND TAZOBACTAM 3375 MG: 3; .375 INJECTION, POWDER, LYOPHILIZED, FOR SOLUTION INTRAVENOUS at 11:32

## 2021-01-01 RX ADMIN — MIDODRINE HYDROCHLORIDE 5 MG: 5 TABLET ORAL at 12:08

## 2021-01-01 RX ADMIN — SODIUM CHLORIDE: 9 INJECTION, SOLUTION INTRAVENOUS at 14:24

## 2021-01-01 RX ADMIN — Medication 10 ML: at 09:38

## 2021-01-01 RX ADMIN — POTASSIUM BICARBONATE 20 MEQ: 782 TABLET, EFFERVESCENT ORAL at 15:41

## 2021-01-01 RX ADMIN — INSULIN LISPRO 2 UNITS: 100 INJECTION, SOLUTION INTRAVENOUS; SUBCUTANEOUS at 18:03

## 2021-01-01 RX ADMIN — MEROPENEM 1000 MG: 1 INJECTION, POWDER, FOR SOLUTION INTRAVENOUS at 06:22

## 2021-01-01 RX ADMIN — INSULIN LISPRO 1 UNITS: 100 INJECTION, SOLUTION INTRAVENOUS; SUBCUTANEOUS at 05:39

## 2021-01-01 RX ADMIN — SODIUM CHLORIDE, PRESERVATIVE FREE 10 ML: 5 INJECTION INTRAVENOUS at 07:47

## 2021-01-01 RX ADMIN — MEROPENEM 1000 MG: 1 INJECTION, POWDER, FOR SOLUTION INTRAVENOUS at 17:31

## 2021-01-01 RX ADMIN — INSULIN LISPRO 2 UNITS: 100 INJECTION, SOLUTION INTRAVENOUS; SUBCUTANEOUS at 10:06

## 2021-01-01 RX ADMIN — IPRATROPIUM BROMIDE AND ALBUTEROL SULFATE 1 AMPULE: .5; 2.5 SOLUTION RESPIRATORY (INHALATION) at 20:23

## 2021-01-01 RX ADMIN — INSULIN GLARGINE 5 UNITS: 100 INJECTION, SOLUTION SUBCUTANEOUS at 21:01

## 2021-01-01 RX ADMIN — MEROPENEM 1000 MG: 1 INJECTION, POWDER, FOR SOLUTION INTRAVENOUS at 14:09

## 2021-01-01 RX ADMIN — CARVEDILOL 6.25 MG: 6.25 TABLET, FILM COATED ORAL at 09:56

## 2021-01-01 RX ADMIN — NYSTATIN 500000 UNITS: 100000 SUSPENSION ORAL at 10:00

## 2021-01-01 RX ADMIN — SODIUM CHLORIDE 500 ML: 9 INJECTION, SOLUTION INTRAVENOUS at 23:57

## 2021-01-01 RX ADMIN — INSULIN GLARGINE 10 UNITS: 100 INJECTION, SOLUTION SUBCUTANEOUS at 08:42

## 2021-01-01 RX ADMIN — MEROPENEM 1000 MG: 1 INJECTION, POWDER, FOR SOLUTION INTRAVENOUS at 00:12

## 2021-01-01 RX ADMIN — NYSTATIN 500000 UNITS: 100000 SUSPENSION ORAL at 19:55

## 2021-01-01 RX ADMIN — IPRATROPIUM BROMIDE AND ALBUTEROL SULFATE 1 AMPULE: .5; 2.5 SOLUTION RESPIRATORY (INHALATION) at 07:58

## 2021-01-01 RX ADMIN — ENOXAPARIN SODIUM 40 MG: 40 INJECTION SUBCUTANEOUS at 08:14

## 2021-01-01 RX ADMIN — MIDODRINE HYDROCHLORIDE 5 MG: 5 TABLET ORAL at 08:26

## 2021-01-01 RX ADMIN — Medication 5000 UNITS: at 08:51

## 2021-01-01 RX ADMIN — ACETAMINOPHEN 650 MG: 325 TABLET ORAL at 11:54

## 2021-01-01 RX ADMIN — HEPARIN SODIUM 5000 UNITS: 5000 INJECTION INTRAVENOUS; SUBCUTANEOUS at 20:43

## 2021-01-01 RX ADMIN — FOLIC ACID: 5 INJECTION, SOLUTION INTRAMUSCULAR; INTRAVENOUS; SUBCUTANEOUS at 14:36

## 2021-01-01 RX ADMIN — FINASTERIDE 5 MG: 5 TABLET, FILM COATED ORAL at 09:56

## 2021-01-01 RX ADMIN — INSULIN LISPRO 1 UNITS: 100 INJECTION, SOLUTION INTRAVENOUS; SUBCUTANEOUS at 02:46

## 2021-01-01 RX ADMIN — HEPARIN SODIUM 5000 UNITS: 5000 INJECTION INTRAVENOUS; SUBCUTANEOUS at 06:17

## 2021-01-01 RX ADMIN — HEPARIN SODIUM 5000 UNITS: 5000 INJECTION INTRAVENOUS; SUBCUTANEOUS at 06:39

## 2021-01-01 RX ADMIN — Medication: at 21:49

## 2021-01-01 RX ADMIN — SODIUM CHLORIDE, PRESERVATIVE FREE 6 ML: 5 INJECTION INTRAVENOUS at 10:40

## 2021-01-01 RX ADMIN — SODIUM CHLORIDE: 9 INJECTION, SOLUTION INTRAVENOUS at 09:48

## 2021-01-01 RX ADMIN — POTASSIUM CHLORIDE 10 MEQ: 7.46 INJECTION, SOLUTION INTRAVENOUS at 09:35

## 2021-01-01 RX ADMIN — IPRATROPIUM BROMIDE AND ALBUTEROL SULFATE 1 AMPULE: .5; 2.5 SOLUTION RESPIRATORY (INHALATION) at 23:51

## 2021-01-01 RX ADMIN — INSULIN LISPRO 1 UNITS: 100 INJECTION, SOLUTION INTRAVENOUS; SUBCUTANEOUS at 17:52

## 2021-01-01 RX ADMIN — CARVEDILOL 6.25 MG: 6.25 TABLET, FILM COATED ORAL at 18:02

## 2021-01-01 RX ADMIN — INSULIN LISPRO 8 UNITS: 100 INJECTION, SOLUTION INTRAVENOUS; SUBCUTANEOUS at 12:24

## 2021-01-01 RX ADMIN — HEPARIN SODIUM 5000 UNITS: 5000 INJECTION INTRAVENOUS; SUBCUTANEOUS at 05:18

## 2021-01-01 RX ADMIN — SODIUM CHLORIDE: 9 INJECTION, SOLUTION INTRAVENOUS at 01:26

## 2021-01-01 RX ADMIN — INSULIN LISPRO 1 UNITS: 100 INJECTION, SOLUTION INTRAVENOUS; SUBCUTANEOUS at 08:13

## 2021-01-01 RX ADMIN — DEXAMETHASONE 6 MG: 4 TABLET ORAL at 07:56

## 2021-01-01 RX ADMIN — TAMSULOSIN HYDROCHLORIDE 0.4 MG: 0.4 CAPSULE ORAL at 08:13

## 2021-01-01 RX ADMIN — ZINC SULFATE 220 MG (50 MG) CAPSULE 50 MG: CAPSULE at 08:12

## 2021-01-01 RX ADMIN — ATORVASTATIN CALCIUM 40 MG: 40 TABLET, FILM COATED ORAL at 08:44

## 2021-01-01 RX ADMIN — INSULIN GLARGINE 5 UNITS: 100 INJECTION, SOLUTION SUBCUTANEOUS at 21:55

## 2021-01-01 RX ADMIN — ACETAMINOPHEN 650 MG: 325 TABLET ORAL at 13:24

## 2021-01-01 RX ADMIN — TAMSULOSIN HYDROCHLORIDE 0.4 MG: 0.4 CAPSULE ORAL at 08:11

## 2021-01-01 RX ADMIN — VANCOMYCIN HYDROCHLORIDE 1000 MG: 10 INJECTION, POWDER, LYOPHILIZED, FOR SOLUTION INTRAVENOUS at 18:47

## 2021-01-01 RX ADMIN — SODIUM CHLORIDE, PRESERVATIVE FREE 10 ML: 5 INJECTION INTRAVENOUS at 21:23

## 2021-01-01 RX ADMIN — MEROPENEM 1000 MG: 1 INJECTION, POWDER, FOR SOLUTION INTRAVENOUS at 05:25

## 2021-01-01 RX ADMIN — MEROPENEM 1000 MG: 1 INJECTION, POWDER, FOR SOLUTION INTRAVENOUS at 18:03

## 2021-01-01 RX ADMIN — SODIUM CHLORIDE 30 MG/ML INHALATION SOLUTION 15 ML: 30 SOLUTION INHALANT at 09:08

## 2021-01-01 RX ADMIN — PIPERACILLIN AND TAZOBACTAM 3375 MG: 3; .375 INJECTION, POWDER, LYOPHILIZED, FOR SOLUTION INTRAVENOUS at 06:57

## 2021-01-01 RX ADMIN — PIPERACILLIN AND TAZOBACTAM 3375 MG: 3; .375 INJECTION, POWDER, LYOPHILIZED, FOR SOLUTION INTRAVENOUS at 12:51

## 2021-01-01 RX ADMIN — SODIUM CHLORIDE: 4.5 INJECTION, SOLUTION INTRAVENOUS at 21:01

## 2021-01-01 RX ADMIN — INSULIN GLARGINE 10 UNITS: 100 INJECTION, SOLUTION SUBCUTANEOUS at 22:36

## 2021-01-01 RX ADMIN — INSULIN LISPRO 9 UNITS: 100 INJECTION, SOLUTION INTRAVENOUS; SUBCUTANEOUS at 17:30

## 2021-01-01 RX ADMIN — Medication: at 21:05

## 2021-01-01 RX ADMIN — HEPARIN SODIUM 5000 UNITS: 5000 INJECTION INTRAVENOUS; SUBCUTANEOUS at 21:02

## 2021-01-01 RX ADMIN — POTASSIUM CHLORIDE 10 MEQ: 7.45 INJECTION INTRAVENOUS at 12:23

## 2021-01-01 RX ADMIN — ENOXAPARIN SODIUM 40 MG: 40 INJECTION SUBCUTANEOUS at 14:51

## 2021-01-01 RX ADMIN — PIPERACILLIN AND TAZOBACTAM 3375 MG: 3; .375 INJECTION, POWDER, LYOPHILIZED, FOR SOLUTION INTRAVENOUS at 05:24

## 2021-01-01 RX ADMIN — ACETAMINOPHEN 650 MG: 325 TABLET ORAL at 06:53

## 2021-01-01 RX ADMIN — Medication: at 20:30

## 2021-01-01 RX ADMIN — FINASTERIDE 5 MG: 5 TABLET, FILM COATED ORAL at 08:42

## 2021-01-01 RX ADMIN — INSULIN LISPRO 1 UNITS: 100 INJECTION, SOLUTION INTRAVENOUS; SUBCUTANEOUS at 12:29

## 2021-01-01 RX ADMIN — MEROPENEM 1000 MG: 1 INJECTION, POWDER, FOR SOLUTION INTRAVENOUS at 13:13

## 2021-01-01 RX ADMIN — SODIUM CHLORIDE 25 ML: 9 INJECTION, SOLUTION INTRAVENOUS at 00:44

## 2021-01-01 RX ADMIN — INSULIN LISPRO 1 UNITS: 100 INJECTION, SOLUTION INTRAVENOUS; SUBCUTANEOUS at 20:46

## 2021-01-01 RX ADMIN — MORPHINE SULFATE 1 MG: 2 INJECTION, SOLUTION INTRAMUSCULAR; INTRAVENOUS at 03:26

## 2021-01-01 RX ADMIN — INSULIN LISPRO 2 UNITS: 100 INJECTION, SOLUTION INTRAVENOUS; SUBCUTANEOUS at 12:41

## 2021-01-01 RX ADMIN — CARVEDILOL 6.25 MG: 6.25 TABLET, FILM COATED ORAL at 08:51

## 2021-01-01 RX ADMIN — Medication 5000 UNITS: at 09:18

## 2021-01-01 RX ADMIN — INSULIN LISPRO 3 UNITS: 100 INJECTION, SOLUTION INTRAVENOUS; SUBCUTANEOUS at 15:19

## 2021-01-01 RX ADMIN — ACETAMINOPHEN 650 MG: 650 SUPPOSITORY RECTAL at 20:30

## 2021-01-01 RX ADMIN — ALBUTEROL SULFATE 2.5 MG: 2.5 SOLUTION RESPIRATORY (INHALATION) at 23:25

## 2021-01-01 RX ADMIN — IPRATROPIUM BROMIDE AND ALBUTEROL SULFATE 1 AMPULE: .5; 2.5 SOLUTION RESPIRATORY (INHALATION) at 23:48

## 2021-01-01 RX ADMIN — PIPERACILLIN AND TAZOBACTAM 3375 MG: 3; .375 INJECTION, POWDER, LYOPHILIZED, FOR SOLUTION INTRAVENOUS at 11:46

## 2021-01-01 RX ADMIN — POTASSIUM CHLORIDE 10 MEQ: 7.45 INJECTION INTRAVENOUS at 14:03

## 2021-01-01 RX ADMIN — PANTOPRAZOLE SODIUM 40 MG: 40 TABLET, DELAYED RELEASE ORAL at 06:20

## 2021-01-01 RX ADMIN — HEPARIN SODIUM 5000 UNITS: 5000 INJECTION INTRAVENOUS; SUBCUTANEOUS at 05:25

## 2021-01-01 RX ADMIN — HEPARIN SODIUM 5000 UNITS: 5000 INJECTION INTRAVENOUS; SUBCUTANEOUS at 22:27

## 2021-01-01 RX ADMIN — INSULIN GLARGINE 5 UNITS: 100 INJECTION, SOLUTION SUBCUTANEOUS at 22:03

## 2021-01-01 RX ADMIN — INSULIN LISPRO 2 UNITS: 100 INJECTION, SOLUTION INTRAVENOUS; SUBCUTANEOUS at 17:48

## 2021-01-01 RX ADMIN — DOCUSATE SODIUM 100 MG: 100 CAPSULE ORAL at 08:52

## 2021-01-01 RX ADMIN — SODIUM CHLORIDE, PRESERVATIVE FREE 10 ML: 5 INJECTION INTRAVENOUS at 22:01

## 2021-01-01 RX ADMIN — LINEZOLID 600 MG: 600 INJECTION, SOLUTION INTRAVENOUS at 15:23

## 2021-01-01 RX ADMIN — INSULIN LISPRO 3 UNITS: 100 INJECTION, SOLUTION INTRAVENOUS; SUBCUTANEOUS at 16:54

## 2021-01-01 RX ADMIN — INSULIN LISPRO 1 UNITS: 100 INJECTION, SOLUTION INTRAVENOUS; SUBCUTANEOUS at 17:09

## 2021-01-01 RX ADMIN — NYSTATIN 500000 UNITS: 100000 SUSPENSION ORAL at 18:02

## 2021-01-01 RX ADMIN — PIPERACILLIN AND TAZOBACTAM 3375 MG: 3; .375 INJECTION, POWDER, LYOPHILIZED, FOR SOLUTION INTRAVENOUS at 05:22

## 2021-01-01 RX ADMIN — INSULIN LISPRO 1 UNITS: 100 INJECTION, SOLUTION INTRAVENOUS; SUBCUTANEOUS at 08:33

## 2021-01-01 RX ADMIN — SODIUM CHLORIDE 30 MG/ML INHALATION SOLUTION 15 ML: 30 SOLUTION INHALANT at 13:05

## 2021-01-01 RX ADMIN — FINASTERIDE 5 MG: 5 TABLET, FILM COATED ORAL at 09:24

## 2021-01-01 RX ADMIN — Medication: at 08:44

## 2021-01-01 RX ADMIN — SODIUM CHLORIDE 30 MG/ML INHALATION SOLUTION 15 ML: 30 SOLUTION INHALANT at 22:15

## 2021-01-01 RX ADMIN — ALBUTEROL SULFATE 2.5 MG: 2.5 SOLUTION RESPIRATORY (INHALATION) at 16:13

## 2021-01-01 RX ADMIN — Medication: at 08:53

## 2021-01-01 RX ADMIN — ZINC SULFATE 220 MG (50 MG) CAPSULE 50 MG: CAPSULE at 09:00

## 2021-01-01 RX ADMIN — VANCOMYCIN HYDROCHLORIDE 1500 MG: 10 INJECTION, POWDER, LYOPHILIZED, FOR SOLUTION INTRAVENOUS at 16:52

## 2021-01-01 RX ADMIN — MEROPENEM 1000 MG: 1 INJECTION, POWDER, FOR SOLUTION INTRAVENOUS at 05:19

## 2021-01-01 RX ADMIN — STANDARDIZED SENNA CONCENTRATE 8.6 MG: 8.6 TABLET ORAL at 20:26

## 2021-01-01 RX ADMIN — VANCOMYCIN HYDROCHLORIDE 1250 MG: 10 INJECTION, POWDER, LYOPHILIZED, FOR SOLUTION INTRAVENOUS at 10:25

## 2021-01-01 RX ADMIN — NYSTATIN 500000 UNITS: 100000 SUSPENSION ORAL at 09:18

## 2021-01-01 RX ADMIN — ZINC SULFATE 220 MG (50 MG) CAPSULE 50 MG: CAPSULE at 12:05

## 2021-01-01 RX ADMIN — ACETAMINOPHEN 650 MG: 325 TABLET ORAL at 08:11

## 2021-01-01 RX ADMIN — ACETAMINOPHEN 650 MG: 325 TABLET ORAL at 21:17

## 2021-01-01 RX ADMIN — STANDARDIZED SENNA CONCENTRATE 8.6 MG: 8.6 TABLET ORAL at 20:21

## 2021-01-01 RX ADMIN — DIGOXIN 250 MCG: 250 INJECTION, SOLUTION INTRAMUSCULAR; INTRAVENOUS; PARENTERAL at 00:08

## 2021-01-01 RX ADMIN — IPRATROPIUM BROMIDE AND ALBUTEROL SULFATE 1 AMPULE: .5; 2.5 SOLUTION RESPIRATORY (INHALATION) at 14:52

## 2021-01-01 RX ADMIN — Medication: at 23:21

## 2021-01-01 RX ADMIN — ACETAMINOPHEN 650 MG: 650 SUPPOSITORY RECTAL at 17:15

## 2021-01-01 RX ADMIN — Medication: at 20:43

## 2021-01-01 RX ADMIN — Medication: at 08:43

## 2021-01-01 RX ADMIN — Medication: at 08:00

## 2021-01-01 RX ADMIN — NYSTATIN 500000 UNITS: 100000 SUSPENSION ORAL at 18:10

## 2021-01-01 RX ADMIN — CARVEDILOL 6.25 MG: 6.25 TABLET, FILM COATED ORAL at 17:56

## 2021-01-01 RX ADMIN — HEPARIN SODIUM 5000 UNITS: 5000 INJECTION INTRAVENOUS; SUBCUTANEOUS at 14:13

## 2021-01-01 RX ADMIN — HEPARIN SODIUM 5000 UNITS: 5000 INJECTION INTRAVENOUS; SUBCUTANEOUS at 05:39

## 2021-01-01 RX ADMIN — IPRATROPIUM BROMIDE AND ALBUTEROL SULFATE 1 AMPULE: .5; 2.5 SOLUTION RESPIRATORY (INHALATION) at 18:22

## 2021-01-01 RX ADMIN — FINASTERIDE 5 MG: 5 TABLET, FILM COATED ORAL at 08:51

## 2021-01-01 RX ADMIN — SODIUM CHLORIDE 25 ML: 9 INJECTION, SOLUTION INTRAVENOUS at 00:56

## 2021-01-01 RX ADMIN — HEPARIN SODIUM 5000 UNITS: 5000 INJECTION INTRAVENOUS; SUBCUTANEOUS at 21:23

## 2021-01-01 RX ADMIN — TAMSULOSIN HYDROCHLORIDE 0.4 MG: 0.4 CAPSULE ORAL at 11:31

## 2021-01-01 RX ADMIN — INSULIN LISPRO 2 UNITS: 100 INJECTION, SOLUTION INTRAVENOUS; SUBCUTANEOUS at 22:20

## 2021-01-01 RX ADMIN — MIDODRINE HYDROCHLORIDE 5 MG: 5 TABLET ORAL at 09:37

## 2021-01-01 RX ADMIN — POTASSIUM CHLORIDE 10 MEQ: 7.45 INJECTION INTRAVENOUS at 15:41

## 2021-01-01 RX ADMIN — SODIUM CHLORIDE 30 MG/ML INHALATION SOLUTION 15 ML: 30 SOLUTION INHALANT at 09:22

## 2021-01-01 RX ADMIN — CARVEDILOL 6.25 MG: 6.25 TABLET, FILM COATED ORAL at 08:14

## 2021-01-01 RX ADMIN — SODIUM CHLORIDE, PRESERVATIVE FREE 10 ML: 5 INJECTION INTRAVENOUS at 08:26

## 2021-01-01 RX ADMIN — ALBUTEROL SULFATE 2.5 MG: 2.5 SOLUTION RESPIRATORY (INHALATION) at 10:31

## 2021-01-01 RX ADMIN — HEPARIN SODIUM 5000 UNITS: 5000 INJECTION INTRAVENOUS; SUBCUTANEOUS at 06:25

## 2021-01-01 RX ADMIN — Medication: at 22:05

## 2021-01-01 RX ADMIN — INSULIN GLARGINE 5 UNITS: 100 INJECTION, SOLUTION SUBCUTANEOUS at 22:20

## 2021-01-01 RX ADMIN — ASPIRIN 81 MG: 81 TABLET, CHEWABLE ORAL at 08:52

## 2021-01-01 RX ADMIN — POTASSIUM CHLORIDE, DEXTROSE MONOHYDRATE AND SODIUM CHLORIDE: 150; 5; 450 INJECTION, SOLUTION INTRAVENOUS at 02:58

## 2021-01-01 RX ADMIN — SODIUM CHLORIDE, PRESERVATIVE FREE 10 ML: 5 INJECTION INTRAVENOUS at 08:02

## 2021-01-01 RX ADMIN — SODIUM CHLORIDE: 9 INJECTION, SOLUTION INTRAVENOUS at 15:11

## 2021-01-01 RX ADMIN — Medication 5000 UNITS: at 10:34

## 2021-01-01 RX ADMIN — MIDODRINE HYDROCHLORIDE 5 MG: 5 TABLET ORAL at 08:43

## 2021-01-01 RX ADMIN — HEPARIN SODIUM 5000 UNITS: 5000 INJECTION INTRAVENOUS; SUBCUTANEOUS at 06:21

## 2021-01-01 RX ADMIN — HEPARIN SODIUM 5000 UNITS: 5000 INJECTION INTRAVENOUS; SUBCUTANEOUS at 15:51

## 2021-01-01 RX ADMIN — POTASSIUM CHLORIDE 10 MEQ: 10 INJECTION, SOLUTION INTRAVENOUS at 01:33

## 2021-01-01 RX ADMIN — INSULIN GLARGINE 5 UNITS: 100 INJECTION, SOLUTION SUBCUTANEOUS at 21:53

## 2021-01-01 RX ADMIN — MEROPENEM 1000 MG: 1 INJECTION, POWDER, FOR SOLUTION INTRAVENOUS at 23:12

## 2021-01-01 RX ADMIN — Medication: at 21:23

## 2021-01-01 RX ADMIN — SODIUM CHLORIDE, PRESERVATIVE FREE 10 ML: 5 INJECTION INTRAVENOUS at 21:56

## 2021-01-01 RX ADMIN — DIGOXIN 125 MCG: 250 INJECTION, SOLUTION INTRAMUSCULAR; INTRAVENOUS; PARENTERAL at 10:33

## 2021-01-01 RX ADMIN — HEPARIN SODIUM 5000 UNITS: 5000 INJECTION INTRAVENOUS; SUBCUTANEOUS at 13:05

## 2021-01-01 RX ADMIN — STANDARDIZED SENNA CONCENTRATE 8.6 MG: 8.6 TABLET ORAL at 21:04

## 2021-01-01 RX ADMIN — SODIUM CHLORIDE 30 MG/ML INHALATION SOLUTION 15 ML: 30 SOLUTION INHALANT at 22:35

## 2021-01-01 RX ADMIN — NYSTATIN 500000 UNITS: 100000 SUSPENSION ORAL at 13:52

## 2021-01-01 RX ADMIN — Medication 10 ML: at 23:08

## 2021-01-01 RX ADMIN — SODIUM CHLORIDE 30 MG/ML INHALATION SOLUTION 15 ML: 30 SOLUTION INHALANT at 08:12

## 2021-01-01 RX ADMIN — INSULIN LISPRO 2 UNITS: 100 INJECTION, SOLUTION INTRAVENOUS; SUBCUTANEOUS at 20:46

## 2021-01-01 RX ADMIN — SODIUM CHLORIDE 30 MG/ML INHALATION SOLUTION 15 ML: 30 SOLUTION INHALANT at 09:21

## 2021-01-01 RX ADMIN — INSULIN LISPRO 2 UNITS: 100 INJECTION, SOLUTION INTRAVENOUS; SUBCUTANEOUS at 11:22

## 2021-01-01 RX ADMIN — ASPIRIN 81 MG: 81 TABLET, CHEWABLE ORAL at 08:13

## 2021-01-01 RX ADMIN — SODIUM CHLORIDE: 9 INJECTION, SOLUTION INTRAVENOUS at 21:39

## 2021-01-01 RX ADMIN — HEPARIN SODIUM 5000 UNITS: 5000 INJECTION INTRAVENOUS; SUBCUTANEOUS at 15:01

## 2021-01-01 RX ADMIN — Medication: at 21:00

## 2021-01-01 RX ADMIN — IPRATROPIUM BROMIDE AND ALBUTEROL SULFATE 1 AMPULE: .5; 2.5 SOLUTION RESPIRATORY (INHALATION) at 08:37

## 2021-01-01 RX ADMIN — MEROPENEM 1000 MG: 1 INJECTION, POWDER, FOR SOLUTION INTRAVENOUS at 12:10

## 2021-01-01 RX ADMIN — INSULIN LISPRO 2 UNITS: 100 INJECTION, SOLUTION INTRAVENOUS; SUBCUTANEOUS at 18:38

## 2021-01-01 RX ADMIN — HEPARIN SODIUM 5000 UNITS: 5000 INJECTION INTRAVENOUS; SUBCUTANEOUS at 22:37

## 2021-01-01 RX ADMIN — SODIUM CHLORIDE, PRESERVATIVE FREE 10 ML: 5 INJECTION INTRAVENOUS at 21:00

## 2021-01-01 RX ADMIN — MEROPENEM 1000 MG: 1 INJECTION, POWDER, FOR SOLUTION INTRAVENOUS at 05:55

## 2021-01-01 RX ADMIN — LINEZOLID 600 MG: 600 INJECTION, SOLUTION INTRAVENOUS at 00:31

## 2021-01-01 RX ADMIN — HEPARIN SODIUM 5000 UNITS: 5000 INJECTION INTRAVENOUS; SUBCUTANEOUS at 22:19

## 2021-01-01 RX ADMIN — MEROPENEM 1000 MG: 1 INJECTION, POWDER, FOR SOLUTION INTRAVENOUS at 12:22

## 2021-01-01 RX ADMIN — INSULIN GLARGINE 5 UNITS: 100 INJECTION, SOLUTION SUBCUTANEOUS at 20:04

## 2021-01-01 RX ADMIN — IPRATROPIUM BROMIDE AND ALBUTEROL SULFATE 1 AMPULE: .5; 2.5 SOLUTION RESPIRATORY (INHALATION) at 13:05

## 2021-01-01 RX ADMIN — POTASSIUM CHLORIDE 10 MEQ: 10 INJECTION, SOLUTION INTRAVENOUS at 03:56

## 2021-01-01 RX ADMIN — IPRATROPIUM BROMIDE AND ALBUTEROL SULFATE 1 AMPULE: .5; 2.5 SOLUTION RESPIRATORY (INHALATION) at 19:51

## 2021-01-01 RX ADMIN — INSULIN LISPRO 1 UNITS: 100 INJECTION, SOLUTION INTRAVENOUS; SUBCUTANEOUS at 20:17

## 2021-01-01 RX ADMIN — DIGOXIN 125 MCG: 250 INJECTION, SOLUTION INTRAMUSCULAR; INTRAVENOUS; PARENTERAL at 08:03

## 2021-01-01 RX ADMIN — DIGOXIN 125 MCG: 250 INJECTION, SOLUTION INTRAMUSCULAR; INTRAVENOUS; PARENTERAL at 09:22

## 2021-01-01 RX ADMIN — POTASSIUM CHLORIDE 10 MEQ: 10 INJECTION, SOLUTION INTRAVENOUS at 10:50

## 2021-01-01 RX ADMIN — ACETAMINOPHEN 650 MG: 325 TABLET ORAL at 16:55

## 2021-01-01 RX ADMIN — MIDODRINE HYDROCHLORIDE 5 MG: 5 TABLET ORAL at 08:51

## 2021-01-01 RX ADMIN — IPRATROPIUM BROMIDE AND ALBUTEROL SULFATE 1 AMPULE: .5; 2.5 SOLUTION RESPIRATORY (INHALATION) at 20:50

## 2021-01-01 RX ADMIN — SODIUM CHLORIDE: 9 INJECTION, SOLUTION INTRAVENOUS at 17:34

## 2021-01-01 RX ADMIN — CARVEDILOL 6.25 MG: 6.25 TABLET, FILM COATED ORAL at 07:57

## 2021-01-01 RX ADMIN — HEPARIN SODIUM 5000 UNITS: 5000 INJECTION INTRAVENOUS; SUBCUTANEOUS at 14:05

## 2021-01-01 RX ADMIN — SODIUM CHLORIDE, PRESERVATIVE FREE 10 ML: 5 INJECTION INTRAVENOUS at 08:25

## 2021-01-01 RX ADMIN — ATORVASTATIN CALCIUM 40 MG: 40 TABLET, FILM COATED ORAL at 09:50

## 2021-01-01 RX ADMIN — Medication: at 08:36

## 2021-01-01 RX ADMIN — INSULIN LISPRO 2 UNITS: 100 INJECTION, SOLUTION INTRAVENOUS; SUBCUTANEOUS at 13:56

## 2021-01-01 RX ADMIN — TAMSULOSIN HYDROCHLORIDE 0.4 MG: 0.4 CAPSULE ORAL at 08:42

## 2021-01-01 RX ADMIN — IPRATROPIUM BROMIDE AND ALBUTEROL SULFATE 1 AMPULE: .5; 2.5 SOLUTION RESPIRATORY (INHALATION) at 08:59

## 2021-01-01 RX ADMIN — HYDROMORPHONE HYDROCHLORIDE 0.25 MG: 1 INJECTION, SOLUTION INTRAMUSCULAR; INTRAVENOUS; SUBCUTANEOUS at 02:13

## 2021-01-01 RX ADMIN — Medication 7.5 G: at 22:54

## 2021-01-01 RX ADMIN — SODIUM CHLORIDE: 9 INJECTION, SOLUTION INTRAVENOUS at 15:18

## 2021-01-01 RX ADMIN — MEROPENEM 1000 MG: 1 INJECTION, POWDER, FOR SOLUTION INTRAVENOUS at 00:34

## 2021-01-01 RX ADMIN — INSULIN LISPRO 3 UNITS: 100 INJECTION, SOLUTION INTRAVENOUS; SUBCUTANEOUS at 12:25

## 2021-01-01 RX ADMIN — VANCOMYCIN HYDROCHLORIDE 1250 MG: 10 INJECTION, POWDER, LYOPHILIZED, FOR SOLUTION INTRAVENOUS at 10:36

## 2021-01-01 RX ADMIN — ACETAMINOPHEN 650 MG: 650 SUPPOSITORY RECTAL at 17:58

## 2021-01-01 RX ADMIN — ACETAMINOPHEN 650 MG: 325 TABLET ORAL at 15:54

## 2021-01-01 RX ADMIN — STANDARDIZED SENNA CONCENTRATE 8.6 MG: 8.6 TABLET ORAL at 20:44

## 2021-01-01 RX ADMIN — POTASSIUM CHLORIDE 20 MEQ: 1500 TABLET, EXTENDED RELEASE ORAL at 12:05

## 2021-01-01 RX ADMIN — Medication 10 ML: at 09:01

## 2021-01-01 RX ADMIN — ACETAMINOPHEN 650 MG: 325 TABLET ORAL at 21:47

## 2021-01-01 RX ADMIN — ACETAMINOPHEN 650 MG: 325 TABLET ORAL at 17:56

## 2021-01-01 RX ADMIN — SENNOSIDES 8.6 MG: 8.6 TABLET, FILM COATED ORAL at 12:08

## 2021-01-01 RX ADMIN — SODIUM CHLORIDE 25 ML: 9 INJECTION, SOLUTION INTRAVENOUS at 10:37

## 2021-01-01 RX ADMIN — MEROPENEM 1000 MG: 1 INJECTION, POWDER, FOR SOLUTION INTRAVENOUS at 19:12

## 2021-01-01 RX ADMIN — MIDODRINE HYDROCHLORIDE 5 MG: 5 TABLET ORAL at 18:29

## 2021-01-01 RX ADMIN — PIPERACILLIN AND TAZOBACTAM 3375 MG: 3; .375 INJECTION, POWDER, LYOPHILIZED, FOR SOLUTION INTRAVENOUS at 15:56

## 2021-01-01 RX ADMIN — NYSTATIN 500000 UNITS: 100000 SUSPENSION ORAL at 17:53

## 2021-01-01 RX ADMIN — SODIUM CHLORIDE 30 MG/ML INHALATION SOLUTION 15 ML: 30 SOLUTION INHALANT at 08:36

## 2021-01-01 RX ADMIN — POTASSIUM CHLORIDE 10 MEQ: 7.45 INJECTION INTRAVENOUS at 13:03

## 2021-01-01 RX ADMIN — MIDODRINE HYDROCHLORIDE 5 MG: 5 TABLET ORAL at 17:51

## 2021-01-01 RX ADMIN — DIGOXIN 250 MCG: 250 INJECTION, SOLUTION INTRAMUSCULAR; INTRAVENOUS; PARENTERAL at 14:09

## 2021-01-01 RX ADMIN — NYSTATIN 500000 UNITS: 100000 SUSPENSION ORAL at 13:25

## 2021-01-01 RX ADMIN — IPRATROPIUM BROMIDE AND ALBUTEROL SULFATE 1 AMPULE: .5; 2.5 SOLUTION RESPIRATORY (INHALATION) at 14:09

## 2021-01-01 RX ADMIN — PIPERACILLIN AND TAZOBACTAM 3375 MG: 3; .375 INJECTION, POWDER, LYOPHILIZED, FOR SOLUTION INTRAVENOUS at 16:27

## 2021-01-01 RX ADMIN — IPRATROPIUM BROMIDE AND ALBUTEROL SULFATE 1 AMPULE: .5; 2.5 SOLUTION RESPIRATORY (INHALATION) at 08:34

## 2021-01-01 RX ADMIN — ACETAMINOPHEN 650 MG: 325 TABLET ORAL at 15:28

## 2021-01-01 RX ADMIN — INSULIN LISPRO 2 UNITS: 100 INJECTION, SOLUTION INTRAVENOUS; SUBCUTANEOUS at 19:55

## 2021-01-01 RX ADMIN — HEPARIN SODIUM 5000 UNITS: 5000 INJECTION INTRAVENOUS; SUBCUTANEOUS at 05:44

## 2021-01-01 RX ADMIN — Medication: at 10:03

## 2021-01-01 RX ADMIN — DEXAMETHASONE 6 MG: 4 TABLET ORAL at 10:00

## 2021-01-01 RX ADMIN — IPRATROPIUM BROMIDE AND ALBUTEROL SULFATE 1 AMPULE: .5; 2.5 SOLUTION RESPIRATORY (INHALATION) at 16:17

## 2021-01-01 RX ADMIN — PIPERACILLIN AND TAZOBACTAM 3375 MG: 3; .375 INJECTION, POWDER, LYOPHILIZED, FOR SOLUTION INTRAVENOUS at 18:36

## 2021-01-01 RX ADMIN — ENOXAPARIN SODIUM 40 MG: 40 INJECTION SUBCUTANEOUS at 09:00

## 2021-01-01 RX ADMIN — MAGNESIUM SULFATE HEPTAHYDRATE 2000 MG: 40 INJECTION, SOLUTION INTRAVENOUS at 10:03

## 2021-01-01 RX ADMIN — MAGNESIUM SULFATE HEPTAHYDRATE 2000 MG: 2 INJECTION, SOLUTION INTRAVENOUS at 09:56

## 2021-01-01 RX ADMIN — SODIUM CHLORIDE, PRESERVATIVE FREE 10 ML: 5 INJECTION INTRAVENOUS at 08:33

## 2021-01-01 RX ADMIN — CARVEDILOL 6.25 MG: 6.25 TABLET, FILM COATED ORAL at 18:21

## 2021-01-01 RX ADMIN — NYSTATIN 500000 UNITS: 100000 SUSPENSION ORAL at 13:04

## 2021-01-01 RX ADMIN — PIPERACILLIN AND TAZOBACTAM 3375 MG: 3; .375 INJECTION, POWDER, LYOPHILIZED, FOR SOLUTION INTRAVENOUS at 13:35

## 2021-01-01 RX ADMIN — Medication: at 08:28

## 2021-01-01 RX ADMIN — ACETAMINOPHEN 650 MG: 650 SUPPOSITORY RECTAL at 18:22

## 2021-01-01 RX ADMIN — POTASSIUM CHLORIDE 10 MEQ: 10 INJECTION, SOLUTION INTRAVENOUS at 08:14

## 2021-01-01 RX ADMIN — VANCOMYCIN HYDROCHLORIDE 750 MG: 10 INJECTION, POWDER, LYOPHILIZED, FOR SOLUTION INTRAVENOUS at 15:28

## 2021-01-01 RX ADMIN — MORPHINE SULFATE 1 MG: 2 INJECTION, SOLUTION INTRAMUSCULAR; INTRAVENOUS at 22:53

## 2021-01-01 RX ADMIN — IPRATROPIUM BROMIDE AND ALBUTEROL SULFATE 1 AMPULE: .5; 2.5 SOLUTION RESPIRATORY (INHALATION) at 15:46

## 2021-01-01 RX ADMIN — MEROPENEM 1000 MG: 1 INJECTION, POWDER, FOR SOLUTION INTRAVENOUS at 00:00

## 2021-01-01 RX ADMIN — INSULIN LISPRO 1 UNITS: 100 INJECTION, SOLUTION INTRAVENOUS; SUBCUTANEOUS at 12:14

## 2021-01-01 RX ADMIN — SODIUM CHLORIDE 30 MG/ML INHALATION SOLUTION 15 ML: 30 SOLUTION INHALANT at 08:55

## 2021-01-01 RX ADMIN — FUROSEMIDE 20 MG: 10 INJECTION, SOLUTION INTRAVENOUS at 10:43

## 2021-01-01 RX ADMIN — PIPERACILLIN AND TAZOBACTAM 3375 MG: 3; .375 INJECTION, POWDER, LYOPHILIZED, FOR SOLUTION INTRAVENOUS at 05:00

## 2021-01-01 RX ADMIN — NYSTATIN 500000 UNITS: 100000 SUSPENSION ORAL at 21:02

## 2021-01-01 RX ADMIN — IPRATROPIUM BROMIDE AND ALBUTEROL SULFATE 1 AMPULE: .5; 2.5 SOLUTION RESPIRATORY (INHALATION) at 18:39

## 2021-01-01 RX ADMIN — HEPARIN SODIUM 5000 UNITS: 5000 INJECTION INTRAVENOUS; SUBCUTANEOUS at 13:57

## 2021-01-01 RX ADMIN — HEPARIN SODIUM 5000 UNITS: 5000 INJECTION INTRAVENOUS; SUBCUTANEOUS at 12:47

## 2021-01-01 RX ADMIN — HEPARIN SODIUM 5000 UNITS: 5000 INJECTION INTRAVENOUS; SUBCUTANEOUS at 04:39

## 2021-01-01 RX ADMIN — Medication 7.5 G: at 01:12

## 2021-01-01 RX ADMIN — Medication 10 ML: at 10:50

## 2021-01-01 RX ADMIN — HEPARIN SODIUM 5000 UNITS: 5000 INJECTION INTRAVENOUS; SUBCUTANEOUS at 14:22

## 2021-01-01 RX ADMIN — DOCUSATE SODIUM 100 MG: 100 CAPSULE ORAL at 20:07

## 2021-01-01 RX ADMIN — INSULIN LISPRO 1 UNITS: 100 INJECTION, SOLUTION INTRAVENOUS; SUBCUTANEOUS at 21:54

## 2021-01-01 RX ADMIN — HEPARIN SODIUM 5000 UNITS: 5000 INJECTION INTRAVENOUS; SUBCUTANEOUS at 05:41

## 2021-01-01 RX ADMIN — ENOXAPARIN SODIUM 40 MG: 40 INJECTION SUBCUTANEOUS at 09:58

## 2021-01-01 RX ADMIN — INSULIN HUMAN 5 UNITS: 100 INJECTION, SOLUTION PARENTERAL at 02:46

## 2021-01-01 RX ADMIN — FUROSEMIDE 10 MG: 10 INJECTION, SOLUTION INTRAMUSCULAR; INTRAVENOUS at 11:54

## 2021-01-01 RX ADMIN — ALBUTEROL SULFATE 2.5 MG: 2.5 SOLUTION RESPIRATORY (INHALATION) at 08:51

## 2021-01-01 RX ADMIN — SODIUM CHLORIDE 30 MG/ML INHALATION SOLUTION 15 ML: 30 SOLUTION INHALANT at 19:51

## 2021-01-01 RX ADMIN — ACETAMINOPHEN 650 MG: 325 TABLET ORAL at 13:47

## 2021-01-01 RX ADMIN — PIPERACILLIN AND TAZOBACTAM 3375 MG: 3; .375 INJECTION, POWDER, LYOPHILIZED, FOR SOLUTION INTRAVENOUS at 23:23

## 2021-01-01 RX ADMIN — Medication: at 09:44

## 2021-01-01 RX ADMIN — HEPARIN SODIUM 5000 UNITS: 5000 INJECTION INTRAVENOUS; SUBCUTANEOUS at 20:59

## 2021-01-01 RX ADMIN — Medication 10 ML: at 09:18

## 2021-01-01 RX ADMIN — IPRATROPIUM BROMIDE AND ALBUTEROL SULFATE 1 AMPULE: .5; 2.5 SOLUTION RESPIRATORY (INHALATION) at 16:48

## 2021-01-01 RX ADMIN — INSULIN LISPRO 2 UNITS: 100 INJECTION, SOLUTION INTRAVENOUS; SUBCUTANEOUS at 21:27

## 2021-01-01 RX ADMIN — MAGNESIUM SULFATE HEPTAHYDRATE 2000 MG: 2 INJECTION, SOLUTION INTRAVENOUS at 02:43

## 2021-01-01 RX ADMIN — Medication: at 20:59

## 2021-01-01 RX ADMIN — SODIUM CHLORIDE 25 ML: 9 INJECTION, SOLUTION INTRAVENOUS at 06:02

## 2021-01-01 RX ADMIN — INSULIN LISPRO 2 UNITS: 100 INJECTION, SOLUTION INTRAVENOUS; SUBCUTANEOUS at 17:09

## 2021-01-01 RX ADMIN — MEROPENEM 1000 MG: 1 INJECTION, POWDER, FOR SOLUTION INTRAVENOUS at 05:50

## 2021-01-01 RX ADMIN — DIGOXIN 125 MCG: 250 INJECTION, SOLUTION INTRAMUSCULAR; INTRAVENOUS; PARENTERAL at 09:46

## 2021-01-01 RX ADMIN — NYSTATIN 500000 UNITS: 100000 SUSPENSION ORAL at 11:30

## 2021-01-01 RX ADMIN — SODIUM CHLORIDE 30 MG/ML INHALATION SOLUTION 15 ML: 30 SOLUTION INHALANT at 10:31

## 2021-01-01 RX ADMIN — HEPARIN SODIUM 5000 UNITS: 5000 INJECTION INTRAVENOUS; SUBCUTANEOUS at 14:11

## 2021-01-01 RX ADMIN — SODIUM CHLORIDE, POTASSIUM CHLORIDE, SODIUM LACTATE AND CALCIUM CHLORIDE 500 ML: 600; 310; 30; 20 INJECTION, SOLUTION INTRAVENOUS at 08:44

## 2021-01-01 RX ADMIN — INSULIN LISPRO 2 UNITS: 100 INJECTION, SOLUTION INTRAVENOUS; SUBCUTANEOUS at 17:53

## 2021-01-01 RX ADMIN — SODIUM CHLORIDE 30 MG/ML INHALATION SOLUTION 15 ML: 30 SOLUTION INHALANT at 21:35

## 2021-01-01 RX ADMIN — SODIUM CHLORIDE, PRESERVATIVE FREE 10 ML: 5 INJECTION INTRAVENOUS at 21:05

## 2021-01-01 RX ADMIN — SODIUM CHLORIDE: 4.5 INJECTION, SOLUTION INTRAVENOUS at 01:30

## 2021-01-01 RX ADMIN — PIPERACILLIN AND TAZOBACTAM 3375 MG: 3; .375 INJECTION, POWDER, LYOPHILIZED, FOR SOLUTION INTRAVENOUS at 10:38

## 2021-01-01 RX ADMIN — SODIUM CHLORIDE, PRESERVATIVE FREE 10 ML: 5 INJECTION INTRAVENOUS at 21:22

## 2021-01-01 RX ADMIN — HEPARIN SODIUM 5000 UNITS: 5000 INJECTION INTRAVENOUS; SUBCUTANEOUS at 22:16

## 2021-01-01 RX ADMIN — HEPARIN SODIUM 5000 UNITS: 5000 INJECTION INTRAVENOUS; SUBCUTANEOUS at 21:56

## 2021-01-01 RX ADMIN — HEPARIN SODIUM 5000 UNITS: 5000 INJECTION INTRAVENOUS; SUBCUTANEOUS at 05:08

## 2021-01-01 RX ADMIN — SODIUM CHLORIDE 25 ML: 9 INJECTION, SOLUTION INTRAVENOUS at 06:34

## 2021-01-01 RX ADMIN — Medication 10 ML: at 08:46

## 2021-01-01 RX ADMIN — LINEZOLID 600 MG: 600 INJECTION, SOLUTION INTRAVENOUS at 14:19

## 2021-01-01 RX ADMIN — INSULIN LISPRO 9 UNITS: 100 INJECTION, SOLUTION INTRAVENOUS; SUBCUTANEOUS at 12:07

## 2021-01-01 RX ADMIN — ACETAMINOPHEN 650 MG: 325 TABLET ORAL at 02:31

## 2021-01-01 RX ADMIN — INSULIN LISPRO 4 UNITS: 100 INJECTION, SOLUTION INTRAVENOUS; SUBCUTANEOUS at 18:04

## 2021-01-01 RX ADMIN — Medication: at 08:26

## 2021-01-01 RX ADMIN — SODIUM CHLORIDE, PRESERVATIVE FREE 10 ML: 5 INJECTION INTRAVENOUS at 21:49

## 2021-01-01 RX ADMIN — INSULIN LISPRO 1 UNITS: 100 INJECTION, SOLUTION INTRAVENOUS; SUBCUTANEOUS at 23:18

## 2021-01-01 RX ADMIN — CARVEDILOL 6.25 MG: 6.25 TABLET, FILM COATED ORAL at 11:27

## 2021-01-01 RX ADMIN — HEPARIN SODIUM 5000 UNITS: 5000 INJECTION INTRAVENOUS; SUBCUTANEOUS at 05:22

## 2021-01-01 RX ADMIN — MEROPENEM 1000 MG: 1 INJECTION, POWDER, FOR SOLUTION INTRAVENOUS at 12:54

## 2021-01-01 RX ADMIN — INSULIN GLARGINE 5 UNITS: 100 INJECTION, SOLUTION SUBCUTANEOUS at 20:40

## 2021-01-01 RX ADMIN — SODIUM CHLORIDE 25 ML: 9 INJECTION, SOLUTION INTRAVENOUS at 02:36

## 2021-01-01 RX ADMIN — HEPARIN SODIUM 5000 UNITS: 5000 INJECTION INTRAVENOUS; SUBCUTANEOUS at 05:53

## 2021-01-01 RX ADMIN — HEPARIN SODIUM 5000 UNITS: 5000 INJECTION INTRAVENOUS; SUBCUTANEOUS at 05:21

## 2021-01-01 RX ADMIN — ACETAMINOPHEN 650 MG: 650 SUPPOSITORY RECTAL at 05:14

## 2021-01-01 RX ADMIN — VANCOMYCIN HYDROCHLORIDE 1250 MG: 10 INJECTION, POWDER, LYOPHILIZED, FOR SOLUTION INTRAVENOUS at 10:15

## 2021-01-01 RX ADMIN — VANCOMYCIN HYDROCHLORIDE 1250 MG: 10 INJECTION, POWDER, LYOPHILIZED, FOR SOLUTION INTRAVENOUS at 12:08

## 2021-01-01 RX ADMIN — FINASTERIDE 5 MG: 5 TABLET, FILM COATED ORAL at 09:01

## 2021-01-01 RX ADMIN — SODIUM CHLORIDE, PRESERVATIVE FREE 10 ML: 5 INJECTION INTRAVENOUS at 20:45

## 2021-01-01 RX ADMIN — INSULIN LISPRO 1 UNITS: 100 INJECTION, SOLUTION INTRAVENOUS; SUBCUTANEOUS at 20:31

## 2021-01-01 RX ADMIN — Medication 10 ML: at 09:06

## 2021-01-01 RX ADMIN — Medication 5000 UNITS: at 09:31

## 2021-01-01 RX ADMIN — FINASTERIDE 5 MG: 5 TABLET, FILM COATED ORAL at 07:57

## 2021-01-01 RX ADMIN — Medication: at 21:16

## 2021-01-01 RX ADMIN — CARVEDILOL 6.25 MG: 6.25 TABLET, FILM COATED ORAL at 16:54

## 2021-01-01 RX ADMIN — HEPARIN SODIUM 5000 UNITS: 5000 INJECTION INTRAVENOUS; SUBCUTANEOUS at 13:43

## 2021-01-01 RX ADMIN — SODIUM CHLORIDE: 9 INJECTION, SOLUTION INTRAVENOUS at 20:58

## 2021-01-01 RX ADMIN — SODIUM CHLORIDE: 9 INJECTION, SOLUTION INTRAVENOUS at 11:00

## 2021-01-01 RX ADMIN — Medication 10 ML: at 21:04

## 2021-01-01 RX ADMIN — SODIUM CHLORIDE: 450 INJECTION, SOLUTION INTRAVENOUS at 09:33

## 2021-01-01 RX ADMIN — CARVEDILOL 6.25 MG: 6.25 TABLET, FILM COATED ORAL at 09:02

## 2021-01-01 RX ADMIN — AZITHROMYCIN MONOHYDRATE 500 MG: 500 INJECTION, POWDER, LYOPHILIZED, FOR SOLUTION INTRAVENOUS at 06:20

## 2021-01-01 RX ADMIN — FINASTERIDE 5 MG: 5 TABLET, FILM COATED ORAL at 09:31

## 2021-01-01 RX ADMIN — LINEZOLID 600 MG: 600 INJECTION, SOLUTION INTRAVENOUS at 01:00

## 2021-01-01 RX ADMIN — NYSTATIN 500000 UNITS: 100000 SUSPENSION ORAL at 09:30

## 2021-01-01 RX ADMIN — Medication: at 22:32

## 2021-01-01 RX ADMIN — SODIUM CHLORIDE 30 MG/ML INHALATION SOLUTION 15 ML: 30 SOLUTION INHALANT at 22:05

## 2021-01-01 RX ADMIN — SODIUM CHLORIDE 30 MG/ML INHALATION SOLUTION 15 ML: 30 SOLUTION INHALANT at 23:15

## 2021-01-01 RX ADMIN — INSULIN GLARGINE 5 UNITS: 100 INJECTION, SOLUTION SUBCUTANEOUS at 21:24

## 2021-01-01 RX ADMIN — IPRATROPIUM BROMIDE AND ALBUTEROL SULFATE 1 AMPULE: .5; 2.5 SOLUTION RESPIRATORY (INHALATION) at 08:53

## 2021-01-01 RX ADMIN — INSULIN LISPRO 2 UNITS: 100 INJECTION, SOLUTION INTRAVENOUS; SUBCUTANEOUS at 21:18

## 2021-01-01 RX ADMIN — SODIUM CHLORIDE 30 MG/ML INHALATION SOLUTION 15 ML: 30 SOLUTION INHALANT at 09:19

## 2021-01-01 RX ADMIN — CARVEDILOL 6.25 MG: 6.25 TABLET, FILM COATED ORAL at 17:53

## 2021-01-01 RX ADMIN — Medication: at 10:28

## 2021-01-01 RX ADMIN — SODIUM CHLORIDE: 9 INJECTION, SOLUTION INTRAVENOUS at 02:45

## 2021-01-01 RX ADMIN — PANTOPRAZOLE SODIUM 40 MG: 40 TABLET, DELAYED RELEASE ORAL at 06:15

## 2021-01-01 RX ADMIN — CARVEDILOL 6.25 MG: 6.25 TABLET, FILM COATED ORAL at 08:29

## 2021-01-01 RX ADMIN — PIPERACILLIN AND TAZOBACTAM 3375 MG: 3; .375 INJECTION, POWDER, LYOPHILIZED, FOR SOLUTION INTRAVENOUS at 01:30

## 2021-01-01 RX ADMIN — ASPIRIN 81 MG: 81 TABLET, CHEWABLE ORAL at 08:42

## 2021-01-01 RX ADMIN — DIGOXIN 125 MCG: 250 INJECTION, SOLUTION INTRAMUSCULAR; INTRAVENOUS; PARENTERAL at 09:26

## 2021-01-01 RX ADMIN — SODIUM CHLORIDE: 9 INJECTION, SOLUTION INTRAVENOUS at 04:32

## 2021-01-01 RX ADMIN — SODIUM CHLORIDE 25 ML: 9 INJECTION, SOLUTION INTRAVENOUS at 13:53

## 2021-01-01 RX ADMIN — INSULIN LISPRO 3 UNITS: 100 INJECTION, SOLUTION INTRAVENOUS; SUBCUTANEOUS at 08:53

## 2021-01-01 RX ADMIN — ACETAMINOPHEN 650 MG: 325 TABLET ORAL at 06:32

## 2021-01-01 RX ADMIN — INSULIN LISPRO 2 UNITS: 100 INJECTION, SOLUTION INTRAVENOUS; SUBCUTANEOUS at 08:27

## 2021-01-01 RX ADMIN — HEPARIN SODIUM 5000 UNITS: 5000 INJECTION INTRAVENOUS; SUBCUTANEOUS at 06:00

## 2021-01-01 RX ADMIN — SODIUM CHLORIDE: 4.5 INJECTION, SOLUTION INTRAVENOUS at 10:49

## 2021-01-01 RX ADMIN — IPRATROPIUM BROMIDE AND ALBUTEROL SULFATE 1 AMPULE: .5; 2.5 SOLUTION RESPIRATORY (INHALATION) at 08:44

## 2021-01-01 RX ADMIN — SODIUM CHLORIDE 30 MG/ML INHALATION SOLUTION 15 ML: 30 SOLUTION INHALANT at 10:16

## 2021-01-01 RX ADMIN — ENOXAPARIN SODIUM 40 MG: 40 INJECTION SUBCUTANEOUS at 10:37

## 2021-01-01 RX ADMIN — SODIUM CHLORIDE: 450 INJECTION, SOLUTION INTRAVENOUS at 20:15

## 2021-01-01 RX ADMIN — Medication: at 20:48

## 2021-01-01 RX ADMIN — HEPARIN SODIUM 5000 UNITS: 5000 INJECTION INTRAVENOUS; SUBCUTANEOUS at 14:36

## 2021-01-01 RX ADMIN — HEPARIN SODIUM 5000 UNITS: 5000 INJECTION INTRAVENOUS; SUBCUTANEOUS at 20:05

## 2021-01-01 RX ADMIN — TAMSULOSIN HYDROCHLORIDE 0.4 MG: 0.4 CAPSULE ORAL at 08:44

## 2021-01-01 RX ADMIN — HEPARIN SODIUM 5000 UNITS: 5000 INJECTION INTRAVENOUS; SUBCUTANEOUS at 20:46

## 2021-01-01 RX ADMIN — ACETAMINOPHEN 650 MG: 650 SUPPOSITORY RECTAL at 22:17

## 2021-01-01 RX ADMIN — NYSTATIN 500000 UNITS: 100000 SUSPENSION ORAL at 20:24

## 2021-01-01 RX ADMIN — STANDARDIZED SENNA CONCENTRATE 8.6 MG: 8.6 TABLET ORAL at 22:58

## 2021-01-01 RX ADMIN — HEPARIN SODIUM 5000 UNITS: 5000 INJECTION INTRAVENOUS; SUBCUTANEOUS at 14:07

## 2021-01-01 RX ADMIN — MIDODRINE HYDROCHLORIDE 5 MG: 5 TABLET ORAL at 17:07

## 2021-01-01 RX ADMIN — BISACODYL 10 MG: 10 SUPPOSITORY RECTAL at 21:40

## 2021-01-01 RX ADMIN — INSULIN LISPRO 1 UNITS: 100 INJECTION, SOLUTION INTRAVENOUS; SUBCUTANEOUS at 17:58

## 2021-01-01 RX ADMIN — HEPARIN SODIUM 5000 UNITS: 5000 INJECTION INTRAVENOUS; SUBCUTANEOUS at 20:17

## 2021-01-01 RX ADMIN — NYSTATIN 500000 UNITS: 100000 SUSPENSION ORAL at 11:54

## 2021-01-01 RX ADMIN — DIGOXIN 125 MCG: 250 INJECTION, SOLUTION INTRAMUSCULAR; INTRAVENOUS; PARENTERAL at 08:22

## 2021-01-01 RX ADMIN — ACETAMINOPHEN 650 MG: 325 TABLET ORAL at 15:52

## 2021-01-01 RX ADMIN — HEPARIN SODIUM 5000 UNITS: 5000 INJECTION INTRAVENOUS; SUBCUTANEOUS at 15:15

## 2021-01-01 RX ADMIN — Medication 10 ML: at 20:26

## 2021-01-01 RX ADMIN — INSULIN LISPRO 2 UNITS: 100 INJECTION, SOLUTION INTRAVENOUS; SUBCUTANEOUS at 18:04

## 2021-01-01 RX ADMIN — INSULIN LISPRO 1 UNITS: 100 INJECTION, SOLUTION INTRAVENOUS; SUBCUTANEOUS at 08:23

## 2021-01-01 RX ADMIN — ACETAMINOPHEN 650 MG: 325 TABLET ORAL at 08:44

## 2021-01-01 RX ADMIN — SODIUM CHLORIDE 30 MG/ML INHALATION SOLUTION 15 ML: 30 SOLUTION INHALANT at 08:51

## 2021-01-01 RX ADMIN — INSULIN LISPRO 4 UNITS: 100 INJECTION, SOLUTION INTRAVENOUS; SUBCUTANEOUS at 17:18

## 2021-01-01 RX ADMIN — DIGOXIN 125 MCG: 125 TABLET ORAL at 09:24

## 2021-01-01 RX ADMIN — DEXAMETHASONE 6 MG: 4 TABLET ORAL at 15:54

## 2021-01-01 RX ADMIN — INSULIN LISPRO 1 UNITS: 100 INJECTION, SOLUTION INTRAVENOUS; SUBCUTANEOUS at 22:18

## 2021-01-01 RX ADMIN — INSULIN LISPRO 1 UNITS: 100 INJECTION, SOLUTION INTRAVENOUS; SUBCUTANEOUS at 09:47

## 2021-01-01 RX ADMIN — CARVEDILOL 6.25 MG: 6.25 TABLET, FILM COATED ORAL at 17:36

## 2021-01-01 RX ADMIN — INSULIN LISPRO 2 UNITS: 100 INJECTION, SOLUTION INTRAVENOUS; SUBCUTANEOUS at 20:05

## 2021-01-01 RX ADMIN — SODIUM CHLORIDE, POTASSIUM CHLORIDE, SODIUM LACTATE AND CALCIUM CHLORIDE 1000 ML: 600; 310; 30; 20 INJECTION, SOLUTION INTRAVENOUS at 22:13

## 2021-01-01 RX ADMIN — SODIUM CHLORIDE, PRESERVATIVE FREE 10 ML: 5 INJECTION INTRAVENOUS at 09:22

## 2021-01-01 RX ADMIN — DIPHENHYDRAMINE HYDROCHLORIDE, ZINC ACETATE: 2; .1 CREAM TOPICAL at 09:03

## 2021-01-01 RX ADMIN — ZINC SULFATE 220 MG (50 MG) CAPSULE 50 MG: CAPSULE at 08:51

## 2021-01-01 RX ADMIN — PANTOPRAZOLE SODIUM 40 MG: 40 TABLET, DELAYED RELEASE ORAL at 06:53

## 2021-01-01 RX ADMIN — INSULIN LISPRO 2 UNITS: 100 INJECTION, SOLUTION INTRAVENOUS; SUBCUTANEOUS at 18:24

## 2021-01-01 RX ADMIN — NYSTATIN 500000 UNITS: 100000 SUSPENSION ORAL at 12:15

## 2021-01-01 RX ADMIN — DOCUSATE SODIUM 50 MG: 50 LIQUID ORAL at 17:53

## 2021-01-01 RX ADMIN — INSULIN LISPRO 2 UNITS: 100 INJECTION, SOLUTION INTRAVENOUS; SUBCUTANEOUS at 18:27

## 2021-01-01 RX ADMIN — HEPARIN SODIUM 5000 UNITS: 5000 INJECTION INTRAVENOUS; SUBCUTANEOUS at 22:28

## 2021-01-01 RX ADMIN — HEPARIN SODIUM 5000 UNITS: 5000 INJECTION INTRAVENOUS; SUBCUTANEOUS at 14:09

## 2021-01-01 RX ADMIN — DIGOXIN 250 MCG: 250 INJECTION, SOLUTION INTRAMUSCULAR; INTRAVENOUS; PARENTERAL at 17:07

## 2021-01-01 RX ADMIN — FUROSEMIDE 20 MG: 10 INJECTION, SOLUTION INTRAMUSCULAR; INTRAVENOUS at 13:57

## 2021-01-01 RX ADMIN — Medication 5000 UNITS: at 11:31

## 2021-01-01 RX ADMIN — SODIUM CHLORIDE 30 MG/ML INHALATION SOLUTION 15 ML: 30 SOLUTION INHALANT at 09:20

## 2021-01-01 RX ADMIN — HEPARIN SODIUM 5000 UNITS: 5000 INJECTION INTRAVENOUS; SUBCUTANEOUS at 22:17

## 2021-01-01 RX ADMIN — ENOXAPARIN SODIUM 40 MG: 40 INJECTION SUBCUTANEOUS at 09:18

## 2021-01-01 RX ADMIN — HEPARIN SODIUM 5000 UNITS: 5000 INJECTION INTRAVENOUS; SUBCUTANEOUS at 14:01

## 2021-01-01 RX ADMIN — POTASSIUM BICARBONATE 40 MEQ: 782 TABLET, EFFERVESCENT ORAL at 09:41

## 2021-01-01 RX ADMIN — NYSTATIN 500000 UNITS: 100000 SUSPENSION ORAL at 16:54

## 2021-01-01 RX ADMIN — VANCOMYCIN HYDROCHLORIDE 750 MG: 10 INJECTION, POWDER, LYOPHILIZED, FOR SOLUTION INTRAVENOUS at 06:22

## 2021-01-01 RX ADMIN — PIPERACILLIN AND TAZOBACTAM 3375 MG: 3; .375 INJECTION, POWDER, LYOPHILIZED, FOR SOLUTION INTRAVENOUS at 02:53

## 2021-01-01 RX ADMIN — SODIUM CHLORIDE 25 ML: 9 INJECTION, SOLUTION INTRAVENOUS at 14:43

## 2021-01-01 RX ADMIN — HEPARIN SODIUM 5000 UNITS: 5000 INJECTION INTRAVENOUS; SUBCUTANEOUS at 03:19

## 2021-01-01 RX ADMIN — SODIUM CHLORIDE 500 ML: 9 INJECTION, SOLUTION INTRAVENOUS at 07:42

## 2021-01-01 RX ADMIN — CARVEDILOL 6.25 MG: 6.25 TABLET, FILM COATED ORAL at 11:26

## 2021-01-01 RX ADMIN — TAMSULOSIN HYDROCHLORIDE 0.4 MG: 0.4 CAPSULE ORAL at 09:56

## 2021-01-01 RX ADMIN — PIPERACILLIN SODIUM AND TAZOBACTAM SODIUM 4500 MG: 4; .5 INJECTION, POWDER, LYOPHILIZED, FOR SOLUTION INTRAVENOUS at 17:51

## 2021-01-01 RX ADMIN — PERFLUTREN 1.65 MG: 6.52 INJECTION, SUSPENSION INTRAVENOUS at 14:01

## 2021-01-01 RX ADMIN — SODIUM CHLORIDE: 9 INJECTION, SOLUTION INTRAVENOUS at 12:20

## 2021-01-01 RX ADMIN — ASPIRIN 81 MG: 81 TABLET, CHEWABLE ORAL at 09:24

## 2021-01-01 RX ADMIN — POTASSIUM CHLORIDE 10 MEQ: 10 INJECTION, SOLUTION INTRAVENOUS at 15:03

## 2021-01-01 RX ADMIN — FINASTERIDE 5 MG: 5 TABLET, FILM COATED ORAL at 09:21

## 2021-01-01 RX ADMIN — INSULIN LISPRO 1 UNITS: 100 INJECTION, SOLUTION INTRAVENOUS; SUBCUTANEOUS at 20:21

## 2021-01-01 RX ADMIN — FINASTERIDE 5 MG: 5 TABLET, FILM COATED ORAL at 09:02

## 2021-01-01 RX ADMIN — DIGOXIN 125 MCG: 250 INJECTION, SOLUTION INTRAMUSCULAR; INTRAVENOUS; PARENTERAL at 10:15

## 2021-01-01 RX ADMIN — INSULIN LISPRO 5 UNITS: 100 INJECTION, SOLUTION INTRAVENOUS; SUBCUTANEOUS at 20:06

## 2021-01-01 RX ADMIN — SODIUM CHLORIDE 25 ML: 9 INJECTION, SOLUTION INTRAVENOUS at 02:47

## 2021-01-01 RX ADMIN — MEROPENEM 1000 MG: 1 INJECTION, POWDER, FOR SOLUTION INTRAVENOUS at 12:14

## 2021-01-01 RX ADMIN — SODIUM CHLORIDE 30 MG/ML INHALATION SOLUTION 15 ML: 30 SOLUTION INHALANT at 21:09

## 2021-01-01 RX ADMIN — MIDODRINE HYDROCHLORIDE 5 MG: 5 TABLET ORAL at 12:27

## 2021-01-01 RX ADMIN — MEROPENEM 1000 MG: 1 INJECTION, POWDER, FOR SOLUTION INTRAVENOUS at 17:55

## 2021-01-01 RX ADMIN — INSULIN LISPRO 1 UNITS: 100 INJECTION, SOLUTION INTRAVENOUS; SUBCUTANEOUS at 01:30

## 2021-01-01 RX ADMIN — SODIUM CHLORIDE: 9 INJECTION, SOLUTION INTRAVENOUS at 10:13

## 2021-01-01 RX ADMIN — IPRATROPIUM BROMIDE AND ALBUTEROL SULFATE 1 AMPULE: .5; 2.5 SOLUTION RESPIRATORY (INHALATION) at 16:14

## 2021-01-01 RX ADMIN — NYSTATIN 500000 UNITS: 100000 SUSPENSION ORAL at 14:00

## 2021-01-01 RX ADMIN — IPRATROPIUM BROMIDE AND ALBUTEROL SULFATE 1 AMPULE: .5; 2.5 SOLUTION RESPIRATORY (INHALATION) at 00:37

## 2021-01-01 RX ADMIN — INSULIN LISPRO 3 UNITS: 100 INJECTION, SOLUTION INTRAVENOUS; SUBCUTANEOUS at 18:08

## 2021-01-01 RX ADMIN — Medication 7.5 G: at 04:50

## 2021-01-01 RX ADMIN — SODIUM CHLORIDE, PRESERVATIVE FREE 10 ML: 5 INJECTION INTRAVENOUS at 22:15

## 2021-01-01 RX ADMIN — INSULIN LISPRO 1 UNITS: 100 INJECTION, SOLUTION INTRAVENOUS; SUBCUTANEOUS at 20:40

## 2021-01-01 RX ADMIN — HEPARIN SODIUM 5000 UNITS: 5000 INJECTION INTRAVENOUS; SUBCUTANEOUS at 21:05

## 2021-01-01 RX ADMIN — INSULIN LISPRO 2 UNITS: 100 INJECTION, SOLUTION INTRAVENOUS; SUBCUTANEOUS at 03:19

## 2021-01-01 RX ADMIN — IPRATROPIUM BROMIDE AND ALBUTEROL SULFATE 1 AMPULE: .5; 2.5 SOLUTION RESPIRATORY (INHALATION) at 14:06

## 2021-01-01 RX ADMIN — Medication 10 ML: at 08:52

## 2021-01-01 RX ADMIN — CEFTRIAXONE 1000 MG: 1 INJECTION, POWDER, FOR SOLUTION INTRAMUSCULAR; INTRAVENOUS at 06:25

## 2021-01-01 RX ADMIN — SODIUM CHLORIDE 30 MG/ML INHALATION SOLUTION 15 ML: 30 SOLUTION INHALANT at 23:30

## 2021-01-01 RX ADMIN — FINASTERIDE 5 MG: 5 TABLET, FILM COATED ORAL at 08:13

## 2021-01-01 RX ADMIN — VANCOMYCIN HYDROCHLORIDE 1250 MG: 10 INJECTION, POWDER, LYOPHILIZED, FOR SOLUTION INTRAVENOUS at 11:00

## 2021-01-01 RX ADMIN — SODIUM CHLORIDE: 450 INJECTION, SOLUTION INTRAVENOUS at 14:47

## 2021-01-01 RX ADMIN — INSULIN LISPRO 1 UNITS: 100 INJECTION, SOLUTION INTRAVENOUS; SUBCUTANEOUS at 14:01

## 2021-01-01 RX ADMIN — INSULIN GLARGINE 5 UNITS: 100 INJECTION, SOLUTION SUBCUTANEOUS at 20:46

## 2021-01-01 RX ADMIN — LINEZOLID 600 MG: 600 INJECTION, SOLUTION INTRAVENOUS at 06:51

## 2021-01-01 RX ADMIN — Medication: at 10:41

## 2021-01-01 RX ADMIN — MIDODRINE HYDROCHLORIDE 5 MG: 5 TABLET ORAL at 12:11

## 2021-01-01 RX ADMIN — MEROPENEM 1000 MG: 1 INJECTION, POWDER, FOR SOLUTION INTRAVENOUS at 12:25

## 2021-01-01 RX ADMIN — HEPARIN SODIUM 5000 UNITS: 5000 INJECTION INTRAVENOUS; SUBCUTANEOUS at 05:16

## 2021-01-01 RX ADMIN — TAMSULOSIN HYDROCHLORIDE 0.4 MG: 0.4 CAPSULE ORAL at 08:52

## 2021-01-01 RX ADMIN — INSULIN LISPRO 6 UNITS: 100 INJECTION, SOLUTION INTRAVENOUS; SUBCUTANEOUS at 12:06

## 2021-01-01 RX ADMIN — POTASSIUM CHLORIDE 10 MEQ: 10 INJECTION, SOLUTION INTRAVENOUS at 06:47

## 2021-01-01 RX ADMIN — CARVEDILOL 6.25 MG: 6.25 TABLET, FILM COATED ORAL at 09:21

## 2021-01-01 RX ADMIN — PIPERACILLIN AND TAZOBACTAM 3375 MG: 3; .375 INJECTION, POWDER, LYOPHILIZED, FOR SOLUTION INTRAVENOUS at 02:48

## 2021-01-01 RX ADMIN — PIPERACILLIN AND TAZOBACTAM 3375 MG: 3; .375 INJECTION, POWDER, LYOPHILIZED, FOR SOLUTION INTRAVENOUS at 17:55

## 2021-01-01 RX ADMIN — MEROPENEM 1000 MG: 1 INJECTION, POWDER, FOR SOLUTION INTRAVENOUS at 18:07

## 2021-01-01 RX ADMIN — INSULIN LISPRO 2 UNITS: 100 INJECTION, SOLUTION INTRAVENOUS; SUBCUTANEOUS at 12:19

## 2021-01-01 RX ADMIN — Medication 3 MG: at 00:03

## 2021-01-01 RX ADMIN — MEROPENEM 1000 MG: 1 INJECTION, POWDER, FOR SOLUTION INTRAVENOUS at 13:00

## 2021-01-01 RX ADMIN — ENOXAPARIN SODIUM 40 MG: 40 INJECTION SUBCUTANEOUS at 10:00

## 2021-01-01 RX ADMIN — INSULIN LISPRO 1 UNITS: 100 INJECTION, SOLUTION INTRAVENOUS; SUBCUTANEOUS at 21:10

## 2021-01-01 RX ADMIN — FINASTERIDE 5 MG: 5 TABLET, FILM COATED ORAL at 08:46

## 2021-01-01 RX ADMIN — CARVEDILOL 6.25 MG: 6.25 TABLET, FILM COATED ORAL at 17:57

## 2021-01-01 RX ADMIN — HEPARIN SODIUM 5000 UNITS: 5000 INJECTION INTRAVENOUS; SUBCUTANEOUS at 21:57

## 2021-01-01 RX ADMIN — INSULIN LISPRO 2 UNITS: 100 INJECTION, SOLUTION INTRAVENOUS; SUBCUTANEOUS at 21:10

## 2021-01-01 RX ADMIN — Medication: at 09:36

## 2021-01-01 RX ADMIN — INSULIN GLARGINE 5 UNITS: 100 INJECTION, SOLUTION SUBCUTANEOUS at 21:26

## 2021-01-01 RX ADMIN — IPRATROPIUM BROMIDE AND ALBUTEROL SULFATE 1 AMPULE: .5; 2.5 SOLUTION RESPIRATORY (INHALATION) at 09:08

## 2021-01-01 RX ADMIN — MORPHINE SULFATE 1 MG: 2 INJECTION, SOLUTION INTRAMUSCULAR; INTRAVENOUS at 05:07

## 2021-01-01 RX ADMIN — POLYETHYLENE GLYCOL 3350 17 G: 17 POWDER, FOR SOLUTION ORAL at 02:49

## 2021-01-01 RX ADMIN — OXYCODONE HYDROCHLORIDE AND ACETAMINOPHEN 500 MG: 500 TABLET ORAL at 08:51

## 2021-01-01 RX ADMIN — INSULIN GLARGINE 10 UNITS: 100 INJECTION, SOLUTION SUBCUTANEOUS at 19:56

## 2021-01-01 RX ADMIN — SODIUM CHLORIDE 25 ML: 9 INJECTION, SOLUTION INTRAVENOUS at 00:27

## 2021-01-01 RX ADMIN — SODIUM CHLORIDE, SODIUM LACTATE, POTASSIUM CHLORIDE, AND CALCIUM CHLORIDE 500 ML: .6; .31; .03; .02 INJECTION, SOLUTION INTRAVENOUS at 16:55

## 2021-01-01 RX ADMIN — IPRATROPIUM BROMIDE AND ALBUTEROL SULFATE 1 AMPULE: .5; 2.5 SOLUTION RESPIRATORY (INHALATION) at 10:24

## 2021-01-01 RX ADMIN — SODIUM CHLORIDE 30 MG/ML INHALATION SOLUTION 15 ML: 30 SOLUTION INHALANT at 23:50

## 2021-01-01 RX ADMIN — SODIUM CHLORIDE: 9 INJECTION, SOLUTION INTRAVENOUS at 04:19

## 2021-01-01 RX ADMIN — PIPERACILLIN AND TAZOBACTAM 3375 MG: 3; .375 INJECTION, POWDER, LYOPHILIZED, FOR SOLUTION INTRAVENOUS at 20:02

## 2021-01-01 RX ADMIN — DIGOXIN 125 MCG: 250 INJECTION, SOLUTION INTRAMUSCULAR; INTRAVENOUS; PARENTERAL at 14:56

## 2021-01-01 RX ADMIN — INSULIN LISPRO 1 UNITS: 100 INJECTION, SOLUTION INTRAVENOUS; SUBCUTANEOUS at 12:28

## 2021-01-01 RX ADMIN — IPRATROPIUM BROMIDE AND ALBUTEROL SULFATE 1 AMPULE: .5; 2.5 SOLUTION RESPIRATORY (INHALATION) at 08:24

## 2021-01-01 RX ADMIN — LINEZOLID 600 MG: 600 INJECTION, SOLUTION INTRAVENOUS at 12:16

## 2021-01-01 RX ADMIN — Medication 10 ML: at 08:15

## 2021-01-01 RX ADMIN — MIDODRINE HYDROCHLORIDE 5 MG: 5 TABLET ORAL at 17:14

## 2021-01-01 RX ADMIN — POTASSIUM CHLORIDE 10 MEQ: 10 INJECTION, SOLUTION INTRAVENOUS at 05:04

## 2021-01-01 RX ADMIN — INSULIN LISPRO 9 UNITS: 100 INJECTION, SOLUTION INTRAVENOUS; SUBCUTANEOUS at 08:58

## 2021-01-01 RX ADMIN — POTASSIUM CHLORIDE 10 MEQ: 7.45 INJECTION INTRAVENOUS at 02:29

## 2021-01-01 RX ADMIN — HEPARIN SODIUM 5000 UNITS: 5000 INJECTION INTRAVENOUS; SUBCUTANEOUS at 06:07

## 2021-01-01 RX ADMIN — INSULIN GLARGINE 5 UNITS: 100 INJECTION, SOLUTION SUBCUTANEOUS at 21:59

## 2021-01-01 ASSESSMENT — PAIN SCALES - PAIN ASSESSMENT IN ADVANCED DEMENTIA (PAINAD)
BREATHING: 0
BREATHING: 0
NEGVOCALIZATION: 0
CONSOLABILITY: 0
FACIALEXPRESSION: 0
FACIALEXPRESSION: 0
BREATHING: 0
NEGVOCALIZATION: 0
FACIALEXPRESSION: 0
CONSOLABILITY: 0
BODYLANGUAGE: 0
CONSOLABILITY: 0
BREATHING: 2
BREATHING: 0
BREATHING: 0
CONSOLABILITY: 0
BODYLANGUAGE: 0
BREATHING: 0
CONSOLABILITY: 2
TOTALSCORE: 1
NEGVOCALIZATION: 0
BODYLANGUAGE: 0
NEGVOCALIZATION: 0
CONSOLABILITY: 0
FACIALEXPRESSION: 0
TOTALSCORE: 2
TOTALSCORE: 0
CONSOLABILITY: 0
CONSOLABILITY: 0
BODYLANGUAGE: 0
FACIALEXPRESSION: 0
BREATHING: 2
FACIALEXPRESSION: 0
TOTALSCORE: 2
BREATHING: 1
TOTALSCORE: 0
CONSOLABILITY: 0
BREATHING: 2
TOTALSCORE: 0
TOTALSCORE: 0
CONSOLABILITY: 0
TOTALSCORE: 5
CONSOLABILITY: 0
BREATHING: 1
NEGVOCALIZATION: 0
BREATHING: 0
BREATHING: 1
TOTALSCORE: 0
TOTALSCORE: 2
CONSOLABILITY: 0
NEGVOCALIZATION: 1
BODYLANGUAGE: 1
BODYLANGUAGE: 0
TOTALSCORE: 0
CONSOLABILITY: 0
CONSOLABILITY: 0
FACIALEXPRESSION: 0
NEGVOCALIZATION: 0
FACIALEXPRESSION: 0
BODYLANGUAGE: 0
TOTALSCORE: 0
TOTALSCORE: 0
CONSOLABILITY: 0
TOTALSCORE: 2
CONSOLABILITY: 0
CONSOLABILITY: 0
BODYLANGUAGE: 0
TOTALSCORE: 0
BREATHING: 2
BREATHING: 0
BODYLANGUAGE: 0
BREATHING: 2
BREATHING: 0
FACIALEXPRESSION: 0
CONSOLABILITY: 0
BREATHING: 0
FACIALEXPRESSION: 1
NEGVOCALIZATION: 1
TOTALSCORE: 0
NEGVOCALIZATION: 0
BODYLANGUAGE: 0
BODYLANGUAGE: 0
TOTALSCORE: 0
TOTALSCORE: 2
NEGVOCALIZATION: 0
NEGVOCALIZATION: 0
BREATHING: 1
TOTALSCORE: 0
FACIALEXPRESSION: 0
BODYLANGUAGE: 0
BREATHING: 0
CONSOLABILITY: 0
NEGVOCALIZATION: 0
FACIALEXPRESSION: 0
FACIALEXPRESSION: 0
NEGVOCALIZATION: 0
FACIALEXPRESSION: 0
BODYLANGUAGE: 0
TOTALSCORE: 2
NEGVOCALIZATION: 0
BODYLANGUAGE: 0
FACIALEXPRESSION: 0
FACIALEXPRESSION: 0
TOTALSCORE: 2
CONSOLABILITY: 0
TOTALSCORE: 0
TOTALSCORE: 0
BODYLANGUAGE: 0
NEGVOCALIZATION: 0
NEGVOCALIZATION: 0
BODYLANGUAGE: 0
BREATHING: 0
BODYLANGUAGE: 0
FACIALEXPRESSION: 0
NEGVOCALIZATION: 0
BODYLANGUAGE: 0
BREATHING: 0
FACIALEXPRESSION: 2
FACIALEXPRESSION: 0
BREATHING: 0
NEGVOCALIZATION: 0
FACIALEXPRESSION: 0
BODYLANGUAGE: 0
BREATHING: 2
BODYLANGUAGE: 0
BREATHING: 1
TOTALSCORE: 1
TOTALSCORE: 0
BODYLANGUAGE: 0
BREATHING: 0
BODYLANGUAGE: 0
FACIALEXPRESSION: 0
NEGVOCALIZATION: 0
CONSOLABILITY: 0
BODYLANGUAGE: 0
BREATHING: 0
BODYLANGUAGE: 0
NEGVOCALIZATION: 0
TOTALSCORE: 0
NEGVOCALIZATION: 0
BREATHING: 2
BREATHING: 2
BODYLANGUAGE: 0
NEGVOCALIZATION: 0
BODYLANGUAGE: 0
BREATHING: 0
NEGVOCALIZATION: 0
TOTALSCORE: 0
CONSOLABILITY: 0
NEGVOCALIZATION: 0
NEGVOCALIZATION: 0
BODYLANGUAGE: 0
BREATHING: 0
BREATHING: 0
TOTALSCORE: 0
FACIALEXPRESSION: 1
FACIALEXPRESSION: 0
TOTALSCORE: 2
TOTALSCORE: 1
FACIALEXPRESSION: 0
CONSOLABILITY: 0
CONSOLABILITY: 0
BODYLANGUAGE: 0
BODYLANGUAGE: 0
FACIALEXPRESSION: 0
TOTALSCORE: 0
NEGVOCALIZATION: 0
BODYLANGUAGE: 0
NEGVOCALIZATION: 1
BREATHING: 2
FACIALEXPRESSION: 0
TOTALSCORE: 0
CONSOLABILITY: 1
TOTALSCORE: 1
BODYLANGUAGE: 0
BREATHING: 0
TOTALSCORE: 0
NEGVOCALIZATION: 0
FACIALEXPRESSION: 0
NEGVOCALIZATION: 0
BREATHING: 2
NEGVOCALIZATION: 0
TOTALSCORE: 0
FACIALEXPRESSION: 0
NEGVOCALIZATION: 0
CONSOLABILITY: 0
FACIALEXPRESSION: 0
NEGVOCALIZATION: 0
TOTALSCORE: 0
TOTALSCORE: 0
FACIALEXPRESSION: 0
TOTALSCORE: 2
BREATHING: 0
CONSOLABILITY: 0
BODYLANGUAGE: 0
TOTALSCORE: 3
TOTALSCORE: 0
TOTALSCORE: 0
BODYLANGUAGE: 0
BODYLANGUAGE: 0
FACIALEXPRESSION: 0
NEGVOCALIZATION: 0
BREATHING: 0
BODYLANGUAGE: 0
FACIALEXPRESSION: 0
CONSOLABILITY: 0
BODYLANGUAGE: 0
FACIALEXPRESSION: 0
CONSOLABILITY: 0
BREATHING: 2
FACIALEXPRESSION: 0
FACIALEXPRESSION: 0
CONSOLABILITY: 0
NEGVOCALIZATION: 0
CONSOLABILITY: 0
TOTALSCORE: 2
FACIALEXPRESSION: 0
NEGVOCALIZATION: 0
CONSOLABILITY: 0
BREATHING: 0
CONSOLABILITY: 0
BREATHING: 0
FACIALEXPRESSION: 0
TOTALSCORE: 2
NEGVOCALIZATION: 0
NEGVOCALIZATION: 0
TOTALSCORE: 0
NEGVOCALIZATION: 0
TOTALSCORE: 0
NEGVOCALIZATION: 1
CONSOLABILITY: 0
BODYLANGUAGE: 0
NEGVOCALIZATION: 0
BODYLANGUAGE: 0
NEGVOCALIZATION: 0
NEGVOCALIZATION: 0
BREATHING: 0
CONSOLABILITY: 0
TOTALSCORE: 4
FACIALEXPRESSION: 0
BODYLANGUAGE: 0
FACIALEXPRESSION: 0
BREATHING: 0
NEGVOCALIZATION: 0
CONSOLABILITY: 0
FACIALEXPRESSION: 0
TOTALSCORE: 0
TOTALSCORE: 0
FACIALEXPRESSION: 0
CONSOLABILITY: 0
CONSOLABILITY: 0
NEGVOCALIZATION: 0
BODYLANGUAGE: 0
NEGVOCALIZATION: 0
NEGVOCALIZATION: 0
BODYLANGUAGE: 0
TOTALSCORE: 2
NEGVOCALIZATION: 0
NEGVOCALIZATION: 0
BODYLANGUAGE: 0
CONSOLABILITY: 0
BREATHING: 0
NEGVOCALIZATION: 0
FACIALEXPRESSION: 0
BREATHING: 0
TOTALSCORE: 3
BREATHING: 1
NEGVOCALIZATION: 0
NEGVOCALIZATION: 0
CONSOLABILITY: 0
TOTALSCORE: 0
FACIALEXPRESSION: 0
NEGVOCALIZATION: 0
NEGVOCALIZATION: 0
FACIALEXPRESSION: 2
CONSOLABILITY: 0
BREATHING: 2
BREATHING: 1
NEGVOCALIZATION: 1
BREATHING: 0
BODYLANGUAGE: 0
NEGVOCALIZATION: 0
TOTALSCORE: 0
CONSOLABILITY: 0
NEGVOCALIZATION: 0
NEGVOCALIZATION: 0
BREATHING: 0
FACIALEXPRESSION: 0
BREATHING: 0
CONSOLABILITY: 0
CONSOLABILITY: 0
CONSOLABILITY: 2
BREATHING: 0
CONSOLABILITY: 0
BREATHING: 2
BREATHING: 1
BODYLANGUAGE: 0
TOTALSCORE: 1
NEGVOCALIZATION: 0
TOTALSCORE: 0
BREATHING: 0
FACIALEXPRESSION: 0
BODYLANGUAGE: 0
TOTALSCORE: 0
NEGVOCALIZATION: 0
BODYLANGUAGE: 0
BODYLANGUAGE: 0
TOTALSCORE: 0
BREATHING: 0
CONSOLABILITY: 0
CONSOLABILITY: 0
BODYLANGUAGE: 0
FACIALEXPRESSION: 0
BREATHING: 0
CONSOLABILITY: 0
BODYLANGUAGE: 0
CONSOLABILITY: 0
FACIALEXPRESSION: 0
BODYLANGUAGE: 0
TOTALSCORE: 0
BODYLANGUAGE: 0
NEGVOCALIZATION: 0
BREATHING: 0
CONSOLABILITY: 0
CONSOLABILITY: 0
NEGVOCALIZATION: 0
FACIALEXPRESSION: 0
TOTALSCORE: 0
FACIALEXPRESSION: 0
BODYLANGUAGE: 0
CONSOLABILITY: 0
BREATHING: 0
BREATHING: 2
BODYLANGUAGE: 0
BREATHING: 0
BODYLANGUAGE: 0
FACIALEXPRESSION: 0
NEGVOCALIZATION: 0
BREATHING: 0
NEGVOCALIZATION: 0
TOTALSCORE: 2
TOTALSCORE: 0
TOTALSCORE: 0
FACIALEXPRESSION: 0
TOTALSCORE: 0
FACIALEXPRESSION: 0
CONSOLABILITY: 0
BREATHING: 1
TOTALSCORE: 0
BREATHING: 0
BREATHING: 1
TOTALSCORE: 0
CONSOLABILITY: 0
BODYLANGUAGE: 0
BREATHING: 0
BODYLANGUAGE: 0
BODYLANGUAGE: 0
BREATHING: 0
CONSOLABILITY: 0
BREATHING: 2
BREATHING: 1
TOTALSCORE: 0
NEGVOCALIZATION: 0
CONSOLABILITY: 0
BREATHING: 0
NEGVOCALIZATION: 0
FACIALEXPRESSION: 0
CONSOLABILITY: 0
BREATHING: 0
TOTALSCORE: 0
CONSOLABILITY: 0
NEGVOCALIZATION: 0
FACIALEXPRESSION: 0
BREATHING: 2
BODYLANGUAGE: 0
BODYLANGUAGE: 0
CONSOLABILITY: 0
FACIALEXPRESSION: 0
TOTALSCORE: 0
FACIALEXPRESSION: 0
BODYLANGUAGE: 1
FACIALEXPRESSION: 0
TOTALSCORE: 0
FACIALEXPRESSION: 0
CONSOLABILITY: 0
FACIALEXPRESSION: 0
FACIALEXPRESSION: 0
BODYLANGUAGE: 0
NEGVOCALIZATION: 0
BODYLANGUAGE: 0
BODYLANGUAGE: 0
BREATHING: 0
BODYLANGUAGE: 0
TOTALSCORE: 7
TOTALSCORE: 0
BREATHING: 2
BODYLANGUAGE: 1
NEGVOCALIZATION: 0
NEGVOCALIZATION: 1
CONSOLABILITY: 0
FACIALEXPRESSION: 2
CONSOLABILITY: 1
FACIALEXPRESSION: 0
NEGVOCALIZATION: 0
FACIALEXPRESSION: 1
NEGVOCALIZATION: 0
FACIALEXPRESSION: 0
FACIALEXPRESSION: 0
NEGVOCALIZATION: 0
TOTALSCORE: 0
BREATHING: 1
NEGVOCALIZATION: 0
FACIALEXPRESSION: 0
CONSOLABILITY: 0
BODYLANGUAGE: 0
BREATHING: 0
BODYLANGUAGE: 0
NEGVOCALIZATION: 0
CONSOLABILITY: 0
BODYLANGUAGE: 0
BREATHING: 2
CONSOLABILITY: 0
FACIALEXPRESSION: 0
TOTALSCORE: 1
CONSOLABILITY: 0
TOTALSCORE: 2
FACIALEXPRESSION: 0
FACIALEXPRESSION: 2
NEGVOCALIZATION: 0
NEGVOCALIZATION: 0
CONSOLABILITY: 0
CONSOLABILITY: 0
TOTALSCORE: 2
CONSOLABILITY: 0
NEGVOCALIZATION: 0
BREATHING: 0
BREATHING: 0
NEGVOCALIZATION: 0
CONSOLABILITY: 0
NEGVOCALIZATION: 0
BREATHING: 0
FACIALEXPRESSION: 0
TOTALSCORE: 2
NEGVOCALIZATION: 0
FACIALEXPRESSION: 0
FACIALEXPRESSION: 0
CONSOLABILITY: 0
CONSOLABILITY: 0
FACIALEXPRESSION: 0
CONSOLABILITY: 0
FACIALEXPRESSION: 0
BODYLANGUAGE: 0
CONSOLABILITY: 0
FACIALEXPRESSION: 0
BREATHING: 0
FACIALEXPRESSION: 0
BODYLANGUAGE: 1
FACIALEXPRESSION: 2
TOTALSCORE: 2
TOTALSCORE: 2
CONSOLABILITY: 0
BODYLANGUAGE: 0
CONSOLABILITY: 0
BODYLANGUAGE: 0
BODYLANGUAGE: 0
BREATHING: 2
BODYLANGUAGE: 0
CONSOLABILITY: 0
BODYLANGUAGE: 0
BREATHING: 2
CONSOLABILITY: 0
NEGVOCALIZATION: 0
BREATHING: 0
TOTALSCORE: 2
BODYLANGUAGE: 1
BODYLANGUAGE: 0
BREATHING: 0
CONSOLABILITY: 0
TOTALSCORE: 0
TOTALSCORE: 2
FACIALEXPRESSION: 0
CONSOLABILITY: 0
CONSOLABILITY: 0
NEGVOCALIZATION: 0
BODYLANGUAGE: 0
BODYLANGUAGE: 0
TOTALSCORE: 0
TOTALSCORE: 0
TOTALSCORE: 7
CONSOLABILITY: 0
FACIALEXPRESSION: 0
CONSOLABILITY: 0
CONSOLABILITY: 0
BREATHING: 0
CONSOLABILITY: 0
TOTALSCORE: 2
CONSOLABILITY: 0
BREATHING: 0
BODYLANGUAGE: 0
CONSOLABILITY: 0
NEGVOCALIZATION: 0
NEGVOCALIZATION: 0
FACIALEXPRESSION: 0
NEGVOCALIZATION: 0
BODYLANGUAGE: 0
NEGVOCALIZATION: 0
FACIALEXPRESSION: 0
BODYLANGUAGE: 0
TOTALSCORE: 0
NEGVOCALIZATION: 0
FACIALEXPRESSION: 0
CONSOLABILITY: 0
BREATHING: 2
NEGVOCALIZATION: 0
NEGVOCALIZATION: 0
BREATHING: 0
TOTALSCORE: 2
TOTALSCORE: 4
FACIALEXPRESSION: 0
BODYLANGUAGE: 0
TOTALSCORE: 0
CONSOLABILITY: 0
FACIALEXPRESSION: 0
BODYLANGUAGE: 0
TOTALSCORE: 1
BODYLANGUAGE: 0
NEGVOCALIZATION: 0
TOTALSCORE: 0
FACIALEXPRESSION: 2
BODYLANGUAGE: 0
FACIALEXPRESSION: 0
BODYLANGUAGE: 0
NEGVOCALIZATION: 0
TOTALSCORE: 0
BREATHING: 0
FACIALEXPRESSION: 2
TOTALSCORE: 2
TOTALSCORE: 4
BODYLANGUAGE: 0
FACIALEXPRESSION: 0
FACIALEXPRESSION: 0
BREATHING: 0
CONSOLABILITY: 1
TOTALSCORE: 0
BODYLANGUAGE: 1
NEGVOCALIZATION: 0
NEGVOCALIZATION: 0
BREATHING: 0
TOTALSCORE: 7
CONSOLABILITY: 0
BREATHING: 1
FACIALEXPRESSION: 0
FACIALEXPRESSION: 0
BREATHING: 1
TOTALSCORE: 0
FACIALEXPRESSION: 0
BREATHING: 2
FACIALEXPRESSION: 2
CONSOLABILITY: 0
NEGVOCALIZATION: 0
NEGVOCALIZATION: 0
BREATHING: 1
TOTALSCORE: 2
BODYLANGUAGE: 0
TOTALSCORE: 0
BREATHING: 1
NEGVOCALIZATION: 0
CONSOLABILITY: 0
NEGVOCALIZATION: 0
NEGVOCALIZATION: 1
FACIALEXPRESSION: 0
FACIALEXPRESSION: 0
BODYLANGUAGE: 0
NEGVOCALIZATION: 0
TOTALSCORE: 5
BREATHING: 0
FACIALEXPRESSION: 0
BODYLANGUAGE: 0
BODYLANGUAGE: 0
TOTALSCORE: 0
TOTALSCORE: 1
FACIALEXPRESSION: 0
FACIALEXPRESSION: 0
NEGVOCALIZATION: 0
BREATHING: 2
CONSOLABILITY: 0
BREATHING: 0
FACIALEXPRESSION: 0
TOTALSCORE: 0
FACIALEXPRESSION: 0
BREATHING: 0
TOTALSCORE: 2
CONSOLABILITY: 0
CONSOLABILITY: 0
FACIALEXPRESSION: 0
CONSOLABILITY: 0
NEGVOCALIZATION: 0
NEGVOCALIZATION: 0
CONSOLABILITY: 0
NEGVOCALIZATION: 0
BREATHING: 1
TOTALSCORE: 2
BREATHING: 0
CONSOLABILITY: 0
BODYLANGUAGE: 0
BREATHING: 0
NEGVOCALIZATION: 0
TOTALSCORE: 4
FACIALEXPRESSION: 0
TOTALSCORE: 0
NEGVOCALIZATION: 1
BODYLANGUAGE: 1
BODYLANGUAGE: 0
NEGVOCALIZATION: 0
BODYLANGUAGE: 0
BREATHING: 2
BREATHING: 0
TOTALSCORE: 0
NEGVOCALIZATION: 0
NEGVOCALIZATION: 0
BODYLANGUAGE: 0
TOTALSCORE: 0
BODYLANGUAGE: 0
CONSOLABILITY: 0
BODYLANGUAGE: 0
BREATHING: 2
BODYLANGUAGE: 0
FACIALEXPRESSION: 0
BODYLANGUAGE: 1
BREATHING: 0
NEGVOCALIZATION: 0
TOTALSCORE: 0
CONSOLABILITY: 0
CONSOLABILITY: 0
TOTALSCORE: 0
NEGVOCALIZATION: 0
BREATHING: 0
FACIALEXPRESSION: 0
CONSOLABILITY: 0
TOTALSCORE: 0
BODYLANGUAGE: 0
FACIALEXPRESSION: 0
NEGVOCALIZATION: 0
BODYLANGUAGE: 0
BODYLANGUAGE: 0
TOTALSCORE: 0
FACIALEXPRESSION: 0
NEGVOCALIZATION: 0
TOTALSCORE: 0
BREATHING: 0
CONSOLABILITY: 0
BREATHING: 1
NEGVOCALIZATION: 0
FACIALEXPRESSION: 0
CONSOLABILITY: 2
BODYLANGUAGE: 1
CONSOLABILITY: 0
BREATHING: 0
TOTALSCORE: 2
TOTALSCORE: 0
TOTALSCORE: 0
NEGVOCALIZATION: 0
FACIALEXPRESSION: 0
TOTALSCORE: 0
CONSOLABILITY: 0
FACIALEXPRESSION: 0
FACIALEXPRESSION: 0
BODYLANGUAGE: 0
BREATHING: 0
FACIALEXPRESSION: 0
BREATHING: 0
NEGVOCALIZATION: 0
BODYLANGUAGE: 0
CONSOLABILITY: 1
TOTALSCORE: 2
BREATHING: 1
CONSOLABILITY: 0
FACIALEXPRESSION: 0
BODYLANGUAGE: 0
BREATHING: 0
BODYLANGUAGE: 0
TOTALSCORE: 0
NEGVOCALIZATION: 0
BODYLANGUAGE: 0
CONSOLABILITY: 0
FACIALEXPRESSION: 0
CONSOLABILITY: 0
CONSOLABILITY: 0
NEGVOCALIZATION: 0
BREATHING: 0
NEGVOCALIZATION: 0
FACIALEXPRESSION: 0
FACIALEXPRESSION: 0
BODYLANGUAGE: 0
FACIALEXPRESSION: 0
NEGVOCALIZATION: 0
BREATHING: 2

## 2021-01-01 ASSESSMENT — PAIN DESCRIPTION - ONSET
ONSET: GRADUAL
ONSET: AWAKENED FROM SLEEP
ONSET: UNABLE TO TELL
ONSET: ON-GOING
ONSET: GRADUAL
ONSET: ON-GOING

## 2021-01-01 ASSESSMENT — PAIN DESCRIPTION - PROGRESSION
CLINICAL_PROGRESSION: NOT CHANGED
CLINICAL_PROGRESSION: GRADUALLY WORSENING
CLINICAL_PROGRESSION: NOT CHANGED
CLINICAL_PROGRESSION: GRADUALLY WORSENING
CLINICAL_PROGRESSION: NOT CHANGED

## 2021-01-01 ASSESSMENT — PAIN SCALES - GENERAL
PAINLEVEL_OUTOF10: 0
PAINLEVEL_OUTOF10: 5
PAINLEVEL_OUTOF10: 5
PAINLEVEL_OUTOF10: 0
PAINLEVEL_OUTOF10: 3
PAINLEVEL_OUTOF10: 3
PAINLEVEL_OUTOF10: 2
PAINLEVEL_OUTOF10: 0
PAINLEVEL_OUTOF10: 7
PAINLEVEL_OUTOF10: 0
PAINLEVEL_OUTOF10: 0
PAINLEVEL_OUTOF10: 1
PAINLEVEL_OUTOF10: 2
PAINLEVEL_OUTOF10: 3
PAINLEVEL_OUTOF10: 4
PAINLEVEL_OUTOF10: 7
PAINLEVEL_OUTOF10: 0
PAINLEVEL_OUTOF10: 2
PAINLEVEL_OUTOF10: 0
PAINLEVEL_OUTOF10: 3
PAINLEVEL_OUTOF10: 0
PAINLEVEL_OUTOF10: 7
PAINLEVEL_OUTOF10: 0
PAINLEVEL_OUTOF10: 0
PAINLEVEL_OUTOF10: 5
PAINLEVEL_OUTOF10: 0
PAINLEVEL_OUTOF10: 5
PAINLEVEL_OUTOF10: 0
PAINLEVEL_OUTOF10: 6
PAINLEVEL_OUTOF10: 0
PAINLEVEL_OUTOF10: 4
PAINLEVEL_OUTOF10: 1
PAINLEVEL_OUTOF10: 0
PAINLEVEL_OUTOF10: 3
PAINLEVEL_OUTOF10: 0
PAINLEVEL_OUTOF10: 3
PAINLEVEL_OUTOF10: 1
PAINLEVEL_OUTOF10: 8
PAINLEVEL_OUTOF10: 0
PAINLEVEL_OUTOF10: 3
PAINLEVEL_OUTOF10: 4
PAINLEVEL_OUTOF10: 3
PAINLEVEL_OUTOF10: 0
PAINLEVEL_OUTOF10: 1
PAINLEVEL_OUTOF10: 0
PAINLEVEL_OUTOF10: 3
PAINLEVEL_OUTOF10: 0
PAINLEVEL_OUTOF10: 3
PAINLEVEL_OUTOF10: 2
PAINLEVEL_OUTOF10: 0
PAINLEVEL_OUTOF10: 3
PAINLEVEL_OUTOF10: 0
PAINLEVEL_OUTOF10: 4
PAINLEVEL_OUTOF10: 0
PAINLEVEL_OUTOF10: 4
PAINLEVEL_OUTOF10: 4
PAINLEVEL_OUTOF10: 0
PAINLEVEL_OUTOF10: 3
PAINLEVEL_OUTOF10: 3
PAINLEVEL_OUTOF10: 0
PAINLEVEL_OUTOF10: 4
PAINLEVEL_OUTOF10: 1
PAINLEVEL_OUTOF10: 0
PAINLEVEL_OUTOF10: 0
PAINLEVEL_OUTOF10: 3
PAINLEVEL_OUTOF10: 0
PAINLEVEL_OUTOF10: 0

## 2021-01-01 ASSESSMENT — PAIN DESCRIPTION - PAIN TYPE
TYPE: ACUTE PAIN
TYPE: ACUTE PAIN
TYPE: CHRONIC PAIN
TYPE: ACUTE PAIN
TYPE: ACUTE PAIN;CHRONIC PAIN
TYPE: ACUTE PAIN
TYPE: ACUTE PAIN

## 2021-01-01 ASSESSMENT — PAIN SCALES - WONG BAKER
WONGBAKER_NUMERICALRESPONSE: 0
WONGBAKER_NUMERICALRESPONSE: 2
WONGBAKER_NUMERICALRESPONSE: 2
WONGBAKER_NUMERICALRESPONSE: 0
WONGBAKER_NUMERICALRESPONSE: 2
WONGBAKER_NUMERICALRESPONSE: 0
WONGBAKER_NUMERICALRESPONSE: 2
WONGBAKER_NUMERICALRESPONSE: 0
WONGBAKER_NUMERICALRESPONSE: 2
WONGBAKER_NUMERICALRESPONSE: 0

## 2021-01-01 ASSESSMENT — ENCOUNTER SYMPTOMS
DIARRHEA: 0
ABDOMINAL PAIN: 1
CONSTIPATION: 0
SHORTNESS OF BREATH: 1
ABDOMINAL DISTENTION: 0
SHORTNESS OF BREATH: 1
CONSTIPATION: 0
ABDOMINAL PAIN: 1
ABDOMINAL PAIN: 1
SHORTNESS OF BREATH: 1
CONSTIPATION: 0
ABDOMINAL PAIN: 1
DIARRHEA: 0
DIARRHEA: 0
ABDOMINAL DISTENTION: 0
ABDOMINAL PAIN: 1
COUGH: 0
SHORTNESS OF BREATH: 1
ABDOMINAL DISTENTION: 0
DIARRHEA: 0
ABDOMINAL PAIN: 0
SHORTNESS OF BREATH: 1
ABDOMINAL DISTENTION: 0
CONSTIPATION: 1
BLOOD IN STOOL: 0
COUGH: 0
CONSTIPATION: 0
COUGH: 0
DIARRHEA: 0
ABDOMINAL DISTENTION: 0

## 2021-01-01 ASSESSMENT — PAIN - FUNCTIONAL ASSESSMENT
PAIN_FUNCTIONAL_ASSESSMENT: PREVENTS OR INTERFERES SOME ACTIVE ACTIVITIES AND ADLS
PAIN_FUNCTIONAL_ASSESSMENT: ACTIVITIES ARE NOT PREVENTED
PAIN_FUNCTIONAL_ASSESSMENT: ACTIVITIES ARE NOT PREVENTED

## 2021-01-01 ASSESSMENT — PAIN DESCRIPTION - LOCATION
LOCATION: HEAD
LOCATION: BACK
LOCATION: HEAD
LOCATION: BACK
LOCATION: BACK;HEAD
LOCATION: HEAD
LOCATION: BUTTOCKS
LOCATION: OTHER (COMMENT)
LOCATION: HEAD

## 2021-01-01 ASSESSMENT — PATIENT HEALTH QUESTIONNAIRE - PHQ9
2. FEELING DOWN, DEPRESSED OR HOPELESS: 0
SUM OF ALL RESPONSES TO PHQ QUESTIONS 1-9: 0
1. LITTLE INTEREST OR PLEASURE IN DOING THINGS: 0
SUM OF ALL RESPONSES TO PHQ9 QUESTIONS 1 & 2: 0
SUM OF ALL RESPONSES TO PHQ QUESTIONS 1-9: 0
SUM OF ALL RESPONSES TO PHQ QUESTIONS 1-9: 0

## 2021-01-01 ASSESSMENT — PAIN DESCRIPTION - FREQUENCY
FREQUENCY: CONTINUOUS
FREQUENCY: INTERMITTENT
FREQUENCY: INTERMITTENT
FREQUENCY: CONTINUOUS
FREQUENCY: CONTINUOUS
FREQUENCY: INTERMITTENT
FREQUENCY: CONTINUOUS

## 2021-01-01 ASSESSMENT — PAIN DESCRIPTION - ORIENTATION
ORIENTATION: LOWER
ORIENTATION: RIGHT;LEFT
ORIENTATION: MID
ORIENTATION: ANTERIOR
ORIENTATION: RIGHT;LEFT
ORIENTATION: ANTERIOR
ORIENTATION: RIGHT;LEFT
ORIENTATION: MID

## 2021-01-01 ASSESSMENT — PAIN DESCRIPTION - DIRECTION
RADIATING_TOWARDS: EYES
RADIATING_TOWARDS: EYES

## 2021-01-01 ASSESSMENT — PAIN DESCRIPTION - DESCRIPTORS
DESCRIPTORS: ACHING
DESCRIPTORS: HEADACHE;ACHING
DESCRIPTORS: HEADACHE
DESCRIPTORS: HEADACHE
DESCRIPTORS: ACHING
DESCRIPTORS: PATIENT UNABLE TO DESCRIBE
DESCRIPTORS: HEADACHE
DESCRIPTORS: ACHING
DESCRIPTORS: DISCOMFORT
DESCRIPTORS: HEADACHE

## 2021-01-01 ASSESSMENT — LIFESTYLE VARIABLES: HOW OFTEN DO YOU HAVE A DRINK CONTAINING ALCOHOL: 0

## 2021-03-17 NOTE — TELEPHONE ENCOUNTER
Submitted PA for DICLOFENAC GEL  Via CMM Key: [id number] STATUS: APPROVED. Please notify patient thank you.

## 2021-03-18 NOTE — PATIENT INSTRUCTIONS
Personalized Preventive Plan for Mary Rutledge - 3/18/2021  Medicare offers a range of preventive health benefits. Some of the tests and screenings are paid in full while other may be subject to a deductible, co-insurance, and/or copay. Some of these benefits include a comprehensive review of your medical history including lifestyle, illnesses that may run in your family, and various assessments and screenings as appropriate. After reviewing your medical record and screening and assessments performed today your provider may have ordered immunizations, labs, imaging, and/or referrals for you. A list of these orders (if applicable) as well as your Preventive Care list are included within your After Visit Summary for your review. Other Preventive Recommendations:    · A preventive eye exam performed by an eye specialist is recommended every 1-2 years to screen for glaucoma; cataracts, macular degeneration, and other eye disorders. · A preventive dental visit is recommended every 6 months. · Try to get at least 150 minutes of exercise per week or 10,000 steps per day on a pedometer . · Order or download the FREE \"Exercise & Physical Activity: Your Everyday Guide\" from [employer]. Call 5-493.290.9746 or search [employer] online. · You need 0365-4661 mg of calcium and 5494-5323 IU of vitamin D per day. It is possible to meet your calcium requirement with diet alone, but a vitamin D supplement is usually necessary to meet this goal.  · When exposed to the sun, use a sunscreen that protects against both UVA and UVB radiation with an SPF of 30 or greater. Reapply every 2 to 3 hours or after sweating, drying off with a towel, or swimming. · Always wear a seat belt when traveling in a car. Always wear a helmet when riding a bicycle or motorcycle.

## 2021-03-18 NOTE — PROGRESS NOTES
Medicare Annual Wellness Visit  Name: Елена Mackay Date: 3/18/2021   MRN: <[id number]> Sex: Male   Age: 81 y.o. Ethnicity: Non-/Non    : 1935 Race: Suzzanna Bumpers is here for Medicare AWV    Screenings for behavioral, psychosocial and functional/safety risks, and cognitive dysfunction are all negative except as indicated below. These results, as well as other patient data from the [address] [address] form, are documented in Flowsheets linked to this Encounter. Allergies   Allergen Reactions    Tramadol          Prior to Visit Medications    Medication Sig Taking? Authorizing Provider   diclofenac sodium (VOLTAREN) 1 % GEL Apply 2 g topically 4 times daily as needed for Pain Yes Mitchell Merchant MD   Misc. Devices (WALKER) MISC 1 each by Does not apply route daily Yes Mitchell Merchant MD   VICTOZA 18 MG/3ML SOPN SC injection ADMINISTER 1.8 MG UNDER THE SKIN DAILY Yes Mitchell Merchant MD   metFORMIN (GLUCOPHAGE) 500 MG tablet TAKE 2 TABLETS BY MOUTH TWICE DAILY WITH MEALS Yes Mitchell Merchant MD   carvedilol (COREG) 6.25 MG tablet TAKE 1 TABLET BY MOUTH TWICE DAILY Yes Mitchell Merchant MD   furosemide (LASIX) 20 MG tablet TAKE 1 TABLET BY MOUTH DAILY Yes Mitchell Merchant MD   glipiZIDE (GLUCOTROL) 5 MG tablet TAKE 1/2 TABLET BY MOUTH DAILY Yes Mitchell Merchant MD   tamsulosin (FLOMAX) 0.4 MG capsule TAKE 2 CAPSULES BY MOUTH EVERY NIGHT Yes Mitchell Merchant MD   diclofenac sodium (VOLTAREN) 1 % GEL APPLY 3 GRAMS EXTERNALLY TO THE AFFECTED AREA THREE TIMES DAILY AS NEEDED FOR PAIN Yes Mitchell Merchant MD   Insulin Pen Needle (BD ULTRA-FINE PEN NEEDLES) 29G X 12.7MM MISC USE ONCE DAILY AS DIRECTED Yes Mitchell Merchant MD   Blood Glucose Monitoring Suppl (CONTOUR NEXT MONITOR) w/Device KIT Once daily Yes Mitchell Merchant MD   blood glucose test strips (CONTOUR NEXT TEST) strip 1 each by In Vitro route daily Daily As needed.  Yes Mitchell Merchant MD   Lancets MISC 1 each by Does not apply route daily Yes Jennifer Melvin MD   blood glucose monitor kit and supplies Dispense sufficient amount for indicated testing frequency plus additional to accommodate PRN testing needs. Dispense all needed supplies to include: monitor, strips, lancing device, lancets, control solutions, alcohol swabs. Yes Jennifer Melvin MD   Blood Glucose Monitoring Suppl (ONE TOUCH ULTRA 2) w/Device KIT 1 kit by Does not apply route daily Yes Jennifer Melvin MD   finasteride (PROSCAR) 5 MG tablet TAKE 1 TABLET BY MOUTH DAILY Yes Jennifer Melvin MD   CONTOUR TEST strip TEST DAILY AS NEEDED Yes Jennifer Melvin MD   rosuvastatin (CRESTOR) 5 MG tablet Take 5 mg by mouth daily Yes Historical Provider, MD   Cholecalciferol (VITAMIN D3) 125 MCG (5000 UT) CAPS TAKE ONE CAPSULE BY MOUTH EVERY DAY Yes Jennifer Melvin MD   ondansetron (ZOFRAN) 4 MG tablet Take 1 tablet by mouth every 8 hours as needed for Nausea Yes KEERTHI Palacios   fluticasone (FLONASE) 50 MCG/ACT nasal spray SHAKE LIQUID AND USE 2 SPRAYS IN EACH NOSTRIL DAILY Yes Jennifer Melvin MD   atorvastatin (LIPITOR) 40 MG tablet Take 1 tablet by mouth daily Yes Ghulam Abbasi MD   nitroGLYCERIN (NITROSTAT) 0.4 MG SL tablet Place 1 tablet under the tongue every 5 minutes as needed for Chest pain May repeat every 5 minutes until relief is obtained. If pain persists after taking 3 tabs in a 15-minute period, call 9-1-1 immediately. Yes Ghulam Abbasi MD   Melatonin 3 MG CAPS Take 1 capsule by mouth nightly as needed (for insomnia) Yes Ghulam Abbasi MD   Naproxen Sodium (ALEVE) 220 MG CAPS Take 1 capsule by mouth daily. With food. Yes Ghulam Abbasi MD   aspirin 81 MG EC tablet Take 81 mg by mouth daily.  Yes Historical Provider, MD         Past Medical History:   Diagnosis Date    Allergic rhinitis 8/27/2010    Asthma     Back pain     Bilateral cataracts     Bilateral low back pain with right-sided sciatica 12/18/2015    BPH (benign prostatic hypertrophy)     Chest pain     Degeneration of lumbar or lumbosacral intervertebral disc     Dental disease     Diabetes mellitus, type 2 ([facility] [address] 5/28/2010    Diverticulosis     Dizziness     Eczema 3/11/2011    Essential hypertension 9/18/2015    Hearing loss     Hyperlipidemia     Hypertension     Insomnia     Lumbar disc disease     Microalbuminuria 6/13/2013    Neck pain 12/14/2012    Nosebleed     Obesity     Obstructive sleep apnea     Osteoarthritis     Post herpetic neuralgia 6/15/2012    Recurrent upper respiratory infection (URI)     Right hip pain 12/14/2012    Right shoulder pain 6/17/2011    Rosacea     Spinal stenosis     Transient global amnesia     Type 2 diabetes mellitus without complication ([facility] [address] 23/81/6871    Vitamin D deficiency 3/17/2012       Past Surgical History:   Procedure Laterality Date    PROSTATE SURGERY  June 13, 2000    TURP         Family History   Problem Relation Age of Onset    Diabetes Mother     Diabetes Brother        CareTeam (Including outside providers/suppliers regularly involved in providing care):   Patient Care Team:  Kitty Guillen MD as PCP - General  Kitty Guillen MD as PCP - [facility]  Lucia Whelan MD  [facility] (Ophthalmology)  Guille Bashir MD (Urology)  Maritza Perez DPM (Podiatry)  Marianne Arevalo (Optometry)  Victor Manuel Haynes MD (Dermatology)    Wt Readings from Last 3 Encounters:   03/18/21 180 lb 4.8 oz (81.8 kg)   11/20/20 182 lb 6.4 oz (82.7 kg)   02/28/20 179 lb (81.2 kg)     Vitals:    03/18/21 1029   BP: 130/70   Site: Right Upper Arm   Temp: 97.3 °F (36.3 °C)   Weight: 180 lb 4.8 oz (81.8 kg)   Height: 5' 4\" (1.626 m)     Body mass index is 30.95 kg/m². Based upon direct observation of the patient, evaluation of cognition reveals recent and remote memory intact.     General Appearance: alert and oriented to person, place and time, well developed and well- nourished, in no acute distress  Skin: warm and dry, no rash or erythema  Head: normocephalic and atraumatic  Eyes: pupils equal, round, and reactive to light, extraocular eye movements intact, conjunctivae normal  ENT: tympanic membrane, external ear and ear canal normal bilaterally, nose without deformity, nasal mucosa and turbinates normal without polyps  Neck: supple and non-tender without mass, no thyromegaly or thyroid nodules, no cervical lymphadenopathy  Pulmonary/Chest: clear to auscultation bilaterally- no wheezes, rales or rhonchi, normal air movement, no respiratory distress  Cardiovascular: normal rate, regular rhythm, normal S1 and S2, no murmurs, rubs, clicks, or gallops, distal pulses intact, no carotid bruits  Abdomen: soft, non-tender, non-distended, normal bowel sounds, no masses or organomegaly  Extremities: no cyanosis, clubbing or edema  Musculoskeletal: normal range of motion, no joint swelling, deformity or tenderness  Neurologic: reflexes normal and symmetric, no cranial nerve deficit, gait, coordination and speech normal    Patient's complete Health Risk Assessment and screening values have been reviewed and are found in Flowsheets. The following problems were reviewed today and where indicated follow up appointments were made and/or referrals ordered. DM type 2 - checks blood glucose daily in the morning; it ranges  mg/dL    HTN- checks daily; it is usually under 140 mg/dL     Dyspnea on exertion  -     We will try furosemide (LASIX) 20 MG tablet; Take 1 tablet by mouth daily     Essential hypertension  -     CBC Auto Differential; Future  -     Comprehensive Metabolic Panel; Future  -     TSH without Reflex; Future  -     Controlled with current medications     Mixed hyperlipidemia        -    Controlled; continue current medications     Vitamin D deficiency  -     Vitamin D 25 Hydroxy;  Future     Mild persistent asthma without complication       -    Controlled; does not use any medications currently     Benign prostatic hyperplasia with urinary frequency       - Relatively well controlled with use of Proscar and Flomax     Prostate cancer screening  -     Psa screening; Future     Peripheral vascular disease (HCC)        -    Stable, continue rosuvastatin and aspirin 81 mg daily     Coronary artery disease of native artery of native heart with stable angina pectoris (HCC)        -     Stable, continue rosuvastatin and aspirin 81 mg daily  Continue oral carvedilol  Keep scheduled follow-up appointments with his cardiologist, Dr. Micaela Mireles with Interventions:            General Health and ACP:  General  In general, how would you say your health is?: Fair  In the past 7 days, have you experienced any of the following?  New or Increased Pain, New or Increased Fatigue, Loneliness, Social Isolation, Stress or Anger?: None of These  Do you get the social and emotional support that you need?: Yes  Do you have a Living Will?: Yes  Advance Directives     Power of  Living Will ACP-Advance Directive ACP-Power of     Not on File Not on File Not on File Not on File      General Health Risk Interventions:  · Pain issues: home exercises provided    Health Habits/Nutrition:  Health Habits/Nutrition  Do you exercise for at least 20 minutes 2-3 times per week?: (!) No  Have you lost any weight without trying in the past 3 months?: No  Do you eat only one meal per day?: No  Have you seen the dentist within the past year?: Yes  Body mass index: (!) 30.95  Health Habits/Nutrition Interventions:  · Inadequate physical activity:  patient agrees to exercise for at least 150 minutes/week       Personalized Preventive Plan   Current Health Maintenance Status  Immunization History   Administered Date(s) Administered    Influenza 11/25/2013    Influenza, High Dose (Fluzone 65 yrs and older) 09/16/2011, 12/14/2012, 08/29/2014, 12/18/2015, 09/30/2016, 12/15/2017, 01/25/2019    Influenza, Triv, inactivated, subunit, adjuvanted, IM (Fluad 65 yrs and older) 11/22/2019    Pneumococcal Polysaccharide ([id number]) 06/08/2007        Health Maintenance   Topic Date Due    COVID-19 Vaccine (1) Never done    DTaP/Tdap/Td vaccine (1 - Tdap) Never done    Shingles Vaccine (1 of 2) Never done   [employer] Visit (AWV)  Never done    Lipid screen  03/25/2020    Flu vaccine (1) 09/01/2020    Potassium monitoring  11/25/2021    Creatinine monitoring  11/25/2021    Pneumococcal 65+ years Vaccine  Completed    Hepatitis A vaccine  Aged Out    Hib vaccine  Aged Out    Meningococcal (ACWY) vaccine  Aged Out     Recommendations for [place] Due: see orders and patient instructions/AVS.  . Recommended screening schedule for the next 5-10 years is provided to the patient in written form: see Patient Ezekiel De León was seen today for medicare awv. Diagnoses and all orders for this visit:    Routine general medical examination at a health care facility    Chronic right-sided low back pain with right-sided sciatica  -     diclofenac sodium (VOLTAREN) 1 % GEL; Apply 2 g topically 4 times daily as needed for Pain  -     Misc. Devices (WALKER) MISC; 1 each by Does not apply route daily    Type 2 diabetes mellitus without complication, without long-term current use of insulin (HCC)  -     Hemoglobin A1C; Future  -     Microalbumin / Creatinine Urine Ratio; Future  -     Continue current medications    Coronary artery disease of native artery of native heart with stable angina pectoris ([address]         -     Stable; continue current medications    Essential hypertension  -     CBC Auto Differential; Future  -     Comprehensive Metabolic Panel; Future  -     Lipid Panel; Future  -     TSH without Reflex; Future  -      Continue current medications; advised to increase regular exercise activity    Mixed hyperlipidemia  -     Lipid Panel; Future  -     TSH without Reflex; Future  -     Continue current medications        Lumbar disc disease  -     Misc. Devices (WALKER) MISC; 1 each by Does not apply route daily    Osteoarthritis, unspecified osteoarthritis type, unspecified site  -     Misc.  Devices (WALKER) MISC; 1 each by Does not apply route daily  -     Diclofenac 1% gel 2 g applications 4 times a day as needed  -     Tylenol 500 mg orally every 6 hours as needed      Follow-up in 3 months    Natalya Canales MD

## 2021-04-19 NOTE — TELEPHONE ENCOUNTER
She is looking for letter medical necessity  form and chart notes for Rolator walker.     States has been waiting and cant process until she has forms

## 2021-04-27 NOTE — TELEPHONE ENCOUNTER
Daughter calling on behalf of patient. Stated the Glipizide was sent to pharm incorrectly.   She stated that the rx should be for:    Glipizide 1/2 tablet, twice daily  90 day supplies    The incorrect rx was sent as Glipizide 1/2 tablet once daily    Asking we correct rx in chart and send new rx to [facility]

## 2021-06-08 NOTE — PROGRESS NOTES
Follow Up Visit Established Patient Visit    Patient:  Aristeo Mclean                                               : 1935  Age: 81 y.o. MRN: <[id number]>  Date : 2021    Aristeo Mclean is a 81 y.o. male who presents for : Scheduled follow-up appointment    Chief Complaint   Patient presents with    Diabetes    Hypertension    Hyperlipidemia     Checks his blood glucose daily; ranges 110-124 mg/dL. No foot numbness/ tingling. Reports some leg cramps overnight. Adheres to carb control and low-sodium diet  + chronic lower back; uses a cane for ambulation. Uses Voltaren gel applications to lower back as needed. Checks his BP daily. Systolic blood pressure ranging from 118 to 130 mmHg. Does not report chest pain/shortness of breath. No heart palpitations. Reports some left great toe discomfort (chronic in nature). No foot numbness/tingling. Adheres to low-cholesterol diet. Takes rosuvastatin regularly.     Past Medical History:   Diagnosis Date    Allergic rhinitis 2010    Asthma     Back pain     Bilateral cataracts     Bilateral low back pain with right-sided sciatica 2015    BPH (benign prostatic hypertrophy)     Chest pain     Degeneration of lumbar or lumbosacral intervertebral disc     Dental disease     Diabetes mellitus, type 2 ([facility] [address] 2010    Diverticulosis     Dizziness     Eczema 3/11/2011    Essential hypertension 2015    Hearing loss     Hyperlipidemia     Hypertension     Insomnia     Lumbar disc disease     Microalbuminuria 2013    Neck pain 2012    Nosebleed     Obesity     Obstructive sleep apnea     Osteoarthritis     Post herpetic neuralgia 6/15/2012    Recurrent upper respiratory infection (URI)     Right hip pain 2012    Right shoulder pain 2011    Rosacea     Spinal stenosis     Transient global amnesia     Type 2 diabetes mellitus without complication ([facility] [address]     Vitamin D deficiency 3/17/2012       Past Surgical History:   Procedure Laterality Date    PROSTATE SURGERY  June 13, 2000    TURP       Current Outpatient Medications   Medication Sig Dispense Refill    VICTOZA 18 MG/3ML SOPN SC injection ADMINISTER 1.8 MG UNDER THE SKIN DAILY 9 mL 3    glipiZIDE (GLUCOTROL) 5 MG tablet TAKE 1/2 TABLET BY MOUTH TWICE DAILY 90 tablet 0    metFORMIN (GLUCOPHAGE) 500 MG tablet TAKE 2 TABLETS BY MOUTH TWICE DAILY WITH MEALS 360 tablet 2    carvedilol (COREG) 6.25 MG tablet TAKE 1 TABLET BY MOUTH TWICE DAILY 180 tablet 2    diclofenac sodium (VOLTAREN) 1 % GEL Apply 2 g topically 4 times daily as needed for Pain 200 g 3    furosemide (LASIX) 20 MG tablet TAKE 1 TABLET BY MOUTH DAILY 90 tablet 0    tamsulosin (FLOMAX) 0.4 MG capsule TAKE 2 CAPSULES BY MOUTH EVERY NIGHT 180 capsule 0    blood glucose test strips (CONTOUR NEXT TEST) strip 1 each by In Vitro route daily Daily As needed. 100 each 3    finasteride (PROSCAR) 5 MG tablet TAKE 1 TABLET BY MOUTH DAILY 90 tablet 3    rosuvastatin (CRESTOR) 5 MG tablet Take 5 mg by mouth daily      Cholecalciferol (VITAMIN D3) 125 MCG (5000 UT) CAPS TAKE ONE CAPSULE BY MOUTH EVERY DAY 90 capsule 1    ondansetron (ZOFRAN) 4 MG tablet Take 1 tablet by mouth every 8 hours as needed for Nausea 12 tablet 0    fluticasone (FLONASE) 50 MCG/ACT nasal spray SHAKE LIQUID AND USE 2 SPRAYS IN EACH NOSTRIL DAILY 1 Bottle 3    atorvastatin (LIPITOR) 40 MG tablet Take 1 tablet by mouth daily 90 tablet 3    nitroGLYCERIN (NITROSTAT) 0.4 MG SL tablet Place 1 tablet under the tongue every 5 minutes as needed for Chest pain May repeat every 5 minutes until relief is obtained. If pain persists after taking 3 tabs in a 15-minute period, call 9-1-1 immediately. 25 tablet 12    Melatonin 3 MG CAPS Take 1 capsule by mouth nightly as needed (for insomnia)      Naproxen Sodium (ALEVE) 220 MG CAPS Take 1 capsule by mouth daily. With food.       aspirin 81 MG EC tablet Take 81 mg by mouth daily. No current facility-administered medications for this visit. /62   Pulse 63   Ht 5' 4\" (1.626 m)   Wt 177 lb (80.3 kg)   BMI 30.38 kg/m²     Physical Exam   Physical Exam  Vitals and nursing note reviewed. Constitutional:       General: He is not in acute distress. Appearance: Normal appearance. He is well-developed. HENT:      Head: Normocephalic and atraumatic. Right Ear: Tympanic membrane, ear canal and external ear normal. There is no impacted cerumen. Left Ear: Tympanic membrane, ear canal and external ear normal. There is no impacted cerumen. Nose:      Comments: Nasal mucosa is mildly swollen/inflamed     Mouth/Throat:      Mouth: Mucous membranes are moist.      Pharynx: Oropharynx is clear. No oropharyngeal exudate or posterior oropharyngeal erythema. Eyes:      General: No scleral icterus. Pupils: Pupils are equal, round, and reactive to light. Neck:      Vascular: No carotid bruit or JVD. Cardiovascular:      Rate and Rhythm: Normal rate and regular rhythm. Heart sounds: Normal heart sounds. No murmur heard. No friction rub. No gallop. Pulmonary:      Effort: Pulmonary effort is normal. No respiratory distress. Breath sounds: Normal breath sounds. No wheezing or rales. Chest:      Chest wall: No tenderness. Abdominal:      General: Bowel sounds are normal. There is no distension. Palpations: Abdomen is soft. Tenderness: There is no abdominal tenderness. There is no right CVA tenderness or left CVA tenderness. Musculoskeletal:         General: Tenderness (Mild parasternal tenderness to deep palpation of his lumbar spine) present. Normal range of motion. Cervical back: Normal range of motion and neck supple. No rigidity or tenderness. Right lower leg: No edema. Left lower leg: No edema. Comments:  There is mild chronic erythema of the medial aspect of his great left toe

## 2021-08-21 PROBLEM — N17.9 ACUTE KIDNEY INJURY (HCC): Status: ACTIVE | Noted: 2021-01-01

## 2021-08-21 NOTE — ED NOTES
Pt arrives to [facility] ED from home via ems after being found on the floor at home by his family. When pt was found by family pt was confused and unable to get up off the floor. Pt is Spanish speaking and is confused upon arrival pts family translating for patient.       Natasha Sol RN  08/21/21 8009

## 2021-08-21 NOTE — ED PROVIDER NOTES
[id number] [facility] RESIDENT NOTE       Date of evaluation: 8/21/2021    Chief Complaint     Altered mental status, subjective fever / chills, weakness and fall at home    of Present Illness     Arian Tan is a 81 y.o. male who presents with altered mental status. The patient is [place] speaking only and  services used; daughter also at bedside assisting. The patient lives alone with family checking on him. Past medical history is significant for diabetes and CHF. Was in baseline health until past 24 hours when he has developed subjective fever and chills. Was doing well this morning however did not come to the door when family was checking on him this afternoon; family had to break into the home where they found him with weakness, altered mental status having fallen while in the bathroom. At presentation he is alert but is confused and cannot meaningfully contribute to HPI or ROS. Review of Systems     Unable to obtain. Past Medical, Surgical, Family, and Social History     He has a past medical history of Allergic rhinitis, Asthma, Back pain, Bilateral cataracts, Bilateral low back pain with right-sided sciatica, BPH (benign prostatic hypertrophy), Chest pain, Degeneration of lumbar or lumbosacral intervertebral disc, Dental disease, Diabetes mellitus, type 2 ([address] Diverticulosis, Dizziness, Eczema, Essential hypertension, Hearing loss, Hyperlipidemia, Hypertension, Insomnia, Lumbar disc disease, Microalbuminuria, Neck pain, Nosebleed, Obesity, Obstructive sleep apnea, Osteoarthritis, Post herpetic neuralgia, Recurrent upper respiratory infection (URI), Right hip pain, Right shoulder pain, Rosacea, Spinal stenosis, Transient global amnesia, Type 2 diabetes mellitus without complication ([address] and Vitamin D deficiency. He has a past surgical history that includes Prostate surgery (June 13, 2000).   His family history includes Diabetes in his brother and mother. He reports that he has never smoked. He has never used smokeless tobacco. He reports current alcohol use. He reports that he does not use drugs. Medications     Previous Medications    ASPIRIN 81 MG EC TABLET    Take 81 mg by mouth daily. ATORVASTATIN (LIPITOR) 40 MG TABLET    Take 1 tablet by mouth daily    CARVEDILOL (COREG) 6.25 MG TABLET    TAKE 1 TABLET BY MOUTH TWICE DAILY    CHOLECALCIFEROL (VITAMIN D3) 125 MCG (5000 UT) CAPS    TAKE ONE CAPSULE BY MOUTH EVERY DAY    DICLOFENAC SODIUM (VOLTAREN) 1 % GEL    APPLY 2 GRAM TOPICALLY FOUR TIMES DAILY AS NEEDED FOR PAIN    FINASTERIDE (PROSCAR) 5 MG TABLET    TAKE 1 TABLET BY MOUTH DAILY    FLUTICASONE (FLONASE) 50 MCG/ACT NASAL SPRAY    SHAKE LIQUID AND USE 2 SPRAYS IN EACH NOSTRIL DAILY    FUROSEMIDE (LASIX) 20 MG TABLET    TAKE 1 TABLET BY MOUTH DAILY    GLIPIZIDE (GLUCOTROL) 5 MG TABLET    TAKE 1/2 TABLET BY MOUTH TWICE DAILY    MELATONIN 3 MG CAPS    Take 1 capsule by mouth nightly as needed (for insomnia)    METFORMIN (GLUCOPHAGE) 500 MG TABLET    TAKE 2 TABLETS BY MOUTH TWICE DAILY WITH MEALS    NAPROXEN SODIUM (ALEVE) 220 MG CAPS    Take 1 capsule by mouth daily. With food. NITROGLYCERIN (NITROSTAT) 0.4 MG SL TABLET    Place 1 tablet under the tongue every 5 minutes as needed for Chest pain May repeat every 5 minutes until relief is obtained. If pain persists after taking 3 tabs in a 15-minute period, call 9-1-1 immediately. ONDANSETRON (ZOFRAN) 4 MG TABLET    Take 1 tablet by mouth every 8 hours as needed for Nausea    ONETOUCH ULTRA STRIP    TEST ONCE DAILY    ROSUVASTATIN (CRESTOR) 5 MG TABLET    Take 5 mg by mouth daily    TAMSULOSIN (FLOMAX) 0.4 MG CAPSULE    TAKE 2 CAPSULES BY MOUTH EVERY NIGHT    VICTOZA 18 MG/3ML SOPN SC INJECTION    ADMINISTER 1.8 MG UNDER THE SKIN DAILY       Allergies     He is allergic to tramadol.     Physical Exam     INITIAL VITALS: BP: (!) 149/77, Temp: 97.8 °F (36.6 °C), Pulse: 96, Resp: (!) 33, SpO2: 97 %     General: alert and conversant in no distress; unwell appearing   Skin: warm, dry and pink; subacute ulceration to right knee   Head: normocephalic atraumatic  Eyes: Pupils equal, extra ocular movements grossly intact  Mouth / Pharynx: moist mucus membranes without erythema or lesions noted  Neck: full range of motion without meningismus  Chest: no external findings or tenderness; clear to auscultation  Heart: regular normal S1 and S2 without murmur  noted  Abdomen: negative external findings, + bowel sounds, soft and non tender; no rebound; non distended  Extremities: well perfused with palpable distal pulses; full range of motion grossly intact, no edema  Neuro: GCS 14 with confusion; CN II-XII grossly intact; UE LE motor stength grossly intact 4/5; UE LE sensation grossly intact. DiagnosticResults     EKG   NSR rate of 95 without ectopy; normal axis and intervals; no evidence of ischemia     RADIOLOGY:  CT Cervical Spine WO Contrast   Final Result      1. No acute intracranial hemorrhage or mass effect. 2.  No evidence of cervical spine fracture or traumatic subluxation. 3.  Severe multilevel degenerative changes in the cervical spine resulting canal and foraminal stenosis, as above. CT Head WO Contrast   Final Result      1. No acute intracranial hemorrhage or mass effect. 2.  No evidence of cervical spine fracture or traumatic subluxation. 3.  Severe multilevel degenerative changes in the cervical spine resulting canal and foraminal stenosis, as above. XR CHEST PORTABLE   Final Result      Mild left basilar atelectasis.           LABS:   Results for orders placed or performed during the hospital encounter of 08/21/21   CBC Auto Differential   Result Value Ref Range    WBC 4.9 4.0 - 11.0 K/uL    RBC 4.17 (L) 4.20 - 5.90 M/uL    Hemoglobin 13.9 13.5 - 17.5 g/dL    Hematocrit 39.0 (L) 40.5 - 52.5 %    MCV 93.7 80.0 - 100.0 fL    MCH 33.4 26.0 - 34.0 pg    MCHC 35.7 31.0 - 36.0 g/dL    RDW 13.3 12.4 - 15.4 %    Platelets 947 (L) 510 - 450 K/uL    MPV 8.1 5.0 - 10.5 fL    Neutrophils % 87.7 %    Lymphocytes % 7.2 %    Monocytes % 4.9 %    Eosinophils % 0.1 %    Basophils % 0.1 %    Neutrophils Absolute 4.3 1.7 - 7.7 K/uL    Lymphocytes Absolute 0.4 (L) 1.0 - 5.1 K/uL    Monocytes Absolute 0.2 0.0 - 1.3 K/uL    Eosinophils Absolute 0.0 0.0 - 0.6 K/uL    Basophils Absolute 0.0 0.0 - 0.2 K/uL   Comprehensive Metabolic Panel w/ Reflex to MG   Result Value Ref Range    Sodium 132 (L) 136 - 145 mmol/L    Potassium reflex Magnesium 4.2 3.5 - 5.1 mmol/L    Chloride 95 (L) 99 - 110 mmol/L    CO2 21 21 - 32 mmol/L    Anion Gap 16 3 - 16    Glucose 337 (H) 70 - 99 mg/dL    BUN 34 (H) 7 - 20 mg/dL    CREATININE 1.4 (H) 0.8 - 1.3 mg/dL    GFR Non- 48 (A) >60    GFR  58 (A) >60    Calcium 9.1 8.3 - 10.6 mg/dL    Total Protein 7.0 6.4 - 8.2 g/dL    Albumin 3.9 3.4 - 5.0 g/dL    Albumin/Globulin Ratio 1.3 1.1 - 2.2    Total Bilirubin 0.6 0.0 - 1.0 mg/dL    Alkaline Phosphatase 67 40 - 129 U/L    ALT 35 10 - 40 U/L    AST 39 (H) 15 - 37 U/L    Globulin 3.1 g/dL   Troponin   Result Value Ref Range    Troponin 0.16 (H) <0.01 ng/mL   Brain Natriuretic Peptide   Result Value Ref Range    Pro- 0 - 449 pg/mL   Lactate, Sepsis   Result Value Ref Range    Lactic Acid, Sepsis 3.4 (H) 0.4 - 1.9 mmol/L   Blood Gas, Venous   Result Value Ref Range    pH, Eder 7.426 7.350 - 7.450    pCO2, Eder 40.0 (L) 41.0 - 51.0 mmHg    pO2, Eder 54.9 (H) 25 - 40 mmHg    HCO3, Venous 26.3 24.0 - 28.0 mmol/L    Base Excess, Eder 1.7 -2.0 - 3.0 mmol/L    O2 Sat, Eder 88 Not established %    Carboxyhemoglobin 1.1 0.0 - 1.5 %    MetHgb, Eder 0.1 0.0 - 1.5 %    TC02 (Calc), Eder 28 mmol/L    Hemoglobin, Eder, Reduced 11.70 %   Urinalysis, reflex to microscopic   Result Value Ref Range    Color, UA Yellow Straw/Yellow    Clarity, UA Clear Clear    Glucose, Ur >=1000 (A) Negative mg/dL Bilirubin Urine Negative Negative    Ketones, Urine 15 (A) Negative mg/dL    Specific Gravity, UA 1.020 1.005 - 1.030    Blood, Urine MODERATE (A) Negative    pH, UA 6.0 5.0 - 8.0    Protein, UA 30 (A) Negative mg/dL    Urobilinogen, Urine 0.2 <2.0 E.U./dL    Nitrite, Urine Negative Negative    Leukocyte Esterase, Urine Negative Negative    Microscopic Examination YES     Urine Type NotGiven    Microscopic Urinalysis   Result Value Ref Range    WBC, UA None seen 0 - 5 /HPF    RBC, UA 0-2 0 - 4 /HPF       ED BEDSIDE ULTRASOUND:  na    RECENT VITALS:  BP: 125/70, Temp: 97.8 °F (36.6 °C), Pulse: 90,Resp: 26, SpO2: 97 %     Procedures     na    ED Course     Nursing Notes, Past Medical Hx, Past Surgical Hx, Social Hx, Allergies, and Family Hx were reviewed. The patient was given the followingmedications:  Orders Placed This Encounter   Medications    acetaminophen (TYLENOL) tablet 650 mg    lactated ringers infusion 1,000 mL       CONSULTS:  Randy Wade / ASSESSMENT / Basia Anabel is a 81 y.o. male who presents with confusion, weakness and fall at home on a background of possible febrile illness. He is hemodynamically stable and low suspicion for sepsis but will benefit from broad workup and anticipate admission. ED Course as of Aug 21 2212   Sat Aug 21, 2021   2155 Patient remains hemodynamically stable and in no distress. He does remain somewhat confused and again presents in the context of weakness and fall at home. His work-up is reviewed and demonstrates a mildly elevated troponin with a repeat pending. His EKG is nonischemic. He shows an elevated BUN and creatinine is not significantly changed from his baseline. He has a mildly elevated lactic acid without overt acidemia. There is no evidence of leukocytosis and his urine demonstrates elevated glucose and moderate blood without evidence of infection.   His chest x-ray demonstrates atelectasis without overt evidence of infection. Overall the precise etiology of his fever is unclear with no source identified. His presentation may represent a viral illness with concern for Covid. Certainly given his weakness and inability to care for himself at home he will benefit from admission; a bed request is placed at this time. He will be given IV fluids to resolve a lactate however again there is low suspicion for overt sepsis at this time and antibiotics will be held. [NG]   2020 Patient's case is discussed with the admitting team and is accepted for admission at this time. This concludes his ED course. [NG]      ED Course User Index  [NG] Michael Neal MD         This patient was also evaluated by the attending physician. All care plans werediscussed and agreed upon. Clinical Impression     1. Altered mental status, unspecified altered mental status type        Disposition     PATIENT REFERRED TO:  No follow-up provider specified.     DISCHARGE MEDICATIONS:  New Prescriptions    No medications on file       DISPOSITION     - admit to hospital medicine      Michael Neal MD  Resident  08/21/21 0615

## 2021-08-22 NOTE — PROGRESS NOTES
4 Eyes Admission Assessment     I agree as the admission nurse that 2 RN's have performed a thorough Head to Toe Skin Assessment on the patient. ALL assessment sites listed below have been assessed on admission. Areas assessed by both nurses: Dairl Declan  [x]   Head, Face, and Ears   [x]   Shoulders, Back, and Chest  [x]   Arms, Elbows, and Hands   [x]   Coccyx, Sacrum, and Ischium  [x]   Legs, Feet, and Heels        Does the Patient have Skin Breakdown? Bruising and abrasion on bilat big toes, abrasion R knee, blanchable redness on buttocks.          Ignacio Prevention initiated:  Yes   Wound Care Orders initiated:  No      WOC nurse consulted for Pressure Injury (Stage 3,4, Unstageable, DTI, NWPT, and Complex wounds) or Ignacio score 18 or lower:  No      Nurse 1 eSignature: Electronically signed by Richi Walton RN on 8/22/21 at 7:05 AM EDT    **SHARE this note so that the co-signing nurse is able to place an eSignature**    Nurse 2 eSignature: Electronically signed by Penelope Rangel RN on 8/22/21 at 7:38 AM EDT

## 2021-08-22 NOTE — ED PROVIDER NOTES
ED Attending Attestation Note     Date of evaluation: 8/21/2021    This patient was seen by the resident. I have seen and examined the patient, agree with the workup, evaluation, management and diagnosis. The care plan has been discussed. I have reviewed the ECG and concur with the resident's interpretation. My assessment reveals complaints of altered mental status. Patient is [place]-speaking only but family states that over the last several hours he has been more confused than baseline. They also note the patient's had 2 falls in the last 24 hours. Patient has no focal neurologic deficits. Will check laboratory studies, imaging.      Baljinder Navarrete MD  08/21/21 2044

## 2021-08-22 NOTE — CONSULTS
[address]         Reason for Consultation/Chief Complaint: \"fell. \"       History of Present Illness:  Adolfo Pablo is a 81 y.o. patient who presented to the hospital with complaints of fall and was found down reportedly. .  The patient denies any chest pain and appears comfortable. He does not speak [place]. Trop peak at 0.30 now 0.24. Normal ECG. procalcitonin 0.43. Past Medical History:   has a past medical history of Allergic rhinitis, Asthma, Back pain, Bilateral cataracts, Bilateral low back pain with right-sided sciatica, BPH (benign prostatic hypertrophy), Chest pain, Degeneration of lumbar or lumbosacral intervertebral disc, Dental disease, Diabetes mellitus, type 2 ([address] Diverticulosis, Dizziness, Eczema, Essential hypertension, Hearing loss, Hyperlipidemia, Hypertension, Insomnia, Lumbar disc disease, Microalbuminuria, Neck pain, Nosebleed, Obesity, Obstructive sleep apnea, Osteoarthritis, Post herpetic neuralgia, Recurrent upper respiratory infection (URI), Right hip pain, Right shoulder pain, Rosacea, Spinal stenosis, Transient global amnesia, Type 2 diabetes mellitus without complication ([address] and Vitamin D deficiency. Surgical History:   has a past surgical history that includes Prostate surgery (June 13, 2000). Social History:   reports that he has never smoked. He has never used smokeless tobacco. He reports current alcohol use. He reports that he does not use drugs. Family History:  No evidence for sudden cardiac death or premature CAD    Home Medications:  Were reviewed and are listed in nursing record. and/or listed below  Prior to Admission medications    Medication Sig Start Date End Date Taking?  Authorizing Provider   [facility] ULTRA strip TEST ONCE DAILY 8/19/21  Yes Neymar Quan MD   diclofenac sodium (VOLTAREN) 1 % GEL APPLY 2 GRAM TOPICALLY FOUR TIMES DAILY AS NEEDED FOR PAIN 7/28/21  Yes Neymar Quan MD   VICTOZA 18 MG/3ML SOPN SC injection ADMINISTER 1.8 MG UNDER THE SKIN DAILY 5/19/21  Yes James Kapadia MD   glipiZIDE (GLUCOTROL) 5 MG tablet TAKE 1/2 TABLET BY MOUTH TWICE DAILY 4/28/21  Yes James Kapadia MD   metFORMIN (GLUCOPHAGE) 500 MG tablet TAKE 2 TABLETS BY MOUTH TWICE DAILY WITH MEALS 3/26/21  Yes James Kapadia MD   carvedilol (COREG) 6.25 MG tablet TAKE 1 TABLET BY MOUTH TWICE DAILY 3/26/21  Yes James Kapadia MD   furosemide (LASIX) 20 MG tablet TAKE 1 TABLET BY MOUTH DAILY 11/20/20  Yes James Kapadia MD   tamsulosin (FLOMAX) 0.4 MG capsule TAKE 2 CAPSULES BY MOUTH EVERY NIGHT 10/12/20  Yes James Kapadia MD   finasteride (PROSCAR) 5 MG tablet TAKE 1 TABLET BY MOUTH DAILY 5/13/20  Yes James Kapadia MD   Cholecalciferol (VITAMIN D3) 125 MCG (5000 UT) CAPS TAKE ONE CAPSULE BY MOUTH EVERY DAY 1/17/20  Yes James Kapadia MD   ondansetron (ZOFRAN) 4 MG tablet Take 1 tablet by mouth every 8 hours as needed for Nausea 5/29/19  Yes KEERTHI Lindsey   fluticasone (FLONASE) 50 MCG/ACT nasal spray SHAKE LIQUID AND USE 2 SPRAYS IN EACH NOSTRIL DAILY 10/8/18  Yes James Kapadia MD   atorvastatin (LIPITOR) 40 MG tablet Take 1 tablet by mouth daily 4/21/17  Yes Monse Sousa MD   nitroGLYCERIN (NITROSTAT) 0.4 MG SL tablet Place 1 tablet under the tongue every 5 minutes as needed for Chest pain May repeat every 5 minutes until relief is obtained. If pain persists after taking 3 tabs in a 15-minute period, call 9-1-1 immediately. 9/30/16  Yes Monse Sousa MD   Melatonin 3 MG CAPS Take 1 capsule by mouth nightly as needed (for insomnia) 4/8/16  Yes Monse Sousa MD   Naproxen Sodium (ALEVE) 220 MG CAPS Take 1 capsule by mouth daily. With food. 11/25/13  Yes Monse Sousa MD   aspirin 81 MG EC tablet Take 81 mg by mouth daily.    Yes Momo Bardales MD   rosuvastatin (CRESTOR) 5 MG tablet Take 5 mg by mouth daily    Historical Provider, MD        Hospital Medications:   atorvastatin  40 mg Oral Daily    carvedilol  6.25 mg Oral BID WC    vitamin D  5,000 Units Oral Daily    finasteride  5 mg Oral Daily    tamsulosin  0.4 mg Oral Daily    sodium chloride flush  5-40 mL Intravenous 2 times per day    enoxaparin  40 mg Subcutaneous Daily    insulin lispro  0-6 Units Subcutaneous TID     insulin lispro  0-3 Units Subcutaneous Nightly    cefTRIAXone (ROCEPHIN) IV  1,000 mg Intravenous Q24H    [START ON 8/23/2021] azithromycin  250 mg Oral Daily    pantoprazole  40 mg Intravenous Daily     melatonin, sodium chloride flush, sodium chloride, ondansetron **OR** ondansetron, polyethylene glycol, acetaminophen **OR** acetaminophen, glucose, dextrose, glucagon (rDNA), dextrose   sodium chloride      dextrose      sodium chloride 125 mL/hr at 08/21/21 5516       Allergies:  Tramadol     Review of Systems:     Cannot obtain due to language barrier. No chest pain. Physical Examination:    Vitals:    08/22/21 0750   BP: (!) 111/52   Pulse: 84   Resp: 16   Temp: 97.6 °F (36.4 °C)   SpO2: 94%    Weight: 179 lb 4.8 oz (81.3 kg)         General Appearance:  Alert, cooperative, no distress, appears stated age   Head:  Normocephalic, without obvious abnormality, atraumatic   Eyes:  PERRL   Nose: Nares normal,   Neck: Supple, JVP normal   Lungs:   Clear to auscultation bilaterally, respirations unlabored   Chest Wall:  No tenderness or deformity   Heart:  Regular rate and rhythm, normal S1, S2 normal, no murmur,  No rub. No S3 / S4 gallop   Abdomen:   Soft, non-tender, +bowel sounds   Extremities: no cyanosis, no clubbing , No edema   Pulses: Symmetric extremities   Skin: no gross lesions or rashes   Pysch: Normal mood and affect   Neurologic: No gross deficits.   CN II - XII grossly intact        Labs  CBC:   Lab Results   Component Value Date    WBC 4.2 08/22/2021    RBC 4.05 08/22/2021    HGB 13.3 08/22/2021    HCT 37.6 08/22/2021    MCV 92.8 08/22/2021    RDW 13.6 08/22/2021    PLT 94 08/22/2021     CMP:    Lab Results   Component Value Date    NA 133 08/22/2021    K 3.4 08/22/2021    CL 99 08/22/2021    CO2 24 08/22/2021    BUN 27 08/22/2021    CREATININE 1.1 08/22/2021    GFRAA >60 08/22/2021    GFRAA >60 06/10/2013    AGRATIO 1.3 08/21/2021    LABGLOM >60 08/22/2021    GLUCOSE 173 08/22/2021    GLUCOSE 120 09/16/2011    PROT 7.0 08/21/2021    PROT 6.5 03/04/2013    CALCIUM 8.7 08/22/2021    BILITOT 0.6 08/21/2021    ALKPHOS 67 08/21/2021    AST 39 08/21/2021    ALT 35 08/21/2021     PT/INR:  No results found for: PTINR  Recent Labs     08/21/21  1933 08/21/21  2202 08/22/21  0433   TROPONINI 0.16* 0.30* 0.24*       EKG:  SR /normal ECG    Assessment  Patient Active Problem List   Diagnosis    Rosacea    Back pain    Obstructive sleep apnea    Chest pain    Transient global amnesia    Obesity    Insomnia    Bilateral cataracts    Osteoarthritis    Lumbar disc disease    Spinal stenosis    Diverticulosis    Benign prostatic hyperplasia    Hyperlipidemia    Diabetes mellitus, type 2 (HCC)    Allergic rhinitis    Nausea    Eczema    Dyspnea on exertion    Right shoulder pain    Shortness of breath    Vitamin D deficiency    Post herpetic neuralgia    Neck pain    Right hip pain    Microalbuminuria    Essential hypertension    Type 2 diabetes mellitus without complication (HCC)    Bilateral low back pain with right-sided sciatica    Coronary artery disease of native artery of native heart with stable angina pectoris (HCC)    Peripheral vascular disease ([address]    Acute kidney injury ([address]        Impression:  Troponin elevation from demand ischemia from fall. Doubt cardiogenic syncope. covid R/O with procalcitonin 0.43. Recommendations:    I had the opportunity to review the clinical symptoms and presentation of Roberto Collado. I recommend that the patient undergo further cardiac evaluation with echo. Troponin elevation on basis of demand ischemia from possible infection.   If there was syncope I doubt that it was cardiac in origin as there is no block on ECG and ECG is normal.      Tobacco use was discussed with the patient and educated on the negative effects. I have asked the patient to not utilize these agents. Thank you for allowing to us to participate in the care or Ramses Gonzales. All questions and concerns were addressed to the patient/family. Alternatives to my treatment were discussed. The note was completed using EMR. Every effort was made to ensure accuracy; however, inadvertent computerized transcription errors may be present.        Syed Palmer MD [facility]

## 2021-08-22 NOTE — PROGRESS NOTES
2356     PRN Meds:melatonin, sodium chloride flush, sodium chloride, ondansetron **OR** ondansetron, polyethylene glycol, acetaminophen **OR** acetaminophen, glucose, dextrose, glucagon (rDNA), dextrose    Objective:   Vitals:   T-max:  Patient Vitals for the past 8 hrs:   BP Temp Temp src Pulse Resp SpO2   08/22/21 0750 (!) 111/52 97.6 °F (36.4 °C) -- 84 16 94 %   08/22/21 0340 (!) 102/46 98.7 °F (37.1 °C) Oral 82 24 96 %       Intake/Output Summary (Last 24 hours) at 8/22/2021 0925  Last data filed at 8/22/2021 0291  Gross per 24 hour   Intake 120 ml   Output 425 ml   Net -305 ml       Review of Systems   Constitutional: Positive for chills, diaphoresis and fatigue. Negative for fever. Respiratory: Positive for shortness of breath. Negative for cough. Cardiovascular: Negative for chest pain and leg swelling. Gastrointestinal: Positive for abdominal pain and constipation. Negative for abdominal distention and diarrhea. Genitourinary: Positive for frequency. Negative for decreased urine volume. Neurological: Positive for headaches. Negative for light-headedness. Physical Exam  Constitutional:       General: He is not in acute distress. Appearance: He is ill-appearing and diaphoretic. Comments: Lethargic   HENT:      Mouth/Throat:      Mouth: Mucous membranes are dry. Pharynx: Oropharynx is clear. Eyes:      Extraocular Movements: Extraocular movements intact. Pupils: Pupils are equal, round, and reactive to light. Cardiovascular:      Rate and Rhythm: Normal rate and regular rhythm. Pulses: Normal pulses. Heart sounds: Normal heart sounds. Pulmonary:      Effort: Pulmonary effort is normal. No respiratory distress. Breath sounds: Normal breath sounds. No wheezing or rales. Abdominal:      General: Abdomen is flat. Bowel sounds are normal.      Palpations: Abdomen is soft. Tenderness: There is abdominal tenderness (RLQ and LLQ).    Musculoskeletal: Right lower leg: No edema. Left lower leg: No edema. Neurological:      General: No focal deficit present. LABS:    CBC:   Recent Labs     08/21/21 1933 08/22/21 0433   WBC 4.9 4.2   HGB 13.9 13.3*   HCT 39.0* 37.6*   * 94*   MCV 93.7 92.8     Renal:    Recent Labs     08/21/21 1933 08/22/21 0433   * 133*   K 4.2 3.4*   CL 95* 99   CO2 21 24   BUN 34* 27*   CREATININE 1.4* 1.1   GLUCOSE 337* 173*   CALCIUM 9.1 8.7   MG  --  1.80   ANIONGAP 16 10     Hepatic:   Recent Labs     08/21/21 1933   AST 39*   ALT 35   BILITOT 0.6   PROT 7.0   LABALBU 3.9   ALKPHOS 67     Troponin:   Recent Labs     08/21/21 1933 08/21/21 2202 08/22/21 0433   TROPONINI 0.16* 0.30* 0.24*     BNP: No results for input(s): BNP in the last 72 hours. Lipids: No results for input(s): CHOL, HDL in the last 72 hours. Invalid input(s): LDLCALCU, TRIGLYCERIDE  ABGs:  No results for input(s): PHART, EPQ2TDK, PO2ART, RQM4KZZ, BEART, THGBART, G0VFMFAK, VVY2CBP in the last 72 hours. INR: No results for input(s): INR in the last 72 hours. Lactate: No results for input(s): LACTATE in the last 72 hours. Cultures:  -----------------------------------------------------------------  RAD:   CT Cervical Spine WO Contrast   Final Result      1. No acute intracranial hemorrhage or mass effect. 2.  No evidence of cervical spine fracture or traumatic subluxation. 3.  Severe multilevel degenerative changes in the cervical spine resulting canal and foraminal stenosis, as above. CT Head WO Contrast   Final Result      1. No acute intracranial hemorrhage or mass effect. 2.  No evidence of cervical spine fracture or traumatic subluxation. 3.  Severe multilevel degenerative changes in the cervical spine resulting canal and foraminal stenosis, as above. XR CHEST PORTABLE   Final Result      Mild left basilar atelectasis.           Assessment/Plan:     Acute Metabolic Encephalopathy: likely related to dehydration. Dehydration 2/2 to reduced PO intake and possible infection. Viral more likely considering no leukocytosis, myalgias/arthralgias, and afebrile. Procal elevated but clinical picture makes bacterial pneumonia much less likely. Flu negative  - NS @ 125ml/hr  - FU resp viral panel, COVID test: can d/c antibiotics if any come back positive  - FU blood cultures  - discontinue azithromycin/ceftriaxone  - TSH w/ reflex     NIDDM with Hyperglycemia: 15 ketones on UA but ketoacidosis unlikely given normal VBG. Sugars have corrected to within goal range. Patient is insulin naive prior to hospital stay  - LDSSI     HA on CKD2: Pre-renal etiology, resolved with rehydration  - IVFs per above     Lactic Acidosis: 2/2 hypovolemia. resolved  - stop trending lactate  - on IVF per above     NSTEMI Type 2: most likely r/t hypovolemia. Trop elevation could also be due to viral myocarditis but improvement with fluids suggests hypovolemia. Initially uptrending trops now downtrending. Last echo (3/2020) 50% with lateral and inferior wall hypokinesis  - trend Trop Q6: d/c if downtrending x2  - FU echo  - Cardiology consulted  - Pt refused cardiac Cath in past but may need stress test following optimization    HLD  - home lipitor    HTN  - home carvedilol    BPH:  - home finasteride, tamsulosin        Code Status: FULL CODE  FEN: Regular diet, Low Carb   PPX:  Lovenox  DISPO: [facility]  Elva Siddiqi MD, PGY-1  08/22/21  9:25 AM    This patient has been staffed and discussed with Dr. Salty Bates.

## 2021-08-22 NOTE — H&P
Internal Medicine  PGY 3  History & Physical      CC Found Down    History Obtained From:  patient, family member - daughter who is , electronic medical record    SUBJECTIVE      HISTORY OF PRESENT ILLNESS:   Mr. Romie Valentine is a 82yo male PM listed below with notable NIDDM, HTN, HLD, BPH, who was found down by his family earlier today. He is independent and lives alone, but with frequent family interaction. He is normally quite independent and here strictly to his medical and diet regimen. He was last seen well Friday evening. Saturday morning, his grandson stop by and he did not answer the door, so his grandson came in through the window to find Roberto eckert on the ground in the bathroom. The patient is reported to be confused, but alert. He was able to speak, but was not making very much sense per the family. He was transported to [facility] ED, where he was found to be dehydrated with elevated glucose in the serum and the urine. His daughter is at his bedside and translates. Patient reports some mild chills, but has no other real complaints. He feels that his mouth is dry and that he has been urinating a lot. He has not been coughing, vomiting, or having diarrhea. He has had no chest pain, chest pressure, or palpitations. He denies syncope, or rather does not recall. Denies abdominal pain. He has been adherent to his normal medications and did take them Friday as scheduled. He was normotensive, afebrile without tachycardia or hypoxia. Lab work was revealing for Cr 1.4, glucose 337, troponin 0 0.61, lactate 3.4, normal LFTs, WBC 4.9, Hb 13.9, platelets 796. Urine has >1000 glucose, 15 ketones, moderate blood, negative for infection. Denies any fevers, chills, chest pain, palpitations, leg swelling, cough, shortness of breath, difficulty breathing, wheezing, abdominal pain, nausea, vomiting, diarrhea.     Past Medical History:        Diagnosis Date    Allergic rhinitis 8/27/2010    Asthma     Back pain     Bilateral cataracts     Bilateral low back pain with right-sided sciatica 12/18/2015    BPH (benign prostatic hypertrophy)     Chest pain     Degeneration of lumbar or lumbosacral intervertebral disc     Dental disease     Diabetes mellitus, type 2 ([facility] [address] 5/28/2010    Diverticulosis     Dizziness     Eczema 3/11/2011    Essential hypertension 9/18/2015    Hearing loss     Hyperlipidemia     Hypertension     Insomnia     Lumbar disc disease     Microalbuminuria 6/13/2013    Neck pain 12/14/2012    Nosebleed     Obesity     Obstructive sleep apnea     Osteoarthritis     Post herpetic neuralgia 6/15/2012    Recurrent upper respiratory infection (URI)     Right hip pain 12/14/2012    Right shoulder pain 6/17/2011    Rosacea     Spinal stenosis     Transient global amnesia     Type 2 diabetes mellitus without complication ([facility] [address] 64/13/8479    Vitamin D deficiency 3/17/2012       Past Surgical History:        Procedure Laterality Date    PROSTATE SURGERY  June 13, 2000    TURP       Medications Priorto Admission:    Not in a hospital admission. Allergies:  Tramadol    Social History:   · TOBACCO:   reports that he has never smoked. He has never used smokeless tobacco.  · ETOH:   reports current alcohol use. · DRUGS : denies  · Patient currently lives alone    Family History:       Problem Relation Age of Onset    Diabetes Mother     Diabetes Brother        ROS: A 10 point review of systems was conducted, significant findings as noted in HPI. Physical Exam  Vitals and nursing note reviewed. Constitutional:       General: He is not in acute distress. Appearance: Normal appearance. He is ill-appearing. He is not toxic-appearing. Comments: Appears stated age, dry mucous membranes. HENT:      Head: Normocephalic and atraumatic.       Right Ear: External ear normal.      Left Ear: External ear normal.      Nose: Nose normal.      Mouth/Throat: GLUCOSE 337*     LFT's:   Recent Labs     08/21/21 1933   AST 39*   ALT 35   BILITOT 0.6   ALKPHOS 67     Troponin:   Recent Labs     08/21/21 1933 08/21/21 2202   TROPONINI 0.16* 0.30*     BNP:No results for input(s): BNP in the last 72 hours. ABGs: No results for input(s): PHART, JDH2CID, PO2ART in the last 72 hours. INR: No results for input(s): INR in the last 72 hours. U/A:  Recent Labs     08/21/21 1933   COLORU Yellow   PHUR 6.0   WBCUA None seen   RBCUA 0-2   CLARITYU Clear   SPECGRAV 1.020   LEUKOCYTESUR Negative   UROBILINOGEN 0.2   BILIRUBINUR Negative   BLOODU MODERATE*   GLUCOSEU >=1000*       CT Cervical Spine WO Contrast   Final Result      1. No acute intracranial hemorrhage or mass effect. 2.  No evidence of cervical spine fracture or traumatic subluxation. 3.  Severe multilevel degenerative changes in the cervical spine resulting canal and foraminal stenosis, as above. CT Head WO Contrast   Final Result      1. No acute intracranial hemorrhage or mass effect. 2.  No evidence of cervical spine fracture or traumatic subluxation. 3.  Severe multilevel degenerative changes in the cervical spine resulting canal and foraminal stenosis, as above. XR CHEST PORTABLE   Final Result      Mild left basilar atelectasis.               ASSESSMENT AND PLAN:    Acute Metabolic Encephalopathy Suspect 2/2 Hyperglycemia and/or Dehydration  - correct volume deficit and hyperglycemia  - saline @ 150ml/hr for 12 hours  - suspect there was a precipitating event such as viral infection   - COVID swabbed, will check mini resp viral panel, Flu A/B, f/u Procal    NIDDM with Hyperglycemia without Ketoacidosis  -  in ED, Urine glucose >1000  - give 5U regular insuln now since he is insulin naive  - hypoglycemia protocol  - low carb diet  - low dose SSI  - POC glucose AC HS    HA on CKD2  - suspect prerenal from volume depletion  - s/p 1L bolus LR in ED   - saline @ 150ml/hr for 12 hours  - daily RFP    Lactic Acidosis  - 3.4 ->  2.7  - Trend Q2 hour  - IVF as above     NSTEMI Type 2  - suspect from volume depletion  - trop 0.16 -> 0.30  - trend Trop Q6  - Obtain Echo given history of inferior hypokinesis on prev Echo  - Cardiology consulted  - Pt refused cardiac Cath in past but may need stress test following optimization  Of acute illnesses. Code Status: FULL CODE  FEN: Low Carb Diet  PPX:  Lovenox  DISPO: [facility]    Will discuss with attending physician Dr. Janelle Sandoval.     Daniel Garcia DO DO PGY-3  8/22/2021,  12:51 AM

## 2021-08-22 NOTE — PROGRESS NOTES
Called pt's daughter Luann Ospina and updated her on pt's admission and plan of care. Luann Ospina assisted in answering admission questions for pt.

## 2021-08-22 NOTE — PLAN OF CARE
Problem: Falls - Risk of:  Goal: Will remain free from falls  Description: Will remain free from falls  Outcome: Ongoing   Pt's bed is in lowest position, brake and bed exit alarm are on, call light and bedside table are within reach. Problem: Skin Integrity:  Goal: Absence of new skin breakdown  Description: Absence of new skin breakdown  Outcome: Ongoing   Pt is being turned every two to decrease risk of new skin breakdown. Problem: Serum Glucose Level - Abnormal:  Goal: Ability to maintain appropriate glucose levels will improve  Description: Ability to maintain appropriate glucose levels will improve  Outcome: Ongoing   Pt's blood sugar is being checked and is being covered with insulin lispro. Problem: Gas Exchange - Impaired  Goal: Absence of hypoxia  Outcome: Ongoing   Pt remains on 2L O2 NC and SpO2 has been WNL. Problem: Body Temperature -  Risk of, Imbalanced  Goal: Will regain or maintain usual level of consciousness  Outcome: Ongoing   Pt is alert and oriented x4, but requires [place]. Problem: Isolation Precautions - Risk of Spread of Infection  Goal: Prevent transmission of infection  Outcome: Ongoing   Pt is in droplet plus precautions r/t pending COVID test.    Problem: Risk for Fluid Volume Deficit  Goal: Maintain normal heart rhythm  Outcome: Ongoing   Pt is NSR on telemetry. Problem: Coping:  Goal: Ability to remain calm will improve  Description: Ability to remain calm will improve  Outcome: Ongoing   Pt is alert and oriented.

## 2021-08-22 NOTE — PROGRESS NOTES
Updated patient's daughter. Patient's daughter has several questions and concerns. Asked resident Dr. Latesha Peck to call daughter, he agreed to call and discuss plan of care with her.  Shashank Pan RN

## 2021-08-22 NOTE — PROGRESS NOTES
Bed alarm going off, patient found sittting on very edge of bed. Camera place in room for safety. Called patient's family and notified them of camera and had patient talk to them. Patient denied needing anything. Repositioned in bed with bed alarm in place.

## 2021-08-22 NOTE — PROGRESS NOTES
Noted Procal of 0.43, will initiate empiric coverage for community acquired bacterial pneumonia. Noted troponin is downtrending, no need for ACS heparin infusion at this time but continue to monitor. K was low and replaced this morning. Blood Glucose in target range.      Tate Tapia, DO PGY3

## 2021-08-22 NOTE — PROGRESS NOTES
Patient's daughter called with concerns that patient sounded short of breath on phone. On assessment, patient did not seem short of breath at this time but o2 sat is only 89% on room air. Applied 2L nc, 97% on 2L. Temp 100.6, medicated with tylenol.  Abhay Musa RN

## 2021-08-22 NOTE — DISCHARGE SUMMARY
INTERNAL MEDICINE DEPARTMENT AT [address] [place] [address]  DISCHARGE SUMMARY    Patient ID: Isai Kelley                                             Discharge Date: 8/27/2021   Patient's PCP: James Kapadia MD                                          Discharge Physician: Fransisco Forte DO   Admit Date: 8/21/2021   Admitting Physician: Anna Rosa MD    PROBLEMS DURING HOSPITALIZATION:  Present on Admission:   Acute kidney injury ([address]   Altered mental status   Chronic diastolic HF (heart failure) ([facility] [address]      DISCHARGE DIAGNOSES: Possible Aspiration Pneumonia    HPI: Mr. Leroy Caba is a 82yo male PM listed below with notable NIDDM, HTN, HLD, BPH, who was found down by his family earlier today.  He is independent and lives alone, but with frequent family interaction. Last seen well Friday evening.  Saturday morning, found on the ground in the bathroom.  The patient is reported to be confused, but alert. Gigi Williamson was able to speak, but was not making very much sense per the family.     He was transported to [facility] ED, where he was found to be dehydrated with elevated glucose in the serum and the urine.  Reports some mild chills, mouth is dry and has been urinating a lot. He was normotensive, afebrile without tachycardia or hypoxia.  Lab work was revealing for Cr 1.4, glucose 337, troponin 0 0.61, lactate 3.4, normal LFTs, WBC 4.9, Hb 13.9, platelets 932.  Urine has >1000 glucose, 15 ketones, moderate blood, negative for infection. Was given 1L IV bolus and admitted. His procalcitonin was found to be elevated and he was started on azithromycin and ceftriaxone for concern of pneumonia. COVID and influenza negative. He had continued symptoms and was lethargic but mental status improved with IVF. Lactate, trops (type II NSTEMI), glucose, and creatinine downtrended to within normal range. Stopped ceftriaxone but continued zithro. CK elevated to 1500 and pressures soft, was given 500ml bolus followed by continuous IVFs. Continued hyperglycemia and transitioned to MDSSI. Progressing thrombocytopenia likely related to viral illness. Echo showed an EF of 55-60%, mild-moderate tricuspid regurgitation. Estimated pulmonary artery systolic pressure is 19UORK assuming a right atrial pressure of 3mmHg. His platelets were also consistently low, so HemeOnc was consulted, but as he his platelets were improving and >50, he was just to be monitored with daily CBCs. Patient had abdominal distention, new onset of acute symptoms with chills as well as acute lower back pain with intermittent confusion. Patient received a CT Chest/Abd/Pelvis as well as a CT Thoracic and CT Lumbar for evaluation. Studies were generally negative but patient was continued on azithromycin and ceftriaxone for empiric coverage. Patient had shown some improving, but began to have febrile episodes overnight. Patient was reworked up for possible new infectious etiology with UA, Blood cultures, CXR which were ultimately negative. MBS was done which showed concern for possible aspiration. ID was consulted, and with his clinical symptoms and MBS results, he was empirically started on zosyn IV which seemed to be effective in resolving his symptoms. Per ID, he is to be transitioned to Augmentin for 1 week on discharge. The following issues were addressed during hospitalization:  Acute metabolic encephalopathy  Aspiration Pneumonia  Acute abdominal pain; lower back pain  NSTEMI Type 2  Thrombocytopenia  Rhabdomyolysis  T2DM, hyperglycemia  HA, CKD2  Lactic Acidosis  HLD  HTN  BPH    Physical Exam:  /70   Pulse 73   Temp 98.3 °F (36.8 °C) (Oral)   Resp 16   Ht 5' 6\" (1.676 m)   Wt 179 lb 4.8 oz (81.3 kg)   SpO2 94%   BMI 28.94 kg/m²     Physical Exam  Constitutional:       General: He is not in acute distress. Appearance: He is obese. He is not toxic-appearing or diaphoretic. HENT:      Head: Normocephalic and atraumatic.       Right Ear: External ear normal.      Nose: Nose normal.      Mouth/Throat:      Mouth: Mucous membranes are dry. Eyes:      General: No scleral icterus. Right eye: No discharge. Left eye: No discharge. Extraocular Movements: Extraocular movements intact. Pupils: Pupils are equal, round, and reactive to light. Cardiovascular:      Rate and Rhythm: Normal rate and regular rhythm. Heart sounds: Normal heart sounds. No murmur heard. No friction rub. No gallop. Pulmonary:      Effort: No respiratory distress. Breath sounds: Normal breath sounds. No stridor. No wheezing, rhonchi or rales. Abdominal:      General: Abdomen is flat. There is distension. Palpations: There is no mass. Tenderness: There is abdominal tenderness. There is no guarding or rebound. Hernia: No hernia is present. Musculoskeletal:      Right lower leg: No edema. Left lower leg: No edema. Skin:     General: Skin is warm and dry. Coloration: Skin is not jaundiced. Findings: No bruising or erythema. Neurological:      Mental Status: He is alert and oriented to person, place, and time. Mental status is at baseline. Psychiatric:         Mood and Affect: Mood normal.           Consults: cardiology, ID and hematology/oncology  Significant Diagnostic Studies:  MBS, chest x-ray, CT Thoracic, CT Lumbar, CT Chest/Abd/Pelvis, XR Abd  Treatments: IV hydration, antibiotics: Zosyn, ceftriaxone, azithromycin and Augmentin, cardiac meds: Carvedilol, , anticoagulation: lovenox and insulin: HDSSI  Disposition: SNF  Discharged Condition: Stable  Follow Up: Primary Care Physician in one week    DISCHARGE MEDICATION:     Medication List      START taking these medications    amoxicillin-clavulanate 875-125 MG per tablet  Commonly known as: AUGMENTIN  Take 1 tablet by mouth every 12 hours for 13 doses     diphenhydrAMINE-zinc acetate 2-0.1 % cream  Apply topically 3 times daily as needed.      lidocaine 4 % external patch  Place 1 patch onto the skin daily as needed (Lower back pain)     pantoprazole 40 MG tablet  Commonly known as: PROTONIX  Take 1 tablet by mouth every morning (before breakfast)  Start taking on: August 28, 2021        CONTINUE taking these medications    aspirin 81 MG EC tablet     carvedilol 6.25 MG tablet  Commonly known as: COREG  TAKE 1 TABLET BY MOUTH TWICE DAILY     diclofenac sodium 1 % Gel  Commonly known as: VOLTAREN  APPLY 2 GRAM TOPICALLY FOUR TIMES DAILY AS NEEDED FOR PAIN     finasteride 5 MG tablet  Commonly known as: PROSCAR  TAKE 1 TABLET BY MOUTH DAILY     fluticasone 50 MCG/ACT nasal spray  Commonly known as: FLONASE  SHAKE LIQUID AND USE 2 SPRAYS IN EACH NOSTRIL DAILY     glipiZIDE 5 MG tablet  Commonly known as: GLUCOTROL  TAKE 1/2 TABLET BY MOUTH TWICE DAILY     Melatonin 3 MG Caps  Take 1 capsule by mouth nightly as needed (for insomnia)     metFORMIN 500 MG tablet  Commonly known as: GLUCOPHAGE  TAKE 2 TABLETS BY MOUTH TWICE DAILY WITH MEALS     Naproxen Sodium 220 MG Caps  Commonly known as: Aleve     nitroGLYCERIN 0.4 MG SL tablet  Commonly known as: Nitrostat  Place 1 tablet under the tongue every 5 minutes as needed for Chest pain May repeat every 5 minutes until relief is obtained. If pain persists after taking 3 tabs in a 15-minute period, call 9-1-1 immediately.      ondansetron 4 MG tablet  Commonly known as: Zofran  Take 1 tablet by mouth every 8 hours as needed for Nausea     OneTouch Ultra strip  Generic drug: blood glucose test strips  TEST ONCE DAILY     tamsulosin 0.4 MG capsule  Commonly known as: FLOMAX  TAKE 2 CAPSULES BY MOUTH EVERY NIGHT     Victoza 18 MG/3ML Sopn SC injection  Generic drug: Liraglutide  ADMINISTER 1.8 MG UNDER THE SKIN DAILY     vitamin D3 125 MCG (5000 UT) Caps  TAKE ONE CAPSULE BY MOUTH EVERY DAY        STOP taking these medications    atorvastatin 40 MG tablet  Commonly known as: LIPITOR     Crestor 5 MG tablet  Generic drug: rosuvastatin furosemide 20 MG tablet  Commonly known as: LASIX           Where to Get Your Medications      [facility] [address] [phone] [address] [id number]    Phone: [phone]   · amoxicillin-clavulanate 875-125 MG per tablet  · diphenhydrAMINE-zinc acetate 2-0.1 % cream  · lidocaine 4 % external patch  · pantoprazole 40 MG tablet       Activity: activity as tolerated  Diet: regular diet and diabetic diet  Wound Care: none needed    Time Spent on discharge is more than 45 minutes    Signed:  Reji Rain DO, MD, PGY-1  8/27/2021

## 2021-08-23 NOTE — PROGRESS NOTES
Electrophysiology - PROGRESS NOTE    Admit Date: 8/21/2021     Chief Complaint: AMS, fall     Interval History:   Patient seen and examined and notes reviewed. Patient is being followed for AMS, fall, elevated troponins. Patient presented to the hospital after a fall at home, AMS, possible febrile illness. Family went to check on patient at home and found that he was weak, confused, and had fallen in bathroom. Procalcitonin 0.43, lactate 3.4, 2.7, troponin 0.16, 0.30, 0.24, 0.15, CXR (8/21) showed mild left basilar atelectasis, CT head (8/21) negative for acute changes. Negative for influenza, covid 19, given IVF w/ some improvement of sx. Pt denies palpitations, heart racing, dizziness, lightheadedness. No CP, PND, orthopnea, BLE edema. C/o SOB at rest, currently on O2. BP controlled, soft at times. Appears diaphoretic. Daughter translating at bedside. Per daughter, patient has a history of of CP that has been ongoing since 2016, however patient does not wish to have angiogram. He follows w/ Dr. Jorge L Arevalo at [facility].     In: 140 [P.O.:120;  I.V.:20]  Out: 100    Wt Readings from Last 2 Encounters:   08/21/21 179 lb 4.8 oz (81.3 kg)   06/08/21 177 lb (80.3 kg)         Data:   Scheduled Meds:   Scheduled Meds:   sodium chloride  500 mL Intravenous Once    insulin glargine  2 Units Subcutaneous Nightly    atorvastatin  40 mg Oral Daily    carvedilol  6.25 mg Oral BID WC    vitamin D  5,000 Units Oral Daily    finasteride  5 mg Oral Daily    tamsulosin  0.4 mg Oral Daily    sodium chloride flush  5-40 mL Intravenous 2 times per day    enoxaparin  40 mg Subcutaneous Daily    insulin lispro  0-6 Units Subcutaneous TID     insulin lispro  0-3 Units Subcutaneous Nightly    pantoprazole  40 mg Intravenous Daily    azithromycin  500 mg Intravenous Q24H     Continuous Infusions:   sodium chloride      dextrose       PRN Meds:.melatonin, sodium chloride flush, sodium chloride, ondansetron **OR** ondansetron, polyethylene glycol, acetaminophen **OR** acetaminophen, glucose, dextrose, glucagon (rDNA), dextrose  Continuous Infusions:   sodium chloride      dextrose         Intake/Output Summary (Last 24 hours) at 8/23/2021 0848  Last data filed at 8/23/2021 0239  Gross per 24 hour   Intake 620 ml   Output 220 ml   Net 400 ml       CBC:   Lab Results   Component Value Date    WBC 4.4 08/23/2021    HGB 13.8 08/23/2021    PLT 78 08/23/2021     BMP:  Lab Results   Component Value Date     08/23/2021    K 3.6 08/23/2021    CL 99 08/23/2021    CO2 23 08/23/2021    BUN 26 08/23/2021    CREATININE 1.1 08/23/2021    GLUCOSE 226 08/23/2021    GLUCOSE 120 09/16/2011     INR: No results found for: INR     CARDIAC LABS  ENZYMES:  Recent Labs     08/21/21  2202 08/22/21  0433 08/22/21  1027   TROPONINI 0.30* 0.24* 0.15*     FASTING LIPID PANEL:  Lab Results   Component Value Date    HDL 41 04/10/2021    HDL 51 06/03/2010    LDLCALC 90 04/10/2021    TRIG 104 04/10/2021    TSH 1.98 04/10/2021     LIVER PROFILE:  Lab Results   Component Value Date    AST 39 08/21/2021    AST 14 04/10/2021    ALT 35 08/21/2021    ALT 15 04/10/2021       -----------------------------------------------------------------  Telemetry: Personally reviewed  N/A pt not on tele    Objective:   Vitals: /72   Pulse 80   Temp 98.3 °F (36.8 °C) (Oral)   Resp 18   Ht 5' 6\" (1.676 m)   Wt 179 lb 4.8 oz (81.3 kg)   SpO2 97%   BMI 28.94 kg/m²   General appearance: alert, appears stated age and cooperative, ill appearing  Eyes: Conjunctiva and pupils normal and reactive  Skin: Skin color, texture, turgor normal. No rashes or ecchymosis, appears diaphoretic  Neck: no JVD, supple, symmetrical, trachea midline   Lungs: , no accessory muscle use, on O2 via NC   Heart: RRR  Abdomen: soft, non-tender; bowel sounds normal  Extremities: No edema, DP +  Psychiatric: normal insight and affect    Patient Active Problem List:     Rosacea     Back pain     Obstructive sleep apnea     Chest pain     Transient global amnesia     Obesity     Insomnia     Bilateral cataracts     Osteoarthritis     Lumbar disc disease     Spinal stenosis     Diverticulosis     Benign prostatic hyperplasia     Hyperlipidemia     Diabetes mellitus, type 2 (HCC)     Allergic rhinitis     Nausea     Eczema     Dyspnea on exertion     Right shoulder pain     Shortness of breath     Vitamin D deficiency     Post herpetic neuralgia     Neck pain     Right hip pain     Microalbuminuria     Essential hypertension     Type 2 diabetes mellitus without complication (HCC)     Bilateral low back pain with right-sided sciatica     Coronary artery disease of native artery of native heart with stable angina pectoris ([address]     Peripheral vascular disease ([address]     Acute kidney injury ([address]        Assessment & Plan:    1. NSTEMI type 2  2. Chronic dHCF  3. HTN  4.  AMS    Cardiac Hx: Dena Amaro is a 81 y.o. man w/ PMH HTN, DM, CAD, PAD, chronic dCHF (follow w/ Dr. Troy [facility] at [facility]), Echo (2016) EF 55-60%, grade II diastolic dysfunction, Echo (2020) showed EF 50%, hypokinesis lateral and inferior walls, CP (2016)- refusing angiogram, JOANNA, who p/w AMS, fall at home, fever    NSTEMI type 2  - Troponin 0.16, 0.30, 0.24, 0.15  - Troponin elevation from demand ischemia from fall per Dr. Wes Sage  - Echo pending  - Consider stress test, refused cardiac cath in the past  - Will place pt on tele monitoring  - EKG showed no changes    Chronic dCHF  - Appears hypovolemic  - Echo pending  - Strict I/o's  - Daily wts  - positive 95 ml since admission  - Keep K> 4.0, Mg >2.0, replacement ordered    HTN  - BP well controlled, soft at times  - On coreg 6.25mg BID, will place hold parameters    AMS  - Negative Covid, influenza  - BC pending  - On IVF  - Likely viral etiology  - Primary team following    Electronically signed by Zulma Kauffman APRN - CNP on 8/23/21 at 8:48 AM EDT    [facility]      I  have spent 35 minutes in care of the patient including direct face to face time, chart preparation, reviewing diagnostic testing, other provider notes and coordinating patient care.

## 2021-08-23 NOTE — PROGRESS NOTES
Patient A&Ox4. VSS, O2 92-94 % on RA, BP soft, medical resident notified, IV fluids infusing. Patient reporting back pain, medicated per MAR. Neuro checks within normal limits. Patient denies nausea, tolerating diet well. Patient up to the chair with therapy x2 assist, tolerated fairly well. Patient anticipating SNF at discharge. Will continue to monitor.

## 2021-08-23 NOTE — PLAN OF CARE
Problem: Falls - Risk of:  Goal: Will remain free from falls  Description: Will remain free from falls  8/23/2021 1241 by Lori Garcia RN  Outcome: Ongoing     Problem: Gas Exchange - Impaired  Goal: Absence of hypoxia  8/23/2021 1241 by Lori Garcia RN  Outcome: Ongoing    Problem: Risk for Fluid Volume Deficit  Goal: Maintain normal heart rhythm  8/23/2021 1241 by Lori Garcia RN  Outcome: Ongoing     Problem: Pain:  Goal: Pain level will decrease  Description: Pain level will decrease  Outcome: Ongoing

## 2021-08-23 NOTE — CARE COORDINATION
Case Management Assessment           Initial Evaluation                Date / Time of Evaluation: 8/23/2021 11:06 AM                 Assessment Completed by: JAYME Prater    Patient Name: Mani Woody     YOB: 1935  Diagnosis: Acute kidney injury ([facility] [address] [N17.9]  Altered mental status, unspecified altered mental status type [R41.82]     Date / Time: 8/21/2021  6:45 PM    Patient Admission Status: Inpatient    If patient is discharged prior to next notation, then this note serves as note for discharge by case management. Current PCP: Estelle Shepherd MD  Clinic Patient: No    Chart Reviewed: Yes  Patient/ Family Interviewed: Yes    Initial assessment completed at bedside with: Patient, daughter, grand-daughter     Hospitalization in the last 30 days: No    Emergency Contacts:  Extended Emergency Contact Information  Primary Emergency Contact: [facility] [address] [place] [address] Phone: [phone]  Relation: Child    Advance Directives:   Code Status: Full Code    Healthcare Power of : No    Financial:  Payor: Gerardo Quevedo / Plan: Desiree Thomas / Product Type: *No Product type* /     Pre-cert required for SNF: Yes    Pharmacy:    [id number] [place] [address] [address] [phone] - F [phone]  [address] [id number]  Phone: [phone] Fax: [phone]      Potential assistance Purchasing Medications:    Does Patient want to participate in local refill/ meds to beds program?: Not Assessed    Meds To Beds General Rules:  1. Can ONLY be done Monday- Friday between 8:30am-5pm  2. Prescription(s) must be in pharmacy by 3pm to be filled same day  3. Copy of patient's insurance/ prescription drug card and patient face sheet must be sent along with the prescription(s)  4.  Cost of Rx cannot be added to hospital bill. If financial assistance is needed, please contact unit  or ;  or  CANNOT provide pharmacy voucher for patients co-pays  5. Patients can then  the prescription on their way out of the hospital at discharge, or pharmacy can deliver to the bedside if staff is available. (payment due at time of pick-up or delivery - cash, check, or card accepted)     Able to afford home medications/ co-pay costs: Yes    ADLS:  Support Systems: Children, Family Members    PT AM-PAC:   /24  OT AM-PAC:   /24    Housing:  Home Environment: Home independent, family check on patient   Steps: yes     Plans to RETURN to current housing: No  Barrier(s) to RETURNING to current housing: concerns for needed short-term rehab. Home Care Information:  Currently ACTIVE with Home Health Care: No  Home Care Agency: Not Applicable      Durable Medical Equipment:  DME Provider: N/A  Equipment: cane    Home Oxygen and Respiratory Equipment:  Has HOME OXYGEN prior to admission: No    Dialysis:  Active with HD/PD prior to admission: No      DISCHARGE PLAN:  Disposition: [facility] (Cert initiated 8/46)    Transportation PLAN for discharge: EMS transportation     Factors facilitating achievement of predicted outcomes: Family support, Cooperative, and Pleasant    Barriers to discharge: N/A     Additional Case Management Notes:   SW met with patient, daughter, and grand-daughter, at bedside. Patient is from home where he was independent at baseline. Family would check on him regularly. Patient speaks [place]. Patient and family interested in short-term rehab placement. Daughter was provided with SNF list, but reported top choice was [facility] as it was close to home. ARTIE placed referral in Epic and would follow up with patient and family on alternative choice. [facility] called and are able to accept patient. They initiated pre-cert today.  SW updated patient and family at bedside. Will continue to follow. The Plan for Transition of Care is related to the following treatment goals Acute kidney injury ([facility] [address] [address] [N17.9]  Altered mental status, unspecified altered mental status type [R41.82]      The Patient and/or patient representative  was provided with a choice of provider and agrees with the discharge plan Yes    Freedom of choice list was provided with basic dialogue that supports the patient's individualized plan of care/goals and shares the quality data associated with the providers.  Yes    Care Transition patient: No    JAYME Douglas  [facility]   Case Management Department  Ph: [phone]

## 2021-08-23 NOTE — PROGRESS NOTES
Physician Progress Note      Erwin Costello  [facility] #:                  [id number]  :                       1935  ADMIT DATE:       2021 6:45 PM  [address] [place] DATE:  RESPONDING  PROVIDER #:        Jessica Alvarado MD          QUERY TEXT:    Patient admitted with metabolic encephalopathy 2/2 dehydration. Noted   documentation of Acute Kidney Injury in throughout the chart. Per  ED:   elevated BUN and creatinine is not significantly changed from his baseline. Presented with creatinine of 1.4. No documentation of what baseline   creatinine actually is. Last creatinine prior to admission is 1.0 on   04/10/21. In order to support the diagnosis of HA, please include additional   clinical indicators in your documentation. Or please document if the   diagnosis of HA has been ruled out after further study    The medical record reflects the following:  Risk Factors: 81 y. o. male w/HTN, DM, Hyperglycemia, dehydration, CKD stage 2  Clinical Indicators: No documented support for KDIGO criteria for HA on   presentation  Treatment: IVF, monitor daily labs    Defined by Kidney Disease Improving Global Outcomes (KDIGO) clinical practice   guideline for acute kidney injury:  -Increase in SCr by greater than or equal to 0.3 mg/dl within 48 hours; or  -Increase or decrease in SCr to greater than or equal to 1.5 times baseline,   which is known or presumed to have occurred within the prior 7 days; or  -Urine volume < 0.5ml/kg/h for 6 hours  Options provided:  -- Acute kidney injury evidenced by, Please document evidence as well as   baseline creatinine, if known. -- Currently resolved acute kidney injury was evidenced by, Please document   evidence as well as baseline creatinine, if known.   -- Acute kidney injury ruled out after study  -- Other - I will add my own diagnosis  -- Disagree - Not applicable / Not valid  -- Disagree - Clinically unable to determine / Unknown  -- Refer to Clinical Documentation Reviewer    PROVIDER RESPONSE TEXT:    This patient has an acute kidney injury as evidenced by dehydration/ volume   depletion w/ bun creat ratio> 30 w/ improvement in renal fnx w/ ivf.     Query created by: Sosa Ricketts on 8/23/2021 11:18 AM      Electronically signed by:  Jannifer Spurling MD 8/23/2021 1:03 PM

## 2021-08-23 NOTE — PROGRESS NOTES
Physical Therapy    Facility/Department: [facility] 5T ORTHO/NEURO  Initial Assessment/Treatment    NAME: Renetta Cummings  : 1935  MRN: [id number]    Date of Service: 2021    Discharge Recommendations:    Renetta Cummings scored a 14/24 on the AM-PAC short mobility form. Current research shows that an AM-PAC score of 17 or less is typically not associated with a discharge to the patient's home setting. Based on the patient's AM-PAC score and their current functional mobility deficits, it is recommended that the patient have 3-5 sessions per week of Physical Therapy at d/c to increase the patient's independence. Please see assessment section for further patient specific details. If patient discharges prior to next session this note will serve as a discharge summary. Please see below for the latest assessment towards goals. PT Equipment Recommendations  Equipment Needed:  (Defer to next level of care)    Assessment   Body structures, Functions, Activity limitations: Decreased functional mobility ; Decreased endurance;Decreased balance  Assessment: Pt is a 81 y.o. M who's primary language is [place], that presented to the ED following a mechanical fall. Today pt was able to demos functional mobility and limited ambulation with min A x 2 with use of RW. Pt is below his reported functional baseline of being indep with functional mobility using a cane or RW. Per discharge planning discussion with pt's daughter - they plan to have him admitted to a SNF. Pt would benefit from continued IP PT to increase independence and activity tolerance. Will follow. Treatment Diagnosis: decreased functional mobility  Prognosis: Good  Decision Making: Medium Complexity  PT Education: PT Role;General Safety  Patient Education: PT educated on PT role and to call for nurse when going back to bed - verbalized understanding.   REQUIRES PT FOLLOW UP: Yes  Activity Tolerance  Activity Tolerance: Patient Tolerated treatment well;Patient limited by pain  Activity Tolerance: Pt c/o dizziness following ambulation. Pt's BP before ambulation was 113/62 and following ambulation it was 105/64. Pt's O2 sats were 95%-96% following amb. Pt recovered well with seated rest break, after 3 min no c/o dizziness. Patient Diagnosis(es): The encounter diagnosis was Altered mental status, unspecified altered mental status type. has a past medical history of Allergic rhinitis, Asthma, Back pain, Bilateral cataracts, Bilateral low back pain with right-sided sciatica, BPH (benign prostatic hypertrophy), Chest pain, Degeneration of lumbar or lumbosacral intervertebral disc, Dental disease, Diabetes mellitus, type 2 ([address] Diverticulosis, Dizziness, Eczema, Essential hypertension, Hearing loss, Hyperlipidemia, Hypertension, Insomnia, Lumbar disc disease, Microalbuminuria, Neck pain, Nosebleed, Obesity, Obstructive sleep apnea, Osteoarthritis, Post herpetic neuralgia, Recurrent upper respiratory infection (URI), Right hip pain, Right shoulder pain, Rosacea, Spinal stenosis, Transient global amnesia, Type 2 diabetes mellitus without complication ([address] and Vitamin D deficiency. has a past surgical history that includes Prostate surgery (June 13, 2000). Restrictions  Position Activity Restriction  Other position/activity restrictions: Up with A  Vision/Hearing  Vision: Within Functional Limits  Hearing: Within functional limits     Subjective  General  Patient assessed for rehabilitation services?: Yes  Additional Pertinent Hx: Pt presents to ED following a fall on 8/21 and AMS. Troponin elevated, downtrending; EKG: normal; CT C-spine: (-); CT head: (-); CXR: atelectasis; Found to have HA and metabolic encephalopathy.  Pt's PMHx includes Asthma, BPH, T2DM, Diverticulosis, Hyperlipidemia, Hypertension, Obstructive sleep apnea, Osteoarthritis, Spinal stenosis  Referring Practitioner: Hali Simons DO  Referral Date : 08/22/21  Diagnosis: Acute Kidney Injury  Follows Commands: Within Functional Limits  Subjective  Subjective: Pt was found supine in bed. PT is agreeable to PT. Video  utilized at beginning of session. Dtr pesent throughout session. Pain Screening  Patient Currently in Pain: Yes (Pain in low back - RN aware)  Pain Assessment  Pain Level: 5  Vital Signs  Patient Currently in Pain: Yes (Pain in low back - RN aware)     Orientation  Orientation  Overall Orientation Status: Within Normal Limits (Oriented to place, , and self)  Social/Functional History  Social/Functional History  Lives With: Alone  Type of Home: House  Home Layout: One level  Home Access: Level entry  Home Equipment: Rolling walker, 4 wheeled walker, [place] [employer] Help From: Family (Daughter does laundry and grocery shopping)  ADL Assistance: Independent  Homemaking Assistance: Independent  Homemaking Responsibilities: Yes  Ambulation Assistance: Independent (uses walker more recently, normally uses cane)  Transfer Assistance: Independent  Active : No  Occupation: Retired  Type of occupation: Was in the [employer]  Additional Comments: Pt's daughter reports pt had 2 recent falls the day he came in 2/2 BLE weakness. The first he was able to get back up from, the second he was too weak to get up and was found on the floor by his grandson. Objective        AROM RLE (degrees)  RLE AROM: WFL  AROM LLE (degrees)  LLE AROM : WFL  Strength RLE  Strength RLE: WFL  Comment: grossly 3+/5  Strength LLE  Strength LLE: WFL  Comment: grossly 3+/5        Bed mobility  Supine to Sit: Contact guard assistance (HOB elevated and use of rails)  Scooting: Contact guard assistance  Comment: Requires VCs for hand placement  Transfers  Sit to Stand:  (From EOB: min A x 2 with RW; From recliner: min A x 2 with RW; From recliner: CGA x 2 with RW; From chair: min A x 2)  Stand to sit:  (To recliner: min A x 2 with RW; To chair: min A x 2;  To recliner: CGA x 2 with RW)  Comment: Required max VCs for UE placement with sit<>stand trans and RW  Ambulation  Ambulation?: Yes  Ambulation 1  Surface: level tile  Device: Rolling Walker  Other Apparatus: O2 (1L O2 and chair follow)  Assistance: (Min A x 2 initially progressing to min A x 1)  Quality of Gait: Demos failry steady gait with forward flexed posture. Requires cues to stay within the walker. Decreased knee flexion, hip flexion, and hip extension. Gait Deviations: Decreased step length;Decreased step height;Slow Charla  Distance: 2 ft; 7 ft + 7 ft within room  Comments: Distance limited d/t pain and dizziness     Balance  Sitting - Static:  (CGA)  Sitting - Dynamic:  (CGA)  Standing - Static:  (Min A x 1)  Standing - Dynamic:  (Min A x 2 with RW initially progressing to min A x 1)      Treatment included functional mobility training.     Plan   Plan  Times per week: 2-5  Current Treatment Recommendations: Strengthening, Transfer Training, Endurance Training, Balance Training, Gait Training, Functional Mobility Training, Patient/Caregiver Education & Training  Safety Devices  Type of devices: Left in chair, Call light within reach, Chair alarm in place, Nurse notified rec assist x 1-2 people with use of RW for back to bed    AM-PAC Score  AM-PAC Inpatient Mobility Raw Score : 14 (08/23/21 1130)  AM-PAC Inpatient T-Scale Score : 38.1 (08/23/21 1130)  Mobility Inpatient CMS 0-100% Score: 61.29 (08/23/21 1130)  Mobility Inpatient CMS G-Code Modifier : CL (08/23/21 1130)        Goals  Short term goals  Time Frame for Short term goals: discharge  Short term goal 1: Pt will demos sit<>stand with min A x 1 and proper hand placement with RW  Short term goal 2: Pt will demos amb of 50 ft with CGA and use of RW  Short term goal 3:  Pt will demos bed mobility with SBA (HOB flat and no use of rails)    Patient Goals   Patient goals : to regain independence     Therapy Time   Individual Concurrent Group Co-treatment   Time In 0950         Time Out 6834

## 2021-08-23 NOTE — PLAN OF CARE
Problem: Falls - Risk of:  Goal: Will remain free from falls  Description: Will remain free from falls  Outcome: Ongoing     Problem: Skin Integrity:  Goal: Will show no infection signs and symptoms  Description: Will show no infection signs and symptoms  Outcome: Ongoing     Problem: Serum Glucose Level - Abnormal:  Goal: Ability to maintain appropriate glucose levels will improve  Description: Ability to maintain appropriate glucose levels will improve  Outcome: Ongoing

## 2021-08-23 NOTE — PROGRESS NOTES
Progress Note    Admit Date: 8/21/2021  Day: 2  Diet: ADULT DIET; Regular; 4 carb choices (60 gm/meal)    CC: fall    Interval history:  - desat to 89% last night that improved with 2L, now satting well even when off the O2  - received ceftriaxone x1 last night, still on azithromycin  - still diaphoretic but otherwise appears to be doing better  - pressures were on the soft side, gave 500ml bolus  - hyperglycemic to 220s, received 4U lispro yesterday    HPI: Mr. Gustavo Edwards is a 82yo male PM listed below with notable NIDDM, HTN, HLD, BPH, who was found down by his family earlier today. He is independent and lives alone, but with frequent family interaction. Last seen well Friday evening. Saturday morning, found on the ground in the bathroom. The patient is reported to be confused, but alert. He was able to speak, but was not making very much sense per the family.     He was transported to [facility] ED, where he was found to be dehydrated with elevated glucose in the serum and the urine. Reports some mild chills, mouth is dry and has been urinating a lot. He was normotensive, afebrile without tachycardia or hypoxia. Lab work was revealing for Cr 1.4, glucose 337, troponin 0 0.61, lactate 3.4, normal LFTs, WBC 4.9, Hb 13.9, platelets 650. Urine has >1000 glucose, 15 ketones, moderate blood, negative for infection. Was given 1L IV bolus and admitted. Of note, patient speaks [place] and requires .     Medications:     Scheduled Meds:   potassium chloride  20 mEq Oral Once    ascorbic acid  500 mg Oral Daily    zinc sulfate  50 mg Oral Daily    lidocaine  1 patch Transdermal Daily    insulin lispro  0-12 Units Subcutaneous TID WC    insulin lispro  0-6 Units Subcutaneous Nightly    atorvastatin  40 mg Oral Daily    carvedilol  6.25 mg Oral BID WC    vitamin D  5,000 Units Oral Daily    finasteride  5 mg Oral Daily    tamsulosin  0.4 mg Oral Daily    sodium chloride flush  5-40 mL Intravenous 2 times per day    enoxaparin  40 mg Subcutaneous Daily    pantoprazole  40 mg Intravenous Daily    azithromycin  500 mg Intravenous Q24H     Continuous Infusions:   sodium chloride      dextrose       PRN Meds:melatonin, sodium chloride flush, sodium chloride, ondansetron **OR** ondansetron, polyethylene glycol, acetaminophen **OR** acetaminophen, glucose, dextrose, glucagon (rDNA), dextrose    Objective:   Vitals:   T-max:  Patient Vitals for the past 8 hrs:   BP Temp Temp src Pulse Resp SpO2   08/23/21 0709 122/72 98.3 °F (36.8 °C) Oral 80 18 97 %       Intake/Output Summary (Last 24 hours) at 8/23/2021 1103  Last data filed at 8/23/2021 0239  Gross per 24 hour   Intake 380 ml   Output 220 ml   Net 160 ml       Review of Systems   Constitutional: Positive for chills, diaphoresis, fatigue and fever. Respiratory: Positive for shortness of breath. Negative for cough. Cardiovascular: Negative for chest pain and leg swelling. Gastrointestinal: Positive for abdominal pain. Negative for abdominal distention, constipation and diarrhea. Genitourinary: Positive for frequency. Negative for decreased urine volume. Neurological: Positive for headaches. Negative for light-headedness. Physical Exam  Constitutional:       General: He is not in acute distress. Appearance: He is ill-appearing and diaphoretic. Comments: Lethargic   HENT:      Mouth/Throat:      Mouth: Mucous membranes are dry. Pharynx: Oropharynx is clear. Eyes:      Extraocular Movements: Extraocular movements intact. Pupils: Pupils are equal, round, and reactive to light. Cardiovascular:      Rate and Rhythm: Normal rate and regular rhythm. Pulses: Normal pulses. Heart sounds: Normal heart sounds. Pulmonary:      Effort: Pulmonary effort is normal. No respiratory distress. Breath sounds: Normal breath sounds. No wheezing or rales. Abdominal:      General: Abdomen is flat.  Bowel sounds are normal. There is no distension. Palpations: Abdomen is soft. Tenderness: There is abdominal tenderness (RLQ and LLQ). Musculoskeletal:      Right lower leg: No edema. Left lower leg: No edema. Neurological:      General: No focal deficit present. Mental Status: He is alert. LABS:    CBC:   Recent Labs     08/21/21 1933 08/22/21 0433 08/23/21  0538   WBC 4.9 4.2 4.4   HGB 13.9 13.3* 13.8   HCT 39.0* 37.6* 39.4*   * 94* 78*   MCV 93.7 92.8 93.7     Renal:    Recent Labs     08/21/21 1933 08/22/21 0433 08/23/21  0539   * 133* 133*   K 4.2 3.4* 3.6   CL 95* 99 99   CO2 21 24 23   BUN 34* 27* 26*   CREATININE 1.4* 1.1 1.1   GLUCOSE 337* 173* 226*   CALCIUM 9.1 8.7 8.6   MG  --  1.80  --    ANIONGAP 16 10 11     Hepatic:   Recent Labs     08/21/21 1933   AST 39*   ALT 35   BILITOT 0.6   PROT 7.0   LABALBU 3.9   ALKPHOS 67     Troponin:   Recent Labs     08/21/21 2202 08/22/21 0433 08/22/21  1027   TROPONINI 0.30* 0.24* 0.15*     BNP: No results for input(s): BNP in the last 72 hours. Lipids: No results for input(s): CHOL, HDL in the last 72 hours. Invalid input(s): LDLCALCU, TRIGLYCERIDE  ABGs:  No results for input(s): PHART, NYP5REQ, PO2ART, DKU3JHB, BEART, THGBART, T5FYINTZ, PSZ5WLN in the last 72 hours. INR: No results for input(s): INR in the last 72 hours. Lactate: No results for input(s): LACTATE in the last 72 hours. Cultures:  -----------------------------------------------------------------  RAD:   CT Cervical Spine WO Contrast   Final Result      1. No acute intracranial hemorrhage or mass effect. 2.  No evidence of cervical spine fracture or traumatic subluxation. 3.  Severe multilevel degenerative changes in the cervical spine resulting canal and foraminal stenosis, as above. CT Head WO Contrast   Final Result      1. No acute intracranial hemorrhage or mass effect. 2.  No evidence of cervical spine fracture or traumatic subluxation.     3. Severe multilevel degenerative changes in the cervical spine resulting canal and foraminal stenosis, as above. XR CHEST PORTABLE   Final Result      Mild left basilar atelectasis. Assessment/Plan:     Acute Metabolic Encephalopathy: likely related to dehydration. Dehydration 2/2 to reduced PO intake and possible infection. Viral more likely considering no leukocytosis, myalgias/arthralgias, and afebrile. Procal elevated but clinical picture makes bacterial pneumonia much less likely. Flu and COVID negative  - 500ml NS bolus  - FU resp viral panel  - FU blood cultures  - cont azithromycin     NIDDM with Hyperglycemia: 15 ketones on UA but ketoacidosis unlikely given normal VBG. Sugars elevated to 220s. Patient is insulin naive prior to hospital stay  - MDSSI    NSTEMI Type 2: most likely r/t hypovolemia. Trop elevation could also be due to viral myocarditis but improvement with fluids suggests hypovolemia. Initially uptrending trops now downtrending. Last echo (3/2020) 50% with lateral and inferior wall hypokinesis  - FU echo  - Pt refused cardiac Cath in past but may need stress test following optimization     HA on CKD2: Pre-renal etiology, resolved with rehydration  - IVFs per above    Thrombocytopenia: likely related to viral illness. 4T score with very low concern for HIT given timeline and lack of concerning findings  - monitor daily  - CBC ~3days after discharge if remains thrombocytopenic     Lactic Acidosis: 2/2 hypovolemia. resolved  - on IVF per above     HLD  - home lipitor    HTN  - home carvedilol    BPH:  - home finasteride, tamsulosin        Code Status: FULL CODE  FEN: Regular diet, Low Carb   PPX:  Lovenox  DISPO: [facility]  Socorro Andre MD, PGY-1  08/23/21  11:03 AM    This patient has been staffed and discussed with Dr. Loida Jeffries.

## 2021-08-23 NOTE — PROGRESS NOTES
4 Eyes Admission Assessment     I agree as the admission nurse that 2 RN's have performed a thorough Head to Toe Skin Assessment on the patient. ALL assessment sites listed below have been assessed on admission. Areas assessed by both nurses:   [x]   Head, Face, and Ears   [x]   Shoulders, Back, and Chest  [x]   Arms, Elbows, and Hands   [x]   Coccyx, Sacrum, and Ischium  [x]   Legs, Feet, and Heels        Does the Patient have Skin Breakdown?   Toes on both feet are red, right knee abrasion        Ignacio Prevention initiated:  No   Wound Care Orders initiated:  No      C nurse consulted for Pressure Injury (Stage 3,4, Unstageable, DTI, NWPT, and Complex wounds) or Ignacio score 18 or lower:  No      Nurse 1 eSignature: Electronically signed by James Hilario RN on 8/22/21 at 11:32 PM EDT    **SHARE this note so that the co-signing nurse is able to place an eSignature**    Nurse 2 eSignature: Electronically signed by Alla Redmond RN on 8/24/21 at 12:43 AM EDT

## 2021-08-23 NOTE — PROGRESS NOTES
Occupational Therapy   Occupational Therapy Initial Assessment/Tx  Date: 2021   Patient Name: Isai Kelley  MRN: [id number]     : 1935    Date of Service: 2021    Discharge Recommendations: Isai Kelley scored a 16/24 on the AM-PAC ADL Inpatient form. Current research shows that an AM-PAC score of 17 or less is typically not associated with a discharge to the patient's home setting. Based on the patient's AM-PAC score and their current ADL deficits, it is recommended that the patient have 3-5 sessions per week of Occupational Therapy at d/c to increase the patient's independence. Please see assessment section for further patient specific details. If patient discharges prior to next session this note will serve as a discharge summary. Please see below for the latest assessment towards goals. OT Equipment Recommendations  Equipment Needed: No  Other: Defer to next level of care    Assessment   Performance deficits / Impairments: Decreased functional mobility ; Decreased ADL status; Decreased strength;Decreased endurance;Decreased safe awareness;Decreased balance;Decreased coordination  Assessment: Pt is 82 yo male presenting from home alone to [facility] with c/o AMS, fever, weakness, and a fall. Pt speaks [place] and interpretor was used during session. Pt is typically highly independent at baseline. Pt is now below this functional baseline and required min Ax2 for sit<>stand transfers and min Ax1 during functional mobility with 2WW. Pt required mod A for LB dressing, SBA during UB drressing, and SBA during grooming while seated. Pt would benefit from ongoing inpatient OT services upon d/c to maximize functional independence prior to returning home alone. Pt and pt's daughter are in agreement with pt receiving skilled therapy services following d/c. Will continue to follow on acute OT POC.    Treatment Diagnosis: Decreased strength and endurance resulting in decreased ADL independence. Prognosis: Good  Decision Making: Medium Complexity  OT Education: OT Role;Plan of Care;ADL Adaptive Strategies; Family Education;Transfer Training  Patient Education: Pt, daughter, and grandaughter verbalized understanding  Barriers to Learning: Pt is non-english speaking- pt speaks [place]   REQUIRES OT FOLLOW UP: Yes  Activity Tolerance  Activity Tolerance: Patient limited by fatigue  Activity Tolerance: Pt limited by weakness and faitgue this date with /62 standing from EOB and 105/64 after functional mobility. Pt's spO2 remained at 95% on 1L throughout session. Safety Devices  Safety Devices in place: Yes  Type of devices: Call light within reach; Left in chair;Chair alarm in place;Nurse notified           Patient Diagnosis(es): The encounter diagnosis was Altered mental status, unspecified altered mental status type. has a past medical history of Allergic rhinitis, Asthma, Back pain, Bilateral cataracts, Bilateral low back pain with right-sided sciatica, BPH (benign prostatic hypertrophy), Chest pain, Degeneration of lumbar or lumbosacral intervertebral disc, Dental disease, Diabetes mellitus, type 2 ([address] Diverticulosis, Dizziness, Eczema, Essential hypertension, Hearing loss, Hyperlipidemia, Hypertension, Insomnia, Lumbar disc disease, Microalbuminuria, Neck pain, Nosebleed, Obesity, Obstructive sleep apnea, Osteoarthritis, Post herpetic neuralgia, Recurrent upper respiratory infection (URI), Right hip pain, Right shoulder pain, Rosacea, Spinal stenosis, Transient global amnesia, Type 2 diabetes mellitus without complication ([address] and Vitamin D deficiency. has a past surgical history that includes Prostate surgery (June 13, 2000). Treatment Diagnosis: Decreased strength and endurance resulting in decreased ADL independence. Restrictions  Position Activity Restriction  Other position/activity restrictions:  Up with A    Subjective   General  Chart Reviewed: Yes  Patient assessed for rehabilitation services?: Yes  Additional Pertinent Hx: Pt is 80 yo male presenting from home alone to [facility] with c/o of AMS, fever, weakness, and a fall in the home. Cardiology Consult= troponin elevation from demand ischemia from fall. Head and cervical spine CT= no acute cranial hemorrhage, no evidence of cervical spine fracture or subluxation, severe multilevel degenerative changes in cervical spine resulting in canal and foraminal stenosis. Chest XR= mild L basilar atelectasis. PMHx= DM-2, diverticulosis, HTN, HLD, hearing loss, osteoarthritis, CHF, BPH. Surgery Hx= Prostate surgery (2000). Family / Caregiver Present: Yes (Daughter and grandaughter)  Referring Practitioner: April Santiago DO  Diagnosis: acute kidney injury  Subjective  Subjective: Pt reclined supine in bed upon OT arrival. Pt agreeable to OT evaluation. Pt's daughter and grandaughter provided translation throughout session as pt is non-english speaking (speaks [place]). Patient Currently in Pain: Yes (Pain in low back - RN aware)  Pain Assessment  Pain Level: 5  Vital Signs  Patient Currently in Pain: Yes (Pain in low back - RN aware)  Social/Functional History  Social/Functional History  Lives With: Alone  Type of Home: House  Home Layout: One level  Home Access: Level entry  Home Equipment: Rolling walker, 4 wheeled walker, [employer] Help From: Family (Daughter does laundry and grocery shopping)  ADL Assistance: [facility]: Independent  Homemaking Responsibilities: Yes  Ambulation Assistance: Independent (uses walker more recently, normally uses cane)  Transfer Assistance: Independent  Active : No  Occupation: Retired  Type of occupation: Was in the [employer]  Additional Comments: Pt's daughter reports pt had 2 recent falls the day he came in 2/2 BLE weakness. The first he was able to get back up from, the second he was too weak to get up and was found on the floor by his grandson. Objective   Vision: Within Functional Limits  Hearing: Within functional limits    Orientation  Overall Orientation Status: Within Normal Limits     Balance  Sitting Balance: Contact guard assistance (at EOB with 3 LOB slowly leaning to R, able to regain balance at midline with CGA)  Standing Balance: Minimal assistance (with 2WW)  Standing Balance  Time: ~5 mins  Activity: Pulling up brief at chair and functional mobility in room  Functional Mobility  Functional - Mobility Device: Rolling Walker  Activity: Other (from chair to EOB and back)  Assist Level: Minimal assistance  Functional Mobility Comments: VC for walker management  ADL  Grooming: Setup (Seated in chair to wash face)  UE Dressing: Stand by assistance (Pt doffed/donned hospital gown seated at EOB)  LE Dressing: Moderate assistance (Pt required mod A to thread BLE into brief while seated in chair, but was able to don brief while standing with CGA x2)        Bed mobility  Supine to Sit: Contact guard assistance (HOB elevated and use of rails)  Scooting: Contact guard assistance (to EOB)  Comment: required VC for hand placement  Transfers  Sit to stand: Minimal assistance;2 Person assistance;Contact guard assistance (4 trials: min Ax2 from EOB, min Ax2 and CGA x2 from chair, min Ax2 from lower chair with arms)  Stand to sit: Minimal assistance;2 Person assistance;Contact guard assistance (4 trials: min Ax2 from EOB, min Ax2 and CGA x2 from chair, min Ax2 from lower chair with arms)  Transfer Comments: VC for hand placement     Cognition  Overall Cognitive Status: WFL  Cognition Comment: Difficult to assess pt's cognitive status 2/2 language barrier (pt non-english speaking [facility]), but pt able to follow commands from interpretor, understand gestures, and follow flow of conversation.                  LUE AROM (degrees)  LUE AROM : WFL  LUE General AROM: Unable to formally assess, but WFL based on functional observation  Left Hand AROM (degrees)  Left Hand AROM: WFL  Left Hand General AROM: Unable to formally assess, but WFL based on functional observation  RUE AROM (degrees)  RUE AROM : WFL  RUE General AROM: Unable to formally assess, but WFL based on functional observation  Right Hand AROM (degrees)  Right Hand AROM: WFL  Right Hand General AROM: Unable to formally assess, but WFL based on functional observation  LUE Strength  Gross LUE Strength: WFL  LUE Strength Comment: Unable to formally assess, but WFL based on functional observation  RUE Strength  Gross RUE Strength: WFL  RUE Strength Comment: Unable to formally assess, but WFL based on functional observation     Hand Dominance  Hand Dominance: Right     Pt participated in OT eval and tx including ADLs and transfer        Plan   Plan  Times per week: 2-5  Current Treatment Recommendations: Balance Training, Functional Mobility Training, Endurance Training, Patient/Caregiver Education & Training, Safety Education & Training, Pain Management, Self-Care / ADL    G-Code     OutComes Score                                                  AM-PAC Score        AM-PAC Inpatient Daily Activity Raw Score: 16 (08/23/21 1125)  AM-PAC Inpatient ADL T-Scale Score : 35.96 (08/23/21 1125)  ADL Inpatient CMS 0-100% Score: 53.32 (08/23/21 1125)  ADL Inpatient CMS G-Code Modifier : CK (08/23/21 1125)    Goals  Short term goals  Time Frame for Short term goals: By discharge  Short term goal 1: Pt will complete sit<>stand transfers with no more than Anamika x1   Short term goal 2: Pt will complete functional mobility with 2WW to/from bathroom with no more than min Ax1  Short term goal 3: Pt will complete toileting assessment   Short term goal 4: Pt will complete LB dressing with CGA and AE prn. Patient Goals   Patient goals :  To get better before his [facility]'s wedding next month       Therapy Time   Individual Concurrent Group Co-treatment   Time In 0950         Time Out 1046         Minutes 56         Timed Code Treatment

## 2021-08-24 NOTE — PROGRESS NOTES
Electrophysiology - PROGRESS NOTE    Admit Date: 8/21/2021     Chief Complaint: AMS, fall     Interval History:   Patient seen and examined and notes reviewed. Patient is being followed for AMS, fall, elevated troponins. Patient presented to the hospital after a fall at home, AMS, possible febrile illness. Family went to check on patient at home and found that he was weak, confused, and had fallen in bathroom. Procalcitonin 0.43, lactate 3.4, 2.7, troponin 0.16, 0.30, 0.24, 0.15, CXR (8/21) showed mild left basilar atelectasis, CT head (8/21) negative for acute changes. Negative for influenza, covid 19, given IVF w/ some improvement of sx. Pt c/o abd and lower back pain yesterday. CT chest/ abd showed cardiac enlargement w/ extensive coronary artery calcification, severe fatty infiltration of liver and right sided nephrolithiasis. CT lumbar spine showed severe degenerative changes, however no acute bony pathology. CT thoracic spine showed no acute bony pathology. CRP and CK elevated, statin on hold. Saw patient this morning in Echo room, Turkish  used. No c/o CP, palpitations, heart racing, dizziness, lightheadedness. No PND, BLE edema. C/o continued SOB, orthopnea. Tele shows NSR w/ PVC's, 2 runs of NSVT, longest lasting 4 beats. Spoke with daughter at length this morning, she is frustrated with pt's condition and wants pt transferred to [facility].      In: -   Out: 850    Wt Readings from Last 2 Encounters:   08/21/21 179 lb 4.8 oz (81.3 kg)   06/08/21 177 lb (80.3 kg)         Data:   Scheduled Meds:   Scheduled Meds:   magnesium sulfate  2,000 mg Intravenous Once    ascorbic acid  500 mg Oral Daily    zinc sulfate  50 mg Oral Daily    lidocaine  1 patch Transdermal Daily    insulin lispro  0-12 Units Subcutaneous TID WC    insulin lispro  0-6 Units Subcutaneous Nightly    pantoprazole  40 mg Oral QAM AC    docusate sodium  100 mg Oral Daily    cefTRIAXone (ROCEPHIN) IV  1,000 mg Intravenous Q24H    [Held by provider] atorvastatin  40 mg Oral Daily    carvedilol  6.25 mg Oral BID WC    vitamin D  5,000 Units Oral Daily    finasteride  5 mg Oral Daily    tamsulosin  0.4 mg Oral Daily    sodium chloride flush  5-40 mL Intravenous 2 times per day    enoxaparin  40 mg Subcutaneous Daily    azithromycin  500 mg Intravenous Q24H     Continuous Infusions:   sodium chloride      dextrose       PRN Meds:.melatonin, sodium chloride flush, sodium chloride, ondansetron **OR** ondansetron, polyethylene glycol, acetaminophen **OR** acetaminophen, glucose, dextrose, glucagon (rDNA), dextrose  Continuous Infusions:   sodium chloride      dextrose         Intake/Output Summary (Last 24 hours) at 8/24/2021 0837  Last data filed at 8/24/2021 2163  Gross per 24 hour   Intake --   Output 850 ml   Net -850 ml       CBC:   Lab Results   Component Value Date    WBC 4.3 08/24/2021    HGB 13.8 08/24/2021    PLT 70 08/24/2021     BMP:  Lab Results   Component Value Date     08/24/2021    K 3.7 08/24/2021    K 3.6 08/23/2021     08/24/2021    CO2 22 08/24/2021    BUN 24 08/24/2021    CREATININE 1.0 08/24/2021    GLUCOSE 235 08/24/2021    GLUCOSE 120 09/16/2011     INR:   Lab Results   Component Value Date    INR 1.11 08/24/2021        CARDIAC LABS  ENZYMES:  Recent Labs     08/21/21  2202 08/22/21  0433 08/22/21  1027   TROPONINI 0.30* 0.24* 0.15*     FASTING LIPID PANEL:  Lab Results   Component Value Date    HDL 41 04/10/2021    HDL 51 06/03/2010    LDLCALC 90 04/10/2021    TRIG 104 04/10/2021    TSH 1.98 04/10/2021     LIVER PROFILE:  Lab Results   Component Value Date    AST 39 08/21/2021    AST 14 04/10/2021    ALT 35 08/21/2021    ALT 15 04/10/2021       -----------------------------------------------------------------  Telemetry: Personally reviewed  NSR, PVC's, NSVT x 4 beats    Objective:   Vitals: BP (!) 166/66   Pulse 86   Temp 97.8 °F (36.6 °C) (Oral)   Resp 18   Ht 5' 6\" (1.676 m)   Wt 179 lb 4.8 oz (81.3 kg)   SpO2 98%   BMI 28.94 kg/m²   General appearance: alert, appears stated age and cooperative, ill appearing  Eyes: Conjunctiva and pupils normal and reactive  Skin: Skin color, texture, turgor normal. No rashes or ecchymosis  Neck: no JVD, supple, symmetrical, trachea midline   Lungs: , no accessory muscle use, on O2 via NC, lungs diminished at the bases  Heart: RRR  Abdomen: soft, non-tender; bowel sounds normal  Extremities: No edema, DP +  Psychiatric: normal insight and affect    Patient Active Problem List:     Rosacea     Back pain     Obstructive sleep apnea     Chest pain     Transient global amnesia     Obesity     Insomnia     Bilateral cataracts     Osteoarthritis     Lumbar disc disease     Spinal stenosis     Diverticulosis     Benign prostatic hyperplasia     Hyperlipidemia     Diabetes mellitus, type 2 (HCC)     Allergic rhinitis     Nausea     Eczema     Dyspnea on exertion     Right shoulder pain     Shortness of breath     Vitamin D deficiency     Post herpetic neuralgia     Neck pain     Right hip pain     Microalbuminuria     Essential hypertension     Type 2 diabetes mellitus without complication (HCC)     Bilateral low back pain with right-sided sciatica     Coronary artery disease of native artery of native heart with stable angina pectoris (HCC)     Peripheral vascular disease ([facility] [address]     Acute kidney injury ([facility] [address]        Assessment & Plan:    1. NSTEMI type 2  2. Chronic dHCF  3. HTN  4.  AMS  5. CAD    Cardiac Hx: Theodora Dumont is a 81 y.o. man w/ PMH HTN, DM, CAD, PAD, chronic dCHF (follow w/ Dr. Amy Shrestha at [facility]), Echo (2016) EF 55-60%, grade II diastolic dysfunction, Echo (2020) showed EF 50%, hypokinesis lateral and inferior walls, CP (2016)- refusing angiogram, JOANNA, who p/w AMS, fall at home, fever    NSTEMI type 2/ CAD  - Troponin 0.16, 0.30, 0.24, 0.15  - Troponin elevation from demand ischemia from fall per Dr. Danielle Taylor  - Echo pending  - CT chest (8/23/21) showed cardiac enlargement w/ extensive coronary artery calcification  - Discussed stress test and cardiac cath at length with both patient and daughter, they do not wish to pursue at this time  - statin on hold, on lovenox  - Keep patient on tele  - EKG showed no changes    Chronic dCHF  - Appears euvolemic  - Echo pending  - Strict I/o's  - Daily wts  - Negative 755 ml since admission  - Keep K> 4.0, Mg >2.0, replacements ordered    HTN  - BP controlled, slightly elevated this morning  - On coreg 6.25mg BID, hold parameters in place    AMS  - Negative Covid, influenza  - BC pending  - On IV ABX  - CRP, CK elevated  - Likely viral etiology  - Primary team following      Melba Warner NP  [address]      I  have spent 40 minutes in care of the patient including direct face to face time, chart preparation, reviewing diagnostic testing, other provider notes and coordinating patient care.

## 2021-08-24 NOTE — PROGRESS NOTES
61-year-old male history of type 2 diabetes, hypertension, hyperlipidemia, chronic low back pain, status post TURP, peripheral vascular disease, chronic angina, on ECCP rx (followed by cardiology at [facility])  -Presented to ED on 8/21 evening, found down by son in the bathtub. Per my discussion with patient's daughter and wife at bedside patient is in independent of ADLs and was driving until Thursday. On Saturday morning he complained of acute low back pain and chills, he had trouble getting out of bed and had a fall on the floor but able to get himself up, he was fine for the rest of the day but in the evening had a fall, son helped him up. On arrival to the hospital he was afebrile, heart rate 90s, respiratory rate 20s to 30s blood pressure stable systolic 932P, placed on 2 L oxygen. VBG showed PCO2 40, pH normal range. Review of vitals he had a fever 100.6 on 8/22 at 1757. He has been intermittently on 2 to 3 L oxygen. Blood glucose have been trending up to 230s to 250s since a.m. I was called bedside as patient's family was worried about patient not getting adequate care. I discussed with patient's daughter and wife at bedside, the concern of patient's abdominal distention, new onset of acute symptoms with chills at home as well as acute lower back pain. He has been intermittently confused. On my questioning he does not have any specific complaints, daughter and wife acted as interpreters, he does point to the right lower quadrant of the abdomen when asked about pain. Unable to localize point towards the lower back region of pain. Limited exam as patient unable to sit up in bed, elderly male, bilateral breath sounds decreased at the bases, abdomen appears distended, bowel sounds present, left lower quadrant tenderness on mild palpation, no guarding. No lower extremity edema.   Neuro-exam -moves all extremities, no pronator drift, able to keep his bilateral lower extremity up and there for 3 to 5 seconds. Nausea in the hospital other orientation questions are asked to do language barrier    A/P:  Abdominal distention, small bowel gas, gas seen throughout the colon and rectum without significant distention. Some pain in the LLQ on exam but without guarding. I will get CT abd/pelvis for further evaluation. Acute lower back pain, with chills, Procal 0.47, will check Ct thoracic and lumbar spine for evaluation as well    Concern for sepsis on admission, so far infectious davidson negative, would let cx finalize  Check inflammatory markers, repeat procal in am    Hypoxemia - suspect due to atelactasis, he has been coughing and with chills on admisison check CT  Add IS q 2 hrs while awake  Chest wo contrast for further evaluation    Elevated trop - trending down, cardiology following, echo planned for am. He follows [facility], hx of ECCP, previously recommended angiograms but didn't want to get procedures. Rhabdo; CK level elevated, 1486 is relatively high for this 81 y.o. male who doesn't have much muscle mass  Did come down to 923  Hold statins for now    HA; resolved with IVF, will check bladder scan, on flomax and Finastride. Perform bladder scan with straight cath parameters    Patient and family wants [facility] to [facility], I called they are at capacity.  Family to be on wait list, so transfer center will call me back    Total time spent in review of patient, and evaluation > 60 min    Neymar Gann MD  Hospitalist  Attending Physician

## 2021-08-24 NOTE — PROGRESS NOTES
Speech Language Pathology  Assessment attempt      Referral rec'd, chart reviewed. Attempted to see pt this date of assessment, but pt out for test. Will attempt to see again later.     Goyo Wise [address]  Speech-Language Pathologist  Pager [phone]

## 2021-08-24 NOTE — CARE COORDINATION
Case Management Daily Note                    Date: 8/24/2021     Patient Name: Marcello Mcginnis    Date of Admission: 8/21/2021  6:45 PM  YOB: 1935    Length of Stay: 3  GMLOS: 3.3         Patient Admission Status: Inpatient  Diagnosis:Acute kidney injury ([address] [N17.9]  Altered mental status, unspecified altered mental status type [R41.82]     ________________________________________________________________________________________  Discharge Plan: SNF: [address]: Payor: Roosevelt Guthrie / Plan: Tori Nettle / Product Type: *No Product type* /   Is pre-cert/notification needed: yes, cert initiated     Tentative discharge date: 8/25 vs 8/26     Current barriers: intent to deny, [id number] requested     Referrals completed: SNF: [facility]     Resources/ information provided: [facility] List   ________________________________________________________________________________________  PT AM-PAC: 15 / 24 per last evaluation on: 8/23     OT AM-PAC: 16 / 24 per last evaluation on: 8/23    DME Needs for discharge: defer  ________________________________________________________________________________________  Notes/Plan of Care:   SW received call from [facility]. Insurance intends to [employer] patient. [id number] requested to be complted by 10:00 am on 8/25. Phone: [phone]   Ref number: [id number]      ARTIE informed Dr. German Pride of the intent to deny and insurance request for [id number]. UPDATE: 1:49 pm   SW received voice mail from daughter to call. SW updated her on current process with [id number] to be completed. SW provided update with family. Patient was independent at home and was driving prior to admission. Family concerned how they would care if patient not able to got to SNF for some rehab and improvement prior to return home.          Roberto and/or his family were provided with choice of provider; he and/or his family are in agreement with the discharge plan at this time.     Care Transition Patient: Yes    JAYME Minor  [facility]   Case Management Department  Ph: [phone]

## 2021-08-24 NOTE — PROGRESS NOTES
Call received from transfer center, [facility] has placed patient on wait list, bed may not be available until tomorrow, once bed available they will let transfer center no and they will connect to the accepting physician. Until bed not available there will be no conference between physicians.

## 2021-08-24 NOTE — CONSULTS
Oncology / Hematology Care - Consultation      Patient name:   Jeovanny Saleh   Date:     8/24/2021           History of Present Illness:    50-year-old gentleman, Found down by his family on August 22, 2021. Prior to this event he was independent, living alone. Upon presentation to the [facility] he was confused and unable to provide history. History was obtained from family members. Upon assessment, normotensive, glucose 337, creatinine 1.4, elevated troponin, Hemoglobin 13.9. Platelet count approximating 100. Reviewed care everywhere, unable to find previous CBCs. Patient was admitted, IV hydrated, respiratory and viral work-up pursued. Also found to have a mild lactic acidosis, levels 3.4, Elevated CK levels.               Past Medical History:    Past Medical History:   Diagnosis Date    Allergic rhinitis 8/27/2010    Asthma     Back pain     Bilateral cataracts     Bilateral low back pain with right-sided sciatica 12/18/2015    BPH (benign prostatic hypertrophy)     Chest pain     Degeneration of lumbar or lumbosacral intervertebral disc     Dental disease     Diabetes mellitus, type 2 ([address] 5/28/2010    Diverticulosis     Dizziness     Eczema 3/11/2011    Essential hypertension 9/18/2015    Hearing loss     Hyperlipidemia     Hypertension     Insomnia     Lumbar disc disease     Microalbuminuria 6/13/2013    Neck pain 12/14/2012    Nosebleed     Obesity     Obstructive sleep apnea     Osteoarthritis     Post herpetic neuralgia 6/15/2012    Recurrent upper respiratory infection (URI)     Right hip pain 12/14/2012    Right shoulder pain 6/17/2011    Rosacea     Spinal stenosis     Transient global amnesia     Type 2 diabetes mellitus without complication ([address] 18/77/4633    Vitamin D deficiency 3/17/2012       Past Surgical History:    Past Surgical History:   Procedure Laterality Date    PROSTATE SURGERY  June 13, 2000    TURP        Social History:    Social History     Socioeconomic History    Marital status:      Spouse name: Sridhar Corley Number of children: 2    Years of education: Not on file    Highest education level: Not on file   Occupational History    Not on file   Tobacco Use    Smoking status: Never Smoker    Smokeless tobacco: Never Used   Substance and Sexual Activity    Alcohol use: Yes     Comment: occasionally    Drug use: No    Sexual activity: Yes     Partners: Female     Comment:  - [place] -  April 15, 2012   Other Topics Concern    Not on file   Social History Narrative        Past Surgical History     TURP: 2000                                                     Last updated by Binaca Rao MD on 2008                  Social History     Marital Status:  - 1958 --      Spouse: Gideon Moura    Children: 2     Children's Names: Ana Kenny and Franklin    Employment Status: retired     Occupation: [employer]     Alcohol Use: occasionally    Drug Use: none    Tobacco Usage: non-smoker                                              Last updated by Bianca Rao MD on 2009                      Family History     mother -  - age 68 - diabetes    father -  - age 32 -  in [de-identified] War [place]        brother - diabetes        son - Rodger Vasquez -         daughter - Ana Kenny -                                  Last updated by Fransico Burk MA on 2008          Social Determinants of Health     Financial Resource Strain: Low Risk     Difficulty of Paying Living Expenses: Not hard at all   Food Insecurity: No Food Insecurity    Worried About [address] in the Last Year: Never true    [place] of Food in the Last Year: Never true   Transportation Needs: No Transportation Needs    Lack of Transportation (Medical): No    Lack of Transportation (Non-Medical):  No   Physical Activity:     Days of Exercise per Week:     Minutes of Exercise per Session:    Stress:  Feeling of Stress :    Social Connections:     Frequency of Communication with Friends and Family:     Frequency of Social Gatherings with Friends and Family:     Attends Synagogue Services:     Active Member of Clubs or Organizations:     Attends Club or Organization Meetings:     Marital Status:    Intimate Partner Violence:     Fear of Current or Ex-Partner:     Emotionally Abused:     Physically Abused:     Sexually Abused:         Family History:    Family History   Problem Relation Age of Onset    Diabetes Mother     Diabetes Brother         Allergies:     Allergies   Allergen Reactions    Tramadol         Medications:    Current Facility-Administered Medications   Medication Dose Route Frequency Provider Last Rate Last Admin    ascorbic acid (VITAMIN C) tablet 500 mg  500 mg Oral Daily Rosie Pyle MD   500 mg at 08/23/21 1205    zinc sulfate (ZINCATE) capsule 50 mg  50 mg Oral Daily Rosie Pyle MD   50 mg at 08/23/21 1205    lidocaine 4 % external patch 1 patch  1 patch Transdermal Daily Rosie Pyle MD   1 patch at 08/23/21 1205    insulin lispro (1 Unit Dial) 0-12 Units  0-12 Units Subcutaneous TID  Rosie Pyle MD   6 Units at 08/23/21 1647    insulin lispro (1 Unit Dial) 0-6 Units  0-6 Units Subcutaneous Nightly Rosie Pyle MD   2 Units at 08/23/21 2118    pantoprazole (PROTONIX) tablet 40 mg  40 mg Oral QAM AC Rosie Pyle MD   40 mg at 08/24/21 2512    docusate sodium (COLACE) capsule 100 mg  100 mg Oral Daily Gabriel Truong MD   100 mg at 08/23/21 2007    cefTRIAXone (ROCEPHIN) 1000 mg IVPB in 50 mL D5W minibag  1,000 mg Intravenous Q24H Danae Gilliam DO   Stopped at 08/23/21 2207    [Held by provider] atorvastatin (LIPITOR) tablet 40 mg  40 mg Oral Daily Danae Gilliam DO   40 mg at 08/23/21 0844    carvedilol (COREG) tablet 6.25 mg  6.25 mg Oral BID  ABA Hawley CNP   6.25 mg at 08/23/21 0844    vitamin D (CHOLECALCIFEROL) capsule 5,000 Units  5,000 Units Oral Daily Ashlee Gamma, DO   5,000 Units at 08/23/21 2411    finasteride (PROSCAR) tablet 5 mg  5 mg Oral Daily Ashlee Gamma, DO   5 mg at 08/23/21 0846    melatonin tablet 3 mg  3 mg Oral Nightly PRN Ashlee Gamma, DO   3 mg at 08/24/21 0003    tamsulosin (FLOMAX) capsule 0.4 mg  0.4 mg Oral Daily Ashlee Gamma, DO   0.4 mg at 08/23/21 7596    sodium chloride flush 0.9 % injection 5-40 mL  5-40 mL Intravenous 2 times per day Ashlee Gamma, DO   10 mL at 08/23/21 2128    sodium chloride flush 0.9 % injection 5-40 mL  5-40 mL Intravenous PRN Ashlee Gamma, DO        0.9 % sodium chloride infusion  25 mL Intravenous PRN Ashlee Gamma, DO        enoxaparin (LOVENOX) injection 40 mg  40 mg Subcutaneous Daily Ashlee Gamma, DO   40 mg at 08/22/21 4424    ondansetron (ZOFRAN-ODT) disintegrating tablet 4 mg  4 mg Oral Q8H PRN Ashlee Gamma, DO        Or    ondansetron [facility] injection 4 mg  4 mg Intravenous Q6H PRN Ashlee Gamma, DO        polyethylene glycol (GLYCOLAX) packet 17 g  17 g Oral Daily PRN Ashlee Gamma, DO   17 g at 08/23/21 0249    acetaminophen (TYLENOL) tablet 650 mg  650 mg Oral Q6H PRN Ashlee Gamma, DO   650 mg at 08/24/21 5882    Or    acetaminophen (TYLENOL) suppository 650 mg  650 mg Rectal Q6H PRN Ashlee Gamma, DO        glucose (GLUTOSE) 40 % oral gel 15 g  15 g Oral PRN Ashlee Gamma, DO        dextrose 50 % IV solution  12.5 g Intravenous PRN Ashlee Gamma, DO        glucagon (rDNA) injection 1 mg  1 mg Intramuscular PRN Ashlee Gamma, DO        dextrose 5 % solution  100 mL/hr Intravenous PRN Ashlee Gamma, DO        azithromycin (ZITHROMAX) 500 mg in dextrose 5 % 250 mL IVPB  500 mg Intravenous Q24H Eun Lutz  mL/hr at 08/24/21 0701 500 mg at 08/24/21 0701          Review of Systems:    · History obtained from patient, otherwise, further 10 point ROS is negative        Physical Exam:    BP (!) 166/66   Pulse 86   Temp 97.8 °F (36.6 °C) (Oral)   Resp 18   Ht 5' 6\" (1.676 m)   Wt 179 lb 4.8 oz (81.3 kg)   SpO2 98%   BMI 28.94 kg/m²     General appearance: alert and cooperative; no acute distress  Head: NCAT, PERRLA, EOMI; oral and nasopharynx without lesions, dentition adequate repair  Neck: no JVD or thyromegaly  Lungs: clear to auscultation bilaterally; no audible rhonchi, rales, wheezes or crackles  Heart: regular rate and rhythm, S1, S2 normal; no murmurs, rubs or gallops  Abdomen: soft, non-tender and non-distended; normoactive, tympanic bowel sounds; no hepatosplenomegaly  Extremities: no cyanosis, clubbing, edema or asymmetry  Skin: no jaundice, purpura or petechiae  Lymph Nodes:  no auricular, cervical, supraclavicular, axillary, inguinal or popliteal lymphadenopathy  Neurologic:  CN 2-12 intact; no focal motor, sensory or cerebellar deficits        Laboratory Evaluation:      CBC:   Lab Results   Component Value Date    WBC 4.4 08/23/2021    NEUTROABS 3.1 08/23/2021    HGB 13.8 08/23/2021    MCV 93.7 08/23/2021    PLT 78 08/23/2021       BMP:   Lab Results   Component Value Date     08/23/2021    K 3.6 08/23/2021    CO2 23 08/23/2021    BUN 26 08/23/2021    CREATININE 1.1 08/23/2021    MG 1.90 08/23/2021    TSH 1.98 04/10/2021       HEPATIC:  Lab Results   Component Value Date    AST 39 08/21/2021    ALT 35 08/21/2021    ALKPHOS 67 08/21/2021    PROT 7.0 08/21/2021    PROT 6.5 03/04/2013    BILITOT 0.6 08/21/2021    BILIDIR <0.2 05/29/2019           Radiology Evaluation:    Reviewed      Assessment / Plan:     Thrombocytopenia  Rhabdomyolysis  Possible pneumonia  Encephalopathy  NSTEMI    - no comparison, per history  - Normal WBC, with a normal differential, normal hemoglobin level    - current viral work-up negative, at the moment; further ID workup ongoing    - CT scan reviewed, no splenomegaly    - Labs did not seem to be consent consistent with a consumptive or destructive process  - Platelet count greater than 50, stable  - Slight decrease, may be related to new med been started since admission    - add coags to morning labs    -Suggest to continue to trend counts, in the context of his other higher acuity issues, and trend counts as hopefully his other active medical issues improved to resolve        As always, thank you for allowing me the opportunity to participate in the care of your patient. Please do not hesitate to contact me with questions or concerns regarding further management. Levi Simmons DO, MS  Oncology/Hematology Care    Please contact via:  1. [employer]  2.   Cell Phone:  [phone]    8/24/2021   7:21 AM

## 2021-08-24 NOTE — RT PROTOCOL NOTE
RT Inhaler-Nebulizer Bronchodilator Protocol Note    There is a bronchodilator order in the chart from a provider indicating to follow the RT Bronchodilator Protocol and there is an Initiate RT Bronchodilator Protocol order as well (see protocol at bottom of note). The findings from the last RT Protocol Assessment were as follows:  Smoking: Non smoker  Surgical Status: No surgery  Xray: One lobe infiltrates/atelectasis/pleural effusion/edema  Respiratory Pattern: RR 12-20  Mental Status: Alert and Oriented  Breath Sounds: Diminished and/or crackles  Cough: Strong, spontaneous, non-productive  Activity Level: Walking with assistance  Oxygen Requirement: Room Air - 2LNC/28% or home setting  Indication for Bronchodilator Therapy: None  Bronchodilator Assessment Score: 3    Aerosolized bronchodilator medication orders have been revised according to the RT Bronchodilator Protocol. RT Inhaler-Nebulizer Bronchodilator Protocol:    Respiratory Therapist to perform RT Therapy Protocol Assessment then follow the protocol. No Indications - adjust the frequency to every 6 hours PRN wheezing or bronchospasm, if no treatments needed after 48 hours then discontinue using Per Protocol order mode. If indication present, adjust the RT bronchodilator orders based on the Bronchodilator Assessment Score as follows:    0-6 - enter or revise RT bronchodilator order to Albuterol Inhaler order with frequency of every 2 hours PRN for wheezing or increased work of breathing using Per Protocol order mode. If Albuterol Inhaler not tolerated or not effective, then discontinue the Albuterol Inhaler order and enter Albuterol Nebulizer order with same frequency and PRN reasons. Repeat RT Therapy Protocol Assessment as needed.     7-10 - discontinue any other Inpatient aerosolized bronchodilator medication orders and enter or revise two Albuterol Inhaler orders, one with BID frequency and one with frequency of every 2 hours PRN wheezing or increased work of breathing using Per Protocol order mode. Repeat RT Therapy Protocol Assessment with second treatment then BID and as needed. If Albuterol Inhaler not tolerated or not effective, then discontinue the Albuterol Inhaler orders and enter two Albuterol Nebulizer orders with same frequencies and PRN reasons. 11-13 - discontinue any other Inpatient aerosolized bronchodilator medication orders and enter DuoNeb Nebulizer orders QID frequency and an Albuterol Nebulizer order every 2 hours PRN wheezing or increased work of breathing using Per Protocol order mode. Repeat RT Therapy Protocol Assessment with second treatment then QID and as needed. Greater than 13 - discontinue any other Inpatient bronchodilator aerosolized medication orders and enter DuoNeb Nebulizer order every 4 hours frequency and Albuterol Nebulizer every 2 hours PRN wheezing or increased work of breathing using Per Protocol order mode. Repeat RT Therapy Protocol Assessment with second treatment then every 4 hours and as needed. RT to enter RT Home Evaluation for COPD & MDI Assessment order using Per Protocol order mode.     Electronically signed by Maury Mondragon RCP on 8/24/2021 at 2:14 PM

## 2021-08-24 NOTE — PROGRESS NOTES
Pt VSS this shift. Pt tolerating diet and ambulating x1-2 assist with lolly lepe. Pt voiding clear rox urine. Pt 02 sats 90-95% on room air, 1L nc provided for comfort while resting in bed. Pt had 3-4 bms this shift. Plan to dc to SNF pending insurance/ precert.

## 2021-08-24 NOTE — PROGRESS NOTES
Progress Note    Admit Date: 8/21/2021  Day: 3  Diet: Diet NPO Exceptions are: Sips of Clear Liquids, Sips of Water with Meds    CC: fall    Interval history:  - Stable O2 saturation >93% through the night,   - On Ceftriaxone and azithromycin  - pressures continue to be labile, range from 100s to 160s  - hyperglycemic at 254 this AM, last insulin dose was 2u lispro at 2118 yesterday. - Patient has continued R ear pain since yesterday  - Continued stable WILFREDO LQ abdominal pain. HPI: Mr. Horace Anand is a 82yo male PM listed below with notable NIDDM, HTN, HLD, BPH, who was found down by his family earlier today. He is independent and lives alone, but with frequent family interaction. Last seen well Friday evening. Saturday morning, found on the ground in the bathroom. The patient is reported to be confused, but alert. He was able to speak, but was not making very much sense per the family.     He was transported to [facility] ED, where he was found to be dehydrated with elevated glucose in the serum and the urine. Reports some mild chills, mouth is dry and has been urinating a lot. He was normotensive, afebrile without tachycardia or hypoxia. Lab work was revealing for Cr 1.4, glucose 337, troponin 0 0.61, lactate 3.4, normal LFTs, WBC 4.9, Hb 13.9, platelets 736. Urine has >1000 glucose, 15 ketones, moderate blood, negative for infection. Was given 1L IV bolus and admitted. Of note, patient speaks [place] and requires .     Medications:     Scheduled Meds:   ascorbic acid  500 mg Oral Daily    zinc sulfate  50 mg Oral Daily    lidocaine  1 patch Transdermal Daily    insulin lispro  0-12 Units Subcutaneous TID WC    insulin lispro  0-6 Units Subcutaneous Nightly    pantoprazole  40 mg Oral QAM AC    docusate sodium  100 mg Oral Daily    cefTRIAXone (ROCEPHIN) IV  1,000 mg Intravenous Q24H    [Held by provider] atorvastatin  40 mg Oral Daily    carvedilol  6.25 mg Oral BID WC    vitamin D  5,000 Units Oral Daily    finasteride  5 mg Oral Daily    tamsulosin  0.4 mg Oral Daily    sodium chloride flush  5-40 mL Intravenous 2 times per day    enoxaparin  40 mg Subcutaneous Daily    azithromycin  500 mg Intravenous Q24H     Continuous Infusions:   sodium chloride      dextrose       PRN Meds:melatonin, sodium chloride flush, sodium chloride, ondansetron **OR** ondansetron, polyethylene glycol, acetaminophen **OR** acetaminophen, glucose, dextrose, glucagon (rDNA), dextrose    Objective:   Vitals:   T-max:  Patient Vitals for the past 8 hrs:   BP Temp Temp src Pulse Resp SpO2   08/24/21 0406 (!) 153/83 97.5 °F (36.4 °C) Oral 88 18 97 %   08/24/21 0007 131/71 98.9 °F (37.2 °C) Oral 86 18 95 %       Intake/Output Summary (Last 24 hours) at 8/24/2021 0622  Last data filed at 8/23/2021 1714  Gross per 24 hour   Intake --   Output 175 ml   Net -175 ml       Review of Systems   Constitutional: Positive for chills, diaphoresis, fatigue and fever. HENT: Positive for ear pain (Right sided ear pain). Respiratory: Positive for shortness of breath. Negative for cough. Cardiovascular: Negative for chest pain and leg swelling. Gastrointestinal: Positive for abdominal pain. Negative for abdominal distention, constipation and diarrhea. Genitourinary: Positive for frequency. Negative for decreased urine volume. Neurological: Positive for headaches. Negative for light-headedness. Physical Exam  Constitutional:       General: He is not in acute distress. Appearance: He is ill-appearing and diaphoretic. Comments: Lethargic   HENT:      Mouth/Throat:      Mouth: Mucous membranes are dry. Pharynx: Oropharynx is clear. Eyes:      Extraocular Movements: Extraocular movements intact. Pupils: Pupils are equal, round, and reactive to light. Cardiovascular:      Rate and Rhythm: Normal rate and regular rhythm. Pulses: Normal pulses. Heart sounds: Normal heart sounds.    Pulmonary: Effort: Pulmonary effort is normal. No respiratory distress. Breath sounds: Normal breath sounds. No wheezing or rales. Abdominal:      General: Abdomen is flat. Bowel sounds are normal. There is distension. Palpations: Abdomen is soft. Tenderness: There is abdominal tenderness (RLQ and LLQ). Musculoskeletal:      Right lower leg: No edema. Left lower leg: No edema. Neurological:      General: No focal deficit present. Mental Status: He is alert. LABS:    CBC:   Recent Labs     08/21/21 1933 08/22/21 0433 08/23/21  0538   WBC 4.9 4.2 4.4   HGB 13.9 13.3* 13.8   HCT 39.0* 37.6* 39.4*   * 94* 78*   MCV 93.7 92.8 93.7     Renal:    Recent Labs     08/21/21 1933 08/22/21 0433 08/23/21  0538 08/23/21  0539   * 133*  --  133*   K 4.2 3.4*  --  3.6   CL 95* 99  --  99   CO2 21 24  --  23   BUN 34* 27*  --  26*   CREATININE 1.4* 1.1  --  1.1   GLUCOSE 337* 173*  --  226*   CALCIUM 9.1 8.7  --  8.6   MG  --  1.80 1.90  --    PHOS  --   --  3.2  --    ANIONGAP 16 10  --  11     Hepatic:   Recent Labs     08/21/21 1933   AST 39*   ALT 35   BILITOT 0.6   PROT 7.0   LABALBU 3.9   ALKPHOS 67     Troponin:   Recent Labs     08/21/21 2202 08/22/21 0433 08/22/21  1027   TROPONINI 0.30* 0.24* 0.15*     BNP: No results for input(s): BNP in the last 72 hours. Lipids: No results for input(s): CHOL, HDL in the last 72 hours. Invalid input(s): LDLCALCU, TRIGLYCERIDE  ABGs:  No results for input(s): PHART, IFP2ZTQ, PO2ART, DEG0XOS, BEART, THGBART, V1YOOWSA, DXZ7XPG in the last 72 hours. INR: No results for input(s): INR in the last 72 hours. Lactate: No results for input(s): LACTATE in the last 72 hours.   Cultures:  -----------------------------------------------------------------  RAD:   CT THORACIC SPINE WO CONTRAST   Final Result      No acute bony pathology              CT CHEST ABDOMEN PELVIS WO CONTRAST   Final Result      CT CHEST:      Exam severely degraded by motion. Cardiac enlargement with extensive coronary artery calcification. CT ABDOMEN/PELVIS:      Severe fatty infiltration of the liver. Cholelithiasis. Right-sided nephrolithiasis      CT LUMBAR SPINE WO CONTRAST   Final Result      No acute bony pathology        Severe degenerative changes as described above         XR CHEST PORTABLE   Final Result      Left lower lobe atelectasis, unchanged                  XR ABDOMEN (KUB) (SINGLE AP VIEW)   Final Result      Mild ileus      XR CHEST 1 VIEW   Final Result   Impression: Left basilar atelectasis. CT Cervical Spine WO Contrast   Final Result      1. No acute intracranial hemorrhage or mass effect. 2.  No evidence of cervical spine fracture or traumatic subluxation. 3.  Severe multilevel degenerative changes in the cervical spine resulting canal and foraminal stenosis, as above. CT Head WO Contrast   Final Result      1. No acute intracranial hemorrhage or mass effect. 2.  No evidence of cervical spine fracture or traumatic subluxation. 3.  Severe multilevel degenerative changes in the cervical spine resulting canal and foraminal stenosis, as above. XR CHEST PORTABLE   Final Result      Mild left basilar atelectasis. Assessment/Plan:     Acute Metabolic Encephalopathy: likely related to dehydration. Dehydration 2/2 to reduced PO intake vs possible infection. Viral more likely considering no leukocytosis, myalgias/arthralgias, and afebrile. Procal elevated but clinical picture makes bacterial pneumonia much less likely.  Flu and COVID negative  - Repeat pocalcitonin at 0.44 this AM  - Cautious fluid resuscitation considering worries for HF exacerbation, echo pending  - Respiratory viral panel: not detected currently  - FU blood cultures NGTD, collected 8/22/21  - Cont azithromycin and rocephin course    Acute Abdominal pain; lower back pain  CT Chest/Abd/Pelvis reported severe fatty infiltrate of liver, cholelithiasis, R-sided nephrolithiasis. CT Thoracic and Lumbar negative  - repeat Procal stable at 0.47  - Continue to monitor    NSTEMI Type 2: most likely r/t hypovolemia. Trop elevation could also be due to viral myocarditis but improvement with fluids suggests hypovolemia. Initially uptrending trops now downtrending. Last echo (3/2020) 50% with lateral and inferior wall hypokinesis  - Echo pending; follow-up  - Pt refused cardiac cath in past but may need stress test following optimization.     Thrombocytopenia: likely related to viral illness. 4T score with very low concern for HIT given timeline and lack of concerning findings  - monitor daily CBC and coags  - Currently stable with platelet count > 50  - CBC ~3days after discharge if remains thrombocytopenic    HA on CKD2: Pre-renal etiology, improving with rehydration  - IVFs as tolerated/needed  - improving BUN/Cr is 24/1.0 this AM. Cr baseline ~ 1.0    Rhabdomyolysis  - CK downtrending >799 this AM  - holding statin  - Continue to monitor labs qAM     NIDDM with Hyperglycemia: 15 ketones on UA but ketoacidosis unlikely given normal VBG. Sugars elevated to 250s. Patient is insulin naive prior to hospital stay  - MDSSI    Lactic Acidosis: 2/2 hypovolemia. Resolved s/p IVF  - Continue to monitor     HLD  - holding lipitor     HTN  - home carvedilol    BPH:  - home finasteride, tamsulosin        Code Status: FULL CODE  FEN: Regular diet, Low Carb   PPX:  Lovenox  DISPO: F  Sascha Can DO, PGY-1  08/24/21  6:22 AM    This patient has been staffed and discussed with Dr. Mitchell Cortez.

## 2021-08-24 NOTE — PROGRESS NOTES
Patient resting in bed. Family members at the bed side. Family members are very concern for patient care. They were waiting for physician to come and talk to them. They were concern with abdominal distention. Doctor order CT of his abdomen. Patient had two BM after he came from CT. Family members left hospital at 22:00 Patient's daughter called RN at 02:30 to update patient status.

## 2021-08-24 NOTE — PROGRESS NOTES
Speech Language Pathology  Facility/Department: [facility] 5T ORTHO/NEURO   CLINICAL BEDSIDE SWALLOW EVALUATION    NAME: Kamran Villanueva  : 1935  MRN: [id number]    ADMISSION DATE: 2021  ADMITTING DIAGNOSIS: has Rosacea; Back pain; Obstructive sleep apnea; Chest pain; Transient global amnesia; Obesity; Insomnia; Bilateral cataracts; Osteoarthritis; Lumbar disc disease; Spinal stenosis; Diverticulosis; Benign prostatic hyperplasia; Hyperlipidemia; Diabetes mellitus, type 2 ([address] Allergic rhinitis; Nausea; Eczema; Dyspnea on exertion; Right shoulder pain; Shortness of breath; Vitamin D deficiency; Post herpetic neuralgia; Neck pain; Right hip pain; Microalbuminuria; Benign essential HTN; Type 2 diabetes mellitus without complication ([address] Bilateral low back pain with right-sided sciatica; Coronary artery disease of native artery of native heart with stable angina pectoris ([address] Peripheral vascular disease ([address] Acute kidney injury ([address] Altered mental status; and Chronic diastolic HF (heart failure) ([address] on their problem list.  ONSET DATE: 21    Recent Chest Xray 21  Impression       Left lower lobe atelectasis, unchanged         CT of head 21  Impression       1.  No acute intracranial hemorrhage or mass effect. 2.  No evidence of cervical spine fracture or traumatic subluxation.     3.  Severe multilevel degenerative changes in the cervical spine resulting canal and foraminal stenosis, as above         Date of Eval: 2021  Evaluating Therapist: ERAN Mendez    Current Diet level:  Current Diet : NPO  Current Liquid Diet : NPO      Primary Complaint  Patient Complaint: pt c/o pain with swallowing when food/drink are cold    Pain:  C/o back pain- repositioned pt to alleviate pain    Reason for Referral  Kamran Villanueva was referred for a bedside swallow evaluation to assess the efficiency of his swallow function, identify signs and symptoms of aspiration and make recommendations regarding safe dietary consistencies, effective compensatory strategies, and safe eating environment. History of Present Illness      Isai Kelley is a 81 y.o. male who presents with altered mental status. The patient is [place] speaking only and  services used; daughter also at bedside assisting. The patient lives alone with family checking on him. Past medical history is significant for diabetes and CHF. Was in baseline health until past 24 hours when he has developed subjective fever and chills. Was doing well this morning however did not come to the door when family was checking on him this afternoon; family had to break into the home where they found him with weakness, altered mental status having fallen while in the bathroom. At presentation he is alert but is confused and cannot meaningfully contribute to HPI or ROS    Impression  Family present. Family reports pt has difficulty swallowing cold items so prefers to only consume food/liquids that are warm. ROM of oral structures was [facility]. Pt had no difficulty closing lips around spoon or drawing liquid up a straw. Pt had mild difficulty with mastication with solids due to dentures being ill fitting. Pt moistened cracker with water to soften. There was no anterior spillage or oral residue noted with any consistency. Pt demonstrated no s/s aspiration with any consistency presented. Pt able to initiate swallow without minimal difficulty with laryngeal movement observed. Pt grimaced with swallowing at times, but per family, pt swallows better when items are warm and items used for testing were room temperature. Vocal quality remained clear throughout. No coughing, throat clearing or choking observed with any consistency. Pt was instructed to consume 3oz water continuously, but pt required brief pauses between swallows. Pt had a few instances when he was able to consume 2-3 successive swallows with no s/s aspiration. Dysphagia Diagnosis: Swallow function appears grossly intact  Dysphagia Outcome Severity Scale: Level 6: Within functional limits/Modified independence     Treatment Plan  Requires SLP Intervention: Yes  Duration/Frequency of Treatment: 1-3x  D/C Recommendations: To be determined       Recommended Diet and Intervention  Diet Solids Recommendation: Regular  Liquid Consistency Recommendation: Thin-Make NPO if s/s aspiration emerge and alert SLP  Recommended Form of Meds: PO  Recommendations: Dysphagia treatment  Therapeutic Interventions: Diet tolerance monitoring;Patient/Family education    Compensatory Swallowing Strategies  Compensatory Swallowing Strategies: Upright as possible for all oral intake    Treatment/Goals  1- The patient will tolerate recommended diet without observed clinical signs of aspiration    2- The pt/caregiver will demonstrate understanding of swallowing recommendations and concerns. 8/24-  The pt and daughter were educated to purpose of the visit, s/s of aspiration, swallowing strategies, diet recommendations and plan for staff to continue to monitor lung status. The pt and daughter indicated  comprehension. con't goal      General  Chart Reviewed: Yes  Behavior/Cognition: Alert; Cooperative  Respiratory Status: Room air  Communication Observation: Functional  Follows Directions: Simple  Dentition: Dentures bottom; Dentures top  Patient Positioning: Upright in bed  Baseline Vocal Quality: Normal  Volitional Cough: Strong  Prior Dysphagia History: daughter present- reports pt c/o pain with swallowing when items are cold  Consistencies Administered: Reg solid; Dysphagia Pureed (Dysphagia I); Thin - cup; Thin - straw       Vision/Hearing  Vision  Vision: Within Functional Limits  Hearing  Hearing: Within functional limits    Oral Motor Deficits  Oral/Motor  Oral Motor:  Within functional limits    Oral Phase Dysfunction  Oral Phase  Oral Phase: WFL     Indicators of Pharyngeal Phase Dysfunction Pharyngeal Phase  Pharyngeal Phase: WFL    Prognosis  Prognosis  Prognosis for safe diet advancement: excellent  Individuals consulted  Consulted and agree with results and recommendations: Patient;RN    Education  Patient Education: Role of SLP  Patient Education Response: Verbalizes understanding  Safety Devices in place: Yes  Type of devices: Call light within reach       Therapy Time  SLP Individual Minutes  Time In: 1110  Time Out: 1130  Minutes: 20        Pt's goal: to eat       Plan:  Continue goals per POC  Recommended diet: regular with thin liquids-Make NPO if s/s aspiration emerge and alert SLP  Pt prefers warm food/drink  Total treatment time:20  Pt's discharge plan:pt did not state  Discharge Plan: To be determined closer to discharge  Discussed with RNJose  Needs within reach.        Goyo Kaiser, [facility]- [address] [facility]  Pg # [phone]  This document will serve as a discharge summary if pt discharge before next treatment   session

## 2021-08-24 NOTE — PLAN OF CARE
Problem: Falls - Risk of:  Goal: Will remain free from falls  Description: Will remain free from falls  8/24/2021 0028 by Estee Ramírez RN  Outcome: Ongoing  8/23/2021 1241 by Dung Murillo RN  Outcome: Ongoing     Problem: Skin Integrity:  Goal: Will show no infection signs and symptoms  Description: Will show no infection signs and symptoms  Outcome: Ongoing     Problem: Serum Glucose Level - Abnormal:  Goal: Ability to maintain appropriate glucose levels will improve  Description: Ability to maintain appropriate glucose levels will improve  Outcome: Ongoing

## 2021-08-25 NOTE — PROGRESS NOTES
Occupational Therapy  Facility/Department: [facility] 5T ORTHO/NEURO  Daily Treatment Note  NAME: Ramses Gonzales  : 1935  MRN: [id number]    Date of Service: 2021    Discharge Recommendations:  Ramses Gonzales scored a 17/24 on the AM-PAC ADL Inpatient form. Current research shows that an AM-PAC score of 17 or less is typically not associated with a discharge to the patient's home setting. Based on the patient's AM-PAC score and their current ADL deficits, it is recommended that the patient have 3-5 sessions per week of Occupational Therapy at d/c to increase the patient's independence. Please see assessment section for further patient specific details. If patient discharges prior to next session this note will serve as a discharge summary. Please see below for the latest assessment towards goals. OT Equipment Recommendations  Other: Defer to next level of care    Assessment   Performance deficits / Impairments: Decreased functional mobility ; Decreased ADL status; Decreased strength;Decreased endurance;Decreased safe awareness;Decreased balance;Decreased coordination  Assessment: Pt continues to be limited by fatigue. Pt CGA for all transfers and functional mobility to/from bathroom < 20 ft each way. Pt requires Mod A for toileting and Min A for LB dressing. Pt lives alone and would benefit from inpt therapy at d/c to maximize functional independence and safety  to return to OF. Continue with POC. Treatment Diagnosis: Decreased strength and endurance resulting in decreased ADL independence. Prognosis: Good  OT Education: OT Role;Plan of Care;Transfer Training  Patient Education: pt demonstrated understanding  REQUIRES OT FOLLOW UP: Yes  Activity Tolerance  Activity Tolerance: Patient limited by fatigue  Safety Devices  Safety Devices in place: Yes  Type of devices: Call light within reach; Left in chair;Chair alarm in place;Nurse notified         Patient Diagnosis(es): The encounter diagnosis was Altered mental status, unspecified altered mental status type. has a past medical history of Allergic rhinitis, Asthma, Back pain, Bilateral cataracts, Bilateral low back pain with right-sided sciatica, BPH (benign prostatic hypertrophy), Chest pain, Degeneration of lumbar or lumbosacral intervertebral disc, Dental disease, Diabetes mellitus, type 2 ([address] Diverticulosis, Dizziness, Eczema, Essential hypertension, Hearing loss, Hyperlipidemia, Hypertension, Insomnia, Lumbar disc disease, Microalbuminuria, Neck pain, Nosebleed, Obesity, Obstructive sleep apnea, Osteoarthritis, Post herpetic neuralgia, Recurrent upper respiratory infection (URI), Right hip pain, Right shoulder pain, Rosacea, Spinal stenosis, Transient global amnesia, Type 2 diabetes mellitus without complication ([address] and Vitamin D deficiency. has a past surgical history that includes Prostate surgery (2000). Restrictions  Position Activity Restriction  Other position/activity restrictions: Up with A; primary language is [place]. Subjective   General  Chart Reviewed: Yes  Patient assessed for rehabilitation services?: Yes  Additional Pertinent Hx: Pt is 80 yo male presenting from home alone to [facility] with c/o of AMS, fever, weakness, and a fall in the home. Cardiology Consult= troponin elevation from demand ischemia from fall. Head and cervical spine CT= no acute cranial hemorrhage, no evidence of cervical spine fracture or subluxation, severe multilevel degenerative changes in cervical spine resulting in canal and foraminal stenosis. Chest XR= mild L basilar atelectasis. PMHx= DM-2, diverticulosis, HTN, HLD, hearing loss, osteoarthritis, CHF, BPH. Surgery Hx= Prostate surgery ().   Response to previous treatment: Patient with no complaints from previous session  Family / Caregiver Present: No  Referring Practitioner: Bonnie Garcia DO  Diagnosis: acute kidney injury  Subjective  Subjective: Pt (2L O2) supine in bed with HOB raised, pt agreeable to therapy session. General Comment  Comments: Pt supine to sit Mod A, pt sit EOB CGA progressing quickly to Supervision. Pt ate breakfast with setup EOB ~15 min. Pt sit to stand CGA from EOB and ambulated with rw ~15 ft to bathroom toilet (slow shuffling gait with rw out in front of pt). Pt with one standing rest break due to fatigue. Pt toilet transfer CGA, seated on toilet: /59, HR 73, O2 96%. Pt required assist for brief up/down, no hygiene care (toileting Mod A) and pt threaded brief seated on toilet (Min A LB dressing to pull up brief). Pt ambulated ~18 ft to chair with rw at [address] at aforementioned gait. Pt stand to sit CGA. Call light in reach, chair alarm on and rest of breakfast served. Vital Signs  Patient Currently in Pain: Denies   Orientation  Orientation  Overall Orientation Status:  (not formally assessed)  Objective    ADL  LE Dressing: Minimal assistance;Setup  Toileting:  Moderate assistance        Balance  Sitting Balance: Supervision  Standing Balance: Contact guard assistance  Standing Balance  Time: ~6 min total  Activity: to/from bathroom and toilet transfer/toileting  Functional Mobility  Functional - Mobility Device: Rolling Walker  Activity: To/from bathroom  Assist Level: Contact guard assistance  Toilet Transfers  Toilet - Technique: Ambulating  Equipment Used: Standard toilet  Toilet Transfer: Contact guard assistance  Bed mobility  Supine to Sit: Moderate assistance  Transfers  Sit to stand: Contact guard assistance  Stand to sit: Contact guard assistance                       Cognition  Overall Cognitive Status: [facility]                                         Plan   Plan  Times per week: 2-5  Current Treatment Recommendations: Balance Training, Functional Mobility Training, Endurance Training, Patient/Caregiver Education & Training, Safety Education & Training, Pain Management, Self-Care / ADL  G-Code     OutComes Score AM-PAC Score        AM-PAC Inpatient Daily Activity Raw Score: 17 (08/25/21 0843)  AM-PAC Inpatient ADL T-Scale Score : 37.26 (08/25/21 0812)  ADL Inpatient CMS 0-100% Score: 50.11 (08/25/21 0843)  ADL Inpatient CMS G-Code Modifier : CK (08/25/21 0843)    Goals  Short term goals  Time Frame for Short term goals: By discharge  Short term goal 1: Pt will complete sit<>stand transfers with no more than min Ax1 - goal met 8/25: new goal: upgraded to SBA  Short term goal 2: Pt will complete functional mobility with 2WW to/from bathroom with no more than min Ax1 - goal met 8/25; new goal: upgraded to SBA  Short term goal 3: Pt will complete toileting assessment - goal met 8/25; new goal: pt will complete toileting with supervision  Short term goal 4: Pt will complete LB dressing with CGA and AE prn. - goal not met 8/25  Patient Goals   Patient goals :  To get better before his grandaughter's wedding next month       Therapy Time   Individual Concurrent Group Co-treatment   Time In [address] [facility] [address]         Time Out 0829         Minutes 47         Timed Code Treatment Minutes: [address], [address]    [facility] OTR/L

## 2021-08-25 NOTE — PLAN OF CARE
Problem: Falls - Risk of:  Goal: Will remain free from falls  Description: Will remain free from falls  Outcome: Ongoing  Note: Pt will remain free of falls for the duration of the shift. Pt is A/O x4  and uses the call light appropriately for needs. Pt is assist x2 with the lolly lepe. Avasys  on for safety. Bed alarm on, wheels locked, bed in lowest position, side rails up 2/4, nonskid socks on, call light and bedside table in reach. Will continue to monitor. Problem: Skin Integrity:  Goal: Will show no infection signs and symptoms  Description: Will show no infection signs and symptoms  Outcome: Ongoing  Note: No infection noted in skin abrasions. Problem: Serum Glucose Level - Abnormal:  Goal: Ability to maintain appropriate glucose levels will improve  Description: Ability to maintain appropriate glucose levels will improve  Outcome: Ongoing  Note: Humalog and Lantus given for hyperglycemia     Problem: Airway Clearance - Ineffective  Goal: Achieve or maintain patent airway  Outcome: Ongoing  Note: Oxygen given for low oxygen saturation on room air     Problem: Body Temperature -  Risk of, Imbalanced  Goal: Ability to maintain a body temperature within defined limits  Outcome: Ongoing  Note: Pt had a temperature of 101.7. Tylenol given. Down to 99.5 when reassessed.

## 2021-08-25 NOTE — CARE COORDINATION
Case Management Daily Note                    Date: 8/25/2021     Patient Name: Arian Tan    Date of Admission: 8/21/2021  6:45 PM  YOB: 1935    Length of Stay: 4  GMLOS: 3.3         Patient Admission Status: Inpatient  Diagnosis:Acute kidney injury [facility]) [N17.9]  Altered mental status, unspecified altered mental status type [R41.82]     ________________________________________________________________________________________  Discharge Plan: SNF: [address]   Report: [phone]   Fax: [phone]     HENS: [phone]    Insurance: Payor: Emilia Meza / Plan: Lesly Morrissey / Product Type: *No Product type* /   Is pre-cert/notification needed: yes, cert initiated     Tentative discharge date: 8/25 vs 8/26     Current barriers: intent to deny, [id number] requested     Referrals completed: SNF: [facility]     Resources/ information provided: SNF List   ________________________________________________________________________________________  PT AM-PAC: 15 / 24 per last evaluation on: 8/23     OT AM-PAC: 17 / 24 per last evaluation on: 8/23    DME Needs for discharge: defer  ________________________________________________________________________________________  Notes/Plan of Care:   [id number] requested to be complted by 10:00 am on 8/25. Phone: [phone]   Ref number: [id number]      Peer-to-per was completed by Dr. Evens Albert. Patient as approved by insurance for admit between 8/25 and 9/4. SW updated patient and family at bedside. HENS completed. SW to discharge once medically cleared. Roberto and/or his family were provided with choice of provider; he and/or his family are in agreement with the discharge plan at this time.     Care Transition Patient: Yes    Cindy Mccarthy, JAYME  [facility]   Case Management Department  Ph: [phone]

## 2021-08-25 NOTE — PROGRESS NOTES
Patient A&Ox4. VSS, O2 94% on RA, pt intermittently febrile, medicated per [facility], medical residents aware. Patient voiding since flores removal, but often incontinent, condom cath placed. Patient reporting back pain, medicated with Tylenol. Patient ambulating with walker and GB, tolerating fairly well with 1 assist. Patient anticipating discharge to [facility] once precert obtained. Will continue to monitor.

## 2021-08-25 NOTE — PROGRESS NOTES
Electrophysiology - PROGRESS NOTE    Admit Date: 8/21/2021     Chief Complaint: AMS, fall     Interval History:   Patient seen and examined and notes reviewed. Patient is being followed for AMS, fall, elevated troponins. Patient presented to the hospital after a fall at home, AMS, possible febrile illness. Family went to check on patient at home and found that he was weak, confused, and had fallen in bathroom. Procalcitonin 0.43, lactate 3.4, 2.7, troponin 0.16, 0.30, 0.24, 0.15, CXR (8/21) showed mild left basilar atelectasis, CT head (8/21) negative for acute changes. Negative for influenza, covid 19, given IVF w/ some improvement of sx. Pt c/o abd and lower back pain yesterday. CT chest/ abd showed cardiac enlargement w/ extensive coronary artery calcification, severe fatty infiltration of liver and right sided nephrolithiasis. CT lumbar spine showed severe degenerative changes, however no acute bony pathology. CT thoracic spine showed no acute bony pathology. CRP and CK elevated, statin on hold. Echo (8/24/21) showed EF 55-60%. Tele shows NSR, sinus arrhythmia, NSVT x 5 beats. Pt denies palpitations, heart racing, dizziness, lightheadedness. No CP, PND, orthopnea, BLE edema. BP well controlled. Continues w/ SOB, on oxygen. Febrile overnight. On IV abx. CXR today shows no acute abnormality or significant change. Patient sitting in chair during assessment. Daughter at bedside translating.     In: -   Out: 1250    Wt Readings from Last 2 Encounters:   08/21/21 179 lb 4.8 oz (81.3 kg)   06/08/21 177 lb (80.3 kg)         Data:   Scheduled Meds:   Scheduled Meds:   ascorbic acid  500 mg Oral Daily    zinc sulfate  50 mg Oral Daily    lidocaine  1 patch Transdermal Daily    insulin lispro  0-12 Units Subcutaneous TID WC    insulin lispro  0-6 Units Subcutaneous Nightly    pantoprazole  40 mg Oral QAM AC    docusate sodium  100 mg Oral Daily    cefTRIAXone (ROCEPHIN) IV  1,000 mg IntraVENous Q24H    [Held by provider] atorvastatin  40 mg Oral Daily    carvedilol  6.25 mg Oral BID WC    vitamin D  5,000 Units Oral Daily    finasteride  5 mg Oral Daily    tamsulosin  0.4 mg Oral Daily    sodium chloride flush  5-40 mL IntraVENous 2 times per day    enoxaparin  40 mg Subcutaneous Daily    azithromycin  500 mg IntraVENous Q24H     Continuous Infusions:   sodium chloride 25 mL (08/25/21 0602)    dextrose       PRN Meds:.ipratropium-albuterol, melatonin, sodium chloride flush, sodium chloride, ondansetron **OR** ondansetron, polyethylene glycol, acetaminophen **OR** acetaminophen, glucose, dextrose, glucagon (rDNA), dextrose  Continuous Infusions:   sodium chloride 25 mL (08/25/21 0602)    dextrose         Intake/Output Summary (Last 24 hours) at 8/25/2021 0808  Last data filed at 8/25/2021 0636  Gross per 24 hour   Intake --   Output 1600 ml   Net -1600 ml       CBC:   Lab Results   Component Value Date    WBC 5.8 08/25/2021    HGB 13.8 08/25/2021    PLT 77 08/25/2021     BMP:  Lab Results   Component Value Date     08/25/2021    K 4.0 08/25/2021    K 3.6 08/23/2021    CL 98 08/25/2021    CO2 27 08/25/2021    BUN 25 08/25/2021    CREATININE 1.2 08/25/2021    GLUCOSE 230 08/25/2021    GLUCOSE 120 09/16/2011     INR:   Lab Results   Component Value Date    INR 1.15 08/25/2021    INR 1.11 08/24/2021        CARDIAC LABS  ENZYMES:  Recent Labs     08/22/21  1027   TROPONINI 0.15*     FASTING LIPID PANEL:  Lab Results   Component Value Date    HDL 41 04/10/2021    HDL 51 06/03/2010    LDLCALC 90 04/10/2021    TRIG 104 04/10/2021    TSH 1.98 04/10/2021     LIVER PROFILE:  Lab Results   Component Value Date    AST 39 08/21/2021    AST 14 04/10/2021    ALT 35 08/21/2021    ALT 15 04/10/2021       -----------------------------------------------------------------  Telemetry: Personally reviewed NSR, sinus arrhythmia     Objective:   Vitals: /61   Pulse 91 Temp 99.5 °F (37.5 °C) (Oral)   Resp 16   Ht 5' 6\" (1.676 m)   Wt 179 lb 4.8 oz (81.3 kg)   SpO2 94%   BMI 28.94 kg/m²   General appearance: alert, appears stated age and cooperative, ill appearing  Eyes: Conjunctiva and pupils normal and reactive  Skin: Skin color, texture, turgor normal. No rashes or ecchymosis  Neck: no JVD, supple, symmetrical, trachea midline   Lungs: accessory muscle use, on O2 via NC, lungs diminished, wheezing at the bases  Heart: RRR  Abdomen: soft, non-tender; bowel sounds normal  Extremities: No edema, DP +  Psychiatric: normal insight and affect    Patient Active Problem List:     Rosacea     Back pain     Obstructive sleep apnea     Chest pain     Transient global amnesia     Obesity     Insomnia     Bilateral cataracts     Osteoarthritis     Lumbar disc disease     Spinal stenosis     Diverticulosis     Benign prostatic hyperplasia     Hyperlipidemia     Diabetes mellitus, type 2 (HCC)     Allergic rhinitis     Nausea     Eczema     Dyspnea on exertion     Right shoulder pain     Shortness of breath     Vitamin D deficiency     Post herpetic neuralgia     Neck pain     Right hip pain     Microalbuminuria     Essential hypertension     Type 2 diabetes mellitus without complication (HCC)     Bilateral low back pain with right-sided sciatica     Coronary artery disease of native artery of native heart with stable angina pectoris (HCC)     Peripheral vascular disease ([address]     Acute kidney injury [address] [address]        Assessment & Plan:    1. NSTEMI type 2  2. Chronic dHCF  3. HTN  4.  AMS  5. CAD    Cardiac Hx: Alysa Interiano is a 81 y.o. man w/ PMH HTN, DM, CAD, PAD, chronic dCHF (follow w/ Dr. Abdulaziz Iverson at [facility]), Echo (2016) EF 55-60%, grade II diastolic dysfunction, Echo (2020) showed EF 50%, hypokinesis lateral and inferior walls, CP (2016)- refusing angiogram, JOANNA, who p/w AMS, fall at home, fever    NSTEMI type 2/ CAD  - Troponin 0.16, 0.30, 0.24, 0.15  - Troponin elevation from demand ischemia from fall per Dr. Nick Minaya  - Echo showed EF 55-60%  - CT chest (8/23/21) showed cardiac enlargement w/ extensive coronary artery calcification  - Discussed stress test and cardiac cath at length with both patient and daughter, they do not wish to pursue either test at this time  - Statin on hold d/t rhabdo  - Keep patient on tele  - Tele shows NSR/ sinus arrhythmia, will order EKG  - Follows w/ Dr. Millicent Canela at [facility] 9 euvolemic  - Echo (8/24/21) showed EF 55-60%  - Strict I/o's  - Daily wts  - Negative 2.335L since admission  - Keep K> 4.0, Mg >2.0    HTN  - BP controlled  - On coreg 6.25mg BID, hold parameters in place    AMS  - Negative Covid, influenza  - BC pending  - On IV ABX  - CRP, CK elevated  - Primary team following      Meeta Jennings, LEE  [facility]

## 2021-08-25 NOTE — PROGRESS NOTES
No acute events overnight. VSS w/exception of an elevated temperature that was resolved with Tylenol. Pt transfer to the  x2 assist with the [address] [place] [address] stedy. No signs of pain this shift. Lai intact with yellow urine in the collection chamber. Bed alarm on, wheels locked, bed in lowest position, side rails up 2/4, nonskid socks on, call light and bedside table in reach. Will continue to monitor.

## 2021-08-25 NOTE — PROGRESS NOTES
Progress Note    Admit Date: 8/21/2021  Day: 3  Diet: ADULT DIET; Regular; 4 carb choices (60 gm/meal); no cold liquids, no ice, no cold food items    CC: fall    Interval history:  - Stable O2 saturation >93% through the night, febrile to 101.7 overnight  - On Ceftriaxone and azithromycin  - pressures continue to be labile, range from 100s to 160s  - hyperglycemic at 230s this AM  - Patient has continued R ear pain since yesterday, on otoscope evaluation, no signs of infection.  - Continued stable WILFREDO LQ abdominal pain. - No longer have flores in place  - increased urinary urgency, more frequent cough with some yellowish sputum productive yesterday. HPI: Mr. Gustavo Edwards is a 80yo male PM listed below with notable NIDDM, HTN, HLD, BPH, who was found down by his family earlier today. He is independent and lives alone, but with frequent family interaction. Last seen well Friday evening. Saturday morning, found on the ground in the bathroom. The patient is reported to be confused, but alert. He was able to speak, but was not making very much sense per the family.     He was transported to [facility] ED, where he was found to be dehydrated with elevated glucose in the serum and the urine. Reports some mild chills, mouth is dry and has been urinating a lot. He was normotensive, afebrile without tachycardia or hypoxia. Lab work was revealing for Cr 1.4, glucose 337, troponin 0 0.61, lactate 3.4, normal LFTs, WBC 4.9, Hb 13.9, platelets 560. Urine has >1000 glucose, 15 ketones, moderate blood, negative for infection. Was given 1L IV bolus and admitted. Of note, patient speaks Uzbek and requires .     Medications:     Scheduled Meds:   ascorbic acid  500 mg Oral Daily    zinc sulfate  50 mg Oral Daily    lidocaine  1 patch Transdermal Daily    insulin lispro  0-12 Units Subcutaneous TID WC    insulin lispro  0-6 Units Subcutaneous Nightly    pantoprazole  40 mg Oral QAM AC    docusate sodium  100 mg movements intact. Pupils: Pupils are equal, round, and reactive to light. Cardiovascular:      Rate and Rhythm: Normal rate and regular rhythm. Pulses: Normal pulses. Heart sounds: Normal heart sounds. Pulmonary:      Effort: Pulmonary effort is normal. No respiratory distress. Breath sounds: Normal breath sounds. No wheezing or rales. Abdominal:      General: Abdomen is flat. Bowel sounds are normal. There is distension. Palpations: Abdomen is soft. Tenderness: There is abdominal tenderness (RLQ and LLQ). Musculoskeletal:      Right lower leg: No edema. Left lower leg: No edema. Neurological:      General: No focal deficit present. Mental Status: He is alert. LABS:    CBC:   Recent Labs     08/23/21  0538 08/24/21 0647 08/25/21  0629   WBC 4.4 4.3 5.8   HGB 13.8 13.8 13.8   HCT 39.4* 39.9* 39.7*   PLT 78* 70* 77*   MCV 93.7 94.6 94.1     Renal:    Recent Labs     08/23/21  0538 08/23/21  0539 08/24/21  0647 08/25/21  0629   NA  --  133* 136 138   K  --  3.6 3.7 4.0   CL  --  99 100 98*   CO2  --  23 22 27   BUN  --  26* 24* 25*   CREATININE  --  1.1 1.0 1.2   GLUCOSE  --  226* 235* 230*   CALCIUM  --  8.6 9.0 8.8   MG 1.90  --  1.60* 2.00   PHOS 3.2  --  2.6 3.2   ANIONGAP  --  11 14 13     Hepatic:   Recent Labs     08/24/21  0647 08/25/21 0629   LABALBU 3.4 3.5     Troponin:   Recent Labs     08/22/21  1027   TROPONINI 0.15*     BNP: No results for input(s): BNP in the last 72 hours. Lipids: No results for input(s): CHOL, HDL in the last 72 hours. Invalid input(s): LDLCALCU, TRIGLYCERIDE  ABGs:  No results for input(s): PHART, UIV4QMH, PO2ART, ZDU0GUI, BEART, THGBART, M5UVWWJG, QIQ5JKB in the last 72 hours. INR:   Recent Labs     08/24/21  0647 08/25/21  0629   INR 1.11 1.15*     Lactate: No results for input(s): LACTATE in the last 72 hours.   Cultures:  -----------------------------------------------------------------  RAD:   CT THORACIC SPINE WO CONTRAST   Final Result      No acute bony pathology              CT CHEST ABDOMEN PELVIS WO CONTRAST   Final Result      CT CHEST:      Exam severely degraded by motion. Cardiac enlargement with extensive coronary artery calcification. CT ABDOMEN/PELVIS:      Severe fatty infiltration of the liver. Cholelithiasis. Right-sided nephrolithiasis      CT LUMBAR SPINE WO CONTRAST   Final Result      No acute bony pathology        Severe degenerative changes as described above         XR CHEST PORTABLE   Final Result      Left lower lobe atelectasis, unchanged                  XR ABDOMEN (KUB) (SINGLE AP VIEW)   Final Result      Mild ileus      XR CHEST 1 VIEW   Final Result   Impression: Left basilar atelectasis. CT Cervical Spine WO Contrast   Final Result      1. No acute intracranial hemorrhage or mass effect. 2.  No evidence of cervical spine fracture or traumatic subluxation. 3.  Severe multilevel degenerative changes in the cervical spine resulting canal and foraminal stenosis, as above. CT Head WO Contrast   Final Result      1. No acute intracranial hemorrhage or mass effect. 2.  No evidence of cervical spine fracture or traumatic subluxation. 3.  Severe multilevel degenerative changes in the cervical spine resulting canal and foraminal stenosis, as above. XR CHEST PORTABLE   Final Result      Mild left basilar atelectasis. XR CHEST PORTABLE    (Results Pending)       Assessment/Plan:     Acute Metabolic Encephalopathy: likely related to dehydration. Dehydration 2/2 to reduced PO intake vs possible infection. Viral more likely considering no leukocytosis, myalgias/arthralgias, and afebrile. Procal elevated but clinical picture makes bacterial pneumonia much less likely.  Flu and COVID negative  - Repeat procalcitonin at 0.44 this AM  - Cautious fluid resuscitation, Echo reports EF 55-60% elevated, pulmonary artery pressure at 59  - Respiratory viral panel: not detected currently  - FU blood cultures NGTD, collected 8/22/21  - Cont azithromycin and rocephin course  - New onset fever - CXR no acute abnormality or significant change, UA/UC pending, Procal 0.38, Lactic 1.3, BCx2  - More frequent cough with yellow sputum production. Possible UTI  Increased urinary frequency/urgency, urinary pain  - recently removed flores cath  - follow up on UA/UC    Acute Abdominal pain; lower back pain  CT Chest/Abd/Pelvis reported severe fatty infiltrate of liver, cholelithiasis, R-sided nephrolithiasis. CT Thoracic and Lumbar negative  - last Procal stable at 0.47  - Continue to monitor    NSTEMI Type 2: most likely r/t hypovolemia. Trop elevation could also be due to viral myocarditis but improvement with fluids suggests hypovolemia. Initially uptrending trops now downtrending. Last echo (3/2020) 50% with lateral and inferior wall hypokinesis  - Echo reports EF 55-60% elevated, pulmonary artery pressure at 59  - Pt refused cardiac cath in past but may need stress test following optimization.     Thrombocytopenia: likely related to viral illness. 4T score with very low concern for HIT given timeline and lack of concerning findings  - monitor daily CBC and coags  - Currently stable with platelet count > 50  - CBC ~3days after discharge if remains thrombocytopenic    HA on CKD2: Pre-renal etiology, improving with rehydration  - IVFs as tolerated/needed  - improving BUN/Cr is 25/1.2 this AM. Cr baseline ~ 1.0    Rhabdomyolysis  - CK downtrending >799 this AM  - holding statin  - Continue to monitor labs qAM     NIDDM with Hyperglycemia: 15 ketones on UA but ketoacidosis unlikely given normal VBG. Sugars elevated to 250s. Patient is insulin naive prior to hospital stay  - MDSSI    Lactic Acidosis: 2/2 hypovolemia.  Resolved s/p IVF  - Continue to monitor     HLD  - holding lipitor     HTN  - home carvedilol    BPH:  - home finasteride, tamsulosin        Code Status: FULL CODE  FEN: Regular diet, Low Carb   PPX:  Lovenox  DISPO: GMF    In regards with disposition planning, before coming to the hospital, patient was independent; without the need for assistance with mobility and management of ADLs. The patient was even capable of driving on his own. After his recent infection/underlying disease, the patient has decreased functional mobility and is unable to take care of his own ADLs without assistance. At this time, he requires additional therapy and assistance that would be appropriate to go to a SNF for care.     Xin Wolf DO, PGY-1  08/25/21  8:38 AM    This patient has been staffed and discussed with Dr. Janette Oquendo

## 2021-08-25 NOTE — PROCEDURES
INSTRUMENTAL SWALLOW REPORT  MODIFIED BARIUM SWALLOW/treat    NAME: Alysa Interiano   : 1935  MRN: [id number]       Date of Eval: 2021     Ordering Physician: Dr. John Gonzalez  Radiologist: Dr. Ivy Akins     Referring Diagnosis(es): Referring Diagnosis: HA    Past Medical History:  has a past medical history of Allergic rhinitis, Asthma, Back pain, Bilateral cataracts, Bilateral low back pain with right-sided sciatica, BPH (benign prostatic hypertrophy), Chest pain, Degeneration of lumbar or lumbosacral intervertebral disc, Dental disease, Diabetes mellitus, type 2 ([address] Diverticulosis, Dizziness, Eczema, Essential hypertension, Hearing loss, Hyperlipidemia, Hypertension, Insomnia, Lumbar disc disease, Microalbuminuria, Neck pain, Nosebleed, Obesity, Obstructive sleep apnea, Osteoarthritis, Post herpetic neuralgia, Recurrent upper respiratory infection (URI), Right hip pain, Right shoulder pain, Rosacea, Spinal stenosis, Transient global amnesia, Type 2 diabetes mellitus without complication ([address] and Vitamin D deficiency. Past Surgical History:  has a past surgical history that includes Prostate surgery (2000). Current Diet Solid Consistency: Regular  Current Diet Liquid Consistency: Thin    Type of Study: Initial MBS     Recent Chest Xray 21  Impression       Left lower lobe atelectasis, unchanged      CT of head 21  Impression       1.  No acute intracranial hemorrhage or mass effect. 2.  No evidence of cervical spine fracture or traumatic subluxation. 3.  Severe multilevel degenerative changes in the cervical spine resulting canal and foraminal stenosis, as above      Patient Complaints/Reason for Referral:  Alysa Interiano was referred for a MBS to assess the efficiency of his/her swallow function, assess for aspiration, and to make recommendations regarding safe dietary consistencies, effective compensatory strategies, and safe eating environment.   Onset of Thin  Liquid administration via: Cup (single sips)  Medication administration:  (whole with water, if difficulty, place in puree)    Safe Swallow Protocol:  Supervision: Close  Compensatory Swallowing Strategies:   Upright as possible for all oral intake  Eat/Feed slowly  No straws  Alternate solids and liquids  Effortful swallow  Remain upright for 30-45 minutes after meals  Small bites/sips    Recommendations/Treatment  Requires SLP Intervention: Yes   D/C Recommendations: To be determined     Recommended Exercises:    Therapeutic Interventions: Oral care;Diet tolerance monitoring;Patient/Family education;Effortful swallow    Education: Images and recommendations were reviewed with pt and family following this exam.   Patient Education: pt and family educated to results of MBS  Patient Education Response: Verbalizes understanding    Prognosis  Prognosis for safe diet advancement: good  Duration/Frequency of Treatment  Duration/Frequency of Treatment: 2-3 x    Goals:    1- The patient will tolerate recommended diet without observed clinical signs of aspiration  8/25; RN reports pt coughing with PO, daughter states pt had episode of cough/choking with tomato soup yesterday. Daughter reports O2 sats dropped after that happened. Pt sitting up in chair and re-assess with water and solid textures. Pt with single instance of cough after initial sip. Pt demonstrated effective mastication with solid texture, no residue noted. Due to RN and daughter concerns, recommend MBS to fully assess swallow physiology. Cont goal as appropriate     2- The pt/caregiver will demonstrate understanding of swallowing recommendations and concerns. 8/24-  The pt and daughter were educated to purpose of the visit, s/s of aspiration, swallowing strategies, diet recommendations and plan for staff to continue to monitor lung status. The pt and daughter indicated  comprehension. con't goal  8/25;   Pt /daughter educated to recommendation for MBS and rationale . Daughter explaining to pt and both agreeable and state comprehension  Cont goal  8/25: 2nd session s/p MBS: Educated pt /daughter and granddaughter to results of MBS. Reviewed video of MBS through [facility] to explain anatomy/physiology of swallow and rationale for diet recommendations/strategies to improve safety of swallow. Family stated comprehension and explained results to pt   Cont goal     New goal   3- Pt will participate in [facility]  8/25: goal met    Oral Preparation / Oral Phase  WFL    Pharyngeal Phase  Pt consumed puree and thin liquids via single sips with no penetration or aspiration. Decreased timing/coordination of swallow with reduced epiglottal closure resulted in aspiration of consecutive swallows of thin liquids via straw. Throat clear but no cough occurred. Additional single sips via cup and straw were consumed with no further pen/aspiration. Reduced base of tongue retraction resulted in mild residue in valleculae with solid texture. Pt was not sensate to the residue, however this cleared when instructed to use liquid wash. Esophageal Phase  Clears UES effectively    Pain   Patient Currently in Pain: No    Therapy Time:   Individual Concurrent Group Co-treatment   Time In 1245         Time Out 1310         Minutes 25            Plan:  Recommended diet: regular diet/thin liquids - no straw, single sips via cup  Dc recommendation: TBD  Needs met prior to leaving radiology, d/w RN Hollie Dunn M.S./[facility]-SLP #[id number]  Pg.  # [phone]  This document will serve as a dc summary if pt dc prior to next visit  8/25/2021, 1:21 PM

## 2021-08-25 NOTE — PROGRESS NOTES
reports pt has difficulty swallowing cold items so prefers to only consume food/liquids that are warm. [facility] Pt had no difficulty closing lips around spoon or drawing liquid up a straw. Pt had mild difficulty with mastication with solids due to dentures being ill fitting. Pt moistened cracker with water to soften. There was no anterior spillage or oral residue noted with any consistency. Pt demonstrated no s/s aspiration with any consistency presented. Pt able to initiate swallow without minimal difficulty with laryngeal movement observed. Pt grimaced with swallowing at times, but per family, pt swallows better when items are warm and items used for testing were room temperature. Vocal quality remained clear throughout. No coughing, throat clearing or choking observed with any consistency. Pt was instructed to consume 3oz water continuously, but pt required brief pauses between swallows. Pt had a few instances when he was able to consume 2-3 successive swallows with no s/s aspiration.      MBS results - scheduled this date, 8/25    Pain: none reported    Current Diet : regular /thin    Treatment:  Pt seen bedside to address the following goals:  1- The patient will tolerate recommended diet without observed clinical signs of aspiration  8/25; RN reports pt coughing with PO, daughter states pt had episode of cough/choking with tomato soup yesterday. Daughter reports O2 sats dropped after that happened. Pt sitting up in chair and re-assess with water and solid textures. Pt with single instance of cough after initial sip. Pt demonstrated effective mastication with solid texture, no residue noted. Due to RN and daughter concerns, recommend MBS to fully assess swallow physiology. Cont goal as appropriate    2- The pt/caregiver will demonstrate understanding of swallowing recommendations and concerns.   8/24-  The pt and daughter were educated to purpose of the visit, s/s of aspiration, swallowing strategies, diet recommendations and plan for staff to continue to monitor lung status. The pt and daughter indicated  comprehension. con't goal  8/25; Pt /daughter educated to recommendation for MBS and rationale . Daughter explaining to pt and both agreeable and state comprehension  Cont goal    New goal   3- Pt will participate in [facility]    Patient/Family/Caregiver Education:  As above    Compensatory Strategies:   TBD after MBS     Plan:  Continued daily Dysphagia treatment with goals per  plan of care. Diet recommendations:  TBD after MBS  MBS today  DC recommendation: TBD  Treatment: 13  D/W nursing [facility]  Needs met prior to leaving room, call button in reach. La Bryan M.S./[facility]-SLP #[id number]  Pg.  # [phone]  If patient is discharged prior to next treatment, this note will serve as the discharge summary

## 2021-08-25 NOTE — PROGRESS NOTES
Physical Therapy  Facility/Department: [facility] 5T ORTHO/NEURO  Daily Treatment Note  NAME: Ramses Gonzales  : 1935  MRN: [id number]    Date of Service: 2021    Discharge Recommendations: Ramses Gonzales scored a 14/24 on the AM-PAC short mobility form. Current research shows that an AM-PAC score of 17 or less is typically not associated with a discharge to the patient's home setting. Based on the patient's AM-PAC score and their current functional mobility deficits, it is recommended that the patient have 3-5 sessions per week of Physical Therapy at d/c to increase the patient's independence.  Please see assessment section for further patient specific details. PT Equipment Recommendations  Equipment Needed:  (defer)    Assessment   Assessment: Pt was limited by severe deconditioning; ambulating 20ft requiring frequent standing rest breaks due to RUBIO. All vitals were stable. Transfers and gait training were mildly unsteady requiring sequencing cues throughout; no LOB noted. Pt is a very high fall risk due to very poor gait mechanics. Treatment Diagnosis: decreased functional mobility  Prognosis: Good  Decision Making: Medium Complexity  PT Education: PT Role;General Safety  REQUIRES PT FOLLOW UP: Yes     Patient Diagnosis(es): The encounter diagnosis was Altered mental status, unspecified altered mental status type.      has a past medical history of Allergic rhinitis, Asthma, Back pain, Bilateral cataracts, Bilateral low back pain with right-sided sciatica, BPH (benign prostatic hypertrophy), Chest pain, Degeneration of lumbar or lumbosacral intervertebral disc, Dental disease, Diabetes mellitus, type 2 ([address] Diverticulosis, Dizziness, Eczema, Essential hypertension, Hearing loss, Hyperlipidemia, Hypertension, Insomnia, Lumbar disc disease, Microalbuminuria, Neck pain, Nosebleed, Obesity, Obstructive sleep apnea, Osteoarthritis, Post herpetic neuralgia, Recurrent upper respiratory infection Pt will demos bed mobility with SBA (HOB flat and no use of rails)  Patient Goals   Patient goals : to regain independence    Plan    Plan  Times per week: 2-5  Current Treatment Recommendations: Strengthening, Transfer Training, Endurance Training, Balance Training, Gait Training, Functional Mobility Training, Patient/Caregiver Education & Training  Safety Devices  Type of devices: Left in chair, Call light within reach, Chair alarm in place, Nurse notified     Therapy Time   Individual Concurrent Group Co-treatment   Time In 0800         Time Out 0830         Minutes 30         Timed Code Treatment Minutes: 800 [address]

## 2021-08-25 NOTE — DISCHARGE INSTR - COC
Continuity of Care Form    Patient Name: Veronica Heredia   :  1935  MRN:  [id number]    Admit date:  2021  Discharge date:  ***    Code Status Order: Full Code   Advance Directives:      Admitting Physician:  Claudine Bloch, MD  PCP: Dawson Pete MD    Discharging Nurse: [facility] Unit/Room#: 1749/3438-18  Discharging Unit Phone Number: ***    Emergency Contact:   Extended Emergency Contact Information  Primary Emergency Contact: [address] [place] [address] Phone: [phone]  Relation: Child    Past Surgical History:  Past Surgical History:   Procedure Laterality Date    PROSTATE SURGERY  2000    TURP       Immunization History:   Immunization History   Administered Date(s) Administered    COVID-19, Pfizer, PF, 30mcg/0.3mL 2021, 2021    Influenza 2013    Influenza, High Dose (Fluzone 65 yrs and older) 2011, 2012, 2014, 2015, 2016, 12/15/2017, 2019    Influenza, Triv, inactivated, subunit, adjuvanted, IM (Fluad 65 yrs and older) 2019    Pneumococcal Polysaccharide ([id number]) 2007       Active Problems:  Patient Active Problem List   Diagnosis Code    Rosacea L71.9    Back pain M54.9    Obstructive sleep apnea G47.33    Chest pain R07.9    Transient global amnesia G45.4    Obesity E66.9    Insomnia G47.00    Bilateral cataracts H26.9    Osteoarthritis M19.90    Lumbar disc disease M51.9    Spinal stenosis M48.00    Diverticulosis K57.90    Benign prostatic hyperplasia N40.0    Hyperlipidemia E78.5    Diabetes mellitus, type 2 (HCC) E11.9    Allergic rhinitis J30.9    Nausea R11.0    Eczema L30.9    Dyspnea on exertion R06.00    Right shoulder pain M25.511    Shortness of breath R06.02    Vitamin D deficiency E55.9    Post herpetic neuralgia B02.29    Neck pain M54.2    Right hip pain M25.551    Microalbuminuria R80.9    Benign essential HTN I10  Type 2 diabetes mellitus without complication ([facility]) G94.6    Bilateral low back pain with right-sided sciatica M54.41    Coronary artery disease of native artery of native heart with stable angina pectoris ([facility]) I25.118    Peripheral vascular disease ([facility]) I73.9    Acute kidney injury ([facility] N17.9    Altered mental status R41.82    Chronic diastolic HF (heart failure) ([facility]) I50.32       Isolation/Infection:   Isolation            No Isolation          Patient Infection Status       Infection Onset Added Last Indicated Last Indicated By Review Planned Expiration Resolved Resolved By    None active    Resolved    COVID-19 Rule Out 08/23/21 08/23/21 08/23/21 Respiratory Panel, Molecular, with COVID-19 (Restricted: peds pts or suitable admitted adults) (Ordered)   08/23/21 Rule-Out Test Resulted    COVID-19 Rule Out 08/21/21 08/21/21 08/22/21 COVID-19 (Ordered)   08/22/21 Rule-Out Test Resulted            Nurse Assessment:  Last Vital Signs: /68   Pulse 88   Temp 100.5 °F (38.1 °C)   Resp 16   Ht 5' 6\" (1.676 m)   Wt 179 lb 4.8 oz (81.3 kg)   SpO2 94%   BMI 28.94 kg/m²     Last documented pain score (0-10 scale): Pain Level: 3  Last Weight:   Wt Readings from Last 1 Encounters:   08/21/21 179 lb 4.8 oz (81.3 kg)     Mental Status:  oriented and alert    IV Access:  - None    Nursing Mobility/ADLs:  Walking   Assisted  Transfer  Assisted  Bathing  Dependent  Dressing  Dependent  Toileting  Assisted  Feeding  Independent  Med Admin  Independent  Med Delivery   whole    Wound Care Documentation and Therapy:        Elimination:  Continence:   · Bowel:  Yes  · Bladder: Yes  Urinary Catheter: None   Colostomy/Ileostomy/Ileal Conduit: No       Date of Last BM: 08/27/21    Intake/Output Summary (Last 24 hours) at 8/25/2021 1505  Last data filed at 8/25/2021 0636  Gross per 24 hour   Intake --   Output 1250 ml   Net -1250 ml     I/O last 3 completed shifts:  In: -   Out: 1250 [Urine:1250]    Safety Concerns: At Risk for Falls    Impairments/Disabilities:      None    Nutrition Therapy:  Current Nutrition Therapy:   - Oral Diet:  Carb Control 4 carbs/meal (1800kcals/day)    Routes of Feeding: Oral  Liquids: Thin Liquids  Daily Fluid Restriction: no  Last Modified Barium Swallow with Video (Video Swallowing Test): not done    Treatments at the Time of Hospital Discharge:   Respiratory Treatments:   Oxygen Therapy:  is not on home oxygen therapy. Ventilator:    - No ventilator support    Rehab Therapies: Physical Therapy and Occupational Therapy  Weight Bearing Status/Restrictions: No weight bearing restirctions  Other Medical Equipment (for information only, NOT a DME order):  walker, bedside commode and hospital bed  Other Treatments:     Patient's personal belongings (please select all that are sent with patient):  Glasses, Dentures upper and lower, Jewelry    RN SIGNATURE:  Electronically signed by Jessica Villanueva on 8/27/21 at 12:14 PM EDT    CASE MANAGEMENT/SOCIAL WORK SECTION    Inpatient Status Date: 8/25/2021    Readmission Risk Assessment Score:  Readmission Risk              Risk of Unplanned Readmission:  14           Discharging to Facility/ [address])   [place] [address], [facility]   Report: [phone]   Fax: [phone]     / signature: Electronically signed by JAYME Houston on 8/25/21 at 3:06 PM EDT    PHYSICIAN SECTION    Prognosis: Fair    Condition at Discharge: Stable    Rehab Potential (if transferring to Rehab): Fair    Recommended Labs or Other Treatments After Discharge: PT/OT, SLP Augmentin 875 bid for 7 days,    Physician Certification: I certify the above information and transfer of Surjit Dyer  is necessary for the continuing treatment of the diagnosis listed and that he requires [facility] for greater 30 days.      Update Admission H&P: No change in H&P    PHYSICIAN SIGNATURE:  Electronically signed by Dr. Raghavendra Walker [facility], DO on 8/27/21 at 10:11 AM EDT

## 2021-08-25 NOTE — CONSULTS
Infectious Diseases Inpatient Consult Note    Reason for Consult:   Fever   Requesting Physician:   Dr Eva Garcia  Primary Care Physician:  Krystal Enriquez MD  History Obtained From:   Pt, EPIC    Admit Date: 8/21/2021  Hospital Day: 5    CHIEF COMPLAINT:       Chief Complaint   Patient presents with    Altered Mental Status     pt from home found on the floor by family. pt is [place] speaking and confused. pt baseline is a/o x4 and independent    Fall       HISTORY OF PRESENT ILLNESS:      80 yo man with hx HTN, DM, BPH, LBP, OA, diverticulosis  Lives in community alone    Found down - was awake, confused. Fever / chills per ED note. Was well day before per notes. Brought to ED 8/21 - afebrile, WBC 4.9, LA 3.4, UA with glu / ketones, LE neg  Admit to ICU, started on ceftriaxone / azithromycin  COVID-19 neg, influenza ag A/B neg, procalcitonin 0.61  Dx STEMI    On 8/24 - T 101.7, had cough  Transferred to [facility]    Today 8/25 - Tm 100.5, 2L.   WBC 5.8  C/o 'weak'  Currently on ceftriaxone + azithromycin    Past Medical History:    Past Medical History:   Diagnosis Date    Allergic rhinitis 8/27/2010    Asthma     Back pain     Bilateral cataracts     Bilateral low back pain with right-sided sciatica 12/18/2015    BPH (benign prostatic hypertrophy)     Chest pain     Degeneration of lumbar or lumbosacral intervertebral disc     Dental disease     Diabetes mellitus, type 2 ([address] 5/28/2010    Diverticulosis     Dizziness     Eczema 3/11/2011    Essential hypertension 9/18/2015    Hearing loss     Hyperlipidemia     Hypertension     Insomnia     Lumbar disc disease     Microalbuminuria 6/13/2013    Neck pain 12/14/2012    Nosebleed     Obesity     Obstructive sleep apnea     Osteoarthritis     Post herpetic neuralgia 6/15/2012    Recurrent upper respiratory infection (URI)     Right hip pain 12/14/2012    Right shoulder pain 6/17/2011    Rosacea     Spinal stenosis     Transient global amnesia     Type 2 diabetes mellitus without complication ([facility] 74/28/3780    Vitamin D deficiency 3/17/2012       Past Surgical History:    Past Surgical History:   Procedure Laterality Date    PROSTATE SURGERY  June 13, 2000    TURP       Current Medications:     insulin lispro  0-18 Units Subcutaneous TID     insulin lispro  0-9 Units Subcutaneous Nightly    ascorbic acid  500 mg Oral Daily    zinc sulfate  50 mg Oral Daily    lidocaine  1 patch Transdermal Daily    pantoprazole  40 mg Oral QAM AC    docusate sodium  100 mg Oral Daily    cefTRIAXone (ROCEPHIN) IV  1,000 mg IntraVENous Q24H    [Held by provider] atorvastatin  40 mg Oral Daily    carvedilol  6.25 mg Oral BID     vitamin D  5,000 Units Oral Daily    finasteride  5 mg Oral Daily    tamsulosin  0.4 mg Oral Daily    sodium chloride flush  5-40 mL IntraVENous 2 times per day    enoxaparin  40 mg Subcutaneous Daily    azithromycin  500 mg IntraVENous Q24H       Allergies:  Tramadol    Social History:    TOBACCO:    None   ETOH:    Occasional   DRUGS:   None   MARITAL STATUS:      OCCUPATION:   Retired     Patient lives alone in community    Family History:   No immunodeficiency    REVIEW OF SYSTEMS:    No fever / chills / sweats. No weight loss. No visual change, eye pain, eye discharge. No oral lesion, sore throat, dysphagia. Denies cough / sputum. Denies chest pain, palpitations. Denies n / v / abd pain. No diarrhea. Denies dysuria or change in urinary function. Denies joint swelling or pain. No myalgia, arthralgia. Denies skin changes, itching  Denies focal weakness, sensory change or other neurologic symptom    Denies new / worse depression, psychiatric symptoms    PHYSICAL EXAM:      Vitals:    /64   Pulse 87   Temp 99.2 °F (37.3 °C) (Oral)   Resp 16   Ht 5' 6\" (1.676 m)   Wt 179 lb 4.8 oz (81.3 kg)   SpO2 93%   BMI 28.94 kg/m²     GENERAL: No apparent distress.   RA  HEENT: Membranes moist, no oral lesion  NECK:  Supple, no lymphadenopathy  LUNGS: Clear b/l, no rales, no dullness  CARDIAC: RRR, no murmur appreciated  ABD:  + BS, soft / NT  EXT:  No rash, no edema, no lesions  NEURO: No focal neurologic findings  PSYCH: Orientation, sensorium, mood normal  LINES:  Peripheral iv    DATA:    Lab Results   Component Value Date    WBC 5.8 2021    HGB 13.8 2021    HCT 39.7 (L) 2021    MCV 94.1 2021    PLT 77 (L) 2021     Lab Results   Component Value Date    CREATININE 1.2 2021    BUN 25 (H) 2021     2021    K 4.0 2021    CL 98 (L) 2021    CO2 27 2021       Hepatic Function Panel:   Lab Results   Component Value Date    ALKPHOS 67 2021    ALT 35 2021    AST 39 2021    PROT 7.0 2021    PROT 6.5 2013    BILITOT 0.6 2021    BILIDIR <0.2 2019    IBILI see below 2019    LABALBU 3.5 2021       Micro:   BC sent   Resp PCR neg       Influenza A/B ag neg   BC x2 no growth to date   SARS CoV-2 PCR neg      Imagin/25 MBS  Impression   Aspiration eliciting a cough reflex with large bulla sequential swallows of thin liquids      CXR:  Impression: No acute abnormality or significant change.      Chest/ Abd / Pelvic CT:  CT CHEST:   Exam severely degraded by motion.       Cardiac enlargement with extensive coronary artery calcification.       CT ABDOMEN/PELVIS:   Severe fatty infiltration of the liver.       Cholelithiasis.       Right-sided nephrolithiasis         IMPRESSION:      Patient Active Problem List   Diagnosis    Rosacea    Back pain    Obstructive sleep apnea    Chest pain    Transient global amnesia    Obesity    Insomnia    Bilateral cataracts    Osteoarthritis    Lumbar disc disease    Spinal stenosis    Diverticulosis    Benign prostatic hyperplasia    Hyperlipidemia    Diabetes mellitus, type 2 (HCC)    Allergic rhinitis    Nausea    Eczema    Dyspnea on exertion    Right shoulder pain    Shortness of breath    Vitamin D deficiency    Post herpetic neuralgia    Neck pain    Right hip pain    Microalbuminuria    Benign essential HTN    Type 2 diabetes mellitus without complication (HCC)    Bilateral low back pain with right-sided sciatica    Coronary artery disease of native artery of native heart with stable angina pectoris (HCC)    Peripheral vascular disease (HCC)    Acute kidney injury ([facility]    Altered mental status    Chronic diastolic HF (heart failure) (HCC)       Hx HTN, DM, BPH, LBP, OA, diverticulosis    Encephalopathy  Lactic acidosis, resolved   STEMI    Fever  Hypoxia     Overall, reason for presentation unclear. COVID-19 and resp virus negative by PCR. Possibly pt has aspiration given intermittent fever, abnormal MBS    RECOMMENDATIONS:    Change to zosyn  D/c ceftriaxone   D/c azithromycin  Aspiration precautions     At discharge, may be able to complete course of antibiotics with oral augmentin     Medical Decision Making:   The following items were considered in medical decision making:  Discussion of patient care with other providers  Reviewed clinical lab tests  Reviewed radiology tests  Reviewed other diagnostic tests/interventions  Independent review of radiologic images  Microbiology cultures and other micro tests reviewed      Discussed with pt, daughter, granddaughter  Keiry Real MD

## 2021-08-26 NOTE — PROGRESS NOTES
Speech Language Pathology  Facility/Department: [facility] 5T ORTHO/NEURO  Dysphagia Daily Treatment Note    NAME: Surjit Dyer  : 1935  MRN: [id number]    Patient Diagnosis(es):   Patient Active Problem List    Diagnosis Date Noted    Type 2 diabetes mellitus without complication ([address]     Benign essential HTN 2015    Diabetes mellitus, type 2 ([address] 2010    Hyperlipidemia     Microalbuminuria 2013    Vitamin D deficiency 2012    Diverticulosis     Benign prostatic hyperplasia     Bilateral low back pain with right-sided sciatica 2015    Neck pain 2012    Right hip pain 2012    Post herpetic neuralgia 06/15/2012    Right shoulder pain 2011    Eczema 2011    Allergic rhinitis 2010    Rosacea     Back pain     Obstructive sleep apnea     Chest pain     Obesity     Insomnia     Osteoarthritis     Lumbar disc disease     Spinal stenosis     Altered mental status     Chronic diastolic HF (heart failure) (HCC)     Acute kidney injury ([address] 2021    Peripheral vascular disease ([address] 2020    Coronary artery disease of native artery of native heart with stable angina pectoris ([address] 2016    Shortness of breath 2011    Nausea 2011    Dyspnea on exertion 2011    Transient global amnesia     Bilateral cataracts      Allergies: Allergies   Allergen Reactions    Tramadol      Recent Chest Xray 21  Impression       Left lower lobe atelectasis, unchanged      CT of head 21  Impression       1.  No acute intracranial hemorrhage or mass effect. 2.  No evidence of cervical spine fracture or traumatic subluxation. 3.  Severe multilevel degenerative changes in the cervical spine resulting canal and foraminal stenosis, as above        Previous MBS   Chart reviewed. Medical Diagnosis: HA  Treatment Diagnosis: dysphagia    BSE Impression 21  Family present.  Family reports pt has difficulty swallowing cold items so prefers to only consume food/liquids that are warm. [facility]. Pt had no difficulty closing lips around spoon or drawing liquid up a straw. Pt had mild difficulty with mastication with solids due to dentures being ill fitting. Pt moistened cracker with water to soften. There was no anterior spillage or oral residue noted with any consistency. Pt demonstrated no s/s aspiration with any consistency presented. Pt able to initiate swallow without minimal difficulty with laryngeal movement observed. Pt grimaced with swallowing at times, but per family, pt swallows better when items are warm and items used for testing were room temperature. Vocal quality remained clear throughout. No coughing, throat clearing or choking observed with any consistency. Pt was instructed to consume 3oz water continuously, but pt required brief pauses between swallows. Pt had a few instances when he was able to consume 2-3 successive swallows with no s/s aspiration.      MBS results  8/25  Pt exhibiting mild oropharyngeal dysphagia. Pt consumed puree and thin liquids via single sips with no penetration or aspiration. Decreased timing/coordination of swallow with reduced epiglottal closure resulted in aspiration of consecutive swallows of thin liquids via straw. Throat clear but no cough occurred. Additional single sips via cup and straw were consumed with no further pen/aspiration. Reduced base of tongue retraction resulted in mild residue in valleculae with solid texture. Pt was not sensate to the residue, however this cleared when instructed to use liquid wash. Pt demonstrated adequate mastication with solid texture, no pocketing /oral residue noted.   Recommend cont regular diet/thin liquids- no straw (daughter reports pt does not typically use a straw).      Pain: none reported    Current Diet : regular /thin    Treatment:  Pt seen bedside to address the following goals:  1- The patient will tolerate recommended diet without observed clinical signs of aspiration  8/25; RN reports pt coughing with PO, daughter states pt had episode of cough/choking with tomato soup yesterday. Daughter reports O2 sats dropped after that happened. Pt sitting up in chair and re-assess with water and solid textures. Pt with single instance of cough after initial sip. Pt demonstrated effective mastication with solid texture, no residue noted.  Due to RN and daughter concerns, recommend MBS to fully assess swallow physiology. Cont goal as appropriate  8/26: no concerns this date per RN with swallowing. Daughter states pt had single episode of little coughing, maybe was lethargic. Pt consumed single sips of thin liquids via cup with no overt signs of aspiration. Daughter did not wish pt to have solid texture offerings - apolinar cracker or peanut butter crackers, due to concern for blood sugar. No respiratory decline per chart review  Goal met, cont to ensure consistency    2- The pt/caregiver will demonstrate understanding of swallowing recommendations and concerns. 8/24-  The pt and daughter were educated to purpose of the visit, s/s of aspiration, swallowing strategies, diet recommendations and plan for staff to continue to monitor lung status. The pt and daughter indicated  comprehension. con't goal  8/25;  Pt /daughter educated to recommendation for MBS and rationale . Daughter explaining to pt and both agreeable and state comprehension  Cont goal  8/25: 2nd session s/p MBS: Educated pt /daughter and granddaughter to results of MBS. Reviewed video of MBS through [facility] to explain anatomy/physiology of swallow and rationale for diet recommendations/strategies to improve safety of swallow. Family stated comprehension and explained results to pt   Cont goal  8/26: educated pt and daughter to purpose of visit, reviewed strategies/recs from [facility].  Daughter states good understanding, pt more alert and closer to baseline per daughter  Cont goal    New goal   3- Pt will participate in [facility]  8/25: goal met    Patient/Family/Caregiver Education:  As above    Compensatory Strategies:  · Upright as possible for all oral intake  · Eat/Feed slowly  · No straws  · Alternate solids and liquids  · Effortful swallow  · Remain upright for 30-45 minutes after meals  · Small bites/sips     Plan:  Continued daily Dysphagia treatment with goals per  plan of care. Diet recommendations:  Cont regular diet/thin liquids- no straw  DC recommendation: follow up at next level of care for follow through of recommendations  Treatment: 12  D/W nursing [facility]  Needs met prior to leaving room, call button in reach. Shirley Sheth M.S./[facility]SL[facility] #[id number]  Pg.  # [phone]  If patient is discharged prior to next treatment, this note will serve as the discharge summary

## 2021-08-26 NOTE — PLAN OF CARE
Problem: Falls - Risk of:  Goal: Will remain free from falls  Description: Will remain free from falls  8/26/2021 1206 by Ingrid Jason RN  Outcome: Ongoing     Problem: Falls - Risk of:  Goal: Absence of physical injury  Description: Absence of physical injury  8/26/2021 1206 by Ingrid Jason RN  Outcome: Ongoing     Problem: Serum Glucose Level - Abnormal:  Goal: Ability to maintain appropriate glucose levels will improve  Description: Ability to maintain appropriate glucose levels will improve  8/26/2021 1206 by Ingrid Jason RN  Outcome: Ongoing     Problem:  Body Temperature -  Risk of, Imbalanced  Goal: Ability to maintain a body temperature within defined limits  8/26/2021 1206 by Ingrid Jason RN  Outcome: Met This Shift     Problem: Pain:  Goal: Pain level will decrease  Description: Pain level will decrease  8/26/2021 1206 by Ingrid Jason RN  Outcome: Ongoing

## 2021-08-26 NOTE — PROGRESS NOTES
ID Follow-up NOTE    CC:   Fever   Antibiotics: Zosyn     Admit Date: 2021  Hospital Day: 6    Subjective:     Patient is more awake, eating 'a little'  C/o weak, no specific complaint       Objective:     Patient Vitals for the past 8 hrs:   BP Temp Temp src Pulse SpO2   21 0700 123/64 98.4 °F (36.9 °C) Oral -- 92 %   21 0247 118/78 98.1 °F (36.7 °C) Oral 78 93 %     I/O last 3 completed shifts:  In: -   Out: 100 [Urine:100]  I/O this shift:  In: 10 [I.V.:10]  Out: -     EXAM:  GENERAL: No apparent distress.   RA  HEENT: Membranes moist, no oral lesion  NECK:  Supple, no lymphadenopathy  LUNGS: Clear b/l, no rales, no dullness  CARDIAC: RRR, no murmur appreciated  ABD:  + BS, soft / NT  EXT:  No rash, no edema, no lesions  NEURO: No focal neurologic findings  PSYCH: Orientation, sensorium, mood normal  LINES:  Peripheral iv       Data Review:  Lab Results   Component Value Date    WBC 5.8 2021    HGB 13.8 2021    HCT 39.7 (L) 2021    MCV 94.1 2021    PLT 77 (L) 2021     Lab Results   Component Value Date    CREATININE 1.2 2021    BUN 25 (H) 2021     2021    K 4.0 2021    CL 98 (L) 2021    CO2 27 2021       Hepatic Function Panel:   Lab Results   Component Value Date    ALKPHOS 67 2021    ALT 35 2021    AST 39 2021    PROT 7.0 2021    PROT 6.5 2013    BILITOT 0.6 2021    BILIDIR <0.2 2019    IBILI see below 2019    LABALBU 3.5 2021       Micro:   BC no growth to date   Resp PCR neg        Influenza A/B ag neg   BC x 2 no growth to date   SARS CoV-2 PCR neg     Imagin/25 MBS  Impression   Aspiration eliciting a cough reflex with large bulla sequential swallows of thin liquids       CXR:  Impression: No acute abnormality or significant change.      Chest/ Abd / Pelvic CT:  CT CHEST:   Exam severely degraded by motion.       Cardiac enlargement with extensive coronary artery calcification.       CT ABDOMEN/PELVIS:   Severe fatty infiltration of the liver.       Cholelithiasis.       Right-sided nephrolithiasis             Scheduled Meds:   insulin lispro  0-18 Units Subcutaneous TID WC    insulin lispro  0-9 Units Subcutaneous Nightly    piperacillin-tazobactam  3,375 mg IntraVENous Q8H    ascorbic acid  500 mg Oral Daily    zinc sulfate  50 mg Oral Daily    lidocaine  1 patch Transdermal Daily    pantoprazole  40 mg Oral QAM AC    docusate sodium  100 mg Oral Daily    [Held by provider] atorvastatin  40 mg Oral Daily    carvedilol  6.25 mg Oral BID WC    vitamin D  5,000 Units Oral Daily    finasteride  5 mg Oral Daily    tamsulosin  0.4 mg Oral Daily    sodium chloride flush  5-40 mL IntraVENous 2 times per day    enoxaparin  40 mg Subcutaneous Daily       Continuous Infusions:   dextrose      sodium chloride 25 mL (08/25/21 0602)    dextrose         PRN Meds:  glucose, dextrose, glucagon (rDNA), dextrose, ipratropium-albuterol, melatonin, sodium chloride flush, sodium chloride, ondansetron **OR** ondansetron, polyethylene glycol, acetaminophen **OR** acetaminophen, glucose, dextrose, glucagon (rDNA), dextrose      Assessment:     Hx HTN, DM, BPH, LBP, OA, diverticulosis     Encephalopathy  Lactic acidosis, resolved   STEMI     Fever  Hypoxia      Overall, reason for presentation unclear. COVID-19 and resp virus negative by PCR. Possibly pt has aspiration given intermittent fever, abnormal MBS    Plan:     Cont zosyn  Aspiration precautions      At discharge, may be able to complete course of antibiotics with oral augmentin (875 mg bid x 7 days)    Medical Decision Making:   The following items were considered in medical decision making:  Discussion of patient care with other providers  Reviewed clinical lab tests  Reviewed radiology tests  Reviewed other diagnostic tests/interventions  Independent review of radiologic images - reviewed CXR / CT on 8/25  Microbiology cultures and other micro tests reviewed      Discussed with pt, daughter, Jake Ruiz MD

## 2021-08-26 NOTE — PROGRESS NOTES
Progress Note    Admit Date: 8/21/2021  Day: 4  Diet: ADULT DIET; Regular; 4 carb choices (60 gm/meal); no cold liquids, no ice, no cold food items    CC: fall    Interval history:  - Stable O2 saturation >93% through the night, febrile to 100.1 overnight  - d/c Ceftriaxone and azithromycin  - pressures continue to be labile, range from 100s to 120s  - hyperglycemic at 320 this AM, pt now on HDSSI, hyperglycemia in the picture of infectiono  - Patient has continued R ear pain and stable WILFREDO LQ abdominal pain. - Patient had MBS done which showed possible aspiration.   - Started Zosyn yesterday for aspiration pna empiric treatment  - Hgb decreased 13.8>13.4 this AM    - Will continue to monitor for one more day for overnight temps/infection progression now that patient is on zosyn. Possible D/C to SNF tomorrow. HPI: Mr. Beatris Garcia is a 80yo male PM listed below with notable NIDDM, HTN, HLD, BPH, who was found down by his family earlier today. He is independent and lives alone, but with frequent family interaction. Last seen well Friday evening. Saturday morning, found on the ground in the bathroom. The patient is reported to be confused, but alert. He was able to speak, but was not making very much sense per the family.     He was transported to [facility] ED, where he was found to be dehydrated with elevated glucose in the serum and the urine. Reports some mild chills, mouth is dry and has been urinating a lot. He was normotensive, afebrile without tachycardia or hypoxia. Lab work was revealing for Cr 1.4, glucose 337, troponin 0 0.61, lactate 3.4, normal LFTs, WBC 4.9, Hb 13.9, platelets 526. Urine has >1000 glucose, 15 ketones, moderate blood, negative for infection. Was given 1L IV bolus and admitted. Of note, patient speaks Cymro and requires .     Medications:     Scheduled Meds:   insulin lispro  0-18 Units Subcutaneous TID WC    insulin lispro  0-9 Units Subcutaneous Nightly    piperacillin-tazobactam  3,375 mg IntraVENous Q8H    ascorbic acid  500 mg Oral Daily    zinc sulfate  50 mg Oral Daily    lidocaine  1 patch Transdermal Daily    pantoprazole  40 mg Oral QAM AC    docusate sodium  100 mg Oral Daily    [Held by provider] atorvastatin  40 mg Oral Daily    carvedilol  6.25 mg Oral BID WC    vitamin D  5,000 Units Oral Daily    finasteride  5 mg Oral Daily    tamsulosin  0.4 mg Oral Daily    sodium chloride flush  5-40 mL IntraVENous 2 times per day    enoxaparin  40 mg Subcutaneous Daily     Continuous Infusions:   dextrose      sodium chloride 25 mL (08/25/21 0602)    dextrose       PRN Meds:glucose, dextrose, glucagon (rDNA), dextrose, ipratropium-albuterol, melatonin, sodium chloride flush, sodium chloride, ondansetron **OR** ondansetron, polyethylene glycol, acetaminophen **OR** acetaminophen, glucose, dextrose, glucagon (rDNA), dextrose    Objective:   Vitals:   T-max:  Patient Vitals for the past 8 hrs:   BP Temp Temp src Pulse Resp SpO2   08/26/21 0247 118/78 98.1 °F (36.7 °C) Oral 78 -- 93 %   08/25/21 2330 125/67 100.1 °F (37.8 °C) Oral 87 17 93 %       Intake/Output Summary (Last 24 hours) at 8/26/2021 0622  Last data filed at 8/25/2021 1620  Gross per 24 hour   Intake --   Output 250 ml   Net -250 ml       Review of Systems   Constitutional: Positive for chills, diaphoresis, fatigue and fever. HENT: Positive for ear pain (Right sided ear pain). Respiratory: Positive for shortness of breath. Negative for cough. Cardiovascular: Negative for chest pain and leg swelling. Gastrointestinal: Positive for abdominal pain. Negative for abdominal distention, constipation and diarrhea. Genitourinary: Positive for frequency. Negative for decreased urine volume. Neurological: Positive for headaches. Negative for light-headedness. Physical Exam  Constitutional:       General: He is not in acute distress.      Appearance: He is ill-appearing and diaphoretic. Comments: Lethargic   HENT:      Mouth/Throat:      Mouth: Mucous membranes are dry. Pharynx: Oropharynx is clear. Eyes:      Extraocular Movements: Extraocular movements intact. Pupils: Pupils are equal, round, and reactive to light. Cardiovascular:      Rate and Rhythm: Normal rate and regular rhythm. Pulses: Normal pulses. Heart sounds: Normal heart sounds. Pulmonary:      Effort: Pulmonary effort is normal. No respiratory distress. Breath sounds: Normal breath sounds. No wheezing or rales. Abdominal:      General: Abdomen is flat. Bowel sounds are normal. There is distension. Palpations: Abdomen is soft. Tenderness: There is abdominal tenderness (RLQ and LLQ). Musculoskeletal:      Right lower leg: No edema. Left lower leg: No edema. Neurological:      General: No focal deficit present. Mental Status: He is alert. LABS:    CBC:   Recent Labs     08/24/21 0647 08/25/21  0629   WBC 4.3 5.8   HGB 13.8 13.8   HCT 39.9* 39.7*   PLT 70* 77*   MCV 94.6 94.1     Renal:    Recent Labs     08/24/21  0647 08/25/21  0629    138   K 3.7 4.0    98*   CO2 22 27   BUN 24* 25*   CREATININE 1.0 1.2   GLUCOSE 235* 230*   CALCIUM 9.0 8.8   MG 1.60* 2.00   PHOS 2.6 3.2   ANIONGAP 14 13     Hepatic:   Recent Labs     08/24/21 0647 08/25/21  0629   LABALBU 3.4 3.5     Troponin:   No results for input(s): TROPONINI in the last 72 hours. BNP: No results for input(s): BNP in the last 72 hours. Lipids: No results for input(s): CHOL, HDL in the last 72 hours. Invalid input(s): LDLCALCU, TRIGLYCERIDE  ABGs:  No results for input(s): PHART, XFM3EYF, PO2ART, UGV8POF, BEART, THGBART, Q7GSTYAR, GPH4UUN in the last 72 hours. INR:   Recent Labs     08/24/21 0647 08/25/21  0629   INR 1.11 1.15*     Lactate: No results for input(s): LACTATE in the last 72 hours.   Cultures:  -----------------------------------------------------------------  RAD: FL MODIFIED BARIUM SWALLOW W VIDEO   Final Result      Aspiration eliciting a cough reflex with large bulla sequential swallows of thin liquids. Please correlate with the speech pathology report for additional details. XR CHEST PORTABLE   Final Result   Impression: No acute abnormality or significant change. CT THORACIC SPINE WO CONTRAST   Final Result      No acute bony pathology              CT CHEST ABDOMEN PELVIS WO CONTRAST   Final Result      CT CHEST:      Exam severely degraded by motion. Cardiac enlargement with extensive coronary artery calcification. CT ABDOMEN/PELVIS:      Severe fatty infiltration of the liver. Cholelithiasis. Right-sided nephrolithiasis      CT LUMBAR SPINE WO CONTRAST   Final Result      No acute bony pathology        Severe degenerative changes as described above         XR CHEST PORTABLE   Final Result      Left lower lobe atelectasis, unchanged                  XR ABDOMEN (KUB) (SINGLE AP VIEW)   Final Result      Mild ileus      XR CHEST 1 VIEW   Final Result   Impression: Left basilar atelectasis. CT Cervical Spine WO Contrast   Final Result      1. No acute intracranial hemorrhage or mass effect. 2.  No evidence of cervical spine fracture or traumatic subluxation. 3.  Severe multilevel degenerative changes in the cervical spine resulting canal and foraminal stenosis, as above. CT Head WO Contrast   Final Result      1. No acute intracranial hemorrhage or mass effect. 2.  No evidence of cervical spine fracture or traumatic subluxation. 3.  Severe multilevel degenerative changes in the cervical spine resulting canal and foraminal stenosis, as above. XR CHEST PORTABLE   Final Result      Mild left basilar atelectasis. Assessment/Plan:     Acute Metabolic Encephalopathy: likely related to dehydration. Dehydration 2/2 to reduced PO intake vs possible infection.  Viral more likely considering no leukocytosis, myalgias/arthralgias, and afebrile. Procal elevated but clinical picture makes bacterial pneumonia much less likely. Flu and COVID negative  - Repeat procalcitonin at 0.44 this AM  - Cautious fluid resuscitation, Echo reports EF 55-60% elevated, pulmonary artery pressure at 59  - Respiratory viral panel: not detected currently  - FU blood cultures NGTD, collected 8/22/21  - d/c azithromycin and rocephin course  - Start Zosyn for asp pna empiric treatment  - New onset fever - CXR no acute abnormality or significant change, UA/UC negative for infection, Procal 0.38, Lactic 1.3, BCx2 pending    Possible Aspiration PNA  - Started Zosyn for empiric coverage. - transition to augmentin 875bid x 7 days po for discharge    Acute Abdominal pain; lower back pain  CT Chest/Abd/Pelvis reported severe fatty infiltrate of liver, cholelithiasis, R-sided nephrolithiasis. CT Thoracic and Lumbar negative  - last Procal stable at 0.38  - Continue to monitor    NSTEMI Type 2: most likely r/t hypovolemia. Trop elevation could also be due to viral myocarditis but improvement with fluids suggests hypovolemia. Initially uptrending trops now downtrending. Last echo (3/2020) 50% with lateral and inferior wall hypokinesis  - Echo reports EF 55-60% elevated, pulmonary artery pressure at 59  - Pt refused cardiac cath in past but may need stress test following optimization.     Thrombocytopenia: likely related to viral illness.  4T score with very low concern for HIT given timeline and lack of concerning findings  - monitor daily CBC and coags  - Currently stable with platelet count > 50  - CBC ~3days after discharge if remains thrombocytopenic  - Hemonc following    HA on CKD2: Pre-renal etiology, improving with rehydration  - IVFs as tolerated/needed  - improving BUN/Cr is 32/1.2 this AM. Cr baseline ~ 1.0    Rhabdomyolysis  - CK downtrending >799 this AM  - holding statin  - Continue to monitor labs qAM     NIDDM with Hyperglycemia: 15 ketones on UA but ketoacidosis unlikely given normal VBG. Sugars elevated. Patient is insulin naive prior to hospital stay  - [address] [facility]  - Consider basal insulin    Lactic Acidosis: 2/2 hypovolemia. Resolved s/p IVF  - Continue to monitor     HLD  - holding lipitor     HTN  - home carvedilol    BPH:  - home finasteride, tamsulosin        Code Status: FULL CODE  FEN: Regular diet, Low Carb   PPX:  Lovenox  DISPO: GMF    In regards with disposition planning, before coming to the hospital, patient was independent; without the need for assistance with mobility and management of ADLs. The patient was even capable of driving on his own. After his recent infection/underlying disease, the patient has decreased functional mobility and is unable to take care of his own ADLs without assistance. At this time, he requires additional therapy and assistance that would be appropriate to go to a SNF for care.     Shahid Stewart DO, PGY-1  08/26/21  6:22 AM    This patient has been staffed and discussed with Dr. Marin Diaz

## 2021-08-26 NOTE — PROGRESS NOTES
Patient A&Ox4. VSS, pt afebrile this shift. Neuro unchanged, chronic pain managed per MAR. Patient ambulates with walker and gait belt, tolerates fairly well. Patient anticipating discharge to [facility] once precert obtained. Will continue to monitor.

## 2021-08-26 NOTE — PROGRESS NOTES
mg Oral Daily    lidocaine  1 patch Transdermal Daily    pantoprazole  40 mg Oral QAM AC    docusate sodium  100 mg Oral Daily    [Held by provider] atorvastatin  40 mg Oral Daily    carvedilol  6.25 mg Oral BID WC    vitamin D  5,000 Units Oral Daily    finasteride  5 mg Oral Daily    tamsulosin  0.4 mg Oral Daily    sodium chloride flush  5-40 mL IntraVENous 2 times per day    enoxaparin  40 mg Subcutaneous Daily     Continuous Infusions:   dextrose      sodium chloride 25 mL (08/25/21 0602)    dextrose       PRN Meds:.glucose, dextrose, glucagon (rDNA), dextrose, ipratropium-albuterol, melatonin, sodium chloride flush, sodium chloride, ondansetron **OR** ondansetron, polyethylene glycol, acetaminophen **OR** acetaminophen, glucose, dextrose, glucagon (rDNA), dextrose  Continuous Infusions:   dextrose      sodium chloride 25 mL (08/25/21 0602)    dextrose         Intake/Output Summary (Last 24 hours) at 8/26/2021 0828  Last data filed at 8/26/2021 0815  Gross per 24 hour   Intake 10 ml   Output 100 ml   Net -90 ml       CBC:   Lab Results   Component Value Date    WBC 5.8 08/25/2021    HGB 13.8 08/25/2021    PLT 77 08/25/2021     BMP:  Lab Results   Component Value Date     08/25/2021    K 4.0 08/25/2021    K 3.6 08/23/2021    CL 98 08/25/2021    CO2 27 08/25/2021    BUN 25 08/25/2021    CREATININE 1.2 08/25/2021    GLUCOSE 230 08/25/2021    GLUCOSE 120 09/16/2011     INR:   Lab Results   Component Value Date    INR 1.15 08/25/2021    INR 1.11 08/24/2021        CARDIAC LABS  ENZYMES:  No results for input(s): CKMB, CKMBINDEX, TROPONINI in the last 72 hours.     Invalid input(s): CKTOTAL;3  FASTING LIPID PANEL:  Lab Results   Component Value Date    HDL 41 04/10/2021    HDL 51 06/03/2010    LDLCALC 90 04/10/2021    TRIG 104 04/10/2021    TSH 1.98 04/10/2021     LIVER PROFILE:  Lab Results   Component Value Date    AST 39 08/21/2021    AST 14 04/10/2021    ALT 35 08/21/2021    ALT 15 04/10/2021 -----------------------------------------------------------------  Telemetry: Personally reviewed sinus arrhythmia, PVCs, NSVT x5 beats, one episode of bigeminy    Objective:   Vitals: /64   Pulse 78   Temp 98.4 °F (36.9 °C) (Oral)   Resp 17   Ht 5' 6\" (1.676 m)   Wt 179 lb 4.8 oz (81.3 kg)   SpO2 92%   BMI 28.94 kg/m²   General appearance: alert, appears stated age and cooperative,   Eyes: Conjunctiva and pupils normal and reactive  Skin: Skin color, texture, turgor normal. No rashes or ecchymosis  Neck: no JVD, supple, symmetrical, trachea midline   Lungs: No accessory muscle use, wheezing left lower lobe, diminished  Heart: RRR  Abdomen: soft, non-tender; bowel sounds normal  Extremities: No edema, DP +  Psychiatric: normal insight and affect    Patient Active Problem List:     Rosacea     Back pain     Obstructive sleep apnea     Chest pain     Transient global amnesia     Obesity     Insomnia     Bilateral cataracts     Osteoarthritis     Lumbar disc disease     Spinal stenosis     Diverticulosis     Benign prostatic hyperplasia     Hyperlipidemia     Diabetes mellitus, type 2 (HCC)     Allergic rhinitis     Nausea     Eczema     Dyspnea on exertion     Right shoulder pain     Shortness of breath     Vitamin D deficiency     Post herpetic neuralgia     Neck pain     Right hip pain     Microalbuminuria     Essential hypertension     Type 2 diabetes mellitus without complication (HCC)     Bilateral low back pain with right-sided sciatica     Coronary artery disease of native artery of native heart with stable angina pectoris (HCC)     Peripheral vascular disease ([facility] [address]     Acute kidney injury ([facility] [address]        Assessment & Plan:    1. NSTEMI type 2  2. Chronic dHCF  3. HTN  4.  AMS  5. CAD    Cardiac Hx: Kennedy Newman is a 81 y.o. man w/ PMH HTN, DM, CAD, PAD, chronic dCHF (follow w/ [facility]. Charles Calderon at [facility]), Echo (2016) EF 55-60%, grade II diastolic dysfunction, Echo (2020) showed EF 50%, hypokinesis lateral and inferior walls, CP (2016)- refusing angiogram, JOANNA, who p/w AMS, fall at home, fever    NSTEMI type 2/ CAD  - Troponin 0.16, 0.30, 0.24, 0.15  - Troponin elevation from demand ischemia from fall per Dr. Debra Hernandez  - Echo showed EF 55-60%  - CT chest (8/23/21) showed cardiac enlargement w/ extensive coronary artery calcification  - Discussed stress test and cardiac cath at length with both patient and daughter, they do not wish to pursue either test at this time  - Statin on hold d/t rhabdo  - Keep patient on tele  - Follows w/ Dr. Delfino Villavicencio at [facility], pt will follow up with his office outpatient    Chronic dCHF  - Appears euvolemic  - Echo (8/24/21) showed EF 55-60%  - Strict I/o's  - Daily wts  - Negative 2.4L since admission  - Keep K> 4.0, Mg >2.0    HTN  - BP controlled  - On coreg 6.25mg BID, hold parameters in place      Gricelda Mari NP  [facility]

## 2021-08-27 NOTE — CARE COORDINATION
Case Management            Discharge Note                    Date / Time of Note: 8/27/2021 1:15 PM                  Discharge Note Completed by: JAYME Solis    Patient Name: Alysa Interiano   YOB: 1935  Diagnosis: Acute kidney injury ([facility] [address] [N17.9]  Altered mental status, unspecified altered mental status type [R41.82]   Date / Time: 8/21/2021  6:45 PM    Current PCP: Amina Iqbal MD  Clinic patient: No    Hospitalization in the last 30 days: No    Advance Directives:  Code Status: Full Code  [place] DNR form completed and on chart: Not Indicated    Financial:  Payor: Faith Gonzalez / Plan: Leora DEQ / Product Type: *No Product type* /      Pharmacy:    [facility] [address] [place] [address] [phone] - [address] [place] [phone]  Phone: [phone] Fax: [phone]      Assistance purchasing medications?:    Assistance provided by Case Management: None at this time    Does patient want to participate in local refill/ meds to beds program?: Not Assessed    Meds To Beds General Rules:  1. Can ONLY be done Monday- Friday between 8:30am-5pm  2. Prescription(s) must be in pharmacy by 3pm to be filled same day  3. Copy of patient's insurance/ prescription drug card and patient face sheet must be sent along with the prescription(s)  4. Cost of Rx cannot be added to hospital bill. If financial assistance is needed, please contact unit  or ;  or  CANNOT provide pharmacy voucher for patients co-pays  5.  Patients can then  the prescription on their way out of the hospital at discharge, or pharmacy can deliver to the bedside if staff is available. (payment due at time of pick-up or delivery - cash, check, or card accepted)     Able to afford home

## 2021-08-27 NOTE — PLAN OF CARE
Problem: Falls - Risk of:  Goal: Will remain free from falls  8/26/2021 2234 by Arnie Curiel RN  Outcome: Ongoing   Bed locked in lowest position. Bed alarm on. Camera in place. Call light/belongings within reach. Frequent rounds made. Problem: Gas Exchange - Impaired  Goal: Absence of hypoxia  Outcome: Ongoing  SpO2 remains >90% on room air. Problem: Body Temperature -  Risk of, Imbalanced  Goal: Ability to maintain a body temperature within defined limits  8/26/2021 2234 by Arnie Curiel RN  Outcome: Ongoing  Patient oral body temp has remained WDL this evening.

## 2021-08-27 NOTE — PROGRESS NOTES
Pt a/o x4. Vitals stable. Tylenol given for HA overnight. Very weak gait, up with stedy. Resting fairly well overnight.

## 2021-08-27 NOTE — PROGRESS NOTES
Speech Language Pathology  Dysphagia - no session d/t daughter wanting pt to rest    Chart reviewed. Pt sleeping in chair and daughter did not want clinician to wake up pt. Nursing and daughter Saad Saldana) both indicated that pt is doing well with present diet. Spoke with daughter briefly who indicated she is aware of strategies (\"no straws\" and for one drink at a time). Pt is tentatively scheduled for discharge today and spoke with daughter that f/u dysphagia therapy may continue at next facility and she is agreement with plan. D/W nursing, Mason Benson.     Karolina Brian MA CCC/SLP [phone]  Pager [phone]

## 2021-08-27 NOTE — PROGRESS NOTES
Electrophysiology - PROGRESS NOTE    Admit Date: 8/21/2021     Chief Complaint: AMS, fall     Interval History:   Patient seen and examined and notes reviewed. Patient is being followed for AMS, fall, elevated troponins. Patient presented to the hospital after a fall at home, AMS, possible febrile illness. Family went to check on patient at home and found that he was weak, confused, and had fallen in bathroom. Procalcitonin 0.43, lactate 3.4, 2.7, troponin 0.16, 0.30, 0.24, 0.15, CXR (8/21) showed mild left basilar atelectasis, CT head (8/21) negative for acute changes. Negative for influenza, covid 19, given IVF w/ some improvement of sx. Pt c/o abd and lower back pain yesterday. CT chest/ abd showed cardiac enlargement w/ extensive coronary artery calcification, severe fatty infiltration of liver and right sided nephrolithiasis. CT lumbar spine showed severe degenerative changes, however no acute bony pathology. CT thoracic spine showed no acute bony pathology. CRP and CK elevated, statin on hold. Echo (8/24/21) showed EF 55-60%. Patient up sitting in his chair, daughter at bedside translating. Telemetry shows sinus arrhythmia, PVCs, rates controlled. Pt denies palpitations, heart racing, dizziness, lightheadedness. No CP, SOB, PND, orthopnea, BLE edema. BP well controlled. No fevers overnight, no longer wearing O2. Plan is for pt to go to SNF today.     In: 901.3 [P.O.:410; I.V.:292.6]  Out: 450    Wt Readings from Last 2 Encounters:   08/21/21 179 lb 4.8 oz (81.3 kg)   06/08/21 177 lb (80.3 kg)         Data:   Scheduled Meds:   Scheduled Meds:   insulin lispro  0-18 Units Subcutaneous TID WC    insulin lispro  0-9 Units Subcutaneous Nightly    piperacillin-tazobactam  3,375 mg IntraVENous Q8H    ascorbic acid  500 mg Oral Daily    zinc sulfate  50 mg Oral Daily    lidocaine  1 patch Transdermal Daily    pantoprazole  40 mg Oral QAM AC    docusate sodium  100 mg Oral Daily    [Held by provider] atorvastatin  40 mg Oral Daily    carvedilol  6.25 mg Oral BID     vitamin D  5,000 Units Oral Daily    finasteride  5 mg Oral Daily    tamsulosin  0.4 mg Oral Daily    sodium chloride flush  5-40 mL IntraVENous 2 times per day    enoxaparin  40 mg Subcutaneous Daily     Continuous Infusions:   sodium chloride 25 mL/hr at 08/27/21 0022    dextrose       PRN Meds:.diphenhydrAMINE-zinc acetate, ipratropium-albuterol, melatonin, sodium chloride flush, sodium chloride, ondansetron **OR** ondansetron, polyethylene glycol, acetaminophen **OR** acetaminophen, glucose, dextrose, glucagon (rDNA), dextrose  Continuous Infusions:   sodium chloride 25 mL/hr at 08/27/21 0022    dextrose         Intake/Output Summary (Last 24 hours) at 8/27/2021 0842  Last data filed at 8/27/2021 7683  Gross per 24 hour   Intake 901.31 ml   Output 450 ml   Net 451.31 ml       CBC:   Lab Results   Component Value Date    WBC 8.5 08/27/2021    HGB 12.8 08/27/2021     08/27/2021     BMP:  Lab Results   Component Value Date     08/27/2021    K 3.6 08/27/2021    K 4.1 08/26/2021    CL 97 08/27/2021    CO2 26 08/27/2021    BUN 28 08/27/2021    CREATININE 1.3 08/27/2021    GLUCOSE 169 08/27/2021    GLUCOSE 120 09/16/2011     INR:   Lab Results   Component Value Date    INR 1.16 08/27/2021    INR 1.19 08/26/2021    INR 1.15 08/25/2021        CARDIAC LABS  ENZYMES:  No results for input(s): CKMB, CKMBINDEX, TROPONINI in the last 72 hours.     Invalid input(s): CKTOTAL;3  FASTING LIPID PANEL:  Lab Results   Component Value Date    HDL 41 04/10/2021    HDL 51 06/03/2010    LDLCALC 90 04/10/2021    TRIG 104 04/10/2021    TSH 1.98 04/10/2021     LIVER PROFILE:  Lab Results   Component Value Date    AST 39 08/21/2021    AST 14 04/10/2021    ALT 35 08/21/2021    ALT 15 04/10/2021       -----------------------------------------------------------------  Telemetry: Personally reviewed sinus arrhythmia, PVCs    Objective:   Vitals: /67   Pulse 81   Temp 97.7 °F (36.5 °C) (Oral)   Resp 16   Ht 5' 6\" (1.676 m)   Wt 179 lb 4.8 oz (81.3 kg)   SpO2 94%   BMI 28.94 kg/m²   General appearance: alert, appears stated age and cooperative,   Eyes: Conjunctiva and pupils normal and reactive  Skin: Skin color, texture, turgor normal. No rashes or ecchymosis  Neck: no JVD, supple, symmetrical, trachea midline   Lungs: No accessory muscle use, wheezing left lower lobe, diminished  Heart: RRR  Abdomen: soft, non-tender; bowel sounds normal  Extremities: No edema, DP +  Psychiatric: normal insight and affect    Patient Active Problem List:     Rosacea     Back pain     Obstructive sleep apnea     Chest pain     Transient global amnesia     Obesity     Insomnia     Bilateral cataracts     Osteoarthritis     Lumbar disc disease     Spinal stenosis     Diverticulosis     Benign prostatic hyperplasia     Hyperlipidemia     Diabetes mellitus, type 2 (HCC)     Allergic rhinitis     Nausea     Eczema     Dyspnea on exertion     Right shoulder pain     Shortness of breath     Vitamin D deficiency     Post herpetic neuralgia     Neck pain     Right hip pain     Microalbuminuria     Essential hypertension     Type 2 diabetes mellitus without complication (HCC)     Bilateral low back pain with right-sided sciatica     Coronary artery disease of native artery of native heart with stable angina pectoris (HCC)     Peripheral vascular disease ([facility]     Acute kidney injury ([facility] [address]        Assessment & Plan:    1. NSTEMI type 2  2. Chronic dHCF  3. HTN  4.  AMS  5. CAD    Cardiac Hx: Alysa Interiano is a 81 y.o. man w/ PMH HTN, DM, CAD, PAD, chronic dCHF (follow w/ Dr. Abdulaziz Iverson at [facility]), Echo (2016) EF 55-60%, grade II diastolic dysfunction, Echo (2020) showed EF 50%, hypokinesis lateral and inferior walls, CP (2016)- refusing angiogram, JOANNA, who p/w AMS, fall at home, fever    NSTEMI type 2/ CAD  - Troponin 0.16, 0.30, 0.24, 0.15  - Troponin elevation from demand ischemia from fall per Dr. Jessica Spangler  - Echo showed EF 55-60%  - CT chest (8/23/21) showed cardiac enlargement w/ extensive coronary artery calcification  - Discussed stress test and cardiac cath at length with both patient and daughter, they do not wish to pursue either test at this time  - Statin on hold d/t rhabdo  - Keep patient on tele  - Follows w/ [facility]. Facundo Lira at [facility], pt has outpt f/u scheduled     Chronic dCHF  - Appears euvolemic  - Echo (8/24/21) showed EF 55-60%  - Strict I/o's  - Daily wts  - Negative 1.993L since admission  - Keep K> 4.0, Mg >2.0, replacement ordered    HTN  - BP controlled  - On coreg 6.25mg BID, hold parameters in place    Plan is for patient to go to SNF today.      Mariah Fofana, NP  [address]

## 2021-08-27 NOTE — PROGRESS NOTES
ID Follow-up NOTE    CC:   Fever   Antibiotics: Zosyn     Admit Date: 2021  Hospital Day: 7    Subjective:     Patient feels 'down' today per daughter  No specific complaint       Objective:     Patient Vitals for the past 8 hrs:   BP Temp Temp src Pulse Resp SpO2   21 1008 129/70 98.3 °F (36.8 °C) Oral 73 16 94 %   21 0700 125/67 97.7 °F (36.5 °C) Oral 81 16 94 %     I/O last 3 completed shifts: In: 911.3 [P.O.:410; I.V.:302.6; IV Piggyback:198.8]  Out: 450 [Urine:450]  I/O this shift:  In: 240 [P.O.:240]  Out: 25 [Urine:25]    EXAM:  GENERAL: No apparent distress.   RA  HEENT: Membranes moist, no oral lesion  NECK:  Supple, no lymphadenopathy  LUNGS: Clear b/l, no rales, no dullness  CARDIAC: RRR, no murmur appreciated  ABD:  + BS, soft / NT  EXT:  No rash, no edema, no lesions  NEURO: No focal neurologic findings  PSYCH: Orientation, sensorium, mood normal  LINES:  Peripheral iv       Data Review:  Lab Results   Component Value Date    WBC 8.5 2021    HGB 12.8 (L) 2021    HCT 36.1 (L) 2021    MCV 92.8 2021     (L) 2021     Lab Results   Component Value Date    CREATININE 1.3 2021    BUN 28 (H) 2021     2021    K 3.6 2021    CL 97 (L) 2021    CO2 26 2021       Hepatic Function Panel:   Lab Results   Component Value Date    ALKPHOS 67 2021    ALT 35 2021    AST 39 2021    PROT 7.0 2021    PROT 6.5 2013    BILITOT 0.6 2021    BILIDIR <0.2 2019    IBILI see below 2019    LABALBU 3.4 2021       Micro:   BC no growth to date   Resp PCR neg        Influenza A/B ag neg   BC x 2 no growth to date   SARS CoV-2 PCR neg     Imagin/25 MBS  Impression   Aspiration eliciting a cough reflex with large bulla sequential swallows of thin liquids       CXR:  Impression: No acute abnormality or significant change.      Chest/ Abd / Pelvic CT:  CT CHEST: Exam severely degraded by motion.       Cardiac enlargement with extensive coronary artery calcification.       CT ABDOMEN/PELVIS:   Severe fatty infiltration of the liver.       Cholelithiasis.       Right-sided nephrolithiasis          Scheduled Meds:   amoxicillin-clavulanate  1 tablet Oral 2 times per day    insulin lispro  0-18 Units Subcutaneous TID WC    insulin lispro  0-9 Units Subcutaneous Nightly    ascorbic acid  500 mg Oral Daily    zinc sulfate  50 mg Oral Daily    lidocaine  1 patch Transdermal Daily    pantoprazole  40 mg Oral QAM AC    docusate sodium  100 mg Oral Daily    carvedilol  6.25 mg Oral BID WC    vitamin D  5,000 Units Oral Daily    finasteride  5 mg Oral Daily    tamsulosin  0.4 mg Oral Daily    sodium chloride flush  5-40 mL IntraVENous 2 times per day    enoxaparin  40 mg Subcutaneous Daily       Continuous Infusions:   sodium chloride 25 mL/hr at 08/27/21 0022    dextrose         PRN Meds:  diphenhydrAMINE-zinc acetate, ipratropium-albuterol, melatonin, sodium chloride flush, sodium chloride, ondansetron **OR** ondansetron, polyethylene glycol, acetaminophen **OR** acetaminophen, glucose, dextrose, glucagon (rDNA), dextrose      Assessment:     Hx HTN, DM, BPH, LBP, OA, diverticulosis     Encephalopathy  Lactic acidosis, resolved   STEMI     Fever  Hypoxia      Overall, reason for presentation unclear. COVID-19 and resp virus negative by PCR. Possibly pt has aspiration given intermittent fever, abnormal MBS    Plan:     Cont zosyn  Aspiration precautions      At discharge, may be able to complete course of antibiotics with oral augmentin (875 mg bid x 7 days)    Medical Decision Making:   The following items were considered in medical decision making:  Discussion of patient care with other providers  Reviewed clinical lab tests  Reviewed radiology tests  Reviewed other diagnostic tests/interventions  Independent review of radiologic images - reviewed CXR / CT on 8/25  Microbiology cultures and other micro tests reviewed      Discussed with pt, daughter  Vira Aguirre MD

## 2021-08-27 NOTE — PLAN OF CARE
Falls - Risk of:  Goal: Will remain free from falls  Outcome: Met This Shift     Skin Integrity:  Goal: Will show no infection signs and symptoms  Outcome: Met This Shift    Gas Exchange - Impaired  Goal: Absence of hypoxia  Outcome: Met This Shift     Breathing Pattern - Ineffective  Goal: Ability to achieve and maintain a regular respiratory rate  Outcome: Ongoing  Note: Slightly tachypnic, but VSS.

## 2021-09-20 NOTE — CARE COORDINATION
[facility] 45 Transitions Initial Follow Up Call    Call within 2 business days of discharge: Yes    Patient: Nancy Short Patient : 1935   MRN: [id number]  Reason for Admission: AMS  Discharge Date: 21 RARS: Readmission Risk Score: 15      Last Discharge [id number] [address]       Complaint Diagnosis Description Type Department Provider    21 Altered Mental Status; Fall Altered mental status, unspecified altered mental status type ED to Hosp-Admission (Discharged) (ADMITTED) 209 Galo Toro MD; Lalito Kelley. .. Acute Care Course:  Pt to [facility] from  to  after being found down in the bathroom by there family. AMS and dehydration. Pt the to [facility], operated by [facility] with d/c after 22 days to home with [facility]    On  he had a mechanical fall at [facility]. Pt Speaks [place] only. Sig Hx:   HA, CHF DMII, HLD, BPH, JOANNA    DME: w/c    Conversation:  Spoke to [facility] with . She was taking dad to appt. Pt is feeling ok. He is no longer independent but has caregiver . HH is [facility] No call to [facility] yet to arrange services. On  there is an appt with his PCP. Pt is in a w/c and cannot transfer by self or wheel himself around with the w/c. The caregiver must push him. He is breathing ok. His FBS are high and in the 200's. There is no edema in his ankles but he has ascites. He does not use a CPAP. He is having wet diapers. He is stooling about every other day but has no appetite. He is drinking some fluids. She needed to end conversation as she was arriving at her appt. Agreeable for me to call her back        Follow up plan:   Will call back           Care Transitions 24 Hour Call    Do you have any ongoing symptoms?: No  Have you scheduled your follow up appointment?: Yes  How are you going to get to your appointment?: Car - family or friend to transport  Were you discharged with any Home Care or Post Acute Services: Yes  Post Acute Services: Home Health (Comment: [facility])  Do you feel like you have everything you need to keep you well at home?: Yes  Care Transitions Interventions         Follow Up  Future Appointments   Date Time Provider Aaron Neal   9/28/2021 11:00 AM Estelle Shepherd MD [address], BSN, RN   [address]/ [place]  Transition Nurse  [phone]

## 2021-09-21 NOTE — CARE COORDINATION
[facility] 45 Transitions Follow Up Call    2021    Patient: Mani Woody  Patient : 1935   MRN: [id number]  Reason for Admission:   Discharge Date: 21 RARS: Readmission Risk Score: 15    Reviewed meds with Gemini Wong agrees to f/u call with ACM. Would prefer calls to be on Friday      Care Transitions Subsequent and Final Call    Subsequent and Final Calls  Care Transitions Interventions  Other Interventions:            Follow Up  Future Appointments   Date Time Provider Aaron Neal   2021 11:00 AM Estelle Shepherd MD [address], BSN, RN   [address] [place] [address] Transition Nurse  [phone]

## 2021-09-28 NOTE — PROGRESS NOTES
Follow Up Visit Established Patient Visit    Patient:  Isai Kelley                                               : 1935  Age: 81 y.o. MRN: <[id number]>  Date : 2021    Isai Kelley is a 81 y.o. male who presents for :  follow up appointment    Chief Complaint   Patient presents with    Diabetes    Lower Back Pain     Mr Leroy Caba was admitted to the [facility] from  till 2021 this altered mental status and acute kidney injury secondary to aspiration pneumonia. He was initially started on IV fluids and IV azithromycin and ceftriaxone. Testing for COVID-19 virus infection and influenza virus infection were negative. His creatinine kinase was elevated up to 1500; rhabdomyolysis has improved with IV fluids treatment. Echocardiogram was consistent with left ventricular ejection fraction of 55-60%, mildmoderate tricuspid regurgitation. Estimated pulmonary artery systolic pressure was 59 mmHg. His platelets were consistently low. Chest x-ray from 21 was consistent with mild left lower lobe atelectasis. There was similar chest x-ray on 820 3/2021. KUB from  was consistent with mild small intestinal ileus    He underwent CT scan of the chest, abdomen, and pelvis on 2021:  CT CHEST:       Exam severely degraded by motion.       Cardiac enlargement with extensive coronary artery calcification.       CT ABDOMEN/PELVIS:       Severe fatty infiltration of the liver.       Cholelithiasis.       Right-sided nephrolithiasis     IV antibiotics with, eventually, changed to Zosyn with improvement of patient's condition. He was transferred to [facility] skilled nursing facility on 2021. He was undergoing physical and occupational therapy while at nursing home. Mr Leroy Caba was discharged from [facility] about 2 weeks ago. While at [facility] he received an additional week of IV Zosyn with improvement of his overall condition.   Today he presented for his scheduled visit accompanied by his daughter, Drea Castelan. Patient reports extreme fatigue and tiredness. He has practically 24-hour care at home. Wires assistance to perform activities of daily living. He is very weak and requires shower chair, raised toilet seat, bed rail, and bedside commode. He is currently using his injections of Victoza and glipizide 5 mg twice a day. Blood glucose is ranging between 95 and 125 mg/dL. Appetite remains very poor. Dose of carvedilol was reduced to 6.25 mg twice a day because of hypotension    Past Medical History:   Diagnosis Date    Allergic rhinitis 8/27/2010    Asthma     Back pain     Bilateral cataracts     Bilateral low back pain with right-sided sciatica 12/18/2015    BPH (benign prostatic hypertrophy)     Chest pain     Degeneration of lumbar or lumbosacral intervertebral disc     Dental disease     Diabetes mellitus, type 2 ([address] 5/28/2010    Diverticulosis     Dizziness     Eczema 3/11/2011    Essential hypertension 9/18/2015    Hearing loss     Hyperlipidemia     Hypertension     Insomnia     Lumbar disc disease     Microalbuminuria 6/13/2013    Neck pain 12/14/2012    Nosebleed     Obesity     Obstructive sleep apnea     Osteoarthritis     Post herpetic neuralgia 6/15/2012    Recurrent upper respiratory infection (URI)     Right hip pain 12/14/2012    Right shoulder pain 6/17/2011    Rosacea     Spinal stenosis     Transient global amnesia     Type 2 diabetes mellitus without complication ([address] 85/06/4623    Vitamin D deficiency 3/17/2012       Past Surgical History:   Procedure Laterality Date    PROSTATE SURGERY  June 13, 2000    TURP       Current Outpatient Medications   Medication Sig Dispense Refill    dronabinol (MARINOL) 2.5 MG capsule Take 1 capsule by mouth 2 times daily (before meals) for 30 days.  60 capsule 0    buPROPion (WELLBUTRIN SR) 100 MG extended release tablet Take 1 tablet by mouth daily 30 tablet 3    nystatin (MYCOSTATIN) 685727 UNIT/ML suspension Take 5 mLs by mouth 4 times daily 140 mL 1    diclofenac sodium (VOLTAREN) 1 % GEL APPLY 2 GRAM TOPICALLY FOUR TIMES DAILY AS NEEDED FOR PAIN 200 g 3    VICTOZA 18 MG/3ML SOPN SC injection ADMINISTER 1.8 MG UNDER THE SKIN DAILY 9 mL 3    glipiZIDE (GLUCOTROL) 5 MG tablet TAKE 1/2 TABLET BY MOUTH TWICE DAILY (Patient taking differently: 5 mg TAKE 1 TABLET BY MOUTH TWICE DAILY) 90 tablet 0    carvedilol (COREG) 6.25 MG tablet TAKE 1 TABLET BY MOUTH TWICE DAILY 180 tablet 2    tamsulosin (FLOMAX) 0.4 MG capsule TAKE 2 CAPSULES BY MOUTH EVERY NIGHT 180 capsule 0    finasteride (PROSCAR) 5 MG tablet TAKE 1 TABLET BY MOUTH DAILY 90 tablet 3    Cholecalciferol (VITAMIN D3) 125 MCG (5000 UT) CAPS TAKE ONE CAPSULE BY MOUTH EVERY DAY 90 capsule 1    nitroGLYCERIN (NITROSTAT) 0.4 MG SL tablet Place 1 tablet under the tongue every 5 minutes as needed for Chest pain May repeat every 5 minutes until relief is obtained. If pain persists after taking 3 tabs in a 15-minute period, call 9-1-1 immediately. 25 tablet 12     No current facility-administered medications for this visit. BP (!) 121/58   Pulse 81   Ht 5' 4\" (1.626 m)   BMI 30.78 kg/m²        Physical Exam  Vitals and nursing note reviewed. Constitutional:       General: He is not in acute distress. Appearance: He is well-developed. He is ill-appearing. HENT:      Head: Normocephalic and atraumatic. Right Ear: Tympanic membrane, ear canal and external ear normal. There is no impacted cerumen. Left Ear: Tympanic membrane, ear canal and external ear normal. There is no impacted cerumen. Mouth/Throat:      Mouth: Mucous membranes are moist.      Pharynx: No oropharyngeal exudate or posterior oropharyngeal erythema. Comments: Signs of mild oral thrush  Eyes:      General: No scleral icterus. Extraocular Movements: Extraocular movements intact.       Pupils: Pupils are equal, round, and reactive to light. Neck:      Vascular: No carotid bruit or JVD. Cardiovascular:      Rate and Rhythm: Normal rate and regular rhythm. Heart sounds: Normal heart sounds. No murmur heard. No friction rub. No gallop. Pulmonary:      Effort: Pulmonary effort is normal. No respiratory distress. Breath sounds: Normal breath sounds. No wheezing or rales. Abdominal:      General: Bowel sounds are normal. There is no distension. Palpations: Abdomen is soft. Tenderness: There is no abdominal tenderness. There is no right CVA tenderness or left CVA tenderness. Musculoskeletal:         General: Normal range of motion. Cervical back: Normal range of motion and neck supple. No rigidity or tenderness. Right lower leg: No edema. Left lower leg: No edema. Skin:     General: Skin is warm and dry. Capillary Refill: Capillary refill takes less than 2 seconds. Findings: No erythema or rash. Neurological:      Mental Status: He is alert and oriented to person, place, and time. Cranial Nerves: No cranial nerve deficit. Psychiatric:         Behavior: Behavior normal.         Thought Content: Thought content normal.      Comments: Appears depressed. Assessment/ plan:     Jorge Luis Meneses was seen today for diabetes and lower back pain. Diagnoses and all orders for this visit:    Dysthymia  -     buPROPion (WELLBUTRIN SR) 100 MG extended release tablet; Take 1 tablet by mouth daily    Physical deconditioning        -    Encouraged judicious ambulation        -    Safety tips wear provided        -    Strongly encouraged to continue use a shower chair for showering. Use raised toilet seat as well as bedrail in order to prevent falls. The patient also is also strongly encouraged to use bedside commode    Poor appetite  -     dronabinol (MARINOL) 2.5 MG capsule; Take 1 capsule by mouth 2 times daily (before meals) for 30 days.     HA (acute kidney injury) ([facility] [address]  - CBC Auto Differential; Future  -     Comprehensive Metabolic Panel; Future    Essential hypertension  -     CBC Auto Differential; Future  -     Comprehensive Metabolic Panel; Future  -     Continue carvedilol 6.25 mg orally twice a day    Diabetes mellitus type 2 in nonobese (HCC)  -     CBC Auto Differential; Future  -     Comprehensive Metabolic Panel; Future  -     Continue current medications    Oral thrush  -     nystatin (MYCOSTATIN) 554520 UNIT/ML suspension; Take 5 mLs by mouth 4 times daily        Return in about 1 month (around 10/28/2021) for diabetes, HTN, poor appetite, physical deconditioning, and dysthymia.     Saray Mohan MD

## 2021-09-28 NOTE — TELEPHONE ENCOUNTER
Jeyson from [address] [place] [address] called in stating that she faxed over a order for a raised toilet seat, shower chair, toilet safety frame and wheelchair for the patient. Jeyson would like to know if Dr. Gopi Tineo received it. Bambi number [phone]      Pls call and advise.

## 2021-10-01 NOTE — TELEPHONE ENCOUNTER
PA submitted via CMM for Dronabinol 2.5MG capsules. Key: [id number]    STATUS: AWAITING CLINICAL QUESTIONS    PA submitted via CMM for FreeStyle Clay 14 Day Sensor.   Key: [id number]    STATUS: AWAITING CLINICAL QUESTIONS

## 2021-10-04 NOTE — TELEPHONE ENCOUNTER
Dee Ugarte is calling because [facility] pharmacy  never received order for Wheelchair, raised toilet seat, toilet safety frame and inconstancies supplies. Notes say its was faxed over 9/29/21 but she said they were not received.  She is re-faxing the orders       Please called at [phone]

## 2021-10-04 NOTE — TELEPHONE ENCOUNTER
Clinical questions regarding Dronabinol 2.5MG capsules  answered and submitted. STATUS: AWAITING OUTCOME      Response on CMM regarding FreeStyle Clay 14 Day Sensor:  Your PA has been resolved, no additional PA is required. Please advise patient. Thank you. no

## 2021-10-05 NOTE — TELEPHONE ENCOUNTER
Received another question from [address] [place] [address]  regarding Dronabinol. Answer faxed to [address] [place] [address] .

## 2021-10-07 NOTE — TELEPHONE ENCOUNTER
Received DENIAL for Dronabinol 2.5MG capsules. Denial letter attached. Please advise patient. Thank you.

## 2021-10-11 NOTE — TELEPHONE ENCOUNTER
Granddaughter of the patient called into request a script for oxygen to be sent to [facility]. Pt has been requesting this before but stated that they never received the order. Please advise and call wife to notify when script has been sent.

## 2021-10-12 PROBLEM — J18.9 PNEUMONIA: Status: ACTIVE | Noted: 2021-01-01

## 2021-10-12 PROBLEM — N18.31 ACUTE RENAL FAILURE SUPERIMPOSED ON STAGE 3A CHRONIC KIDNEY DISEASE (HCC): Status: ACTIVE | Noted: 2021-01-01

## 2021-10-12 NOTE — ED NOTES
Bed: B22-22  Expected date:   Expected time:   Means of arrival:   Comments:  Leah Cardona RN  10/12/21 2453

## 2021-10-12 NOTE — H&P
Internal Medicine  PGY 1  History & Physical      CC increased weakness, lethargy and hypoxia at home    History Obtained From: Daughter    HISTORY OF PRESENT ILLNESS: Patient is a 43-year-old male with past medical history of BPH, aspiration pneumonia, hypertension, type 2 diabetes mellitus who presented to the ED via EMS due to complaints of increased weakness, lethargy and hypoxia for the past 2 days. According to the patient's daughter patient has been fairly weak and lethargic for the past 2 days and yesterday the patient became very weak and was not able to even walk using his walker. According to her the patient was too sleepy and his oxygen saturation kept falling and it was noted as 87% today and therefore they decided to come to the ED for further evaluation. She also noticed that patient had poor p.o. intake for the past couple of days. Patient had 3-4 falls in the past 2 months after being discharged but did not hit his head. Patient was admitted in August for altered mental status and acute kidney injury secondary to aspiration pneumonia and was treated on IV antibiotics and fluids. He was tested negative for Covid and influenza. Echocardiogram was consistent with left ventricular ejection fraction of 55 to 60%, mild to moderate tricuspid regurgitation and estimated pulmonary artery systolic pressure of 59 mmHg. Patient has been compliant with all his medications at home. According to patient's daughter patient has urinary complaints due to his history of BPH for which he is on medications. In the ED patient was afebrile, chest x-ray showed multifocal bilateral opacities. Patient was started on Zosyn because during his last hospitalization with aspiration pneumonia, he was responsive to it. Blood cultures were ordered before starting the antibiotics and patient was started on oxygen 4 L with maintaining saturation above 95%. Patient is not on oxygen at baseline.     Past Medical History: Diagnosis Date    Allergic rhinitis 8/27/2010    Asthma     Back pain     Bilateral cataracts     Bilateral low back pain with right-sided sciatica 12/18/2015    BPH (benign prostatic hypertrophy)     Chest pain     Degeneration of lumbar or lumbosacral intervertebral disc     Dental disease     Diabetes mellitus, type 2 ([facility] [address] 5/28/2010    Diverticulosis     Dizziness     Eczema 3/11/2011    Essential hypertension 9/18/2015    Hearing loss     Hyperlipidemia     Hypertension     Insomnia     Lumbar disc disease     Microalbuminuria 6/13/2013    Neck pain 12/14/2012    Nosebleed     Obesity     Obstructive sleep apnea     Osteoarthritis     Pneumonia 10/12/2021    Post herpetic neuralgia 6/15/2012    Recurrent upper respiratory infection (URI)     Right hip pain 12/14/2012    Right shoulder pain 6/17/2011    Rosacea     Spinal stenosis     Transient global amnesia     Type 2 diabetes mellitus without complication ([facility] [address] 23/51/0055    Vitamin D deficiency 3/17/2012   ·     Past Surgical History:        Procedure Laterality Date    PROSTATE SURGERY  June 13, 2000    TURP   ·     No current facility-administered medications on file prior to encounter. Current Outpatient Medications on File Prior to Encounter   Medication Sig Dispense Refill    VICTOZA 18 MG/3ML SOPN SC injection ADMINISTER 1.8 MG UNDER THE SKIN DAILY 9 mL 3    Continuous Blood Gluc Sensor (FREESTYLE KARISSA 14 DAY SENSOR) MISC USE EVERY 14 DAYS AS DIRECTED 2 each 3    dronabinol (MARINOL) 2.5 MG capsule Take 1 capsule by mouth 2 times daily (before meals) for 30 days.  60 capsule 0    buPROPion (WELLBUTRIN SR) 100 MG extended release tablet Take 1 tablet by mouth daily 30 tablet 3    nystatin (MYCOSTATIN) 698163 UNIT/ML suspension Take 5 mLs by mouth 4 times daily 140 mL 1    diclofenac sodium (VOLTAREN) 1 % GEL APPLY 2 GRAM TOPICALLY FOUR TIMES DAILY AS NEEDED FOR PAIN 200 g 3    glipiZIDE (GLUCOTROL) 5 MG tablet TAKE 1/2 TABLET BY MOUTH TWICE DAILY (Patient taking differently: 5 mg TAKE 1 TABLET BY MOUTH TWICE DAILY) 90 tablet 0    carvedilol (COREG) 6.25 MG tablet TAKE 1 TABLET BY MOUTH TWICE DAILY 180 tablet 2    tamsulosin (FLOMAX) 0.4 MG capsule TAKE 2 CAPSULES BY MOUTH EVERY NIGHT 180 capsule 0    finasteride (PROSCAR) 5 MG tablet TAKE 1 TABLET BY MOUTH DAILY 90 tablet 3    Cholecalciferol (VITAMIN D3) 125 MCG (5000 UT) CAPS TAKE ONE CAPSULE BY MOUTH EVERY DAY 90 capsule 1    nitroGLYCERIN (NITROSTAT) 0.4 MG SL tablet Place 1 tablet under the tongue every 5 minutes as needed for Chest pain May repeat every 5 minutes until relief is obtained. If pain persists after taking 3 tabs in a 15-minute period, call 9-1-1 immediately. 25 tablet 12     Allergies:  Tramadol    Social History:   · TOBACCO:   reports that he has never smoked. He has never used smokeless tobacco.  · ETOH:   reports current alcohol use. · Patient currently lives at home. . Has 1 adult daughter closely involved in Mr [place] care. Has private caregivers who assist him with performance of  activities of daily living  ·   Family History:       Problem Relation Age of Onset    Diabetes Mother     Diabetes Brother    ·     Review of Systems    ROS: A 10 point review of systems was conducted, significant findings as noted in HPI. Physical Exam  Constitutional:       General: He is sleeping. Appearance: He is obese. HENT:      Head: Normocephalic and atraumatic. Right Ear: External ear normal.      Left Ear: External ear normal.      Nose: Nose normal.      Mouth/Throat:      Mouth: Mucous membranes are dry. Cardiovascular:      Rate and Rhythm: Normal rate and regular rhythm. Pulses: Normal pulses. Heart sounds: Normal heart sounds. Pulmonary:      Effort: Pulmonary effort is normal.      Breath sounds: Rhonchi present. Abdominal:      General: There is no distension. Palpations: Abdomen is soft. Tenderness: There is abdominal tenderness (mild diffuse). Musculoskeletal:         General: Normal range of motion. Right lower leg: No edema. Left lower leg: No edema. Skin:     General: Skin is warm and dry. Neurological:      Mental Status: He is lethargic. Physical exam:       Vitals:    10/12/21 1830   BP: 116/77   Pulse: 86   Resp: 23   Temp:    SpO2: 95%       DATA:    Labs:  CBC:   Recent Labs     10/12/21  1654   WBC 5.1   HGB 12.2*   HCT 36.9*          BMP:   Recent Labs     10/12/21  1654   *   K 3.2*   *   CO2 25   BUN 41*   CREATININE 1.5*   GLUCOSE 228*     LFT's:   Recent Labs     10/12/21  1654   AST 25   ALT 32   BILITOT 0.5   ALKPHOS 130*     Troponin:   Recent Labs     10/12/21  1654   TROPONINI <0.01     U/A:  Recent Labs     10/12/21  1803   COLORU Yellow   PHUR 6.0   CLARITYU Clear   SPECGRAV 1.025   LEUKOCYTESUR Negative   UROBILINOGEN 0.2   BILIRUBINUR Negative   BLOODU Negative   GLUCOSEU Negative       CT Cervical Spine WO Contrast   Final Result   1. No acute abnormality in the head or cervical spine. CT Head WO Contrast   Final Result   1. No acute abnormality in the head or cervical spine. XR CHEST PORTABLE   Final Result   Impression: Interval development of multifocal bilateral pulmonary opacities. ASSESSMENT AND PLAN:  Patient is a 18-year-old male with past medical history of BPH, aspiration pneumonia, hypertension, type 2 diabetes mellitus who presented to the ED via EMS due to complaints of increased weakness, lethargy and hypoxia for the past 2 days. Suspected aspiration pneumonia  Patient has multiple admissions for aspiration pneumonia.   Vitals are stable, afebrile with oxygen saturations above 95% on 4 L of oxygen via nasal cannula  -SLP evaluation  -Zosyn 3375 mg every 8 hours  -Linezolid 600 mg IV every 12 hours  -Ordered blood cultures   -Ordered MRSA swab, Legionella and strep antigen  -Held vancomycin due to deranged RFT    HA on CKD stage 3a, likely prerenal  Patient had poor p.o. intake for the past few days  -IV fluids at 75 mL/h  -Ordered urine sodium and urine creatinine  -Ordered x-ray KUB  -Monitor intake and output  -Daily renal function test    BPH  -Continue home tamsulosin and finasteride    MDD  -Continue home Wellbutrin    Type 2 diabetes mellitus  -Low-dose sliding scale  -Hypoglycemia protocol  -POC checks every 4 hours  -Held home medications for now    Essential hypertension  -continue home Coreg 6.25 oral BID     Will discuss with attending physician Dr. Oumou Shaikh     Code Status:Full code  FEN: NPO   PPX: Heparin   DISPO: GEMMA Silva MD  10/12/2021,  7:11 PM    Addendum to Resident H& P/Progress note:  I have personally seen,examined and evaluated the patient. I have reviewed the current history, physical findings, labs and assessment and plan and agree with note as documented by resident MD ( Demar Higgins)  In addition: The patient has hypernatremia secondary to volume depletion. The patient has hypoxia secondary to multifocal pneumonia, most probably aspiration in nature. Positive acute encephalopathy, most probably multifactorial in nature: Volume depletion with hypernatremia and multifocal pneumonia. Cussed the patient's condition with his daughter, Mike Valdez.     Oumou Shaikh MD, FACP

## 2021-10-12 NOTE — ED PROVIDER NOTES
[facility]  EMERGENCY DEPARTMENT ENCOUNTER          ATTENDING PHYSICIAN NOTE       Date of evaluation: 10/12/2021    Chief Complaint     Fall (FELL 2 DAY AGO ) and Fatigue (WORSENING WEAKNESS AND LETHARGIC)      History of Present Illness     Juan Aggarwal is a 81 y.o. male with history of diabetes, CAD, hypertension, hyperlipidemia, aspiration, recent aspiration pneumonia presenting to the emergency department today for lethargy and multiple falls. Per his daughter who provides the history as the patient is minimally verbal currently, he has been having multiple falls over the past 3 days and has been generally weak. Over the last 24 hours he has become lethargic and had decreased alertness. He has not had any fevers at home. Unclear if he has hit his head, is on aspirin but no other blood thinners. She is concerned that he has an infection, possibly recurrent pneumonia. She has not noticed any choking episodes or aspiration. Per his chart, patient has been admitted recently for similar altered mental status that was felt to be secondary to pneumonia. He was treated with Zosyn for aspiration with improvement after he did not improve on other antibiotics. He was then discharged to a SNF and has been home for approximately 2 weeks following discharge from there. Review of Systems     Pertinent positive and negative findings as documented in the HPI. Otherwise all other systems were reviewed and were negative. Physical Exam     INITIAL VITALS: BP: 125/78, Temp: 97.4 °F (36.3 °C), Pulse: 91, Resp: 18, SpO2: (!) 87 %     Nursing note and vitals reviewed. General:  Adult male, lethargic, awakens to loud verbal stimulation. No respiratory distress. HENT: Normocephalic and atraumatic. External ears normal. Nose appears normal externally. Eyes: Conjunctivae normal. No scleral icterus. PERRL  Neck: Neck supple. No tracheal deviation present. CV: Normal rate. Regular rhythm.  S1/S2 auscultated. No murmurs, gallops or rubs. Pulm: Mildly increased work of breathing on nasal cannula. Right-sided rhonchi, primarily at the base. No wheezing or prolonged expiratory phase. GI: Soft. No distension. No tenderness. No rebound or guarding. No masses. No peritoneal signs. Musculoskeletal: No edema. No gross deformities. Neurological: Lethargic, answers yes/no questions appropriately. Face symmetric. Moving all extremities spontaneously. Skin: Warm, dry. No rash. No diaphoresis or erythema. Procedures   Procedures    MEDICAL DECISION MAKING     MDM: Cale Mckeon is a 81 y.o. male with history as above presenting to the emergency department today for altered mental status, lethargy, multiple falls. On arrival, he is lethargic, hypoxic on room air, does improve on nasal cannula. He has rhonchorous right-sided breath sounds and a benign abdomen. His work-up is notable for bilateral lung infiltrates concerning for pneumonia, likely aspiration in the patient's case. Labs with mild hyponatremia, elevated BUN and creatinine, all suggestive of dehydration. He was given IV fluids, initiated on Zosyn as well as vancomycin for nosocomial infection given his prior response to Zosyn and also recent hospitalization/SNF stay. He remained hemodynamically stable in the emergency department. His lactate was normal and procalcitonin is moderately elevated, possibly suggestive of bacterial etiology as well. I discussed his case with the patient's PCP, Dr. Jose Antonio Nash, who agrees with the current management and we are both in agreement that this is unlikely to represent Covid and will not test at this time. Also discussed with the AOD and patient will be admitted for further care and management. Critical Care:  Due to the immediate potential for life-threatening deterioration due to sepsis with altered mental status and hypoxic respiratory failure, I spent 30 minutes providing critical care.   Thistime excludes time spent performing procedures but includes time spent on direct patient care, history retrieval, review of the chart, and discussions with patient, family, and consultant(s). Clinical Impression     1. Bacterial pneumonia    2. Acute hypoxemic respiratory failure (HCC)    3. Altered mental status, unspecified altered mental status type    4. Multiple falls        Disposition     DISPOSITION Admitted 10/12/2021 06:36:03 PM        Susie Alcantar MD  6:39 PM                     Past Medical, Surgical, Family, and Social History     He has a past medical history of Allergic rhinitis, Asthma, Back pain, Bilateral cataracts, Bilateral low back pain with right-sided sciatica, BPH (benign prostatic hypertrophy), Chest pain, Degeneration of lumbar or lumbosacral intervertebral disc, Dental disease, Diabetes mellitus, type 2 ([address] Diverticulosis, Dizziness, Eczema, Essential hypertension, Hearing loss, Hyperlipidemia, Hypertension, Insomnia, Lumbar disc disease, Microalbuminuria, Neck pain, Nosebleed, Obesity, Obstructive sleep apnea, Osteoarthritis, Pneumonia, Post herpetic neuralgia, Recurrent upper respiratory infection (URI), Right hip pain, Right shoulder pain, Rosacea, Spinal stenosis, Transient global amnesia, Type 2 diabetes mellitus without complication ([address] and Vitamin D deficiency. He has a past surgical history that includes Prostate surgery (June 13, 2000). His family history includes Diabetes in his brother and mother. He reports that he has never smoked. He has never used smokeless tobacco. He reports current alcohol use. He reports that he does not use drugs.     Medications     Previous Medications    BUPROPION (WELLBUTRIN SR) 100 MG EXTENDED RELEASE TABLET    Take 1 tablet by mouth daily    CARVEDILOL (COREG) 6.25 MG TABLET    TAKE 1 TABLET BY MOUTH TWICE DAILY    CHOLECALCIFEROL (VITAMIN D3) 125 MCG (5000 UT) CAPS    TAKE ONE CAPSULE BY MOUTH EVERY DAY    CONTINUOUS BLOOD GLUC SENSOR (FREESTYLE KARISSA 14 DAY SENSOR) MISC    USE EVERY 14 DAYS AS DIRECTED    DICLOFENAC SODIUM (VOLTAREN) 1 % GEL    APPLY 2 GRAM TOPICALLY FOUR TIMES DAILY AS NEEDED FOR PAIN    DRONABINOL (MARINOL) 2.5 MG CAPSULE    Take 1 capsule by mouth 2 times daily (before meals) for 30 days. FINASTERIDE (PROSCAR) 5 MG TABLET    TAKE 1 TABLET BY MOUTH DAILY    GLIPIZIDE (GLUCOTROL) 5 MG TABLET    TAKE 1/2 TABLET BY MOUTH TWICE DAILY    NITROGLYCERIN (NITROSTAT) 0.4 MG SL TABLET    Place 1 tablet under the tongue every 5 minutes as needed for Chest pain May repeat every 5 minutes until relief is obtained. If pain persists after taking 3 tabs in a 15-minute period, call 9-1-1 immediately. NYSTATIN (MYCOSTATIN) 175643 UNIT/ML SUSPENSION    Take 5 mLs by mouth 4 times daily    TAMSULOSIN (FLOMAX) 0.4 MG CAPSULE    TAKE 2 CAPSULES BY MOUTH EVERY NIGHT    VICTOZA 18 MG/3ML SOPN SC INJECTION    ADMINISTER 1.8 MG UNDER THE SKIN DAILY       Allergies     He is allergic to tramadol. ED Course     Nursing Notes, Past Medical Hx, Past Surgical Hx, Social Hx,Allergies, and Family Hx were reviewed. Patient was given the following medications:  Orders Placed This Encounter   Medications    lactated ringers bolus    piperacillin-tazobactam (ZOSYN) 4,500 mg in dextrose 5 % 100 mL IVPB (mini-bag)     Order Specific Question:   Antimicrobial Indications     Answer: Other     Order Specific Question:   Other Abx Indication     Answer: Suspected Sepsis of Pulmonary Origin - Nosocomial    vancomycin (VANCOCIN) 1,500 mg in dextrose 5 % 250 mL IVPB     Order Specific Question:   Antimicrobial Indications     Answer: Other     Order Specific Question:   Other Abx Indication     Answer: Suspected Sepsis of Pulmonary Origin - Nosocomial       Diagnostic Results     EKG   Sinus rhythm, ventricular rate 88. Intervals normal, axis normal.  Frequent PVCs noted. No ST or T wave changes suggestive of acute ischemia.       RECENT VITALS: BP: (!) 128/54,Temp: 97.4 °F (36.3 °C), Pulse: 90, Resp: 29, SpO2: 96 %     RADIOLOGY:  CT Cervical Spine WO Contrast   Final Result   1. No acute abnormality in the head or cervical spine. CT Head WO Contrast   Final Result   1. No acute abnormality in the head or cervical spine. XR CHEST PORTABLE   Final Result   Impression: Interval development of multifocal bilateral pulmonary opacities.           LABS:   Results for orders placed or performed during the hospital encounter of 10/12/21   CBC Auto Differential   Result Value Ref Range    WBC 5.1 4.0 - 11.0 K/uL    RBC 3.88 (L) 4.20 - 5.90 M/uL    Hemoglobin 12.2 (L) 13.5 - 17.5 g/dL    Hematocrit 36.9 (L) 40.5 - 52.5 %    MCV 95.1 80.0 - 100.0 fL    MCH 31.6 26.0 - 34.0 pg    MCHC 33.2 31.0 - 36.0 g/dL    RDW 15.7 (H) 12.4 - 15.4 %    Platelets 557 383 - 985 K/uL    MPV 8.6 5.0 - 10.5 fL    Neutrophils % 66.7 %    Lymphocytes % 22.8 %    Monocytes % 6.5 %    Eosinophils % 3.1 %    Basophils % 0.9 %    Neutrophils Absolute 3.4 1.7 - 7.7 K/uL    Lymphocytes Absolute 1.2 1.0 - 5.1 K/uL    Monocytes Absolute 0.3 0.0 - 1.3 K/uL    Eosinophils Absolute 0.2 0.0 - 0.6 K/uL    Basophils Absolute 0.0 0.0 - 0.2 K/uL   Comprehensive Metabolic Panel w/ Reflex to MG   Result Value Ref Range    Sodium 151 (H) 136 - 145 mmol/L    Potassium reflex Magnesium 3.2 (L) 3.5 - 5.1 mmol/L    Chloride 113 (H) 99 - 110 mmol/L    CO2 25 21 - 32 mmol/L    Anion Gap 13 3 - 16    Glucose 228 (H) 70 - 99 mg/dL    BUN 41 (H) 7 - 20 mg/dL    CREATININE 1.5 (H) 0.8 - 1.3 mg/dL    GFR Non- 44 (A) >60    GFR  54 (A) >60    Calcium 11.4 (H) 8.3 - 10.6 mg/dL    Total Protein 7.2 6.4 - 8.2 g/dL    Albumin 3.3 (L) 3.4 - 5.0 g/dL    Albumin/Globulin Ratio 0.8 (L) 1.1 - 2.2    Total Bilirubin 0.5 0.0 - 1.0 mg/dL    Alkaline Phosphatase 130 (H) 40 - 129 U/L    ALT 32 10 - 40 U/L    AST 25 15 - 37 U/L    Globulin 3.9 Not Established g/dL   Lactate, Sepsis   Result Value Ref Range    Lactic Acid, Sepsis 1.6 0.4 - 1.9 mmol/L   Urinalysis Reflex to Culture    Specimen: Urine, clean catch   Result Value Ref Range    Color, UA Yellow Straw/Yellow    Clarity, UA Clear Clear    Glucose, Ur Negative Negative mg/dL    Bilirubin Urine Negative Negative    Ketones, Urine TRACE (A) Negative mg/dL    Specific Gravity, UA 1.025 1.005 - 1.030    Blood, Urine Negative Negative    pH, UA 6.0 5.0 - 8.0    Protein, UA Negative Negative mg/dL    Urobilinogen, Urine 0.2 <2.0 E.U./dL    Nitrite, Urine Negative Negative    Leukocyte Esterase, Urine Negative Negative    Microscopic Examination Not Indicated     Urine Type 4     Urine Reflex to Culture Not Indicated    Blood gas, venous (Lab)   Result Value Ref Range    pH, Eder 7.487 (H) 7.350 - 7.450    pCO2, Eder 37.8 (L) 41.0 - 51.0 mmHg    pO2, Eder 45.8 (H) 25 - 40 mmHg    HCO3, Venous 28.5 (H) 24.0 - 28.0 mmol/L    Base Excess, Eder 4.9 (H) -2.0 - 3.0 mmol/L    O2 Sat, Eder 83 Not established %    Carboxyhemoglobin 1.3 0.0 - 1.5 %    MetHgb, Eder 0.2 0.0 - 1.5 %    TC02 (Calc), Eder 30 mmol/L    Hemoglobin, Eder, Reduced 16.60 %   Procalcitonin   Result Value Ref Range    Procalcitonin 0.35 (H) 0.00 - 0.15 ng/mL   Troponin (lab)   Result Value Ref Range    Troponin <0.01 <0.01 ng/mL   Magnesium   Result Value Ref Range    Magnesium 2.20 1.80 - 2.40 mg/dL   EKG 12 Lead   Result Value Ref Range    Ventricular Rate 88 BPM    Atrial Rate 88 BPM    P-R Interval 130 ms    QRS Duration 96 ms    Q-T Interval 326 ms    QTc Calculation (Bazett) 394 ms    P Axis 26 degrees    R Axis 21 degrees    T Axis 74 degrees    Diagnosis       EKG performed in ER and to be interpreted by ER physician. Confirmed by MD, ER ([id number]),  Ashish Moran ([phone]) on 10/12/2021 6:38:12 PM       CONSULTS:  IP CONSULT TO PRIMARY CARE PROVIDER    PATIENT REFERRED TO:  No follow-up provider specified.     DISCHARGE MEDICATIONS:  New Prescriptions    No medications on file Ashley Muniz MD  10/12/21 [id number]

## 2021-10-12 NOTE — ED NOTES
Pt presents to the ed from home via ems with c/o increased weakness, lethargy, and hypoxia at home. Pt pts family pt fell 2 days ago and is now too weak to stand. Pt arrives with SpO2 87% on RA. Pts pcp is arranging home oxygen.       Sofia Kussmaul, RN  10/12/21 [id number]

## 2021-10-13 NOTE — PROGRESS NOTES
Physician Progress Note      PATIENTLarue Shone  CSN #:                  [id number]  :                       1935  ADMIT DATE:       10/12/2021 4:38 PM  [address] DATE:  RESPONDING  PROVIDER #:        Demarcus Colvin MD          QUERY TEXT:    Pt admitted with aspiration pna and has encephalopathy documented. If   possible, please document in progress notes and discharge summary further   specificity regarding the type of encephalopathy:    The medical record reflects the following:  Risk Factors: 80 yo w/ suspected aspiration pna, hypernatremia  Clinical Indicators: Per H&P:  acute encephalopathy, most probably   multifactorial in nature: Volume depletion with hypernatremia and multifocal   pneumonia. Treatment: bed alarm, increased rounding  Options provided:  -- Metabolic encephalopathy  -- Other - I will add my own diagnosis  -- Disagree - Not applicable / Not valid  -- Disagree - Clinically unable to determine / Unknown  -- Refer to Clinical Documentation Reviewer    PROVIDER RESPONSE TEXT:    This patient has metabolic encephalopathy.     Query created by: Gabriela Johnson on 10/13/2021 6:27 AM      Electronically signed by:  Demarcus Colvin MD 10/13/2021 2:05 PM

## 2021-10-13 NOTE — PROGRESS NOTES
Occupational Therapy   Occupational Therapy Initial Assessment/Tx  Date: 10/13/2021   Patient Name: Rai Villafuerte  MRN: [id number]     : 1935    Date of Service: 10/13/2021    Discharge Recommendations: Rai Villafuerte scored a 14/24 on the AM-PAC ADL Inpatient form. Current research shows that an AM-PAC score of 18 or greater is typically associated with a discharge to the patient's home setting. Based on the patient's AM-PAC score, and their current ADL deficits, it is recommended that the patient have 2-3 sessions per week of Occupational Therapy at d/c to increase the patient's independence. At this time, this patient demonstrates the endurance and safety to discharge home with 24hr assistance and home services and a follow up treatment frequency of 2-3x/wk. Please see assessment section for further patient specific details. If patient discharges prior to next session this note will serve as a discharge summary. Please see below for the latest assessment towards goals. OT Equipment Recommendations  Equipment Needed: No  Other: has recommended equipment    Assessment   Performance deficits / Impairments: Decreased functional mobility ; Decreased ADL status; Decreased endurance  Assessment: Pt is 82 yo male admitted to [facility] for weakness and hypoxia. Pt on 4L of O2 with typically no O2 requirement at baseline. Pt was recieving assist from aids with most ADLs at baseline but able to transfer with supervision. Pt was also recieving HHOT/PT prior to admission. Pt is now below his functional baseline requiring 2 person A to mngmt transfers and IV/O2 line mngmt. Pt initially required mod A for sit<>stand with lolly lepe and progressed to CGA. Pt needed max A for toileting hygiene and has a flores cath donned. Pt would benefit from cont acute OT to improve functional transfers and independence. Despite low AMPAC score, pt and pts family plan to discharge back to home setting with prior services. Recommend 24hrA (pt has) and cont HHOT/PT. Will follow on POC. Treatment Diagnosis: decreased independence with ADLs and fx mobility  Prognosis: Good  Decision Making: Medium Complexity  OT Education: OT Role;Plan of Care  Patient Education: pt and pts daughter verb understanding  REQUIRES OT FOLLOW UP: Yes  Activity Tolerance  Activity Tolerance: Patient limited by fatigue  Activity Tolerance: 4L of O2 throughout session and no SOB; fatigue evidenced at end of session with pt resting eyes in chair  Safety Devices  Safety Devices in place: Yes  Type of devices: All fall risk precautions in place;Call light within reach; Chair alarm in place; Left in chair;Gait belt;Nurse notified           Patient Diagnosis(es): The primary encounter diagnosis was Bacterial pneumonia. Diagnoses of Acute hypoxemic respiratory failure ([facility] [address] Altered mental status, unspecified altered mental status type, and Multiple falls were also pertinent to this visit. has a past medical history of Allergic rhinitis, Asthma, Bilateral cataracts, BPH (benign prostatic hypertrophy), Diabetes mellitus, type 2 ([address] Diverticulosis, Eczema, Hyperlipidemia, Hypertension, Lumbar disc disease, Microalbuminuria, Obstructive sleep apnea, Osteoarthritis, Pneumonia, Post herpetic neuralgia, Recurrent upper respiratory infection (URI), Right shoulder pain, Spinal stenosis, Transient global amnesia, and Vitamin D deficiency. has a past surgical history that includes Prostate surgery (June 13, 2000).     Treatment Diagnosis: decreased independence with ADLs and fx mobility      Restrictions  Position Activity Restriction  Other position/activity restrictions: up with assist    Subjective   General  Chart Reviewed: Yes  Patient assessed for rehabilitation services?: Yes  Additional Pertinent Hx: 27-year-old male with past medical history of BPH, aspiration pneumonia, hypertension, type 2 diabetes mellitus who presented to the ED via EMS due to complaints of increased weakness, lethargy and hypoxia for the past 2 days. Pt unable to stand after fall and has had poor PO intake. Pt spO2 85% with EMS- now on 4L. CT head and spine (-) ; Chest XR (+) Interval development of multifocal bilateral pulmonary opacities. Family / Caregiver Present: Yes (pts daughter)  Referring Practitioner: Catherine Beckman MD  Diagnosis: pneumonia  Subjective  Subjective: Pt seated upright on edge of bed upon OT arrival and needing use the bathroom with urgency. Pt is pleasant and cooperative throughout. Communication limited by language barrier initially. Pts daughter at bedside and very helpful. Patient Currently in Pain:  (no indication of pain)  Vital Signs  Patient Currently in Pain:  (no indication of pain)  Social/Functional History  Social/Functional History  Lives With: Alone  Type of Home: House  Home Layout: One level  Home Access: Level entry  Home Equipment: Rolling walker, 4 wheeled walker, Cane (bedrail, BSC, transport w/c)  Additional Comments: pt has been home from SNF x 1 month and has 24/7 caregivers. Uses BSC at night 2* urgency. Uses toilet during the day - HHA pushes pt in transport chair into bathroom where pt is able to transfer onto toilet \"pretty much on his own\" with grab bar, caregiver nearby and assists with hygiene. HHA assist with all dressing and bathing as well as housekeeping. Pt receiving home PT and OT 3x per week. Pts daughter reports pt was placed on antidepressent medication 2 weeks ago. Pt now on thickened liquids but was not prior.        Objective        Orientation  Overall Orientation Status: Within Functional Limits  Observation/Palpation  Observation: on 4L of O2  Balance  Sitting Balance: Contact guard assistance (pt is impulsive and attempts to try and stand without asssitance)  Standing Balance: Contact guard assistance  Standing Balance  Time: up to 2 instances of ~30 seconds  Activity: up at [address] [facility] [address] for toileting hygiene task  Comment: fatigues with standing and takes rest break as needed  Functional Mobility  Functional Mobility Comments: lolly nhungdy was used to manuever pt to bathroom from bed and from bathroom to chair  Toilet Transfers  Equipment Used: Standard toilet  Toilet Transfer: Contact guard assistance  Toilet Transfers Comments: use of lolly stedy ; pt required mod A to descend onto commode first trial, CGA to ascend from commode and CGA on second trial for sitting and standing  ADL  Feeding: Thickened liquids;Setup  Grooming: Setup (sat on lolly stedy to wash hands at sink)  LE Dressing: Maximum assistance  Toileting: Maximum assistance (cath donned; pt required max A for hygiene- pt not wearing briefs)  Coordination  Movements Are Fluid And Coordinated: Yes  Coordination and Movement description: Decreased speed        Transfers  Sit to stand: Moderate assistance;Contact guard assistance  Stand to sit: Moderate assistance;Contact guard assistance  Transfer Comments: sit-stand- pt required mod A from bed to lolly stedy and then progressed to CGA for toilet transfer , stand to sit- pt required mod A to commode 2x and CGA to chair     Cognition  Overall Cognitive Status: [facility]  Cognition Comment: pts daughter reports pt is depressed                 LUE AROM (degrees)  LUE AROM : WFL  Left Hand AROM (degrees)  Left Hand AROM: WFL  RUE AROM (degrees)  RUE AROM : WFL  Right Hand AROM (degrees)  Right Hand AROM: WFL  LUE Strength  Gross LUE Strength: WFL  LUE Strength Comment: weakness more dominant in BLE- pt fatigues quickly but has good strength demonstrated for sit<>stand at lolly stedy and from commode  RUE Strength  Gross RUE Strength: WFL           Treatment included functional transfer training, ADLs, and patient education.          Plan   Plan  Times per week: 2-5  Times per day: Daily  Current Treatment Recommendations: Strengthening, Endurance Training, Self-Care / ADL, Functional Mobility Training, Safety Education & Training    G-Code     OutComes Score                                                  AM-PAC Score        AM-PAC Inpatient Daily Activity Raw Score: 14 (10/13/21 1247)  AM-PAC Inpatient ADL T-Scale Score : 33.39 (10/13/21 1247)  ADL Inpatient CMS 0-100% Score: 59.67 (10/13/21 1247)  ADL Inpatient CMS G-Code Modifier : CK (10/13/21 1247)    Goals  Short term goals  Time Frame for Short term goals: by d/c  Short term goal 1: Pt will perform sit<>stand transfer with SBA with 2WW as needed  Short term goal 2: Pt will maintain standing balance for 1 min with no more than CGA  Short term goal 3: Pt will perform UB dressing task with mod I  Patient Goals   Patient goals : to be able to transfer independently again from bed to bedside commode and from bed to chair       Therapy Time   Individual Concurrent Group Co-treatment   Time In 1143         Time Out 1222         Minutes 39         Timed Code Treatment Minutes: 24 Minutes (+ 15 min OT eval)       Abelardo Habermann, OT

## 2021-10-13 NOTE — PROGRESS NOTES
Speech Language Pathology  Facility/Department: [address]   CLINICAL BEDSIDE SWALLOW EVALUATION    NAME: Yuriy Barron  : 1935  MRN: [id number]    ADMISSION DATE: 10/12/2021  ADMITTING DIAGNOSIS: has Rosacea; Back pain; Obstructive sleep apnea; Chest pain; Transient global amnesia; Obesity; Insomnia; Bilateral cataracts; Osteoarthritis; Lumbar disc disease; Spinal stenosis; Diverticulosis; Benign prostatic hyperplasia; Hyperlipidemia; Diabetes mellitus type 2 in obese ([address] Allergic rhinitis; Nausea; Eczema; Dyspnea on exertion; Right shoulder pain; Shortness of breath; Vitamin D deficiency; Post herpetic neuralgia; Neck pain; Right hip pain; Microalbuminuria; Essential hypertension; Type 2 diabetes mellitus without complication ([address] Bilateral low back pain with right-sided sciatica; Coronary artery disease of native artery of native heart with stable angina pectoris ([address] Peripheral vascular disease ([address] Acute renal failure superimposed on stage 3a chronic kidney disease ([address] Altered mental status; Chronic diastolic HF (heart failure) ([address] Multifocal pneumonia; Hypoxia; and Hypernatremia on their problem list.  ONSET DATE: 10/12/2021    Recent Chest Xray: (10/12/2021)     Impression   Impression: Interval development of multifocal bilateral pulmonary opacities. CT of Head: (10/12/2021)     Impression   1. No acute abnormality in the head or cervical spine. Date of Eval: 10/13/2021  Evaluating Therapist: ERAN Boone    Current Diet level:  Current Diet : NPO  Current Liquid Diet : NPO      Primary Complaint  Patient Complaint: Did not clearly state.     Pain:  None indicated by any means    Reason for Referral  Yuriy Barron was referred for a bedside swallow evaluation to assess the efficiency of his swallow function, identify signs and symptoms of aspiration and make recommendations regarding safe dietary consistencies, effective compensatory strategies, and safe eating environment. Impression  Dysphagia Diagnosis: Suspected needs further assessment  Dysphagia Impression : Suspect oropharyngeal dysphagia; needs further assessment. With pt seated up in bed, assessed tolerance thins via cup and bites of puree. Pt with positive oral acceptance, positive swallow movement, adequate oral clearance. Reactive cough with thins, no s/s aspiration with puree. Recommend completion of MBS; pt confused at this time, however daughter agreeable. Recommend continue NPO pending results. Dysphagia Outcome Severity Scale: Level 1: Severe dysphagia- NPO. Unable to tolerate any PO safely     Treatment Plan  Requires SLP Intervention: Yes  Duration/Frequency of Treatment: TBD pending MBS results  D/C Recommendations: To be determined       Recommended Diet and Intervention  Diet Solids Recommendation: NPO  Liquid Consistency Recommendation: NPO  Recommended Form of Meds: Crushed in puree as able  Recommendations: NPO;Dysphagia treatment; Modified barium swallow study;Consider alternative nutrition  Therapeutic Interventions: Diet tolerance monitoring;Patient/Family education    Compensatory Swallowing Strategies  NPO    Treatment/Goals  Short-term Goals  Timeframe for Short-term Goals: 1-2 wks or LOS  Goal 1: Patient will participate in ongoing assessment of swallowing function as appropriate. Goal 2: Patient/caregiver will demonstrate understanding of swallowing concerns/recommendations. General  Chart Reviewed: Yes  Comments: Per admitting H&P (10/12/2021): 'Patient is a 81-year-old male with past medical history of BPH, aspiration pneumonia, hypertension, type 2 diabetes mellitus who presented to the ED via EMS due to complaints of increased weakness, lethargy and hypoxia for the past 2 days.   According to the patient's daughter patient has been fairly weak and lethargic for the past 2 days and yesterday the patient became very weak and was not able to even walk using his walker. According to her the patient was too sleepy and his oxygen saturation kept falling and it was noted as 87% today and therefore they decided to come to the ED for further evaluation. She also noticed that patient had poor p.o. intake for the past couple of days. Patient had 3-4 falls in the past 2 months after being discharged but did not hit his head. Patient was admitted in August for altered mental status and acute kidney injury secondary to aspiration pneumonia and was treated on IV antibiotics and fluids. He was tested negative for Covid and influenza. Echocardiogram was consistent with left ventricular ejection fraction of 55 to 60%, mild to moderate tricuspid regurgitation and estimated pulmonary artery systolic pressure of 59 mmHg. Patient has been compliant with all his medications at home. According to patient's daughter patient has urinary complaints due to his history of BPH for which he is on medications. In the ED patient was afebrile, chest x-ray showed multifocal bilateral opacities. Patient was started on Zosyn because during his last hospitalization with aspiration pneumonia, he was responsive to it. Blood cultures were ordered before starting the antibiotics and patient was started on oxygen 4 L with maintaining saturation above 95%. Patient is not on oxygen at baseline.'  Subjective  Subjective: Patient awake, alert, resting in bed, on 4L O2 via nc. Pt confused, speaking some [address], but also [place]. Daughter present, confirming confusion. Unable to utilize interpreting device at this time. Behavior/Cognition: Alert;Confused; Impulsive; Requires cueing;Doesn't follow directions  Respiratory Status: O2 via nasual cannula  O2 Device: Nasal cannula  Liters of Oxygen: 4 L  Communication Observation: Functional  Follows Directions: Simple  Dentition: Adequate  Patient Positioning: Upright in bed  Baseline Vocal Quality: Normal  Volitional Cough:  (did not elicit)  Prior Dysphagia History: Patient recently seen by this speech therapy department in August 2021, with MBS completed 8/25/2021. At that time, recommendations were for regular solids, thin liquids (no straws). Daughter reports pt has been consuming that diet at home, though sometimes he will 'go too fast, and have trouble'. Consistencies Administered: Dysphagia Pureed (Dysphagia I); Thin - cup    Vision/Hearing  Vision  Vision:  (unable to assess)  Hearing  Hearing:  (unable to assess)    Oral Motor Deficits  Oral/Motor  Oral Motor:  (pt not following directions)    Prognosis  Prognosis  Prognosis for safe diet advancement: fair  Individuals consulted  Consulted and agree with results and recommendations: RN;Family member;Patient  Family member consulted: Daughter    Education  Patient Education: Educated pt to purpose of visit, swallow function, recommendations. Patient Education Response: Needs reinforcement  Safety Devices in place: Yes  Type of devices: All fall risk precautions in place; Other (comment) (RN and daughter bedside)       Therapy Time  SLP Individual Minutes  Time In: 2005  Time Out: 0900  Minutes: 28     SLP Total Treatment Time  Timed Code Treatment Minutes: 0 Minutes  Total Treatment Time: 32    Plan  Diet Recommendations: NPO pending MBS results (planned for this date), exceptions necessary meds crushed in puree as able    Discharge Plan:  TBD  Discussed with RN, Wyatt Maldonado. Needs within reach. Electronically Signed by:  Renetta Carrel, M.A., Joe Mclean   Speech-Language Pathologist  Pager #[phone]    This document will serve as a discharge summary if pt discharges before next treatment.

## 2021-10-13 NOTE — CARE COORDINATION
Case Management Assessment           Daily Note                 Date/ Time of Note: 10/13/2021 2:54 PM         Note completed by: DEANNA Garcia    Patient Name: Nya Morni  YOB: 1935    Diagnosis:Pneumonia [J18.9]  Bacterial pneumonia [J15.9]  Multiple falls [R29.6]  Altered mental status, unspecified altered mental status type [R41.82]  Acute hypoxemic respiratory failure ([address] [J96.01]  Patient Admission Status: Inpatient    Date of Admission:10/12/2021  4:38 PM Length of Stay: 1 GLOS: GMLOS: 4    Current Plan of Care: IV ABX, NPO/SP eval, 02  ________________________________________________________________________________________  PT AM-PAC: 12 / 24 per last evaluation on: today    OT AM-PAC: 14 / 24 per last evaluation on: today    DME Needs for discharge: TBD  ________________________________________________________________________________________  Discharge Plan: dgt and Pt want to return home and continue with 24 hr private duty care    Tentative discharge date: TBD    Current barriers to discharge: medical clearance; need [place]     Referrals completed:     Resources/ information provided:   ________________________________________________________________________________________  Case Management Notes:  Chart reviewed as Pt does not speak [employer]. Per Dr. Mabel Burnett, Pt's dgt plans for Pt to return home at DC and continue with his 24 hr private duty care. Pt also has skilled [id number] [facility] will need to ask dgt name of agency. Pt does not have Home 02. Pt was at [facility] in August of this year and then DC back home. Roberto and his family were provided with choice of provider; he and his family are in agreement with the discharge plan.     Care Transition Patient: Holli Fang [facility]  Case Management Department  Ph: [phone]

## 2021-10-13 NOTE — PROCEDURES
INSTRUMENTAL SWALLOW REPORT  MODIFIED BARIUM SWALLOW    NAME: Anna Oakley   : 1935  MRN: [id number]       Date of Eval: 10/13/2021     Ordering Physician: Dr. Benson Holcomb  Radiologist: Dr. Wendy Ren  ENT: NA  Referring Diagnosis(es): Referring Diagnosis: Dysphagia    Past Medical History:  has a past medical history of Allergic rhinitis, Asthma, Bilateral cataracts, BPH (benign prostatic hypertrophy), Diabetes mellitus, type 2 ([address] Diverticulosis, Eczema, Hyperlipidemia, Hypertension, Lumbar disc disease, Microalbuminuria, Obstructive sleep apnea, Osteoarthritis, Pneumonia, Post herpetic neuralgia, Recurrent upper respiratory infection (URI), Right shoulder pain, Spinal stenosis, Transient global amnesia, and Vitamin D deficiency. Past Surgical History:  has a past surgical history that includes Prostate surgery (2000). Current Diet Solid Consistency: NPO  Current Diet Liquid Consistency: NPO    Date of Prior Study: 2021  Type of Study: Repeat MBS     Results of Prior Study: 2021  Pt exhibiting mild oropharyngeal dysphagia. Pt consumed puree and thin liquids via single sips with no penetration or aspiration. Decreased timing/coordination of swallow with reduced epiglottal closure resulted in aspiration of consecutive swallows of thin liquids via straw. Throat clear but no cough occurred. Additional single sips via cup and straw were consumed with no further pen/aspiration. Reduced base of tongue retraction resulted in mild residue in valleculae with solid texture. Pt was not sensate to the residue, however this cleared when instructed to use liquid wash. Pt demonstrated adequate mastication with solid texture, no pocketing /oral residue noted. Recommend cont regular diet/thin liquids- no straw (daughter reports pt does not typically use a straw). Recent CXR:     Impression   Impression: Interval development of multifocal bilateral pulmonary opacities.        CT of Head: Date 10/12/2021     Impression   1. No acute abnormality in the head or cervical spine. Patient Complaints/Reason for Referral:  Raji Barrett was referred for a MBS to assess the efficiency of his/her swallow function, assess for aspiration, and to make recommendations regarding safe dietary consistencies, effective compensatory strategies, and safe eating environment. Patient complaints: Pt did not clearly state himself. Per daughter, he sometimes has trouble swallowing at home, especially if he rushes. Onset of problem:   Date of Onset: 10/12/2021    General Comment  Comments: Per admitting H&P (10/12/2021): 'Patient is a 81-year-old male with past medical history of BPH, aspiration pneumonia, hypertension, type 2 diabetes mellitus who presented to the ED via EMS due to complaints of increased weakness, lethargy and hypoxia for the past 2 days. According to the patient's daughter patient has been fairly weak and lethargic for the past 2 days and yesterday the patient became very weak and was not able to even walk using his walker. According to her the patient was too sleepy and his oxygen saturation kept falling and it was noted as 87% today and therefore they decided to come to the ED for further evaluation. She also noticed that patient had poor p.o. intake for the past couple of days. Patient had 3-4 falls in the past 2 months after being discharged but did not hit his head. Patient was admitted in August for altered mental status and acute kidney injury secondary to aspiration pneumonia and was treated on IV antibiotics and fluids. He was tested negative for Covid and influenza. Echocardiogram was consistent with left ventricular ejection fraction of 55 to 60%, mild to moderate tricuspid regurgitation and estimated pulmonary artery systolic pressure of 59 mmHg. Patient has been compliant with all his medications at home.   According to patient's daughter patient has urinary complaints due to his history of BPH for which he is on medications. In the ED patient was afebrile, chest x-ray showed multifocal bilateral opacities. Patient was started on Zosyn because during his last hospitalization with aspiration pneumonia, he was responsive to it. Blood cultures were ordered before starting the antibiotics and patient was started on oxygen 4 L with maintaining saturation above 95%. Patient is not on oxygen at baseline.'  Subjective  Subjective: Patient awake, alert, seated upright in stretcher, on 4L O2 via nc. Daughter Yeny Tran) present throughout procedure to provide support for her father (wearing lead protective apron/thyroid collar throughout). Behavior/Cognition/Vision/Hearing:  Behavior/Cognition: Alert; Requires cueing  Vision:  (unable to assess)  Hearing: Within functional limits    Treatment Dx and ICD 10: dysphagia   Patient Position: Lateral and Patient Degrees: 90 degrees upright      Consistencies Administered: Dysphagia Pureed (Dysphagia I); Thin teaspoon; Thin cup;Nectar  teaspoon;Nectar cup    Compensatory Swallowing Strategies Attempted: Upright as possible for all oral intake; No straws;Eat/Feed slowly; Small bites/sips; Remain upright for 30-45 minutes after meals  Postural Changes and/or Swallow Maneuvers Trialed: Chin tuck      Impressions:  Mild-moderate oral phase dysphagia characterized by lingual pumping/rocking, reduced posterior propulsion, holding of bolus, piecemeal swallowing, premature bolus loss to pharynx, and reduced tongue base retraction.   Moderate pharyngeal phase dysphagia characterized by impaired hyolaryngeal mechanics and reduced sensation, with delayed swallow initiation, premature spillage to the valleculae and pyriforms (thin liquids), decreased pharyngeal peristalsis, reduced/delayed epiglottic movement, reduced laryngeal elevation/retraction, with pooling at the valleculae/pyriforms/UES (mostly thins), subsequent deep penetration and suspected silent aspiration of thin liquids (no cough reflex), mild pharyngeal residue and pharyngeal wall weakness. No aspiration or penetration of nectar thick liquids or puree. Trialed chin tuck manuever with thin liquids via cup; on review of images, appeared effective at protecting airway and preventing aspiration, however pt required max cues from daughter to complete. On discussion with daughter, she suspects he may have limited carryover with other caregivers. After further discussion re: QoL wishes, plan of care with daughter (pt present, but currently confused), recommend nectar thick liquids as safest option, with soft foods (minced & moist), with implementation of free water protocol (may have unthickened plan H2O between meals following completion of thorough oral hygiene). Also discussed daughter can try thin liquids with herself cuing her father to chin-tuck. Upper Esophageal Screen: Reduced cricopharyngeal opening    Dysphagia Outcome Severity Scale: Level 4: Mild moderate dysphagia- Intermittent supervision/cueing. One - two diet consistencies restricted  Penetration-Aspiration Scale (PAS): 8 - Material Enters the airway, passes below the vocal folds, and no effort is made to eject    Recommended Diet:  Solid consistency: Dysphagia Minced and Moist (Dysphagia II)  Liquid consistency: Mildly Thick (Nectar) + Free Water Protocol (thin, plain, unsweetened H2O (not coffee, tea, flavored water, etc.) permitted between meals following completion of thorough oral hygiene)  Liquid administration via: Cup    Medication administration: PO    Safe Swallow Protocol:  Supervision: Close  Compensatory Swallowing Strategies: Eat/Feed slowly;Upright as possible for all oral intake; No straws; Alternate solids and liquids; Remain upright for 30-45 minutes after meals      Recommendations/Treatment  Requires SLP Intervention: Yes  Recommendations: F/U MBS;[place] Water Protocol     D/C Recommendations:  To be determined Recommended Exercises:    Therapeutic Interventions: Diet tolerance monitoring;Patient/Family education;[place] Water Protocol;Oral care;Effortful swallow    Education: Images and recommendations were reviewed with the patient following this exam.   Patient Education: Provided lengthy education to patient and daughter. See goals. Patient Education Response: Verbalizes understanding;Needs reinforcement    Prognosis  Prognosis for safe diet advancement: good  Duration/Frequency of Treatment  Duration/Frequency of Treatment: 1-3x/wk for LOS  Safety Devices  Safety Devices in place: Yes  Type of devices: All fall risk precautions in place; Other (comment) (pt in transport stretcher, guard rails up, brakes applied, daughter present, handed off to transporter)      Goals:    Goal 1: Patient will participate in ongoing assessment of swallowing function as appropriate. 10/13: Addressed via [facility] this date. Please see impression statment  Goal 2: Patient/caregiver will demonstrate understanding of swallowing concerns/recommendations. 10/13: Provided extended education to patient/daughter regarding instrumental assessment, results, swallow function/structures, dysphagia, aspiration vs penetration, aspiration concerns, diet options, thickener use/rationale, diet recommendations (NTL/soft solids), free water protocol, safety strategies (upright, small bites/sips, slow rate), chin tuck with thins (daughter only). Updated white board and provided handout in addition to verbal education re: thickener/FWP. Daughter stated good comprehension; will continue to reinforce. Goal met    Oral Preparation / Oral Phase  Oral Phase: Impaired  Oral Phase - Major Contributing Deficits  Lingual Pumping: All  Lingual Rocking: All  Reduced Posterior Propulsion: All  Holding Of Bolus: All  Piecemeal Swallowing: All  Premature Bolus Loss to Pharynx:  All  Reduced Tongue Base Retraction: All    Pharyngeal Phase  Pharyngeal Phase: Impaired  Pharyngeal Phase - Major Contributing Deficits  Delayed Swallow Initiation: Thin cup; Thin teaspoon;Nectar teaspoon;Nectar cup  Premature Spillage to Valleculae: Nectar teaspoon; Thin teaspoon; Thin cup;Nectar cup  Premature Spillage to Pyriform: Thin cup; Thin teaspoon  Premature Spillage to Ues: Thin cup; Thin teaspoon  Reduced Pharyngeal Peristalsis: All  Reduced Epiglottic Distention: All  Delayed Epiglottic Retraction: All  Reduced Laryngeal Elevation: All  Reduced Anterior Laryngeal Movement: All  Pooling Valleculae: All  Pooling Pyriform: Thin cup; Thin teaspoon  Pooling UES: Thin cup; Thin teaspoon  Reduced Airway/laryngeal Closure: Thin cup; Thin teaspoon  Reduced Tongue Base: All  Shallow Penetration Before: Thin cup; Thin teaspoon  Shallow Penetration During: Thin cup; Thin teaspoon  Shallow Penetration After: Thin cup; Thin teaspoon  Not Self-clearing (shallow): Thin straw; Thin cup; Thin teaspoon  Deep Penetration During: Thin cup; Thin teaspoon  Deep Penetration After: Thin cup; Thin teaspoon  No Retrieval (deep): Thin cup; Thin teaspoon  Aspiration During: Thin cup; Thin teaspoon  Aspiration After: Thin cup; Thin teaspoon  No Cough Reflex: Thin cup; Thin teaspoon  Pharyngeal Residue - Valleculae: Thin cup; Thin teaspoon;Nectar teaspoon;Nectar cup  Pharyngeal Residue - Pyriform: Thin cup; Thin teaspoon;Nectar teaspoon;Nectar cup  Pharyngeal Residue - Posterior Pharynx: Nectar teaspoon;Nectar cup; Thin cup; Thin teaspoon  Pharyngeal Residue - UES: Nectar cup;Nectar teaspoon; Thin teaspoon; Thin cup  Pharyngeal Wall - Weakness: All    Esophageal Phase  Esophageal Screen: Impaired  Upper Esophageal Screen- Major Contributing Deficits  Reduced Cricopharyngeal Opening:  All    Pain   Patient Currently in Pain: Denies       Therapy Time:   Individual Concurrent Group Co-treatment   Time In 1030         Time Out 1110         Minutes 40            Timed Code Treatment Minutes: 0 Minutes  Total Treatment Time: 40    Plan  Diet Recommendations: Dysphagia II Minced & Moist Solids / Nectar (Mildly) Thick Liquids    Discharge Plan:  TBD  Discussed with RN, Jose Meier. Needs within reach. Electronically Signed by:  Sergio Hill M.A., Joe Mclean   Speech-Language Pathologist  Pager #[phone]    This document will serve as a discharge summary if pt discharges before next treatment.

## 2021-10-13 NOTE — PROGRESS NOTES
which he is on medications. In the ED patient was afebrile, chest x-ray showed multifocal bilateral opacities. Patient was started on Zosyn because during his last hospitalization with aspiration pneumonia, he was responsive to it. Blood cultures were ordered before starting the antibiotics and patient was started on oxygen 4 L with maintaining saturation above 95%. Patient is not on oxygen at baseline. Medications:     Scheduled Meds:   piperacillin-tazobactam  3,375 mg IntraVENous Q8H    linezolid  600 mg IntraVENous Q12H    buPROPion  100 mg Oral Daily    carvedilol  6.25 mg Oral BID WC    vitamin D  5,000 Units Oral Daily    finasteride  5 mg Oral Daily    nystatin  500,000 Units Oral 4x Daily    tamsulosin  0.4 mg Oral Daily    sodium chloride flush  5-40 mL IntraVENous 2 times per day    heparin (porcine)  5,000 Units SubCUTAneous 3 times per day    potassium chloride  10 mEq IntraVENous Q1H    insulin lispro  0-6 Units SubCUTAneous Q4H     Continuous Infusions:   sodium chloride 75 mL/hr at 10/13/21 0126    sodium chloride 25 mL (10/13/21 0132)    dextrose       PRN Meds:sodium chloride flush, sodium chloride, ondansetron **OR** ondansetron, polyethylene glycol, acetaminophen **OR** acetaminophen, glucose, dextrose, glucagon (rDNA), dextrose, nitroGLYCERIN    Objective:   Vitals:   T-max:  Patient Vitals for the past 8 hrs:   BP Temp Temp src Pulse Resp SpO2   10/13/21 0009 (!) 142/58 97.3 °F (36.3 °C) Axillary 79 20 92 %     No intake or output data in the 24 hours ending 10/13/21 0643    Pertinent ROS are in the Interval Hx    Physical Exam  Constitutional: Alert, normal appearance, no acute distress. Obese. HENT: head normocephalic/atraumatic, no congestion or rhinorrhea, PERRLA, EOM intact  Cardio: normal rate, regular rhythm. No murmurs heard on auscultation  Pulmonary: clear to auscultation bilaterally, no wheezes, or stridor. Rales heard in lower lung bases bilaterally.   GI: abdomen soft, flat, and non-distended. No tenderness or guarding. Musculoskeletal: no deformity, tenderness, or injury. No lower extremity edema. Patient is weak 2/5 in upper and lower extremities. Skin: no lesion, rash, or erythema  Neuro: no focal neurological deficits, alert and oriented X3  Psych: mood normal, behavior normal    LABS:    CBC:   Recent Labs     10/12/21  1452 10/12/21  1654 10/13/21  0555   WBC 5.2 5.1 4.9   HGB 12.1* 12.2* 11.6*   HCT 36.8* 36.9* 35.4*    172 156   MCV 94.3 95.1 95.2     Renal:    Recent Labs     10/12/21  1452 10/12/21  1654   * 151*   K 3.4* 3.2*   * 113*   CO2 24 25   BUN 40* 41*   CREATININE 1.7* 1.5*   GLUCOSE 240* 228*   CALCIUM 11.9* 11.4*   MG  --  2.20   ANIONGAP 13 13     Hepatic:   Recent Labs     10/12/21  1452 10/12/21  1654   AST 29 25   ALT 34 32   BILITOT 0.5 0.5   PROT 7.0 7.2   LABALBU 3.4 3.3*   ALKPHOS 134* 130*     Troponin:   Recent Labs     10/12/21  1654   TROPONINI <0.01     -----------------------------------------------------------------  RAD:   XR ABDOMEN (KUB) (SINGLE AP VIEW)   Final Result     No acute findings in the abdomen or pelvis. No evidence of bowel    obstruction. CT Cervical Spine WO Contrast   Final Result   1. No acute abnormality in the head or cervical spine. CT Head WO Contrast   Final Result   1. No acute abnormality in the head or cervical spine. XR CHEST PORTABLE   Final Result   Impression: Interval development of multifocal bilateral pulmonary opacities. Assessment/Plan:   Patient is a 71-year-old male with past medical history of BPH, aspiration pneumonia, hypertension, type 2 diabetes mellitus who presented to the ED via EMS due to complaints of increased weakness, lethargy and hypoxia for the past 2 days.      #Suspected aspiration pneumonia  Patient has multiple admissions for aspiration pneumonia.   Vitals are stable, afebrile with oxygen saturations above 95% on 4 L of oxygen via nasal cannula  -WBC: 5.2, Procal = 0.35  -Abx: Zosyn 3375 mg every 8 hours & Linezolid 600 mg IV every 12 hours  -Ordered MRSA swab, Legionella, strep antigen, SLP evaluation     #HA on CKD stage 3a, likely prerenal  -Patient had poor p.o. intake for the past few days  -KUB: shows no obstructive process  -IV fluids at 75 mL/h  -BUN 32 today was 41 on admission. Cr 1.3 today was 1.5 on admission. Baseline: 1.1-1.3. #Hypernatremia  -Na was 154 this AM from 151 yesterday, changed fluid to 0.45% sodium chloride infusion     #BPH  -Continue home tamsulosin and finasteride     #MDD  -Continue home Wellbutrin     #Type 2 diabetes mellitus  -Changed to Lantus 10 Units BID, LDSSI    #Essential hypertension  -continue home Coreg 6.25 oral BID     Code Status: Full Code  FEN: Diet NPO  PPX: Heparin 5,000 Units q8hrs  DISPO: Yadira Hall MD, PGY-1  10/13/21  6:43 AM       This patient has been staffed and discussed with Tri Tracey MD.     Addendum to Resident H& P/Progress note:  I have personally seen,examined and evaluated the patient. I have reviewed the current history, physical findings, labs and assessment and plan and agree with note as documented by resident MD ( Yousif Orellana)  + hypokalemia; will replace    Discussed the patient's condition with his daughter, Alec Conklin. The patient underwent MBS:  1.  Intermittent silent penetration and aspiration with thin liquids. 2.  No evidence of penetration or aspiration with nectar thick liquids or puree consistencies.      Recommended by speech path dysphagia II minced and moist solids/ nectar ( mildly)thick liquids    Tri Tracey MD, [facility]

## 2021-10-13 NOTE — PROGRESS NOTES
Physical Therapy    Facility/Department: [address]  Initial Assessment / treatment    NAME: Anna Oakley  : 1935  MRN: [id number]    Date of Service: 10/13/2021    Discharge Recommendations: Anna Oakley scored a 12/24 on the AM-PAC short mobility form. Current research shows that an AM-PAC score of 18 or greater is typically associated with a discharge to the patient's home setting. Based on the patient's AM-PAC score and their current functional mobility deficits, it is recommended that the patient have 2-3 sessions per week of Physical Therapy at d/c to increase the patient's independence. At this time, this patient demonstrates the endurance and safety to discharge home with home services and a follow up treatment frequency of 2-3x/wk. Please see assessment section for further patient specific details. If patient discharges prior to next session this note will serve as a discharge summary. Please see below for the latest assessment towards goals. PT Equipment Recommendations  Equipment Needed: No    Assessment   Body structures, Functions, Activity limitations: Decreased functional mobility   Assessment: Pt is 81 y.o. male admit with PNA. Pt was very indep prior to hospitalization in 2021 including walking and driving. Recently, pt at home with 24hr caregivers, non-ambulatory, assist x 1 with transfers. Pt requires assist x 1 for sit to stand transfer today. [facility] [place] utilized 2* pt requesting to use bathroom urgently. Pt fatigued by end of session. Dtr states plan is to return home with 24hr caregivers and resuming home therapy services.   Will follow  Treatment Diagnosis: impaired gait and transfers  Prognosis: Good  Decision Making: Medium Complexity  PT Education: PT Role;Functional Mobility Training  Patient Education: pt demos partial understanding; will continue to reinforce  REQUIRES PT FOLLOW UP: Yes       Patient Diagnosis(es): The primary encounter diagnosis was Bacterial pneumonia. Diagnoses of Acute hypoxemic respiratory failure ([address] Altered mental status, unspecified altered mental status type, and Multiple falls were also pertinent to this visit. has a past medical history of Allergic rhinitis, Asthma, Bilateral cataracts, BPH (benign prostatic hypertrophy), Diabetes mellitus, type 2 ([address] Diverticulosis, Eczema, Hyperlipidemia, Hypertension, Lumbar disc disease, Microalbuminuria, Obstructive sleep apnea, Osteoarthritis, Pneumonia, Post herpetic neuralgia, Recurrent upper respiratory infection (URI), Right shoulder pain, Spinal stenosis, Transient global amnesia, and Vitamin D deficiency. has a past surgical history that includes Prostate surgery (June 13, 2000). Restrictions  Position Activity Restriction  Other position/activity restrictions: up with assist  Vision/Hearing  Vision:  (appears WFL)  Hearing: Within functional limits     Subjective  General  Chart Reviewed: Yes  Patient assessed for rehabilitation services?: Yes  Additional Pertinent Hx: Admit 10/12 with lethargy and falls at home; (+) hypoxia due to PNA, acute encephalopathy; head CT and c-spine CT: (-); PMHx: asthma, DM, HTN, OA, spinal stenosis  Referring Practitioner: Ana Truong MD  Diagnosis: PNA  General Comment  Comments: Pt is [place] speaking. Subjective  Subjective: Pt found sitting EOB with dtr present. Dtr states pt needs to use restroom urgently.   Pain Screening  Patient Currently in Pain:  (no indication of pain)       Orientation  Orientation  Overall Orientation Status:  (oriented to self, limited assessment due to language barrier; pt following simple commands)  Social/Functional History  Social/Functional History  Lives With: Alone  Type of Home: House  Home Layout: One level  Home Access: Level entry  Home Equipment: Rolling walker, 4 wheeled walker, Cane (bedrail, BSC, transport w/c)  Additional Comments: pt has been home from SNF x 1 month and has 24/7 caregivers. Uses BSC at night 2* urgency. Uses toilet during the day - HHA pushes pt in transport chair into bathroom where pt is able to transfer onto toilet \"pretty much on his own\" with grab bar, caregiver nearby and assists with hygiene. HHA assist with all dressing and bathing as well as housekeeping. Pt receiving home PT and OT 3x per week. Pts daughter reports pt was placed on antidepressent medication 2 weeks ago. Pt now on thickened liquids but was not prior. Cognition        Objective          AROM RLE (degrees)  RLE AROM: WFL  AROM LLE (degrees)  LLE AROM : WFL  Strength RLE  Comment: grossly >3+/5 throughout  Strength LLE  Comment: grossly >3+/5 throughout           Transfers  Sit to Stand: Moderate Assistance (from EOB, CGA from toilet x 2 trials - pt pulling on lolly nhungdy bar with each transfer)  Stand to sit: Moderate Assistance (progressed to [facility])  Bed to Chair: Dependent/Total  Comment: lolly stedy utilized for bed --> toilet and toilet --> chair transfers for safety due to weakness        Balance  Sitting - Static: Good  Sitting - Dynamic: Good  Standing - Static: Fair  Comments: pt stood with CGA x 30 sec with B UE support before he indicates he is fatigued and requests to sit down. Pt sat at EOB with CGA      Treatment included transfer training, pt education.   Plan   Plan  Times per week: 2-5  Current Treatment Recommendations: Strengthening, Functional Mobility Training, Balance Training, Gait Training, Endurance Training, Patient/Caregiver Education & Training  Safety Devices  Type of devices: Call light within reach, Nurse notified, Chair alarm in place, Gait belt, Left in chair    G-Code       OutComes Score                                                  AM-PAC Score  AM-PAC Inpatient Mobility Raw Score : 12 (10/13/21 1234)  AM-PAC Inpatient T-Scale Score : 35.33 (10/13/21 1234)  Mobility Inpatient CMS 0-100% Score: 68.66 (10/13/21 1234)  Mobility Inpatient CMS G-Code Modifier : CL (10/13/21 1234)          Goals  Short term goals  Time Frame for Short term goals: discharge  Short term goal 1: Pt will transfer sit <--> stand with SBA  Short term goal 2: Pt will stand x 1 min with B UE support and CGA  Short term goal 3: Pt will participate in gait assessment  Patient Goals   Patient goals : return home with caregivers       Therapy Time   Individual Concurrent Group Co-treatment   Time In 1140         Time Out 1218         Minutes 38                 Timed Code Treatment Minutes:  23    Total Treatment Minutes:  38    If patient is discharged prior to next treatment, this note will serve as the discharge summary.   Asael Almeida, PT, DPT  [phone]

## 2021-10-14 NOTE — PROGRESS NOTES
Physical Therapy  Daily Treatment Note    Discharge Recommendations: . Kym Current scored a 14/24 on the AM-PAC short mobility form. Current research shows that an AM-PAC score of 18 or greater is typically associated with a discharge to the patient's home setting. Based on the patient's AM-PAC score and their current functional mobility deficits, it is recommended that the patient have 2-3 sessions per week of Physical Therapy at d/c to increase the patient's independence. At this time, this patient demonstrates the endurance and safety to discharge home with home PT and a follow up treatment frequency of 2-3x/wk. Please see assessment section for further patient specific details. Assessment:  Pt able to take a few steps with walker today. Needing assist x 2 for safe mobility at this time. Pt impulsive at times and with decreased safety awareness. Pt is functioning below baseline for mobility at this time. Plan is for home with 24 hour assist of family/caregivers. Pt would benefit from home PT to work towards baseline function. No new DME needs. HOME HEALTH CARE: LEVEL 1 STANDARD  - Initial home health evaluation to occur within 24-48 hours, in patient home   - Therapy to evaluate with goal of regaining prior level of functioning   - Therapy to evaluate if patient has [address] needs for personal care    Equipment Needs: No new needs anticipated    Chart Reviewed: Yes     Other position/activity restrictions: up with assist   Additional Pertinent Hx: Admit 10/12 with lethargy and falls at home; (+) hypoxia due to PNA, acute encephalopathy; head CT and c-spine CT: (-); PMHx: asthma, DM, HTN, OA, spinal stenosis      Diagnosis: PNA   Treatment Diagnosis: impaired gait and transfers    Subjective: Pt in chair initially. Granddaughter present and translating cues/instructions. Pt reports being very uncomfortable in chair. Wants to get back to bed. Pain: c/o back discomfort. RN aware. Objective:    Transfers  Sit to stand: Dependent (Min assist x 2) from chair (x 2 trials)  Stand to sit: Mod assist x 1 into chair and onto bed. Pt impulsive and with decreased safety during transfer. Chair to bed: Dependent (Min assist x 2) with wheeled walker    Ambulation  Assistance Level: Dependent (Min assist x 2)  Assistive device: Wheeled walker  Distance: 2 ft (chair to bed)  Quality of gait: Decreased step length; weak; effortful; unsteady  Other: Needing cues for safety--not backing up to bed completely prior to attempting to sit. Bed mobility  Sit to Supine: Dependent (Mod assist x 2), HOB flat  Scooting: Dependent assist x 2 to scoot up in bed in supine    Exercises  In bed:   12 reps B ankle pumps, hip adductor squeezes, heel slides (CGA to Min assist), hip abd/add, (CGA to Min assist), SAQ  Other: Pt needing frequent verbal/tactile cues for quality and continuation of exercises. Balance  Sat EOB with CGA  Static stance with walker Min assist  Taking steps with walker Min assist x 2    Safety Devices  Pt left with needs in reach. In bed with bed alarm on. RN updated. Granddaughter present.      AM-PAC score  AM-PAC Inpatient Mobility Raw Score : 14  AM-PAC Inpatient T-Scale Score : 38.1  Mobility Inpatient CMS 0-100% Score: 61.29  Mobility Inpatient CMS G-Code Modifier : CL    Goals: (as determined and assessed by primary PT)  Time Frame for Short term goals: discharge  Short term goal 1: Pt will transfer sit <--> stand with SBA   Short term goal 2: Pt will stand x 1 min with B UE support and CGA   Short term goal 3: Pt will participate in gait assessment      Plan:  Times per week: 2-5;    Current Treatment Recommendations: Strengthening, Functional Mobility Training, Balance Training, Gait Training, Endurance Training, Patient/Caregiver Education & Training    Therapy Time    Individual  Concurrent  Group  Co-treatment    Time In  1355            Time Out  1421            Minutes  26             Timed Code Treatment Minutes: 26  Total Treatment Minutes: 26    Will continue per plan of care. If patient is discharged prior to next treatment, this note will serve as the discharge summary.     Marianne Yanes #[id number]

## 2021-10-14 NOTE — PROGRESS NOTES
Progress Note    Admit Date: 10/12/2021  Day: 3  Diet: ADULT DIET; Dysphagia - Minced and Moist; 4 carb choices (60 gm/meal); Mildly Thick (Nectar)    CC: increased weakness, lethargy, and hypoxia    Interval history: Overnight the patient removed flores catheter. Per note patient had a small amount of bloody discharge on the pad underneath. Patient does not speak english. Dr. Hyun Marie is at bedside to aid in interpretation. Patient is more awake and alert this morning. The patient had evaluation by PT/OT/SLP. Changed diet in accordance to SLP's recommendations. The patient remains saturating well on low oxygen.     Medications:     Scheduled Meds:   buPROPion  50 mg Oral BID    potassium chloride  10 mEq IntraVENous Q1H    piperacillin-tazobactam  3,375 mg IntraVENous Q8H    linezolid  600 mg IntraVENous Q12H    carvedilol  6.25 mg Oral BID WC    vitamin D  5,000 Units Oral Daily    finasteride  5 mg Oral Daily    nystatin  500,000 Units Oral 4x Daily    [Held by provider] tamsulosin  0.4 mg Oral Daily    sodium chloride flush  5-40 mL IntraVENous 2 times per day    heparin (porcine)  5,000 Units SubCUTAneous 3 times per day    insulin lispro  0-6 Units SubCUTAneous Q4H    insulin glargine  10 Units SubCUTAneous BID     Continuous Infusions:   sodium chloride 25 mL (10/14/21 0247)    dextrose      sodium chloride 75 mL/hr at 10/14/21 0130     PRN Meds:sodium chloride flush, sodium chloride, ondansetron **OR** ondansetron, polyethylene glycol, acetaminophen **OR** acetaminophen, glucose, dextrose, glucagon (rDNA), dextrose, nitroGLYCERIN    Objective:   Vitals:   T-max:  Patient Vitals for the past 8 hrs:   BP Temp Temp src Pulse Resp SpO2   10/14/21 0819 -- -- -- -- 16 95 %   10/14/21 0816 125/66 97.9 °F (36.6 °C) Oral 74 18 96 %   10/14/21 0504 123/72 97.4 °F (36.3 °C) Axillary 83 16 93 %       Intake/Output Summary (Last 24 hours) at 10/14/2021 1039  Last data filed at 10/13/2021 2226  Gross per 24 hour   Intake 2152.35 ml   Output 700 ml   Net 1452.35 ml       Pertinent ROS in the Interval Hx    Physical Exam  Constitutional: Alert, normal appearance, no acute distress. Obese. HENT: head normocephalic/atraumatic, no congestion or rhinorrhea, PERRLA, EOM intact  Cardio: normal rate, regular rhythm. No murmurs heard on auscultation  Pulmonary: clear to auscultation bilaterally, no wheezes, or stridor. Rales heard in lower lung bases bilaterally. GI: abdomen soft, flat, and non-distended. No tenderness or guarding. Musculoskeletal: no deformity, tenderness, or injury. No lower extremity edema. Patient is weak 2/5 in upper and lower extremities. Skin: no lesion, rash, or erythema  Neuro: no focal neurological deficits, alert and oriented X3  Psych: mood normal, behavior normal    LABS:    CBC:   Recent Labs     10/12/21  1452 10/12/21  1654 10/13/21  0555   WBC 5.2 5.1 4.9   HGB 12.1* 12.2* 11.6*   HCT 36.8* 36.9* 35.4*    172 156   MCV 94.3 95.1 95.2     Renal:    Recent Labs     10/12/21  1654 10/13/21  0555 10/14/21  0706   * 154* 148*   K 3.2* 3.5 3.4*   * 117* 116*   CO2 25 26 21   BUN 41* 32* 26*   CREATININE 1.5* 1.3 1.3   GLUCOSE 228* 197* 138*   CALCIUM 11.4* 10.4 10.0   MG 2.20 2.00 1.90   ANIONGAP 13 11 11     Hepatic:   Recent Labs     10/12/21  1452 10/12/21  1654   AST 29 25   ALT 34 32   BILITOT 0.5 0.5   PROT 7.0 7.2   LABALBU 3.4 3.3*   ALKPHOS 134* 130*     Troponin:   Recent Labs     10/12/21  1654   TROPONINI <0.01     BNP: No results for input(s): BNP in the last 72 hours. Lipids: No results for input(s): CHOL, HDL in the last 72 hours. Invalid input(s): LDLCALCU, TRIGLYCERIDE  ABGs:  No results for input(s): PHART, ABR4MUR, PO2ART, DDU2GUS, BEART, THGBART, L3IMPNAW, OKS4JEO in the last 72 hours. INR: No results for input(s): INR in the last 72 hours.   Lactate: No results for input(s): LACTATE in the last 72 hours.  Cultures:  -----------------------------------------------------------------  RAD:   FL MODIFIED BARIUM SWALLOW W VIDEO   Final Result      1. Intermittent silent penetration and aspiration with thin liquids. 2.  No evidence of penetration or aspiration with nectar thick liquids or puree consistencies. Please correlate with the speech pathology report for additional details. XR ABDOMEN (KUB) (SINGLE AP VIEW)   Final Result     No acute findings in the abdomen or pelvis. No evidence of bowel    obstruction. CT Cervical Spine WO Contrast   Final Result   1. No acute abnormality in the head or cervical spine. CT Head WO Contrast   Final Result   1. No acute abnormality in the head or cervical spine. XR CHEST PORTABLE   Final Result   Impression: Interval development of multifocal bilateral pulmonary opacities. Assessment/Plan:   Patient is a 81-year-old male with past medical history of BPH, aspiration pneumonia, hypertension, type 2 diabetes mellitus who presented to the ED via EMS due to complaints of increased weakness, lethargy and hypoxia for the past 2 days.      #Suspected aspiration pneumonia  Patient has multiple admissions for aspiration pneumonia.  Vitals are stable, afebrile with oxygen saturations above 95% on 4 L of oxygen via nasal cannula  --Ordered MRSA swab, Legionella, strep antigen, SLP evaluation  -WBC: 5.2 yesterday, Procal = 0.35  -Abx: Zosyn 3375 mg every 8 hours & Linezolid 600 mg IV every 12 hours  -Modified Barium swallow y/d showed: 1.) intermittent silent penetration and aspiration w/ thin liquids.  2.) No evidence of penetration or aspiration w/ nectar thick liquids or puree consistencies.  -Followed SLP evaluation: Dysphagia II minced and moist solids/nectar (mildly) thick liquids  - Discussed w/ respiratory therapy getting a humidifier       #HA on CKD stage 3a, likely prerenal  -Patient had poor p.o. intake for the past few days  -KUB: shows no obstructive process  -IV fluids at 75 mL/h  -BUN 26 today was 41 on admission. Cr 1.3 today was 1.5 on admission. Baseline: 1.1-1.3.     #Hypernatremia  -Na was 148 this AM from 154 yesterday,   -continue 0.45% sodium chloride infusion     #BPH  -Continue home finasteride  -home tamsulosin cannot be continued b/c cannot be crushed which patient needs to swallow safetly     #MDD  -Continue home Wellbutrin     #Type 2 diabetes mellitus  -Changed to Lantus 10 Units BID, LDSSI     #Essential hypertension  -continue home Coreg 6.25 oral BID     Code Status: Full Code  FEN: ADULT DIET; Dysphagia - Minced and Moist; 4 carb choices (60 gm/meal); Mildly Thick (Nectar)  PPX: Heparin 5,000 Units q8 hrs  DISPO: Remington Pathak MD, PGY-1  10/14/21  10:39 AM    This patient has been staffed and discussed with Quillian Gosselin, MD.     Addendum to Resident H& P/Progress note:  I have personally seen,examined and evaluated the patient. I have reviewed the current history, physical findings, labs and assessment and plan and agree with note as documented by resident MD ( Johnnylauryn Levi)  Discussed the patient's condition with his daughter, Fareed Collazo Glucoses: Recent Labs     10/13/21  2034 10/13/21  2341 10/14/21  0448 10/14/21  0803 10/14/21  1114   POCGLU 217* 165* 153* 127* 150*     We will continue current management. The patient had physical and Occupational Therapy evaluation/treatment; recommendations were reviewed.     Quillian Gosselin, MD, FACP

## 2021-10-14 NOTE — PROGRESS NOTES
RN walked into patient yelling out in pain. Pt had pulled his flores catheter out. Small amount of bloody discharge present on pad underneath Pt. Yefri served Dr. Deondre Dobson to update. Will monitor Pt will bladder scanner throughout night. If Pt retains >300 mL will placed another.

## 2021-10-14 NOTE — PROGRESS NOTES
Speech Language Pathology  [facility] [address] [facility]  [facility]ys[facility]agia Daily Treatment Note (2 sessions)    NAME: Halie Tesfaye  : 1935  MRN: [id number]    Patient Diagnosis(es):   Patient Active Problem List    Diagnosis Date Noted    Type 2 diabetes mellitus without complication ([facility]     Essential hypertension 2015    Diabetes mellitus type 2 in obese ([facility] 2010    Hyperlipidemia     Microalbuminuria 2013    Vitamin D deficiency 2012    Diverticulosis     Benign prostatic hyperplasia     Bilateral low back pain with right-sided sciatica 2015    Neck pain 2012    Right hip pain 2012    Post herpetic neuralgia 06/15/2012    Right shoulder pain 2011    Eczema 2011    Allergic rhinitis 2010    Rosacea     Back pain     Obstructive sleep apnea     Chest pain     Obesity     Insomnia     Osteoarthritis     Lumbar disc disease     Spinal stenosis     Oropharyngeal dysphagia     Multifocal pneumonia 10/12/2021    Hypoxia     Hypernatremia     Altered mental status     Chronic diastolic HF (heart failure) (HCC)     Acute renal failure superimposed on stage 3a chronic kidney disease ([facility] 2021    Peripheral vascular disease ([facility] 2020    Coronary artery disease of native artery of native heart with stable angina pectoris ([facility] [facility] 2016    Shortness of breath 2011    Nausea 2011    Dyspnea on exertion 2011    Transient global amnesia     Bilateral cataracts      Allergies: Allergies   Allergen Reactions    Tramadol      Onset Date: 10/12/2021      CXR (10/12/2021)-  Impression   Impression: Interval development of multifocal bilateral pulmonary opacities. CT Head: (10/12/2021)  Impression   1. No acute abnormality in the head or cervical spine.        Previous MBS (2021):  Pt exhibiting mild oropharyngeal dysphagia.  Pt consumed puree and thin liquids via single sips with no penetration or aspiration.  Decreased timing/coordination of swallow with reduced epiglottal closure resulted in aspiration of consecutive swallows of thin liquids via straw.  Throat clear but no cough occurred. Additional single sips via cup and straw were consumed with no further pen/aspiration. Reduced base of tongue retraction resulted in mild residue in valleculae with solid texture. Pt was not sensate to the residue, however this cleared when instructed to use liquid wash.  Pt demonstrated adequate mastication with solid texture, no pocketing /oral residue noted. Recommend cont regular diet/thin liquids- no straw (daughter reports pt does not typically use a straw).     Chart reviewed. Medical Diagnosis: Pneumonia  Treatment Diagnosis: Dysphagia    BSE Impression (10/13/2021)-  Dysphagia Impression : Suspect oropharyngeal dysphagia; needs further assessment. With pt seated up in bed, assessed tolerance thins via cup and bites of puree. Pt with positive oral acceptance, positive swallow movement, adequate oral clearance. Reactive cough with thins, no s/s aspiration with puree. Recommend completion of MBS; pt confused at this time, however daughter agreeable. Recommend continue NPO pending results. MBS results (10/13/2021)-  Mild-moderate oral phase dysphagia characterized by lingual pumping/rocking, reduced posterior propulsion, holding of bolus, piecemeal swallowing, premature bolus loss to pharynx, and reduced tongue base retraction.   Moderate pharyngeal phase dysphagia characterized by impaired hyolaryngeal mechanics and reduced sensation, with delayed swallow initiation, premature spillage to the valleculae and pyriforms (thin liquids), decreased pharyngeal peristalsis, reduced/delayed epiglottic movement, reduced laryngeal elevation/retraction, with pooling at the valleculae/pyriforms/UES (mostly thins), subsequent deep penetration and suspected silent aspiration of thin liquids (no cough reflex), mild pharyngeal residue and pharyngeal wall weakness. No aspiration or penetration of nectar thick liquids or puree. Trialed chin tuck manuever with thin liquids via cup; on review of images, appeared effective at protecting airway and preventing aspiration, however pt required max cues from daughter to complete. On discussion with daughter, she suspects he may have limited carryover with other caregivers. After further discussion re: QoL wishes, plan of care with daughter (pt present, but currently confused), recommend nectar thick liquids as safest option, with soft foods (minced & moist), with implementation of free water protocol (may have unthickened plan H2O between meals following completion of thorough oral hygiene). Also discussed daughter can try thin liquids with herself cuing her father to chin-tuck. Upper Esophageal Screen: Reduced cricopharyngeal opening    Pain: none indicated by any means    Current Diet :  Dysphagia II minced and moist solids, thin liquids    Treatment:  Pt seen bedside to address the following goals:  Goal 1: Patient will participate in ongoing assessment of swallowing function as appropriate. 10/13: Addressed via [facility] this date. Please see impression statement. 10/14 (session 2): Repositioned pt upright in bed, and assessed with nectar thickened liquids via cup and minced/moist solid from home (stewed apples). Pt with positive oral acceptance, mild oral holding, swallow movement with verbal cue, good oral clearance. Single cough, productive of minimal material. As discussed with granddaughter, pt with notable silent aspiration/penetration on yesterday's MBS, meaning no cough reflex, and given current diagnosis of pneumonia, cough not necessarily indicative of aspiration/penetration. As pt fatiguing and showing disinterest in eating/drinking more at this time, withheld further PO trials.  Per   Cont goal  Goal 2: Patient/caregiver will demonstrate understanding of swallowing concerns/recommendations. 10/13: Provided extended education to patient/daughter regarding instrumental assessment, results, swallow function/structures, dysphagia, aspiration vs penetration, aspiration concerns, diet options, thickener use/rationale, diet recommendations (NTL/soft solids), free water protocol, safety strategies (upright, small bites/sips, slow rate), chin tuck with thins (daughter only). Updated white board and provided handout in addition to verbal education re: thickener/FWP. Daughter stated good comprehension; will continue to reinforce. Goal met  10/14 (session 1): Received pt awake, resting in bed, on O2 via nc. Granddaughter present. Pt still somewhat confused. Provided education to Granddaughter/patient regarding results of yesterday's MBS, current swallow function, aspiration/penetration, diet recommendations, swallow exercises (effortful swallow, Lola). Ended treatment session d/t pt's need to toilet  10/14 (session 2): Began new treatment session. Provided lengthy education this session regarding different thickener types, proper consistency, mixing, specifics re: diet consistency (minced/moist), importance of upright position, slow rate, small bites/sips, [employer], provided demonstration of oral hygiene completion, assisted granddaughter with denture placement/remova. Currently his dentures are loose; granddaughter confirmed that he has lost weight. Educated her regarding problem with ill-fitting dentures (when very loose, food can get stuck behind them, creating choking risk). Recommended eat w/o unless securely in place. Also provided general education regarding dysphagia and possible causes (e.g., when an individual is sick, and has generalized weakness, can negatively impact swallow function/safety), and recommendation for follow-up MBS as pt's overall strength/conditioning improved.     Patient/Family/Caregiver Education:  As above.    Compensatory Strategies:  Upright position, small bites/sips, slow rate, oral hygiene 2-3x/day, assist feed, effortful swallow exercise, no straws     Plan:  Continued daily Dysphagia treatment with goals per  plan of care. Diet recommendations: Dysphagia Minced and Moist (Dysphagia II), Mildly Thick (Nectar) + Free Water Protocol (thin, plain, unsweetened H2O (not coffee, tea, flavored water, etc.) permitted between meals following completion of thorough oral hygiene)    DC recommendation: TBD  Treatment session 1: 15 minutes; Treatment session 2: 30 minutes  D/W nursing, Alicia Huston  Needs met prior to leaving room, call button in reach. Candy Goodrich M.A., [id number] [facility] [phone]  Speech-Language Pathologist  Pg.  # [phone]    If patient is discharged prior to next treatment, this note will serve as the discharge summary

## 2021-10-14 NOTE — PROGRESS NOTES
Occupational Therapy  Facility/Department: [address]  Daily Treatment Note  NAME: Carmen Gould  : 1935  MRN: [id number]    Date of Service: 10/14/2021    Discharge Recommendations: Carmen Gould scored a 14/24 on the AM-PAC ADL Inpatient form. Current research shows that an AM-PAC score of 18 or greater is typically associated with a discharge to the patient's home setting. Based on the patient's AM-PAC score, and their current ADL deficits, it is recommended that the patient have 2-3 sessions per week of Occupational Therapy at d/c to increase the patient's independence. At this time, this patient demonstrates the endurance and safety to discharge home with 24hr A and home services and a follow up treatment frequency of 2-3x/wk. Please see assessment section for further patient specific details. If patient discharges prior to next session this note will serve as a discharge summary. Please see below for the latest assessment towards goals. OT Equipment Recommendations  Other: has recommended equipment    Assessment   Performance deficits / Impairments: Decreased functional mobility ; Decreased ADL status; Decreased endurance  Assessment: Pt is needing min A x 2 for sit<>stand transfers from chair and min A x 2 to take shuffled steps from chair to bed with A for 2WW mngmt. Pt participated in UE exercises from bed level with good tolerance. Pt displays slightly increased weakness on L side compared to R side. Pt would benefit from ongoing acute OT services. Plan is for return home at discharge with 24hr caregivers and HHOT/PT.   Treatment Diagnosis: decreased independence with ADLs and fx mobility  Prognosis: Good  Decision Making: Medium Complexity  OT Education: OT Role;Plan of Care  Patient Education: pt and pts granddaughter verb understanding  REQUIRES OT FOLLOW UP: Yes  Activity Tolerance  Activity Tolerance: Patient Tolerated treatment well;Patient limited by fatigue  Activity Tolerance: 4L of O2 throughout session and no SOB; fatigue evidenced at end of session with pt in bed and resting eyes intermitently  Safety Devices  Safety Devices in place: Yes  Type of devices: All fall risk precautions in place;Nurse notified; Bed alarm in place;Call light within reach; Left in bed         Patient Diagnosis(es): The primary encounter diagnosis was Bacterial pneumonia. Diagnoses of Acute hypoxemic respiratory failure ([address] Altered mental status, unspecified altered mental status type, and Multiple falls were also pertinent to this visit. has a past medical history of Allergic rhinitis, Asthma, Bilateral cataracts, BPH (benign prostatic hypertrophy), Diabetes mellitus, type 2 ([address] Diverticulosis, Eczema, Hyperlipidemia, Hypertension, Lumbar disc disease, Microalbuminuria, Obstructive sleep apnea, Osteoarthritis, Pneumonia, Post herpetic neuralgia, Recurrent upper respiratory infection (URI), Right shoulder pain, Spinal stenosis, Transient global amnesia, and Vitamin D deficiency. has a past surgical history that includes Prostate surgery (June 13, 2000). Restrictions  Position Activity Restriction  Other position/activity restrictions: up with assist  Subjective   General  Chart Reviewed: Yes  Patient assessed for rehabilitation services?: Yes  Additional Pertinent Hx: 60-year-old male with past medical history of BPH, aspiration pneumonia, hypertension, type 2 diabetes mellitus who presented to the ED via EMS due to complaints of increased weakness, lethargy and hypoxia for the past 2 days. Pt unable to stand after fall and has had poor PO intake. Pt spO2 85% with EMS- now on 4L. CT head and spine (-) ; Chest XR (+) Interval development of multifocal bilateral pulmonary opacities.   Family / Caregiver Present: Yes (pts daughter)  Referring Practitioner: Mao Bradford MD  Diagnosis: pneumonia  Subjective  Subjective: Pt seated upright in chair upon OT arrival with granddaughter present at side. Pt uncomfortable in chair and requesting to get back to bed. Pt agreeable to OT therapy session. Orientation     Objective             Balance  Sitting Balance: Contact guard assistance  Standing Balance: Minimal assistance  Standing Balance  Time: 2 trials: 30 seconds and 1 min  Activity: stance at chair and transfer from chair to bed  Comment: fatigues with standing ; use of 2WW  Functional Mobility  Functional - Mobility Device: Rolling Walker  Activity: Other  Functional Mobility Comments: min A x 2 with use of 2WW and A needing for 2WW mngmt throughout; pt took several shuffled steps, fatigues quickly  Bed mobility  Sit to Supine: 2 Person assistance; Moderate assistance (mod A x 2)  Scootin Person assistance (max A x 2)  Transfers  Sit to stand: 2 Person assistance (min A  x2 with max VC for hand placement)  Stand to sit: Moderate assistance (for decreased safety awareness with body positioning)  Transfer Comments: use of 2WW this date                       Cognition  Overall Cognitive Status: WFL                    Type of ROM/Therapeutic Exercise  Type of ROM/Therapeutic Exercise: AROM  Comment: Pt performed from bed level  Exercises  Shoulder Elevation: x10 BUE  Shoulder Flexion: 2 sets of 10 with BUE  Elbow Flexion: x15 BUE  Grasp/Release: x5 BUE  Other: Pt also participated in core strengthening ax 10x                    Plan   Plan  Times per week: 2-5  Times per day: Daily  Current Treatment Recommendations: Strengthening, Endurance Training, Self-Care / ADL, Functional Mobility Training, Safety Education & Training  G-Code     OutComes Score                                                  AM-PAC Score        -[facility] Inpatient Daily Activity Raw Score: 14 (10/14/21 1533)  AM-PAC Inpatient ADL T-Scale Score : 33.39 (10/14/21 1533)  ADL Inpatient CMS 0-100% Score: 59.67 (10/14/21 1533)  ADL Inpatient CMS G-Code Modifier : CK (10/14/21 1533)    Goals  Short term goals  Time Frame for Short term goals: by d/c  Short term goal 1: Pt will perform sit<>stand transfer with SBA with 2WW as needed- not met  Short term goal 2: Pt will maintain standing balance for 1 min with no more than CGA- not met  Short term goal 3: Pt will perform UB dressing task with mod I- not addressed  Patient Goals   Patient goals : to be able to transfer independently again from bed to bedside commode and from bed to chair       Therapy Time   Individual Concurrent Group Co-treatment   Time In 1358         Time Out 1436         Minutes 38         Timed Code Treatment Minutes: 455 Mignon Sewell, OT

## 2021-10-15 NOTE — PROGRESS NOTES
acute distress. Obese. HENT: head normocephalic/atraumatic, no congestion or rhinorrhea, PERRLA, EOM intact  Cardio: normal rate, regular rhythm. No murmurs heard on auscultation  Pulmonary: clear to auscultation bilaterally, no wheezes, rales, or stridor. Rales heard in lower lung bases bilaterally, diminished from yesterday. GI: abdomen soft, flat, and non-distended. No tenderness or guarding. Musculoskeletal: no deformity, tenderness, or injury. No lower extremity edema. Patient is weak 2/5 in upper and lower extremities. Skin: no lesion, rash, or erythema  Neuro: no focal neurological deficits. Patients is alert to himself and his family, he is not oriented to place or situation. Patient's daughter was used to translate. Psych: mood normal, behavior normal    LABS:  CBC:   Recent Labs     10/12/21  1654 10/13/21  0555 10/14/21  1027   WBC 5.1 4.9 3.5*   HGB 12.2* 11.6* 10.8*   HCT 36.9* 35.4* 32.4*    156 142   MCV 95.1 95.2 95.2     Renal:    Recent Labs     10/12/21  1654 10/13/21  0555 10/14/21  0706   * 154* 148*   K 3.2* 3.5 3.4*   * 117* 116*   CO2 25 26 21   BUN 41* 32* 26*   CREATININE 1.5* 1.3 1.3   GLUCOSE 228* 197* 138*   CALCIUM 11.4* 10.4 10.0   MG 2.20 2.00 1.90   ANIONGAP 13 11 11     Hepatic:   Recent Labs     10/12/21  1452 10/12/21  1654   AST 29 25   ALT 34 32   BILITOT 0.5 0.5   PROT 7.0 7.2   LABALBU 3.4 3.3*   ALKPHOS 134* 130*     Troponin:   Recent Labs     10/12/21  1654   TROPONINI <0.01     BNP: No results for input(s): BNP in the last 72 hours. Lipids: No results for input(s): CHOL, HDL in the last 72 hours. Invalid input(s): LDLCALCU, TRIGLYCERIDE  ABGs:  No results for input(s): PHART, ORS2XCZ, PO2ART, UKK0RLA, BEART, THGBART, H4VYWGBQ, ZMI2ACL in the last 72 hours. INR: No results for input(s): INR in the last 72 hours.   Lactate: No results for input(s): LACTATE in the last 72 hours.  Cultures:  -----------------------------------------------------------------  RAD:   FL MODIFIED BARIUM SWALLOW W VIDEO   Final Result      1. Intermittent silent penetration and aspiration with thin liquids. 2.  No evidence of penetration or aspiration with nectar thick liquids or puree consistencies. Please correlate with the speech pathology report for additional details. XR ABDOMEN (KUB) (SINGLE AP VIEW)   Final Result     No acute findings in the abdomen or pelvis. No evidence of bowel    obstruction. CT Cervical Spine WO Contrast   Final Result   1. No acute abnormality in the head or cervical spine. CT Head WO Contrast   Final Result   1. No acute abnormality in the head or cervical spine. XR CHEST PORTABLE   Final Result   Impression: Interval development of multifocal bilateral pulmonary opacities. Assessment/Plan:   Patient is a 81-year-old male with past medical history of BPH, aspiration pneumonia, hypertension, type 2 diabetes mellitus who presented to the ED via EMS due to two days of increased weakness, lethargy and hypoxia. #Suspected aspiration pneumonia  Patient has multiple admissions for aspiration pneumonia.  Vitals are stable, afebrile with oxygen saturations above 95% on 4 L of oxygen via nasal cannula  --Ordered MRSA swab, Legionella, strep antigen, SLP evaluation  -WBC: 5.2 yesterday, Procal = 0.35  -(10/12-)Zosyn 3375 mg every 8 hours & Linezolid 600 mg IV every 12 hours  -(10/13) Modified Barium swallow y/d showed: 1.) intermittent silent penetration and aspiration w/ thin liquids. 2.) No evidence of penetration or aspiration w/ nectar thick liquids or puree consistencies.    -Diet: Followed SLP evaluation: Dysphagia II minced and moist solids/nectar (mildly) thick liquids  -MRSA swab pending, if negative can reduce antibiotics  -Can try to titrate oxygen    #HA on CKD stage 3a, likely prerenal  -Patient had poor p.o. intake for the past few days  -KUB: shows no obstructive process  -IV fluids at 75 mL/h  -BUN 19 today was 41 on admission. Cr 1.3 today was 1.5 on admission. Baseline: 1.1-1.3.     #Hypernatremia  -Na was 146 this AM from 148 yesterday,   -continue 0.45% sodium chloride infusion     #BPH  -Continue home finasteride  -Held home tamsulosin, can consider adding non-specific alpha-1 blocker if patient is retaining  -Bladder scan q6 to evaluate for urinary retention     #MDD  -Discontinued Wellbutrin due concerns for serotonin syndrome interaction w/ linezolid.     #Type 2 diabetes mellitus  -Changed to Lantus 10 Units BID, LDSSI  -BG has been between (118-101) over night and this AM  -Will monitor closely to avoid hypoglycemia.     #Essential hypertension  -continue home Coreg 6.25 oral BID     #Multiple Falls  -CT head negative  -Likely mechanical in nature  -Consult PT/OT    Code Status: Full Code  FEN: ADULT DIET;  Dysphagia - Minced and Moist; 4 carb choices (60 gm/meal); Mildly Thick (Nectar)  PPX: Heparin 5,000 Units q8 hr  DISPO: Erin Nolasco MD, PGY-1  10/15/21  6:20 AM    This patient has been staffed and discussed with Angelo Horn MD

## 2021-10-15 NOTE — CARE COORDINATION
Case Management Assessment           Daily Note                 Date/ Time of Note: 10/15/2021 9:17 AM         Note completed by: Kobe Bowers RN    Patient Name: Hazel Art  YOB: 1935    Diagnosis:Pneumonia [J18.9]  Bacterial pneumonia [J15.9]  Multiple falls [R29.6]  Altered mental status, unspecified altered mental status type [R41.82]  Acute hypoxemic respiratory failure ([address] [J96.01]  Patient Admission Status: Inpatient    Date of Admission:10/12/2021  4:38 PM Length of Stay: 3 GLOS: GMLOS: 4    Current Plan of Care: IV ABX,   :Suspected aspiration pneumoniaSLP eval, :  Mince/Moist  Nectar thick liquid     02-  Wean  currently requires  4 L nc  ( none @ baseline)  ________________________________________________________________________________________  PT AM-PAC: 14 / 24 per last evaluation on: 10/14/2021    OT AM-PAC: 14 / 24 per last evaluation on:  10/14/2021    DME Needs for discharge:   NA  Has  Needed  DME   ________________________________________________________________________________________  Discharge Plan: dgt and Pt want to return home and continue with 24 hr private duty care  [facility]    Tentative discharge date: TBD    Current barriers to discharge: medical clearance; need Turkmen     Referrals completed:  [facility]  ________________________________________________________________________________________  Case Management Notes:    CM spoke with Bonnie Li  To  Get  update  On d/c planning  : ( pt  Does not speak [place]  ( [place]   Needed:  Dgtr  At  bedside  [address]:  [phone]    - Pt's dgt plans for Pt to return home at [facility] and continue with his 24 hr private duty care. - Pt also has skilled [place] 1 does not have Home 02. May need  Home O 2 eval prior to discharge  If  Not able to wean . Dgtr  Aware and agreeable  States  they were  Trying to get it approved  pta   .  PATRICA explained  We  Can

## 2021-10-15 NOTE — PROGRESS NOTES
Speech Language Pathology  Facility/Department: [address]  Dysphagia Daily Treatment Note    NAME: Raji Barrett  : 1935  MRN: [id number]    Patient Diagnosis(es):   Patient Active Problem List    Diagnosis Date Noted    Type 2 diabetes mellitus without complication ([facility]     Essential hypertension 2015    Diabetes mellitus type 2 in obese ([address] 2010    Hyperlipidemia     Microalbuminuria 2013    Vitamin D deficiency 2012    Diverticulosis     Benign prostatic hyperplasia     Bilateral low back pain with right-sided sciatica 2015    Neck pain 2012    Right hip pain 2012    Post herpetic neuralgia 06/15/2012    Right shoulder pain 2011    Eczema 2011    Allergic rhinitis 2010    Rosacea     Back pain     Obstructive sleep apnea     Chest pain     Obesity     Insomnia     Osteoarthritis     Lumbar disc disease     Spinal stenosis     Oropharyngeal dysphagia     Multifocal pneumonia 10/12/2021    Hypoxia     Hypernatremia     Altered mental status     Chronic diastolic HF (heart failure) (HCC)     Acute renal failure superimposed on stage 3a chronic kidney disease ([address] 2021    Peripheral vascular disease ([facility] 2020    Coronary artery disease of native artery of native heart with stable angina pectoris ([address] 2016    Shortness of breath 2011    Nausea 2011    Dyspnea on exertion 2011    Transient global amnesia     Bilateral cataracts      Allergies: Allergies   Allergen Reactions    Tramadol      Onset Date: 10/12/2021      CXR (10/12/2021)-  Impression   Impression: Interval development of multifocal bilateral pulmonary opacities. CT Head: (10/12/2021)  Impression   1. No acute abnormality in the head or cervical spine.        Previous MBS (2021):  Pt exhibiting mild oropharyngeal dysphagia.  Pt consumed puree and thin liquids via single sips with no penetration or aspiration.  Decreased timing/coordination of swallow with reduced epiglottal closure resulted in aspiration of consecutive swallows of thin liquids via straw.  Throat clear but no cough occurred. Additional single sips via cup and straw were consumed with no further pen/aspiration. Reduced base of tongue retraction resulted in mild residue in valleculae with solid texture. Pt was not sensate to the residue, however this cleared when instructed to use liquid wash.  Pt demonstrated adequate mastication with solid texture, no pocketing /oral residue noted. Recommend cont regular diet/thin liquids- no straw (daughter reports pt does not typically use a straw).     Chart reviewed. Medical Diagnosis: Pneumonia  Treatment Diagnosis: Dysphagia    BSE Impression (10/13/2021)-  Dysphagia Impression : Suspect oropharyngeal dysphagia; needs further assessment. With pt seated up in bed, assessed tolerance thins via cup and bites of puree. Pt with positive oral acceptance, positive swallow movement, adequate oral clearance. Reactive cough with thins, no s/s aspiration with puree. Recommend completion of MBS; pt confused at this time, however daughter agreeable. Recommend continue NPO pending results. MBS results (10/13/2021)-  Mild-moderate oral phase dysphagia characterized by lingual pumping/rocking, reduced posterior propulsion, holding of bolus, piecemeal swallowing, premature bolus loss to pharynx, and reduced tongue base retraction.   Moderate pharyngeal phase dysphagia characterized by impaired hyolaryngeal mechanics and reduced sensation, with delayed swallow initiation, premature spillage to the valleculae and pyriforms (thin liquids), decreased pharyngeal peristalsis, reduced/delayed epiglottic movement, reduced laryngeal elevation/retraction, with pooling at the valleculae/pyriforms/UES (mostly thins), subsequent deep penetration and suspected silent aspiration of thin liquids consume recommended diet consistency w/o respiratory decline. 10/15: Family member at side. Pt and family declining use of  system despite education from SLP for rationale for use. Targeted goal via effortful swallows of varying consistencies presented in hierarchical fashion (nectar thick via tsp, thin liquid via tsp, and puree via tsp) - total of 25 reps achieved with max cues from SLP and dtr. Pt with slowed posterior transit and intermittent holding of bolus; fatigue noted as session progressed with increasing amount of time required for posterior transit. Pt consumed nectar thick via tsp for 90% of opps, thin via tsp for 80% of opps, and puree for 80% of opps w/o overt s/s associated with aspiration. Continue goal.    Goal 2: Patient/caregiver will demonstrate understanding of swallowing concerns/recommendations. 10/13: Provided extended education to patient/daughter regarding instrumental assessment, results, swallow function/structures, dysphagia, aspiration vs penetration, aspiration concerns, diet options, thickener use/rationale, diet recommendations (NTL/soft solids), free water protocol, safety strategies (upright, small bites/sips, slow rate), chin tuck with thins (daughter only). Updated white board and provided handout in addition to verbal education re: thickener/FWP. Daughter stated good comprehension; will continue to reinforce. Goal met  10/14 (session 1): Received pt awake, resting in bed, on O2 via nc. Granddaughter present. Pt still somewhat confused. Provided education to Granddaughter/patient regarding results of yesterday's MBS, current swallow function, aspiration/penetration, diet recommendations, swallow exercises (effortful swallow, Lola). Ended treatment session d/t pt's need to toilet  10/14 (session 2): Began new treatment session.  Provided lengthy education this session regarding different thickener types, proper consistency, mixing, specifics re: diet consistency (minced/moist), importance of upright position, slow rate, small bites/sips, [employer], provided demonstration of oral hygiene completion, assisted granddaughter with denture placement/remova. Currently his dentures are loose; granddaughter confirmed that he has lost weight. Educated her regarding problem with ill-fitting dentures (when very loose, food can get stuck behind them, creating choking risk). Recommended eat w/o unless securely in place. Also provided general education regarding dysphagia and possible causes (e.g., when an individual is sick, and has generalized weakness, can negatively impact swallow function/safety), and recommendation for follow-up MBS as pt's overall strength/conditioning improved. 10/15: Educated to use of effortful swallow, rationale for tx tasks, parameters for [employer] Protocol, and recommended POC. Family verbalized comprehension and demonstrated adequate recall of previous education. Continue goal.    Patient/Family/Caregiver Education:  As above. Compensatory Strategies:  Upright position  small bites/sips  slow rate  oral hygiene 2-3x/day  assist feed  effortful swallow exercise    no straws     Plan:  Dysphagia tx 1-3x/wk for LOS. Diet recommendations: Dysphagia Minced and Moist (Dysphagia II), Mildly Thick (Nectar)     [employer] Protocol (thin, plain, unsweetened H2O (not coffee, tea, flavored water, etc.) permitted between meals following completion of thorough oral hygiene and after 30 min wait time)    DC recommendation: ongoing dysphagia tx indicated at this time  Treatment: 15 min  D/W nursing, [facility] met prior to leaving room, call button in reach. RN and family at side      Felicitas Hotter, [place] CCC-SLP; [id number]  Speech-Language Pathologist  Pager # [phone]  Phone # [phone]  Office # [phone]    If patient is discharged prior to next session, this note will serve as discharge summary.

## 2021-10-15 NOTE — DISCHARGE INSTR - COC
Continuity of Care Form    Patient Name: Anneliese Moscoso   :  1935  MRN:  [id number]    Admit date:  10/12/2021  Discharge date:  ***    Code Status Order: Full Code   Advance Directives:     Admitting Physician:  Marven Dakin, MD  PCP: Marven Dakin, MD    Discharging Nurse: [facility] Unit/Room#: 4912/7954-03  Discharging Unit Phone Number: ***    Emergency Contact:   Extended Emergency Contact Information  Primary Emergency Contact: [address] of [address] [place] [address] Phone: [phone]  Relation: Child    Past Surgical History:  Past Surgical History:   Procedure Laterality Date    PROSTATE SURGERY  2000    TURP       Immunization History:   Immunization History   Administered Date(s) Administered    COVID-19, Pfizer, PF, 30mcg/0.3mL 2021, 2021    Influenza 2013    Influenza, High Dose (Fluzone 65 yrs and older) 2011, 2012, 2014, 2015, 2016, 12/15/2017, 2019    Influenza, Triv, inactivated, subunit, adjuvanted, IM (Fluad 65 yrs and older) 2019    Pneumococcal Polysaccharide ([id number]) 2007       Active Problems:  Patient Active Problem List   Diagnosis Code    Rosacea L71.9    Back pain M54.9    Obstructive sleep apnea G47.33    Chest pain R07.9    Transient global amnesia G45.4    Obesity E66.9    Insomnia G47.00    Bilateral cataracts H26.9    Osteoarthritis M19.90    Lumbar disc disease M51.9    Spinal stenosis M48.00    Diverticulosis K57.90    Benign prostatic hyperplasia N40.0    Hyperlipidemia E78.5    Diabetes mellitus type 2 in obese ([facility] [address] [facility] E11.69, E66.9    Allergic rhinitis J30.9    Nausea R11.0    Eczema L30.9    Dyspnea on exertion R06.00    Right shoulder pain M25.511    Shortness of breath R06.02    Vitamin D deficiency E55.9    Post herpetic neuralgia B02.29    Neck pain M54.2    Right hip pain M25.551    Microalbuminuria R80.9    Essential hypertension I10    Type 2 diabetes mellitus without complication (HCC) T33.8    Bilateral low back pain with right-sided sciatica M54.41    Coronary artery disease of native artery of native heart with stable angina pectoris (HCC) I25.118    Peripheral vascular disease (HCC) I73.9    Acute renal failure superimposed on stage 3a chronic kidney disease (HCC) N17.9, N18.31    Altered mental status R41.82    Chronic diastolic HF (heart failure) ([facility]) I50.32    Multifocal pneumonia J18.9    Hypoxia R09.02    Hypernatremia E87.0    Oropharyngeal dysphagia R13.12       Isolation/Infection:   Isolation          No Isolation        Patient Infection Status     Infection Onset Added Last Indicated Last Indicated By Review Planned Expiration Resolved Resolved By    None active    Resolved    COVID-19 Rule Out 08/23/21 08/23/21 08/23/21 Respiratory Panel, Molecular, with COVID-19 (Restricted: peds pts or suitable admitted adults) (Ordered)   08/23/21 Rule-Out Test Resulted    COVID-19 Rule Out 08/21/21 08/21/21 08/22/21 COVID-19 (Ordered)   08/22/21 Rule-Out Test Resulted          Nurse Assessment:  Last Vital Signs: /76   Pulse 68   Temp 97 °F (36.1 °C) (Oral)   Resp 24   Ht 5' 2\" (1.575 m)   Wt 179 lb (81.2 kg)   SpO2 96%   BMI 32.74 kg/m²     Last documented pain score (0-10 scale): Pain Level: 5  Last Weight:   Wt Readings from Last 1 Encounters:   10/12/21 179 lb (81.2 kg)     Mental Status:  {IP PT MENTAL STATUS:08140}    IV Access:  {[facility] IV ACCESS:152698859}    Nursing Mobility/ADLs:  Walking   {[facility] DME VUAD:500907301}  Transfer  {[facility] DME ISAU:791363251}  Bathing  {[facility] DME SWBX:512542576}  Dressing  {P DME CRAO:292159123}  Toileting  {P DME EUBU:971665491}  Feeding  {P DME TPJJ:555996307}  Med Admin  {[facility] DME BKKE:796475708}  Med Delivery   {[facility] MED Delivery:610611011}    Wound Care Documentation and Therapy:        Elimination:  Continence:   · Bowel: {YES / AN:89314}  · Bladder: {YES / ZA:99430}  Urinary Catheter: {Urinary Catheter:511758913}   Colostomy/Ileostomy/Ileal Conduit: {YES / OE:48538}       Date of Last BM: ***    Intake/Output Summary (Last 24 hours) at 10/15/2021 1721  Last data filed at 10/15/2021 0851  Gross per 24 hour   Intake 120 ml   Output --   Net 120 ml     I/O last 3 completed shifts:   In: 120 [P.O.:120]  Out: -     Safety Concerns:     [address] Concerns:736485355}    Impairments/Disabilities:      [address] Impairments/Disabilities:074428255}    Nutrition Therapy:  Current Nutrition Therapy:   [address] AddressReport Diet List:625425437}    Routes of Feeding: {CHP DME Other Feedings:133396450}  Liquids: {Slp liquid thickness:18204}  Daily Fluid Restriction: {CHP DME Yes amt example:310002370}  Last Modified Barium Swallow with Video (Video Swallowing Test): {Done Not Done FKHT:064248803}    Treatments at the Time of Hospital Discharge:   Respiratory Treatments: ***  Oxygen Therapy:  {Therapy; copd oxygen:91477}  Ventilator:    { CC Vent IQGP:001301947}    Rehab Therapies: {THERAPEUTIC INTERVENTION:8877017603}  Weight Bearing Status/Restrictions: [address] [employer]  Weight Bearin}  Other Medical Equipment (for information only, NOT a DME order):  {EQUIPMENT:517372965}  Other Treatments: ***    Patient's personal belongings (please select all that are sent with patient):  {P DME Belongings:999078953}    RN SIGNATURE:  {Esignature:081737565}    CASE MANAGEMENT/SOCIAL WORK SECTION    Inpatient Status Date: 10/12/2021    Readmission Risk Assessment Score:  Readmission Risk              Risk of Unplanned Readmission:  17           Discharging to Facility/ [address]  Address: [facility] Meredith Mendez, [address]   Phone: [phone]  Fax  [phone]        Dialysis Facility (if applicable)   · Name:  · Address:  · Dialysis Schedule:  · Phone:  · Fax:    / signature: Electronically signed by Khushi Toney RN on 10/21/21 at 12:37 PM EDT    PHYSICIAN SECTION    Prognosis: {Prognosis:8598915776}    Condition at Discharge: [address] Patient Condition:679576423}    Rehab Potential (if transferring to Rehab): {Prognosis:1858322240}    Recommended Labs or Other Treatments After Discharge: ***    Physician Certification: I certify the above information and transfer of Bj Centeno  is necessary for the continuing treatment of the diagnosis listed and that he requires {Admit to Appropriate Level of Care:52073} for {GREATER/LESS:223979966} 30 days.      Update Admission H&P: {[facility]708[facility]}    PHYSICIAN SIGNATURE:  {Esignature:639048000}

## 2021-10-15 NOTE — PROGRESS NOTES
Program  Patient Education: pt and pts daughter verb understanding  REQUIRES OT FOLLOW UP: Yes  Activity Tolerance  Activity Tolerance: Patient Tolerated treatment well;Patient limited by fatigue  Activity Tolerance: 4L of O2 during session, only one instance of SOB during LUE arm exercises. Pt fastly asleep after returning to bed  Safety Devices  Safety Devices in place: Yes  Type of devices: All fall risk precautions in place;Nurse notified; Bed alarm in place;Call light within reach; Left in bed         Patient Diagnosis(es): The primary encounter diagnosis was Bacterial pneumonia. Diagnoses of Acute hypoxemic respiratory failure ([facility] [address] Altered mental status, unspecified altered mental status type, and Multiple falls were also pertinent to this visit. has a past medical history of Allergic rhinitis, Asthma, Bilateral cataracts, BPH (benign prostatic hypertrophy), Diabetes mellitus, type 2 ([facility] [address] Diverticulosis, Eczema, Hyperlipidemia, Hypertension, Lumbar disc disease, Microalbuminuria, Obstructive sleep apnea, Osteoarthritis, Pneumonia, Post herpetic neuralgia, Recurrent upper respiratory infection (URI), Right shoulder pain, Spinal stenosis, Transient global amnesia, and Vitamin D deficiency. has a past surgical history that includes Prostate surgery (June 13, 2000). Restrictions  Position Activity Restriction  Other position/activity restrictions: up with assist  Subjective   General  Chart Reviewed: Yes  Patient assessed for rehabilitation services?: Yes  Additional Pertinent Hx: 72-year-old male with past medical history of BPH, aspiration pneumonia, hypertension, type 2 diabetes mellitus who presented to the ED via EMS due to complaints of increased weakness, lethargy and hypoxia for the past 2 days. Pt unable to stand after fall and has had poor PO intake. Pt spO2 85% with EMS- now on 4L. CT head and spine (-) ; Chest XR (+) Interval development of multifocal bilateral pulmonary opacities.   Family / Caregiver Present: Yes (pts daughter)  Referring Practitioner: Char So MD  Diagnosis: pneumonia  Subjective  Subjective: Pt seated upright in chair upon OT arrival with daughter present at bedside. Pt agreeable to OT treatment session and needing to use bathroom. Pt fatigued at end of session and returned to bed. Per pts daughter report pts cognition has been flucuating and some points throughout the day he is oriented to place but other times he is unoriented to place or time. Orientation     Objective    ADL  LE Bathing: Maximum assistance (to wash BLE with bath wipe)  LE Dressing: Dependent/Total (to exchange socks)  Toileting: Dependent/Total (for brief mngmt and hygiene following BM; Pt also urinated on self and requried total A to get cleaned up)        Balance  Sitting Balance: Minimal assistance (pt flucuated between min A and CGA 2/2 posterior lean with fatigue)  Standing Balance: Minimal assistance  Standing Balance  Time: up to 1.5 min total  Activity: stance at bedside commode for grecia care and transfers  Comment: use of 2WW  Toilet Transfers  Toilet - Technique: Ambulating  Equipment Used: Standard bedside commode  Toilet Transfer: Minimal assistance  Toilet Transfers Comments: use of 2WW ; max A to descend onto commode and min A to ascend from commode  Bed mobility  Sit to Supine: Maximum assistance (pt required A for BLE and to position shoulders)  Scootin Person assistance (pt initiated scooting self back towards middle of bed but required min A; pt then required max A x2 to boost pt in bed)  Transfers  Stand Step Transfers: Maximum assistance; Moderate assistance (max A x 1 from L side (2/2 L sided weakness) ; mod A x1 to R side (2/2 increased strength and motor planning on this side))  Sit to stand: Minimal assistance; Moderate assistance  Stand to sit: Maximum assistance  Transfer Comments: use of 2WW this date; pt initially required mod A x 1 from chair and then progressed to min Ax1 ; pt required max A x1 to for stand to sit each time 2/2 increased fatigue and pt attempted to terminate ax early                       Cognition  Overall Cognitive Status: Exceptions  Arousal/Alertness: Inconsistent responses to stimuli  Following Commands:  Follows one step commands consistently  Attention Span: Attends with cues to redirect  Memory: Decreased recall of biographical Information;Decreased short term memory  Safety Judgement: Decreased awareness of need for assistance;Decreased awareness of need for safety  Problem Solving: Decreased awareness of errors  Insights: Decreased awareness of deficits  Initiation: Requires cues for some  Sequencing: Requires cues for some  Cognition Comment: impulsive at time- pt daughter expressing his orientation continues to flucuate and he is forgetful                    Type of ROM/Therapeutic Exercise  Type of ROM/Therapeutic Exercise: AROM  Exercises  Shoulder Elevation: x10 BUE  Shoulder Flexion: 1 set of 10 with LUE while seated  unsupported on edge of bed; pt required to reach above head and hit target                    Plan   Plan  Times per week: 2-5  Times per day: Daily  Current Treatment Recommendations: Strengthening, Endurance Training, Self-Care / ADL, Functional Mobility Training, Safety Education & Training  G-Code     OutComes Score                                                  -PAC Score        [facility] Inpatient Daily Activity Raw Score: 14 (10/15/21 1521)  AM-PAC Inpatient ADL T-Scale Score : 33.39 (10/15/21 1521)  ADL Inpatient CMS 0-100% Score: 59.67 (10/15/21 1521)  ADL Inpatient CMS G-Code Modifier : CK (10/15/21 1521)    Goals  Short term goals  Time Frame for Short term goals: by d/c  Short term goal 1: Pt will perform sit<>stand transfer with SBA with 2WW as needed- not met  Short term goal 2: Pt will maintain standing balance for 1 min with no more than CGA- not met  Short term goal 3: Pt will perform UB dressing task with mod I- not

## 2021-10-16 NOTE — RT PROTOCOL NOTE
RT Nebulizer Bronchodilator Protocol Note    There is a bronchodilator order in the chart from a provider indicating to follow the RT Bronchodilator Protocol and there is an Initiate RT Bronchodilator Protocol order as well (see protocol at bottom of note). CXR Findings:  XR CHEST PORTABLE    Result Date: 10/15/2021  Increased bilateral airspace disease       The findings from the last RT Protocol Assessment were as follows:  Smoking: None or smoker <15 pack years  Respiratory Pattern: Regular pattern and RR 12-20 bpm  Breath Sounds: Slightly diminished and/or crackles  Cough: Strong, spontaneous, non-productive  Indication for Bronchodilator Therapy: Decreased or absent breath sounds  Bronchodilator Assessment Score: 2    Aerosolized bronchodilator medication orders have been revised according to the RT Nebulizer Bronchodilator Protocol below. Respiratory Therapist to perform RT Therapy Protocol Assessment initially then follow the protocol. Repeat RT Therapy Protocol Assessment PRN for score 0-3 or on second treatment, BID, and PRN for scores above 3. No Indications - adjust the frequency to every 6 hours PRN wheezing or bronchospasm, if no treatments needed after 48 hours then discontinue using Per Protocol order mode. If indication present, adjust the RT bronchodilator orders based on the Bronchodilator Assessment Score as indicated below. If a patient is on this medication at home then do not decrease Frequency below that used at home. 0-3 - enter or revise RT bronchodilator order(s) to equivalent RT Bronchodilator order with Frequency of every 4 hours PRN for wheezing or increased work of breathing using Per Protocol order mode. 4-6 - enter or revise RT Bronchodilator order(s) to two equivalent RT bronchodilator orders with one order with BID Frequency and one order with Frequency of every 4 hours PRN wheezing or increased work of breathing using Per Protocol order mode.          7-10 - enter or revise RT Bronchodilator order(s) to two equivalent RT bronchodilator orders with one order with TID Frequency and one order with Frequency of every 4 hours PRN wheezing or increased work of breathing using Per Protocol order mode. 11-13 - enter or revise RT Bronchodilator order(s) to one equivalent RT bronchodilator order with QID Frequency and an Albuterol order with Frequency of every 4 hours PRN wheezing or increased work of breathing using Per Protocol order mode. Greater than 13 - enter or revise RT Bronchodilator order(s) to one equivalent RT bronchodilator order with every 4 hours Frequency and an Albuterol order with Frequency of every 2 hours PRN wheezing or increased work of breathing using Per Protocol order mode. RT to enter RT Home Evaluation for COPD & MDI Assessment order using Per Protocol order mode.     Electronically signed by Rafi Wyatt RCP on 10/16/2021 at 2:50 PM

## 2021-10-16 NOTE — PROGRESS NOTES
Progress Note    Admit Date: 10/12/2021  Diet: ADULT DIET; Dysphagia - Minced and Moist; 4 carb choices (60 gm/meal); Mildly Thick (Nectar)    CC:   Chief Complaint   Patient presents with    Fall     FELL 2 DAY AGO     Fatigue     WORSENING WEAKNESS AND LETHARGIC         Interval history:   Examined this morning a.m. with his daughter at bedside. Reports feeling tired. Denies any shortness of breath, chest pain. Medications:     Scheduled Meds:   potassium bicarb-citric acid  40 mEq Oral Once    senna  1 tablet Oral Nightly    piperacillin-tazobactam  3,375 mg IntraVENous Q8H    carvedilol  6.25 mg Oral BID WC    vitamin D  5,000 Units Oral Daily    finasteride  5 mg Oral Daily    nystatin  500,000 Units Oral 4x Daily    [Held by provider] tamsulosin  0.4 mg Oral Daily    sodium chloride flush  5-40 mL IntraVENous 2 times per day    heparin (porcine)  5,000 Units SubCUTAneous 3 times per day    insulin lispro  0-6 Units SubCUTAneous Q4H    insulin glargine  10 Units SubCUTAneous BID     Continuous Infusions:   sodium chloride 25 mL (10/15/21 0044)    dextrose       PRN Meds:albuterol, sodium chloride flush, sodium chloride, ondansetron **OR** ondansetron, polyethylene glycol, acetaminophen **OR** acetaminophen, glucose, dextrose, glucagon (rDNA), dextrose, nitroGLYCERIN    Objective:   Vitals:   T-max:  Patient Vitals for the past 8 hrs:   BP Temp Temp src Pulse Resp SpO2   10/16/21 0800 114/69 98.2 °F (36.8 °C) Oral 72 16 91 %       Intake/Output Summary (Last 24 hours) at 10/16/2021 1208  Last data filed at 10/16/2021 0841  Gross per 24 hour   Intake 20 ml   Output 150 ml   Net -130 ml       Review of Systems  As Per HPI and Interval History. Physical Exam  Constitutional: Alert, normal appearance, no acute distress. Obese. HENT: head normocephalic/atraumatic, no congestion or rhinorrhea, PERRLA, EOM intact  Cardio: normal rate, regular rhythm.  No murmurs heard on auscultation  Pulmonary: clear to auscultation bilaterally, no wheezes, rales, or stridor. Bibasilar Rales. GI: abdomen soft, flat, and non-distended. No tenderness or guarding. Musculoskeletal: no deformity, tenderness, or injury. No lower extremity edema. Not awake enough or a strength exam.   Skin: no lesion, rash, or erythema  Neuro: no focal neurological deficits. Patients is alert to himself and his family, he is not oriented to place or situation. Patient's daughter was used to translate. Psych: mood normal, behavior normal    LABS:    CBC:   Recent Labs     10/14/21  1027 10/15/21  0624 10/16/21  0804   WBC 3.5* 4.4 3.6*   HGB 10.8* 11.3* 11.1*   HCT 32.4* 33.8* 33.0*    159 150   MCV 95.2 95.1 94.0     Renal:    Recent Labs     10/15/21  0624 10/15/21  1813 10/16/21  0804   * 142 143   K 2.9* 3.5 3.0*   * 108 109   CO2 23 23 24   BUN 19 18 16   CREATININE 1.3 1.3 1.3   GLUCOSE 97 155* 115*   CALCIUM 9.8 9.6 9.5   MG 1.60* 2.00 1.80   PHOS  --  2.9  --    ANIONGAP 11 11 10     Hepatic:   Recent Labs     10/15/21  1813   LABALBU 2.6*     Cultures:  -----------------------------------------------------------------  RAD:   XR CHEST PORTABLE   Final Result      Increased bilateral airspace disease             FL MODIFIED BARIUM SWALLOW W VIDEO   Final Result      1. Intermittent silent penetration and aspiration with thin liquids. 2.  No evidence of penetration or aspiration with nectar thick liquids or puree consistencies. Please correlate with the speech pathology report for additional details. XR ABDOMEN (KUB) (SINGLE AP VIEW)   Final Result     No acute findings in the abdomen or pelvis. No evidence of bowel    obstruction. CT Cervical Spine WO Contrast   Final Result   1. No acute abnormality in the head or cervical spine. CT Head WO Contrast   Final Result   1. No acute abnormality in the head or cervical spine.       XR CHEST PORTABLE   Final Result Impression: Interval development of multifocal bilateral pulmonary opacities. Assessment/Plan:     Hypoxia 2/2 Aspiration pneumonia  -He is on Zosyn. MRSA nasal swab was negative and thus linezolid was discontinued.   -Has been requiring 4 L of nasal cannula oxygen. Was started on IV fluids due to hypernatremia. Now x-ray with pulmonary vascular congestion. He may have a component of volume overload. -Given 10 mg of IV Lasix. Wean nasal cannula oxygen to maintain O2 sats greater than 89%. #HA on CKD stage 3a, likely prerenal  -Patient had poor p.o. intake for the past few days prior to admission  Initially got IV fluids due to hypernatremia. Now stopped given signs of volume overload on chest x-ray.   -His creatinine is now baseline    Hypernatremia  -Likely secondary to poor p.o. intake. Was initially on half-normal saline. His sodium is now with normalized. IV fluids been stopped. #BPH  -Continue home finasteride  -Held home tamsulosin, can consider adding non-specific alpha-1 blocker if patient is retaining  -Bladder scan q6 to evaluate for urinary retention     #MDD  -Discontinued Wellbutrin due concerns for serotonin syndrome interaction w/ linezolid.     #Type 2 diabetes mellitus  -Changed to Lantus 10 Units BID, LDSSI  -BG has been between (118-101) over night and this AM  -Will monitor closely to avoid hypoglycemia.     #Essential hypertension  -continue home Coreg 6.25 oral BID      #Multiple Falls  -CT head negative  -Likely mechanical in nature  -Consult PT/OT     Code Status: Full Code  FEN: ADULT DIET;  Dysphagia - Minced and Moist; 4 carb choices (60 gm/meal); Mildly Thick (Nectar)  PPX: Heparin 5,000 Units q8 hr  DISPO: Forrest Jiang MD, PGY-3  10/16/21  12:08 PM    This patient has been staffed and discussed with Hanna Qureshi MD.

## 2021-10-17 NOTE — PROGRESS NOTES
auscultation  Pulmonary: clear to auscultation bilaterally, no wheezes, rales, or stridor. Bibasilar rales to auscultation. GI: abdomen soft, flat, and non-distended. No tenderness or guarding. Musculoskeletal: no deformity, tenderness, or injury. No lower extremity edema. Patient 4/5 strength in upper and lower extremities. Skin: no lesion, rash, or erythema  Neuro: no focal neurological deficits. Patient alert to himself or family. He has fluctuating cognition, sometimes being alert to place and situation but other times not. Psych: mood normal, behavior normal    LABS:    CBC:   Recent Labs     10/15/21  0624 10/16/21  0804 10/17/21  0707   WBC 4.4 3.6* 3.0*   HGB 11.3* 11.1* 11.3*   HCT 33.8* 33.0* 34.1*    150 see below   MCV 95.1 94.0 95.1     Renal:    Recent Labs     10/15/21  1813 10/16/21  0804 10/17/21  0707    143 145   K 3.5 3.0* 3.6    109 110   CO2 23 24 23   BUN 18 16 16   CREATININE 1.3 1.3 1.3   GLUCOSE 155* 115* 108*   CALCIUM 9.6 9.5 9.7   MG 2.00 1.80 1.90   PHOS 2.9  --   --    ANIONGAP 11 10 12     Hepatic:   Recent Labs     10/15/21  1813   LABALBU 2.6*     Troponin: No results for input(s): TROPONINI in the last 72 hours. BNP: No results for input(s): BNP in the last 72 hours. Lipids: No results for input(s): CHOL, HDL in the last 72 hours. Invalid input(s): LDLCALCU, TRIGLYCERIDE  ABGs:  No results for input(s): PHART, PJY6CAY, PO2ART, ABH1PAX, BEART, THGBART, W6NPVRVK, ALS2MIU in the last 72 hours. INR: No results for input(s): INR in the last 72 hours. Lactate: No results for input(s): LACTATE in the last 72 hours. Cultures:  -----------------------------------------------------------------  RAD:   XR CHEST PORTABLE   Final Result      Increased bilateral airspace disease             FL MODIFIED BARIUM SWALLOW W VIDEO   Final Result      1. Intermittent silent penetration and aspiration with thin liquids.    2.  No evidence of penetration or aspiration with nectar thick liquids or puree consistencies. Please correlate with the speech pathology report for additional details. XR ABDOMEN (KUB) (SINGLE AP VIEW)   Final Result     No acute findings in the abdomen or pelvis. No evidence of bowel    obstruction. CT Cervical Spine WO Contrast   Final Result   1. No acute abnormality in the head or cervical spine. CT Head WO Contrast   Final Result   1. No acute abnormality in the head or cervical spine. XR CHEST PORTABLE   Final Result   Impression: Interval development of multifocal bilateral pulmonary opacities. Assessment/Plan:   Patient is a 71-year-old male with past medical history of BPH, aspiration pneumonia, hypertension, type 2 diabetes mellitus who presented to the ED via EMS due to two days of increased weakness, lethargy and hypoxia. #Suspected aspiration pneumonia  -(10/12-)Zosyn 3375 mg every 8 hours &   -(10/13) Modified Barium swallow y/d showed: 1.) intermittent silent penetration and aspiration w/ thin liquids. 2.) No evidence of penetration or aspiration w/ nectar thick liquids or puree consistencies. -(10/15) Chest Xray shows pulmonary vascular congestion, may have component of volume overload. Fluids discontinued. -(10/16) MRSA swab negative discontinued Linezolid  -Try to wean down oxygen, try to keep oxygen over 89%  -Repeat CXR, possibly consult pulmonology tomorrow if patient remains on 4 L NC     #HA on CKD stage 3a, likely prerenal  -Patient had poor p.o. intake for the past few days  -KUB: shows no obstructive process  -IV fluids at 75 mL/h  -BUN 19 today was 41 on admission. Cr 1.3 today was 1.5 on admission.  Baseline: 1.1-1.3.     #Hypernatremia  -Na was 146 this AM from 148 yesterday,   -continue 0.45% sodium chloride infusion     #BPH  -Continue home finasteride  -Held home tamsulosin, can consider adding non-specific alpha-1 blocker if patient is retaining  -Bladder scan q6 to evaluate for urinary retention     #MDD  -Discontinued Wellbutrin due concerns for serotonin syndrome interaction w/ linezolid.     #Type 2 diabetes mellitus  -Changed to Lantus 10 Units BID, LDSSI  -BG has been between (118-101) over night and this AM  -Will monitor closely to avoid hypoglycemia.     #Essential hypertension  -continue home Coreg 6.25 oral BID      #Multiple Falls  -CT head negative  -Likely mechanical in nature  -Consult PT/OT    Code Status: Full Code  FEN: ADULT DIET;  Dysphagia - Minced and Moist; 4 carb choices (60 gm/meal); Mildly Thick (Nectar)  PPX: Heparin 5,000 units q8 hrs  DISPO: Kitty Adames MD, PGY-1  10/17/21  8:56 AM    This patient has been staffed and discussed with Nancy Sena MD.

## 2021-10-17 NOTE — CARE COORDINATION
SW following, ARTIE spoke w/the Pt's dtr who is interested in getting a hospital bed for the Pt. ARTIE called Leonora [phone] and MARLA for [facility] w/Pt info and request for her to meet w/Pt on Monday.     Electronically signed by JAYME Ocasio, CHENGW on 10/17/2021 at 1:31 PM   [phone]

## 2021-10-18 NOTE — PROGRESS NOTES
Physician Progress Note      Jose Spence  [facility] #:                  [id number]  :                       1935  ADMIT DATE:       10/12/2021 4:38 PM  [address] [place] DATE:  RESPONDING  PROVIDER #:        CRISTA RAMOS          QUERY TEXT:    Patient admitted with aspiration PNA. Noted documentation of acute respiratory   failure in Pulmonology consult 10/18. In order to support the diagnosis of   acute respiratory failure, please include additional clinical indicators in   your documentation. Or please document if the diagnosis of acute respiratory   failure has been ruled out after further study. The medical record reflects the following:  Risk Factors: 80 yo w/ aspiration pna  Clinical Indicators: Per Pulm 10/13: Acute hypoxic respiratory failure. Per   ED: No respiratory distress. Mildly increased work of breathing on nasal   cannula. sats 87% on room air, RR 16-24. Per pN 10/16: Denies any shortness   of breath. Per PN 10/16: Hypoxia 2/2 Aspiration pneumonia. Treatment: 2-4L    Acute Respiratory Failure Clinical Indicators per [employer] Training Guide and   Quick Reference Guide:  pO2 < 60 mmHg or SpO2 (pulse oximetry) < 91% breathing room air  pCO2 > 50 and pH < 7.35  P/F ratio (pO2 / FIO2) < 300  pO2 decrease or pCO2 increase by 10 mmHg from baseline (if known)  Supplemental oxygen of 40% or more  Presence of respiratory distress, tachypnea, dyspnea, shortness of breath,   wheezing  Unable to speak in complete sentences  Use of accessory muscles to breathe  Extreme anxiety and feeling of impending doom  Tripod position  Confusion/altered mental status/obtunded  Options provided:  -- Acute Respiratory Failure ruled out after study  -- Acute Respiratory Failure as evidenced by, Please document evidence.   -- Other - I will add my own diagnosis  -- Disagree - Not applicable / Not valid  -- Disagree - Clinically unable to determine / Unknown  -- Refer to Clinical Documentation Reviewer    PROVIDER RESPONSE TEXT:    This patient is in acute respiratory failure as evidenced by SpO2 < 91%   breathing room air    Query created by: Pa Walker on 10/18/2021 1:29 PM      Electronically signed by:  Sara Hendrix 10/18/2021 1:34 PM

## 2021-10-18 NOTE — CONSULTS
Pulmonology Consult Note  PGY-2    PCP: Jey Lee MD    Code:Full Code  Admit Date: 10/12/2021  Diet: ADULT DIET; Dysphagia - Minced and Moist; 4 carb choices (60 gm/meal); Mildly Thick (Nectar)      History of present illness:      CC: Increased weakness, lethargy, and shortness of breath    Patient is a 81 y.o. male with a PMHx of BPH, hyperlipidemia, CAD, diabetic neuropathy, hypertension, diabetes mellitus presenting with chief complaint of increased weakness, lethargy, and shortness of breath for 2 days. According to patient's daughter, patient has been fairly weak and lethargic for 2 days. The day before presenting to the ED, patient became very weak and was not able to walk using his walker. According to the daughter, patient was sleepy and his oxygen saturation kept falling. Oxygen saturation was 87% on room air. Therefore, they decided to call EMS and bring him to the hospital.  She also noticed that patient had decreased oral intake for 2 days. Patient had 3-4 falls in the past 2 months after being discharged but did not hit his head. Per chart, patient was admitted in August for altered mental status secondary to aspiration pneumonia and HA. Patient was treated with IV antibiotics and fluids. In the ED, temperature 97.3 °F, respiratory rate 16, heart rate 89, blood pressure 122/68, SPO2 94% on 4 L nasal cannula. Labs were significant for sodium 152, potassium 3.4, BUN 40, creatinine 1.7, calcium 11.9. CT head and cervical spine was done and showed no acute abnormality in the head or cervical spine. Chest x-ray was done and showed interval development of multifocal bilateral pulmonary opacities. Patient was admitted for further evaluation. Zosyn and Linezolid were started for aspiration pneumonia. Patient was also started on IVF for HA. ROS: Review of Systems -   All other systems reviewed and are negative.     Past Medical / Surgical History:    Past Medical History: Diagnosis Date    Allergic rhinitis 08/27/2010    Asthma     Bilateral cataracts     BPH (benign prostatic hypertrophy)     Diabetes mellitus, type 2 ([facility] [address] 05/28/2010    Diverticulosis     Eczema 03/11/2011    Hyperlipidemia     Hypertension     Lumbar disc disease     Microalbuminuria 06/13/2013    Obstructive sleep apnea     Osteoarthritis     Pneumonia 10/12/2021    Post herpetic neuralgia 06/15/2012    Recurrent upper respiratory infection (URI)     Right shoulder pain 06/17/2011    Spinal stenosis     Transient global amnesia     Vitamin D deficiency 03/17/2012     Past Surgical History:   Procedure Laterality Date    PROSTATE SURGERY  June 13, 2000    TURP       Medications Prior to Admission:    No current facility-administered medications on file prior to encounter. Current Outpatient Medications on File Prior to Encounter   Medication Sig Dispense Refill    VICTOZA 18 MG/3ML SOPN SC injection ADMINISTER 1.8 MG UNDER THE SKIN DAILY 9 mL 3    Continuous Blood Gluc Sensor (Cream.HRSTYLE KARISSA 14 DAY SENSOR) MISC USE EVERY 14 DAYS AS DIRECTED 2 each 3    buPROPion (WELLBUTRIN SR) 100 MG extended release tablet Take 1 tablet by mouth daily 30 tablet 3    diclofenac sodium (VOLTAREN) 1 % GEL APPLY 2 GRAM TOPICALLY FOUR TIMES DAILY AS NEEDED FOR PAIN 200 g 3    glipiZIDE (GLUCOTROL) 5 MG tablet TAKE 1/2 TABLET BY MOUTH TWICE DAILY (Patient taking differently: 5 mg TAKE 1 TABLET BY MOUTH TWICE DAILY) 90 tablet 0    carvedilol (COREG) 6.25 MG tablet TAKE 1 TABLET BY MOUTH TWICE DAILY 180 tablet 2    tamsulosin (FLOMAX) 0.4 MG capsule TAKE 2 CAPSULES BY MOUTH EVERY NIGHT 180 capsule 0    finasteride (PROSCAR) 5 MG tablet TAKE 1 TABLET BY MOUTH DAILY 90 tablet 3    Cholecalciferol (VITAMIN D3) 125 MCG (5000 UT) CAPS TAKE ONE CAPSULE BY MOUTH EVERY DAY 90 capsule 1    dronabinol (MARINOL) 2.5 MG capsule Take 1 capsule by mouth 2 times daily (before meals) for 30 days.  60 capsule 0    nystatin (MYCOSTATIN) 134578 UNIT/ML suspension Take 5 mLs by mouth 4 times daily 140 mL 1    nitroGLYCERIN (NITROSTAT) 0.4 MG SL tablet Place 1 tablet under the tongue every 5 minutes as needed for Chest pain May repeat every 5 minutes until relief is obtained. If pain persists after taking 3 tabs in a 15-minute period, call 9-1-1 immediately. 25 tablet 12       Allergies:  Tramadol    Social History:   TOBACCO:   reports that he has never smoked. He has never used smokeless tobacco.       ETOH:   reports current alcohol use. Family History:       Problem Relation Age of Onset    Diabetes Mother     Diabetes Brother        Vital/I&O/Physical examination:   VS:  BP (!) 143/73   Pulse 84   Temp 98.4 °F (36.9 °C) (Oral)   Resp 18   Ht 5' 2\" (1.575 m)   Wt 179 lb (81.2 kg)   SpO2 (!) 88%   BMI 32.74 kg/m²     I/O:    Intake/Output Summary (Last 24 hours) at 10/18/2021 0838  Last data filed at 10/18/2021 0302  Gross per 24 hour   Intake 310 ml   Output --   Net 310 ml       PE:  Physical Exam  Vitals reviewed. HENT:      Head: Normocephalic and atraumatic. Nose: Nose normal.      Mouth/Throat:      Mouth: Mucous membranes are moist.   Eyes:      Extraocular Movements: Extraocular movements intact. Conjunctiva/sclera: Conjunctivae normal.      Pupils: Pupils are equal, round, and reactive to light. Cardiovascular:      Rate and Rhythm: Normal rate and regular rhythm. Pulses: Normal pulses. Heart sounds: Normal heart sounds. Pulmonary:      Comments: Diffuse crackle on lung auscultation, worse on the left side. On 4 L of nasal cannula  Abdominal:      Palpations: Abdomen is soft. Musculoskeletal:         General: Normal range of motion. Cervical back: Normal range of motion and neck supple. Neurological:      General: No focal deficit present. Mental Status: He is alert and oriented to person, place, and time.            Labs & Imaging:   LABS:  CBC:   Recent Labs 10/16/21  0804 10/17/21  0707 10/18/21  0704   WBC 3.6* 3.0* 3.6*   HGB 11.1* 11.3* 11.3*   HCT 33.0* 34.1* 34.4*    see below 157   MCV 94.0 95.1 95.0                            Renal:   Recent Labs     10/15/21  1813 10/16/21  0804 10/17/21  0707    143 145   K 3.5 3.0* 3.6    109 110   CO2 23 24 23   BUN 18 16 16   CREATININE 1.3 1.3 1.3   GLUCOSE 155* 115* 108*   ANIONGAP 11 10 12     Hepatic: No results for input(s): AST, ALT, ALB, BILITOT, ALKPHOS in the last 72 hours. Troponin: No results for input(s): TROPONINI in the last 72 hours. BNP: No results for input(s): BNP in the last 72 hours. Lipids: No results for input(s): CHOL, HDL in the last 72 hours. Invalid input(s): LDLCALCU, TRIGLYCERIDE  INR: No results for input(s): INR in the last 72 hours. Lactate: No results for input(s): LACTATE in the last 72 hours. ABGs:No results for input(s): PHART, FGT9BYZ, PO2ART, ZZX4BOT, BEART, THGBART, P4VSTNOP, ZHB7EBB in the last 72 hours. UA:No results for input(s): NITRITE, COLORU, PHUR, LABCAST, WBCUA, RBCUA, MUCUS, TRICHOMONAS, YEAST, BACTERIA, CLARITYU, SPECGRAV, LEUKOCYTESUR, UROBILINOGEN, BILIRUBINUR, BLOODU, GLUCOSEU, AMORPHOUS in the last 72 hours. Invalid input(s): KETONESU     IMAGING:  XR CHEST PORTABLE   Final Result   1. Multifocal bilateral airspace disease slightly improved along bases and slightly worsened in the upper lungs. XR CHEST PORTABLE   Final Result      Increased bilateral airspace disease             FL MODIFIED BARIUM SWALLOW W VIDEO   Final Result      1. Intermittent silent penetration and aspiration with thin liquids. 2.  No evidence of penetration or aspiration with nectar thick liquids or puree consistencies. Please correlate with the speech pathology report for additional details. XR ABDOMEN (KUB) (SINGLE AP VIEW)   Final Result     No acute findings in the abdomen or pelvis. No evidence of bowel    obstruction.           CT Cervical Spine WO Contrast   Final Result   1. No acute abnormality in the head or cervical spine. CT Head WO Contrast   Final Result   1. No acute abnormality in the head or cervical spine. XR CHEST PORTABLE   Final Result   Impression: Interval development of multifocal bilateral pulmonary opacities. Assessment & Plan:    Anna Oakley is a 81 y.o. male with PMHx significant for of BPH, hyperlipidemia, CAD, diabetic neuropathy, hypertension, diabetes mellitus presenting with chief complaint of increased weakness, lethargy, and shortness of breath for 2 days. Acute hypoxic respiratory failure:  -Secondary to most likely viral pneumonia.  -Patient presented with shortness of breath. He was lethargic and had decrease in his appetite. Oxygen saturation was 87% on room air. He required 4 L of nasal cannula. Patient was afebrile and WBC was within normal limit. Pro-Calc was 0.35. Patient has been vaccinated for Covid vaccine in April, 2021.  -Chest x-ray was done and showed interval development of multifocal bilateral pulmonary opacity. On lung exam, patient had diffuse crackles worse on the left side.  -Patient has been requiring oxygen despite on antibiotic.  -We will order respiratory panel, including Covid test.  -Check CRP, ferritin, D-dimer, LD  -If Covid test is negative, will get CT chest.    Diabetes mellitus:  -Lantus 10 unit  -Low-dose sliding scale    HA on CKD and hyponatremia:  -Resolved with IV fluid      Code Status: Full Code  ADULT DIET; Dysphagia - Minced and Moist; 4 carb choices (60 gm/meal); Mildly Thick (Nectar)  DVT PPX: Lovenox       This patient will be discussed with attending, Dr. Earl Dhillon MD.    Jody Bowers MD, PGY- 2  Contact via [facility]  10/18/2021,  8:38 AM     Patient seen, examined and discussed with the resident and I agree with the assessment and plan.   Briefly, this is a 81 y.o. male  with acute hypoxemic respiratory failure  Patient does not provide April of this year but did not get a booster. If his Covid test does come back positive then he would be appropriate for a Decadron course of 6 mg/day for 10 days and with the CRP of 128 consideration could be given for tocilizumab although I am not sure, since he is stable he on 4 L of oxygen if an immune modulator would be in his best interest given his risk for bacterial superinfection. Additionally if he does test positive he has reliable follow-up with his family and home pulse oximeter such that he might be able to go home on 4 L of oxygen and can still have his family around him since they have already been exposed. Hopefully we get the test results back in short order as he will not be able to have visitors despite his communication limitations.       William Scott MD

## 2021-10-18 NOTE — CARE COORDINATION
Case Management Assessment           Daily Note                 Date/ Time of Note: 10/18/2021 10:10 AM         Note completed by: Lis Mckeon RN    Patient Name: Rai Villafuerte  YOB: 1935    Diagnosis:Pneumonia [J18.9]  Bacterial pneumonia [J15.9]  Multiple falls [R29.6]  Altered mental status, unspecified altered mental status type [R41.82]  Acute hypoxemic respiratory failure ([address] [J96.01]  Patient Admission Status: Inpatient    Date of Admission:10/12/2021  4:38 PM Length of Stay: 6 GLOS: GMLOS: 4    Current Plan of Care: IV ABX,   :Suspected aspiration pneumoniaSLP eval, :  Mince/Moist  Nectar thick liquid     02-  Wean  currently requires  4 L nc  ( none @ baseline)  ________________________________________________________________________________________  PT AM-PAC: 14 / 24 per last evaluation on: 10/14/2021    OT AM-PAC: 14 / 24 per last evaluation on:  10/14/2021    DME Needs for discharge:   NA  Has  Needed  DME   ________________________________________________________________________________________  Discharge Plan: dgt and Pt want to return home and continue with 24 hr private duty care  [facility]    Tentative discharge date: TBD    Current barriers to discharge: medical clearance; need [place]     Referrals completed:  NERY  ________________________________________________________________________________________  Case Management Notes:    PATRICA spoke with Doris Astudillo  To  Get  update  On d/c planning  : ( pt  Does not speak Kaela Aguilar  ( [place]   Needed:  Dgtr  At  bedside  [address]:  [phone]    - Pt's dgt plans for Pt to return home at [facility] and continue with his 24 hr private duty care. - Pt also has skilled [facility] 1 does not have Home 02. May need  Home O 2 eval prior to discharge  If  Not able to wean . Dgtr  Aware and agreeable     -  Patient daughter  Interested  In Hospital bed  At  D/C  as well.   Patrica made referral to  Kelley Client with  [employer]:  Who is  Meeting with dgtr at  Bedside to discuss:      ( of note)  - Pt was at [facility] in August of this year and then DC back home. Roberto and his family were provided with choice of provider; he and his family are in agreement with the discharge plan.     Care Transition Patient: Holli He RN  [facility]  Case Management Department  Ph: [phone]

## 2021-10-18 NOTE — DISCHARGE SUMMARY
INTERNAL MEDICINE DEPARTMENT AT [address] [place] [address]  DISCHARGE SUMMARY    Patient ID: Anneliese Moscoso                                             Discharge Date: 10/21/2021   Patient's PCP: Marven Dakin, MD                                          Discharge Physician: Marven Dakin, MD  Admit Date: 10/12/2021   Admitting Physician: Marven Dakin, MD    PROBLEMS DURING HOSPITALIZATION:  Present on Admission:   Multifocal pneumonia   Essential hypertension   Diabetes mellitus type 2 in obese ([address]   Acute renal failure superimposed on stage 3a chronic kidney disease ([address]   Oropharyngeal dysphagia   Bacterial pneumonia    DISCHARGE DIAGNOSES:  Hypoxia secondary to multifocal pneumonia  Acute renal failure superimposed on stage IIIa chronic kidney disease  Physical deconditioning  Oropharyngeal dysphagia  Diabetes mellitus type 2 in obese    HPI:  Anneliese Moscoso is a 78-year-old male with past medical history of BPH, aspiration pneumonia, hypertension, type 2 diabetes mellitus who presented to the ED on 10/12 via EMS due to complaints of increased weakness, lethargy and hypoxia for the past 2 days. The patient speaks only [place] and history was acquired through his daughter which is his primary caretaker. According to the patient's daughter patient has been fairly weak and lethargic for the past 2 days and yesterday the patient became very weak and was not able to even walk using his walker. According to her the patient was too sleepy and his oxygen saturation kept falling and it was noted as 87% today and therefore they decided to come to the ED for further evaluation. She also noticed that patient had poor p.o. intake for the past couple of days. Patient had 3-4 falls in the past 2 months after being discharged but did not hit his head. Patient was admitted in August for altered mental status and acute kidney injury secondary to aspiration pneumonia and was treated on IV antibiotics and fluids.   He was tested negative for Covid and influenza. Echocardiogram was consistent with left ventricular ejection fraction of 55 to 60%, mild to moderate tricuspid regurgitation and estimated pulmonary artery systolic pressure of 59 mmHg. Patient has been compliant with all his medications at home. According to patient's daughter patient has urinary complaints due to his history of BPH for which he is on medications. In the ED patient was afebrile, chest x-ray showed multifocal bilateral opacities. Patient was started on Zosyn because during his last hospitalization with aspiration pneumonia, he was responsive to it. Blood cultures were ordered before starting the antibiotics and patient was started on oxygen 4 L with maintaining saturation above 95%. The patient was not on oxygen at baseline. The patient was admitted to the medical service for further workup and care. Hospital Course    On admission the patient was started on zosyn and linezolid for presumed aspiration pneumonia. The patient also was started on NS IVF due to HA w/ BUN 41 on admission and creatinine of 1.5 on admission. Patient was also hypernatremia w/ Na of 154 thought secondary to decreased oral intake. The patient was also started on 10 units of lantis BID w LDSSI for blood sugar management. OT/PT/SLP were consulted and help management. On 10/14 patient still required 4L of O2, and had rales in bilateral lower lobes. The patient's HA resolved secondary to IV fluids. Modified barium swallow showed intermittent silent penetration and aspiration w/ thin liquids and no evidence of penetration or aspiration w/ nectar thick liquids or puree consistencies. The patient was stared on dysphagia II minced and moist solids/nectar per SLP recommendations. 10/15 patient's clinical status was unchanged and patient was stared on D5 1/2 NS for hypernatremia.  Wellbutrin was discontinued per families request and tamsulosin was discontinued since it could not be crushed. A new general alpha 1 blocker wasn't started to avoid hypotension . A repeat CXR showed increased bilateral airspace disease that could be secondary to fluid overload so D5 1/2 NS was stopped. On 10/16 the patient's MRSA swab came back negative and patient's linezolid was discontinued. On 10/17 patient's HA and hypernatremia were corrected, however patient was still requiring 4 L of oxygen to maintain saturation. Repeat CXR showed multifocal bilateral airspace disease slightly improved along bases and slightly worsened in the upper lungs. 10/18 Pulmonology was consulted due to new oxygen requirement in the setting of adequate antibiotic treatment for aspiration pneumonia. There was suspicion for COVID-pneumonia so COVID PCR test was ordered and inflammatory markers were ferritin = 577 and D-dimer =488. Patient's Covid PCR was negative, and viral panel was otherwise negative. The patient completed a 7-day course of Zosyn for a suspected aspiration pneumonia, however after extensive consultation with the pulmonologist it is possible that this was a resolving viral pneumonia. The patient CT scan demonstrates bilateral interstitial and apical abnormalities which would be more consistent with a viral etiology, however since the patient is not currently in the shedding phase it would be difficult to prove this as normal testing would give a positive result. Given the concern for resolving viral pneumonia, or a possible interstitial lung disease the patient was placed on Decadron which demonstrated clinical improvement. Patient was found to need 2 L of oxygen per a home O2 eval, and additionally consultation was made with case management to ensure that the patient's home health care needs were adequately met. Per patient's family, they would appreciate if he would be discharged home further care.   On the day of discharge patient was found to be mildly hypokalemic to 3.2, and patient was given a oral replacement of potassium. Patient medically appropriate for discharge with ongoing treatment of oral Decadron to assist in his recovery which will likely be an extended period of time with slow weaning of his oxygen. Patient to see his PCP in 1 week. Patient discharged with 10 units of lantus BID to help control blood sugars while on steroids for the next week. Physical Exam:  /77   Pulse 89   Temp 97.6 °F (36.4 °C) (Oral)   Resp 16   Ht 5' 2\" (1.575 m)   Wt 147 lb 7.8 oz (66.9 kg)   SpO2 92%   BMI 26.98 kg/m²     Consults: pulmonary/intensive care  Significant Diagnostic Studies:  CT scan Chest  Treatments: antibiotics: Zosyn and Linezolid, Decadron  Disposition: home  Discharged Condition: Stable  Follow Up: Primary Care Physician in one week    DISCHARGE MEDICATION:     Medication List      START taking these medications    dexamethasone 6 MG tablet  Commonly known as: DECADRON  Take 1 tablet by mouth daily for 7 days     Lantus SoloStar 100 UNIT/ML injection pen  Generic drug: insulin glargine  Inject 10 Units into the skin 2 times daily        CONTINUE taking these medications    buPROPion 100 MG extended release tablet  Commonly known as: Wellbutrin SR  Take 1 tablet by mouth daily     carvedilol 6.25 MG tablet  Commonly known as: COREG  TAKE 1 TABLET BY MOUTH TWICE DAILY     diclofenac sodium 1 % Gel  Commonly known as: VOLTAREN  APPLY 2 GRAM TOPICALLY FOUR TIMES DAILY AS NEEDED FOR PAIN     dronabinol 2.5 MG capsule  Commonly known as: Marinol  Take 1 capsule by mouth 2 times daily (before meals) for 30 days. finasteride 5 MG tablet  Commonly known as: PROSCAR  TAKE 1 TABLET BY MOUTH DAILY     FreeStyle Clay 14 Day Sensor Misc  USE EVERY 14 DAYS AS DIRECTED     nitroGLYCERIN 0.4 MG SL tablet  Commonly known as: Nitrostat  Place 1 tablet under the tongue every 5 minutes as needed for Chest pain May repeat every 5 minutes until relief is obtained.  If pain persists after taking 3 tabs in a 15-minute period, call 9-1-1 immediately. nystatin 012717 UNIT/ML suspension  Commonly known as: MYCOSTATIN  Take 5 mLs by mouth 4 times daily     tamsulosin 0.4 MG capsule  Commonly known as: FLOMAX  TAKE 2 CAPSULES BY MOUTH EVERY NIGHT     vitamin D3 125 MCG (5000 UT) Caps  TAKE ONE CAPSULE BY MOUTH EVERY DAY        STOP taking these medications    glipiZIDE 5 MG tablet  Commonly known as: GLUCOTROL     Victoza 18 MG/3ML Sopn SC injection  Generic drug: Liraglutide           Where to Get Your Medications      These medications were sent to [facility], [address] - P [phone] - F [phone] [address] [facility], [facility] [address]    Phone: [phone]   · dexamethasone 6 MG tablet  · Lantus SoloStar 100 UNIT/ML injection pen       Activity: ambulate in house  Diet: Dysphagia - Minced/Moist  Wound Care: none needed    Time Spent on discharge is more than 45 minutes    Signed:  Alberta Roa, PGY-1  10/21/2021     Addendum to Resident H& P/Progress note:  I have personally seen,examined and evaluated the patient. I have reviewed the current history, physical findings, labs and assessment and plan and agree with note as documented by resident MD ( Alona Owens)  Discussed the patient's condition and discharge plans with his daughter, Citlalli Jose.     Dallin Gold MD, FACP

## 2021-10-18 NOTE — PROGRESS NOTES
Progress Note    Admit Date: 10/12/2021  Day: 6  Diet: ADULT DIET; Dysphagia - Minced and Moist; 4 carb choices (60 gm/meal); Mildly Thick (Nectar)    CC: weakness, lethargy, and hypoxia    Interval history: Patient examined this morning lying in bed. Per nursing patient had a few episodes of incontinence overnight. The patient remains on 4 L of O2 nasal cannula, have been unable to titrate down oxygen the past couple days. Patient is on day 6 of zosyn for suspected aspiration pneumonia. Patient denies chest pain, abdominal pain, and N/V.       Medications:     Scheduled Meds:   potassium bicarb-citric acid  40 mEq Oral Once    enoxaparin  40 mg SubCUTAneous Daily    senna  1 tablet Oral Nightly    piperacillin-tazobactam  3,375 mg IntraVENous Q8H    carvedilol  6.25 mg Oral BID WC    vitamin D  5,000 Units Oral Daily    finasteride  5 mg Oral Daily    nystatin  500,000 Units Oral 4x Daily    [Held by provider] tamsulosin  0.4 mg Oral Daily    sodium chloride flush  5-40 mL IntraVENous 2 times per day    insulin lispro  0-6 Units SubCUTAneous Q4H    insulin glargine  10 Units SubCUTAneous BID     Continuous Infusions:   sodium chloride 25 mL (10/17/21 1353)    dextrose       PRN Meds:albuterol, sodium chloride flush, sodium chloride, ondansetron **OR** ondansetron, polyethylene glycol, acetaminophen **OR** acetaminophen, glucose, dextrose, glucagon (rDNA), dextrose, nitroGLYCERIN    Objective:   Vitals:   T-max:  Patient Vitals for the past 8 hrs:   BP Temp Temp src Pulse Resp SpO2   10/18/21 0520 -- 99 °F (37.2 °C) Axillary -- -- --   10/18/21 0337 116/65 99.6 °F (37.6 °C) Axillary 81 18 93 %   10/18/21 0047 130/68 99 °F (37.2 °C) Oral 77 18 90 %       Intake/Output Summary (Last 24 hours) at 10/18/2021 0653  Last data filed at 10/18/2021 2279  Gross per 24 hour   Intake 310 ml   Output --   Net 310 ml     ROS: Pertinent positives in the Interval Hx    Physical Exam  Constitutional: Alert, normal appearance, no acute distress. Reports fatigue, obese. HENT: head normocephalic/atraumatic, no congestion or rhinorrhea, PERRLA, EOM intact  Cardio: normal rate, regular rhythm. No murmurs heard on auscultation  Pulmonary: clear to auscultation bilaterally, no wheezes, rales, or stridor. Rales remain in the lower left lobe. GI: abdomen soft, flat, and non-distended. No tenderness or guarding. Musculoskeletal: no deformity, tenderness, or injury. No lower extremity edema. Patient 4/5 strength in upper and lower extremities. Skin: no lesion, rash, or erythema  Neuro: no focal neurological deficits. Patient alert to himself or family. He has fluctuating cognition, sometimes being alert to place and situation but other times not. Psych: mood normal, behavior normal      LABS:    CBC:   Recent Labs     10/16/21  0804 10/17/21  0707   WBC 3.6* 3.0*   HGB 11.1* 11.3*   HCT 33.0* 34.1*    see below   MCV 94.0 95.1     Renal:    Recent Labs     10/15/21  1813 10/16/21  0804 10/17/21  0707    143 145   K 3.5 3.0* 3.6    109 110   CO2 23 24 23   BUN 18 16 16   CREATININE 1.3 1.3 1.3   GLUCOSE 155* 115* 108*   CALCIUM 9.6 9.5 9.7   MG 2.00 1.80 1.90   PHOS 2.9  --   --    ANIONGAP 11 10 12     Hepatic:   Recent Labs     10/15/21  1813   LABALBU 2.6*     -----------------------------------------------------------------  RAD:   XR CHEST PORTABLE   Final Result   1. Multifocal bilateral airspace disease slightly improved along bases and slightly worsened in the upper lungs. XR CHEST PORTABLE   Final Result      Increased bilateral airspace disease             FL MODIFIED BARIUM SWALLOW W VIDEO   Final Result      1. Intermittent silent penetration and aspiration with thin liquids. 2.  No evidence of penetration or aspiration with nectar thick liquids or puree consistencies. Please correlate with the speech pathology report for additional details.       XR ABDOMEN (KUB) (SINGLE AP VIEW)   Final Result     No acute findings in the abdomen or pelvis. No evidence of bowel    obstruction. CT Cervical Spine WO Contrast   Final Result   1. No acute abnormality in the head or cervical spine. CT Head WO Contrast   Final Result   1. No acute abnormality in the head or cervical spine. XR CHEST PORTABLE   Final Result   Impression: Interval development of multifocal bilateral pulmonary opacities. Assessment/Plan:   Patient is a 52-year-old male with past medical history of BPH, aspiration pneumonia, hypertension, type 2 diabetes mellitus who presented to the ED via EMS due to two days of increased weakness, lethargy and hypoxia. #Suspected aspiration pneumonia  -(10/12-)Zosyn 3375 mg every 8 hours &   -(10/13) Modified Barium swallow y/d showed: 1.) intermittent silent penetration and aspiration w/ thin liquids. 2.) No evidence of penetration or aspiration w/ nectar thick liquids or puree consistencies.   -(10/15) CXR shows pulmonary vascular congestion, may have component of volume overload. Fluids discontinued. -(10/16) MRSA swab negative discontinued Linezolid  -(10/18): CXR \"multifocal bilateral airspace disease slightly improved along bases and slightly worsened in the upper lung. \"  -Consults Pulmonology; persistent oxygen requirement despite adequate treatment for aspiration pneumonia  -Try to wean down oxygen, try to keep oxygen over 89%  -COVID testing pending     #HA on CKD stage 3a, likely prerenal resolved  -Patient had poor p.o. intake for the past few days prehospitalization  -Cr 1.3 yesterday.  Baseline: 1.1-1.3.     #Hypernatremia resolved  -Na was 145 yesterday, 154 on admission      #BPH  -Continue home finasteride  -Held home tamsulosin, can consider adding non-specific alpha-1 blocker if patient is retaining  -Bladder scan q6 to evaluate for urinary retention     #MDD  -Discontinued Wellbutrin due concerns for serotonin syndrome interaction w/ linezolid.     #Type 2 diabetes mellitus  -Changed to Lantus 10 Units BID, LDSSI  -BG has been between (136 -> 232 -> 139 -> 126 last 24 hrs) over night and this AM  -Will monitor closely to avoid hypoglycemia.     #Essential hypertension  -continue home Coreg 6.25 oral BID      #Multiple Falls  -CT head negative  -Likely mechanical in nature  -Consult PT/OT    Code Status: Full Code  FEN: ADULT DIET; Dysphagia - Minced and Moist; 4 carb choices (60 gm/meal); Mildly Thick (Nectar)  PPX: Heparin  DISPO: Christopher Tejeda MD, PGY-1  10/18/21  6:53 AM    This patient has been staffed and discussed with Dr. Erasto King    Addendum to Resident H& P/Progress note:  I have personally seen,examined and evaluated the patient. I have reviewed the current history, physical findings, labs and assessment and plan and agree with note as documented by resident MD ( Lynn William)  Na is down to 144; K-3.4. Discussed the patient's condition with family members and Dr Wendie Swift, pulmonology. Work up for COVID 19 virus infection has been initiated. Will continue O2 support.     Accucheck Glucoses: Recent Labs     10/17/21  2122 10/18/21  0217 10/18/21  0526 10/18/21  0718 10/18/21  1202   POCGLU 232* 133* 139* 126* 165*       Olga March MD, Caretha All

## 2021-10-18 NOTE — PROGRESS NOTES
Pt's son in law at bedside and updated by pulmonology attending and this RN. Aware pt will be transferred to PCU for covid rule out. Asking Rn if pt can be fed downstairs since he has a difficult time feeding himself Rn relayed this to Kristan Soares on PCU. Pt assisted back to bed and to be transferred to [facility]. Family aware of no visitor policy on PCU due to covid rule out and that daily updates can be received via phone. Pt had pulled out iv and 20G IV was replaced in R hand prior to transferring to PCU.

## 2021-10-18 NOTE — PROGRESS NOTES
4 Eyes Admission Assessment     I agree as the admission nurse that 2 RN's have performed a thorough Head to Toe Skin Assessment on the patient. ALL assessment sites listed below have been assessed on admission. Areas assessed by both nurses:   [x]   Head, Face, and Ears   [x]   Shoulders, Back, and Chest  [x]   Arms, Elbows, and Hands   [x]   Coccyx, Sacrum, and Ischium  [x]   Legs, Feet, and Heels        Does the Patient have Skin Breakdown? Yes. Pt has skin R knee skin abrasion noted along with blanchable redness on toes on bilat feet and blanchable redness noted on buttocks.         Ignacio Prevention initiated:  Yes  Wound Care Orders initiated:  Yes      [id number] nurse consulted for Pressure Injury (Stage 3,4, Unstageable, DTI, NWPT, and Complex wounds) or Ignacio score 18 or lower:  No.      Nurse 1 eSignature: Electronically signed by Lavinia Gary RN on 10/18/21 at 12:26 PM EDT    **SHARE this note so that the co-signing nurse is able to place an eSignature**    Nurse 2 eSignature: Electronically signed by James Londono RN on 10/18/21 at 12:10 PM EDT

## 2021-10-18 NOTE — PROGRESS NOTES
Physical Therapy/Occupational Therapy  No treatment    Attempted to see pt today for PT/OT. Pt in process of being transferred to another unit for Covid r/o. Will continue per plan of care as pt available.      [place], [place] [id number]  Patricia Dodge, PT   Boone Colin OTR/L

## 2021-10-19 NOTE — PROGRESS NOTES
Pts daughter updated x3. She spoke with pt as well this final time. Pt refused to answer questions with video  but when daughter spoke with pt she alerted me that pt was A&Ox 2 and also knew who she was as well. Will continue to monitor.

## 2021-10-19 NOTE — PROGRESS NOTES
results for input(s): AST, ALT, LIPASE, BILIDIR, BILITOT, ALKPHOS in the last 72 hours. Invalid input(s): AMYLASE,  ALB    CXR REVIEWED BY ME AND SHOWED:  XR CHEST PORTABLE   Final Result   1. Multifocal bilateral airspace disease slightly improved along bases and slightly worsened in the upper lungs. XR CHEST PORTABLE   Final Result      Increased bilateral airspace disease             FL MODIFIED BARIUM SWALLOW W VIDEO   Final Result      1. Intermittent silent penetration and aspiration with thin liquids. 2.  No evidence of penetration or aspiration with nectar thick liquids or puree consistencies. Please correlate with the speech pathology report for additional details. XR ABDOMEN (KUB) (SINGLE AP VIEW)   Final Result     No acute findings in the abdomen or pelvis. No evidence of bowel    obstruction. CT Cervical Spine WO Contrast   Final Result   1. No acute abnormality in the head or cervical spine. CT Head WO Contrast   Final Result   1. No acute abnormality in the head or cervical spine. XR CHEST PORTABLE   Final Result   Impression: Interval development of multifocal bilateral pulmonary opacities. CT CHEST WO CONTRAST    (Results Pending)        ASSESSMENT/PLAN:  This is a 81 y.o. male with acute hypoxemic respiratory failure and multifocal airspace disease. Viral PCR came back negative, which just means he isn't shedding virus. Doesn't rule out that this could be 2/2 covid, but it does mean that we have to keep the differential open. I did order a CT for this morning and will see if it shows us anything actionable. My leading dx remains that he had covid and we are seeing the sequela but I don't think there's a way to prove it. I considered checking antibodies, but since he is vaccinated I'm not sure that would help us differentiate.   And even if this is a post covid manifestation I could only justify the 6mg decadron for 10 days and not tocilizumab because his crp was also elevated in August.    Today is day 7 of zosyn so he should complete his empiric course of antibiotics today. Jerome Dee MD    Addendum:  I reviewed the CT of patient's chest and it is fairly classic for a viral pneumonia with diffuse airspace opacities and predominant subpleural distribution. I suspect the covid study was done too late to make the dx and he probably cleared virus faster because he was vaccinated. I am going to empirically start him on decadron. I'm not going to check antibodies, but wouldn't object if the family wants them. He should get a booster dose of his Pfizer vaccine.

## 2021-10-19 NOTE — PROGRESS NOTES
AM assessment charted. /67   Pulse 75   Temp 97 °F (36.1 °C) (Oral)   Resp 16   Ht 5' 2\" (1.575 m)   Wt 179 lb (81.2 kg)   SpO2 93%   BMI 32.74 kg/m²   Pt denies any pain, no obvious s/s of SOB, refused to use/speak to interpret via video resulting in limited assessment. Will continue to monitor.

## 2021-10-19 NOTE — PROGRESS NOTES
Progress Note    Admit Date: 10/12/2021  Day: 7  Diet: ADULT DIET; Dysphagia - Minced and Moist; 4 carb choices (60 gm/meal); Mildly Thick (Nectar)    CC: weakness, lethargy, and hypoxia    Interval history:   -NAEO  -VSS, afeb  -4L NC, SpO2 92%  -Pt tolerated CT chest w/o issue, comfortable this AM    Medications:     Scheduled Meds:   potassium chloride  10 mEq IntraVENous Q1H    enoxaparin  40 mg SubCUTAneous Daily    senna  1 tablet Oral Nightly    piperacillin-tazobactam  3,375 mg IntraVENous Q8H    carvedilol  6.25 mg Oral BID WC    vitamin D  5,000 Units Oral Daily    finasteride  5 mg Oral Daily    nystatin  500,000 Units Oral 4x Daily    [Held by provider] tamsulosin  0.4 mg Oral Daily    sodium chloride flush  5-40 mL IntraVENous 2 times per day    insulin lispro  0-6 Units SubCUTAneous Q4H    insulin glargine  10 Units SubCUTAneous BID     Continuous Infusions:   sodium chloride      sodium chloride 25 mL (10/17/21 1353)    dextrose       PRN Meds:albuterol, sodium chloride flush, sodium chloride, ondansetron **OR** ondansetron, polyethylene glycol, acetaminophen **OR** acetaminophen, glucose, dextrose, glucagon (rDNA), dextrose, nitroGLYCERIN    Objective:   Vitals:   T-max:  Patient Vitals for the past 8 hrs:   BP Temp Temp src Pulse Resp SpO2   10/19/21 0404 115/61 97.9 °F (36.6 °C) Axillary 69 16 92 %       Intake/Output Summary (Last 24 hours) at 10/19/2021 6497  Last data filed at 10/18/2021 1500  Gross per 24 hour   Intake 50 ml   Output --   Net 50 ml     ROS: Pertinent positives in the Interval Hx    Physical Exam  Constitutional: Alert, normal appearance, no acute distress. Reports fatigue, obese. HENT: head normocephalic/atraumatic, no congestion or rhinorrhea, PERRLA, EOM intact  Cardio: normal rate, regular rhythm. No murmurs heard on auscultation  Pulmonary: clear to auscultation bilaterally, no wheezes, rales, or stridor. Rales remain in the lower left lobe.   GI: abdomen soft, flat, and non-distended. No tenderness or guarding. Musculoskeletal: no deformity, tenderness, or injury. No lower extremity edema. Patient 4/5 strength in upper and lower extremities. Skin: no lesion, rash, or erythema  Neuro: no focal neurological deficits. Patient alert to himself or family. He has fluctuating cognition, sometimes being alert to place and situation but other times not. Psych: mood normal, behavior normal      LABS:    CBC:   Recent Labs     10/17/21  0707 10/18/21  0704 10/19/21  0436   WBC 3.0* 3.6* 3.9*   HGB 11.3* 11.3* 11.6*   HCT 34.1* 34.4* 34.3*   PLT see below 157 166   MCV 95.1 95.0 93.3     Renal:    Recent Labs     10/17/21  0707 10/18/21  0704 10/19/21  0436    144 149*   K 3.6 3.4* 3.2*    108 111*   CO2 23 24 26   BUN 16 18 19   CREATININE 1.3 1.3 1.4*   GLUCOSE 108* 153* 115*   CALCIUM 9.7 10.4 10.8*   MG 1.90 1.80 2.00   ANIONGAP 12 12 12     Hepatic:   No results for input(s): AST, ALT, BILITOT, BILIDIR, PROT, LABALBU, ALKPHOS in the last 72 hours. -----------------------------------------------------------------  RAD:   XR CHEST PORTABLE   Final Result   1. Multifocal bilateral airspace disease slightly improved along bases and slightly worsened in the upper lungs. XR CHEST PORTABLE   Final Result      Increased bilateral airspace disease             FL MODIFIED BARIUM SWALLOW W VIDEO   Final Result      1. Intermittent silent penetration and aspiration with thin liquids. 2.  No evidence of penetration or aspiration with nectar thick liquids or puree consistencies. Please correlate with the speech pathology report for additional details. XR ABDOMEN (KUB) (SINGLE AP VIEW)   Final Result     No acute findings in the abdomen or pelvis. No evidence of bowel    obstruction. CT Cervical Spine WO Contrast   Final Result   1. No acute abnormality in the head or cervical spine. CT Head WO Contrast   Final Result   1.  No acute abnormality in the head or cervical spine. XR CHEST PORTABLE   Final Result   Impression: Interval development of multifocal bilateral pulmonary opacities. CT CHEST WO CONTRAST    (Results Pending)       Assessment/Plan:   Patient is a 78-year-old male with past medical history of BPH, aspiration pneumonia, hypertension, type 2 diabetes mellitus who presented to the ED via EMS due to two days of increased weakness, lethargy and hypoxia. #Asp PNA vs Previous COVID-19 PNA  -Zosyn 7d course finished today (10/19), Linezolid d/c'd w/ neg MRSA swab  -Modified Barium swallow showed: 1.) intermittent silent penetration and aspiration w/ thin liquids. 2.) No evidence of penetration or aspiration w/ nectar thick liquids or puree consistencies.   -CT Chest w/ mixed alveolar & interstitial abnormalities, possible viral PNA  -Pulm c/s, rec's appreciated  -Supplemental O2 PRN, currently 4L NC      #Hypernatremia  -Daily RFP  -Monitor     #BPH  -Continue home finasteride  -Held home tamsulosin, can consider adding non-specific alpha-1 blocker if patient is retaining  -Bladder scan q6 to evaluate for urinary retention     #MDD  -Previously d/c'd Wellbutrin due concerns for serotonin syndrome interaction w/ linezolid.     #Type 2 diabetes mellitus  -Lantus 10 Units BID, LDSSI  -Will monitor closely to avoid hypoglycemia.     #Essential hypertension  -continue home Coreg 6.25 PO BID      #Multiple Falls  -CT head negative  -Likely mechanical in nature 2/2 deconditioning   -Consult PT/OT    #HA on CKD stage 3a, likely prerenal (Resolved)    Code Status: Full Code  FEN: ADULT DIET; Dysphagia - Minced and Moist; 4 carb choices (60 gm/meal); Mildly Thick (Nectar)  PPX: SQH  DISPO: [facility]    Robby Parker MD, PGY-1  10/19/21  8:21 AM    This patient has been staffed and discussed with Dr. Sergey Gutierrez    Addendum to Resident H& P/Progress note:  I have personally seen,examined and evaluated the patient.  I have reviewed the current history, physical findings, labs and assessment and plan and agree with note as documented by resident MD ( )  CT scan of the chest appears to be consistent with bilateral viral pneumonia. Discussed the patient's condition with the pulmonologist, Nathaniel Fernandez MD.  The patient will be started on oral Decadron 6 mg daily. We will monitor blood glucose. Will order PT/OT evaluation and treatment. The patient's condition was discussed with his daughter, Mike Valdez.     Oumou Shaikh MD, FACP

## 2021-10-19 NOTE — PROGRESS NOTES
Speech Language Pathology  Facility/Department: [place] [id number] [address] [facility]  Dysphagia Daily Treatment Note    NAME: Hazel Art  : 1935  MRN: [id number]    Patient Diagnosis(es):   Patient Active Problem List    Diagnosis Date Noted    Type 2 diabetes mellitus without complication ([address]     Essential hypertension 2015    Diabetes mellitus type 2 in obese ([address] 2010    Hyperlipidemia     Microalbuminuria 2013    Vitamin D deficiency 2012    Diverticulosis     Benign prostatic hyperplasia     Bilateral low back pain with right-sided sciatica 2015    Neck pain 2012    Right hip pain 2012    Post herpetic neuralgia 06/15/2012    Right shoulder pain 2011    Eczema 2011    Allergic rhinitis 2010    Rosacea     Back pain     Obstructive sleep apnea     Chest pain     Obesity     Insomnia     Osteoarthritis     Lumbar disc disease     Spinal stenosis     Bacterial pneumonia     Oropharyngeal dysphagia     Multifocal pneumonia 10/12/2021    Hypoxia     Hypernatremia     Altered mental status     Chronic diastolic HF (heart failure) (HCC)     Acute renal failure superimposed on stage 3a chronic kidney disease ([address] 2021    Peripheral vascular disease ([facility] 2020    Coronary artery disease of native artery of native heart with stable angina pectoris ([address] 2016    Shortness of breath 2011    Nausea 2011    Dyspnea on exertion 2011    Transient global amnesia     Bilateral cataracts      Allergies: Allergies   Allergen Reactions    Tramadol      Onset Date: 10/12/2021      CXR (10/12/2021)-  Impression   Impression: Interval development of multifocal bilateral pulmonary opacities. CT Head: (10/12/2021)  Impression   1. No acute abnormality in the head or cervical spine.        Previous MBS (2021):  Pt exhibiting mild oropharyngeal dysphagia.  Pt consumed puree and thin liquids via single sips with no penetration or aspiration.  Decreased timing/coordination of swallow with reduced epiglottal closure resulted in aspiration of consecutive swallows of thin liquids via straw.  Throat clear but no cough occurred. Additional single sips via cup and straw were consumed with no further pen/aspiration. Reduced base of tongue retraction resulted in mild residue in valleculae with solid texture. Pt was not sensate to the residue, however this cleared when instructed to use liquid wash.  Pt demonstrated adequate mastication with solid texture, no pocketing /oral residue noted. Recommend cont regular diet/thin liquids- no straw (daughter reports pt does not typically use a straw).     Chart reviewed. Medical Diagnosis: Pneumonia  Treatment Diagnosis: Dysphagia    BSE Impression (10/13/2021)-  Dysphagia Impression : Suspect oropharyngeal dysphagia; needs further assessment. With pt seated up in bed, assessed tolerance thins via cup and bites of puree. Pt with positive oral acceptance, positive swallow movement, adequate oral clearance. Reactive cough with thins, no s/s aspiration with puree. Recommend completion of MBS; pt confused at this time, however daughter agreeable. Recommend continue NPO pending results. MBS results (10/13/2021)-  Mild-moderate oral phase dysphagia characterized by lingual pumping/rocking, reduced posterior propulsion, holding of bolus, piecemeal swallowing, premature bolus loss to pharynx, and reduced tongue base retraction.   Moderate pharyngeal phase dysphagia characterized by impaired hyolaryngeal mechanics and reduced sensation, with delayed swallow initiation, premature spillage to the valleculae and pyriforms (thin liquids), decreased pharyngeal peristalsis, reduced/delayed epiglottic movement, reduced laryngeal elevation/retraction, with pooling at the valleculae/pyriforms/UES (mostly thins), subsequent deep penetration and suspected silent aspiration of thin liquids (no cough reflex), mild pharyngeal residue and pharyngeal wall weakness. No aspiration or penetration of nectar thick liquids or puree. Trialed chin tuck manuever with thin liquids via cup; on review of images, appeared effective at protecting airway and preventing aspiration, however pt required max cues from daughter to complete. On discussion with daughter, she suspects he may have limited carryover with other caregivers. After further discussion re: QoL wishes, plan of care with daughter (pt present, but currently confused), recommend nectar thick liquids as safest option, with soft foods (minced & moist), with implementation of free water protocol (may have unthickened plan H2O between meals following completion of thorough oral hygiene). Also discussed daughter can try thin liquids with herself cuing her father to chin-tuck. Upper Esophageal Screen: Reduced cricopharyngeal opening    Pain: none indicated by any means    Current Diet :  Dysphagia II minced and moist solids, nectar thick (mildly thick) liquids    Treatment:  Pt seen bedside to address the following goals:  Goal 1: Patient will participate in ongoing assessment of swallowing function as appropriate. 10/13: Addressed via MBS this date. Please see impression statement. 10/14 (session 2): Repositioned pt upright in bed, and assessed with nectar thickened liquids via cup and minced/moist solid from home (stewed apples). Pt with positive oral acceptance, mild oral holding, swallow movement with verbal cue, good oral clearance. Single cough, productive of minimal material. As discussed with granddaughter, pt with notable silent aspiration/penetration on yesterday's MBS, meaning no cough reflex, and given current diagnosis of pneumonia, cough not necessarily indicative of aspiration/penetration. As pt fatiguing and showing disinterest in eating/drinking more at this time, withheld further PO trials.  Per   Cont goal  GOAL MET    NEW GOAL:  Pt will consume recommended diet consistency w/o respiratory decline. 10/15: Family member at side. Pt and family declining use of  system despite education from SLP for rationale for use. Targeted goal via effortful swallows of varying consistencies presented in hierarchical fashion (nectar thick via tsp, thin liquid via tsp, and puree via tsp) - total of 25 reps achieved with max cues from SLP and dtr. Pt with slowed posterior transit and intermittent holding of bolus; fatigue noted as session progressed with increasing amount of time required for posterior transit. Pt consumed nectar thick via tsp for 90% of opps, thin via tsp for 80% of opps, and puree for 80% of opps w/o overt s/s associated with aspiration. Continue goal.  10/18-  Session limited as pt declining PO. No family present as pt moved to 4S, so no visitors permitted on the unit. RN reported no concerns re: swallowing, other than poor intake. Pt analyzed with one trial each of yogurt, pudding and warm nectar thick water by cup. Pt encouraged to perform effortful swallow with all, but question full comprehension given language barrier. Pt displayed no s/s aspiration, however, per MBS, pt silently aspirated, so it's difficult to fully determine tolerance at bedside. Returned with  system (it was charging in another location). Pt analyzed with lunch. Pt was encouraged to perform effortful swallow with all trials. Pt only agreeable to consuming 1 tsp of mashed potatoes, 1 tsp of minced pepper steak, 1tsp of pudding, 1 tsp of applesauce and 1 tsp of thickened lemon water. Pt grimaced with each trial. When asked if pt was experiencing pain, pt stated \"no, but that it felt uncomfortable\". No s/s aspiration observed. Performed oral care with suction and presented with trial of warm, thin water, knowing that pt dislikes cold foods/liquids. Again, pt encouraged to perform effortful swallow.   No s/s aspiration observed, but pt known to silently aspirate per MBS. Pt declined further trials and requested to take a nap.  con't goal     Goal 2: Patient/caregiver will demonstrate understanding of swallowing concerns/recommendations. 10/13: Provided extended education to patient/daughter regarding instrumental assessment, results, swallow function/structures, dysphagia, aspiration vs penetration, aspiration concerns, diet options, thickener use/rationale, diet recommendations (NTL/soft solids), free water protocol, safety strategies (upright, small bites/sips, slow rate), chin tuck with thins (daughter only). Updated white board and provided handout in addition to verbal education re: thickener/FWP. Daughter stated good comprehension; will continue to reinforce. Goal met  10/14 (session 1): Received pt awake, resting in bed, on O2 via nc. Granddaughter present. Pt still somewhat confused. Provided education to Granddaughter/patient regarding results of yesterday's MBS, current swallow function, aspiration/penetration, diet recommendations, swallow exercises (effortful swallow, Lola). Ended treatment session d/t pt's need to toilet  10/14 (session 2): Began new treatment session. Provided lengthy education this session regarding different thickener types, proper consistency, mixing, specifics re: diet consistency (minced/moist), importance of upright position, slow rate, small bites/sips, [employer], provided demonstration of oral hygiene completion, assisted granddaughter with denture placement/[place]ova. Currently his dentures are loose; granddaughter confirmed that he has lost weight. Educated her regarding problem with ill-fitting dentures (when very loose, food can get stuck behind them, creating choking risk). Recommended eat w/o unless securely in place.  Also provided general education regarding dysphagia and possible causes (e.g., when an individual is sick, and has generalized weakness, can negatively impact swallow function/safety), and recommendation for follow-up MBS as pt's overall strength/conditioning improved. 10/15: Educated to use of effortful swallow, rationale for tx tasks, parameters for [employer] Protocol, and recommended POC. Family verbalized comprehension and demonstrated adequate recall of previous education. Continue goal.  10/18-  Attempted to educate pt re: purpose of the visit, swallowing strategies and use of effortful swallow, but pt with questionable comprehension. No family present. Via  system- pt was educated to the results of MBS, silent aspiration, rational for nectar/mildly thick liquids, rational for swallowing exercises and was encouraged to continue to utilize effortful swallow with all PO intake. Pt indicated comprehension. con't goal     Patient/Family/Caregiver Education:  As above. Compensatory Strategies:  Upright position  small bites/sips  slow rate  oral hygiene 2-3x/day  assist feed  effortful swallow exercise    no straws     Plan:  Dysphagia tx 1-3x/wk for LOS. Diet recommendations: Dysphagia Minced and Moist (Dysphagia II), Mildly Thick (Lindy)   Oral care with suction 2-3x/day  [employer] Protocol (thin, plain, unsweetened H2O (not coffee, tea, flavored water, etc.) permitted between meals following completion of thorough oral hygiene and after 30 min wait time)  DC recommendation: ongoing dysphagia tx indicated at this time  Treatment: 40 min  D/W nursingDanyell   Needs met prior to leaving room, call button in reach. RN and family at [place], [place], [place] [id number]  Speech-Language Pathologist  Pager [phone]    If patient is discharged prior to next session, this note will serve as discharge summary.

## 2021-10-19 NOTE — PROGRESS NOTES
Pt oriented to person/place somewhat to time/situation. Pt attempts to get out of bed whenever soiled, redirectable with sitter at bedside. Cleaned/changed as needed throughout evening. Remains on 4L of O2, bilateral crackles heard. Pt is not tachypneic and denies dyspnea. Pulled out cannula several times, Sp02 as low as 84% on RA recovered to > 92% ~1min. Updated daughter Eliane Denis on pt condition/plan of care.

## 2021-10-20 NOTE — PROGRESS NOTES
Patient to floor at [phone], vitals stable, assessment done. Patient's family will be in tonight. Nectar thickened water given to patient.

## 2021-10-20 NOTE — PROGRESS NOTES
Progress Note    Admit Date: 10/12/2021  Day: 8  Diet: ADULT DIET; Dysphagia - Minced and Moist; 4 carb choices (60 gm/meal); Mildly Thick (Nectar)    CC: weakness, lethargy, and hypoxia    Interval history:   -NAEO, Overnight lantus decreased to 5u 2/2 to poor PO intake  -HDS, afeb  -5L NC overnight to 4.5 this AM, satting 90%  -Pt comfortable this AM  -Home O2 consult made & Social Work contacted to facilitate the acquisition of a home hospital bed    Medications:     Scheduled Meds:   dexamethasone  6 mg Oral Daily    enoxaparin  40 mg SubCUTAneous Daily    senna  1 tablet Oral Nightly    piperacillin-tazobactam  3,375 mg IntraVENous Q8H    carvedilol  6.25 mg Oral BID WC    vitamin D  5,000 Units Oral Daily    finasteride  5 mg Oral Daily    nystatin  500,000 Units Oral 4x Daily    [Held by provider] tamsulosin  0.4 mg Oral Daily    sodium chloride flush  5-40 mL IntraVENous 2 times per day    insulin lispro  0-6 Units SubCUTAneous Q4H    insulin glargine  10 Units SubCUTAneous BID     Continuous Infusions:   sodium chloride 75 mL/hr at 10/20/21 0026    sodium chloride 25 mL (10/20/21 0634)    dextrose       PRN Meds:albuterol, sodium chloride flush, sodium chloride, ondansetron **OR** ondansetron, polyethylene glycol, acetaminophen **OR** acetaminophen, glucose, dextrose, glucagon (rDNA), dextrose, nitroGLYCERIN    Objective:   Vitals:   T-max:  Patient Vitals for the past 8 hrs:   BP Temp Temp src Pulse Resp SpO2   10/20/21 0313 (!) 146/78 97 °F (36.1 °C) Axillary 88 20 90 %   10/20/21 0024 (!) 152/74 97.6 °F (36.4 °C) Axillary 77 18 93 %       Intake/Output Summary (Last 24 hours) at 10/20/2021 0653  Last data filed at 10/20/2021 9359  Gross per 24 hour   Intake 100 ml   Output 1300 ml   Net -1200 ml     ROS: Pertinent positives in the Interval Hx    Physical Exam  Constitutional: Alert, normal appearance, no acute distress. Is obese.   HENT: head normocephalic/atraumatic, no congestion or rhinorrhea, PERRLA, EOM intact  Cardio: normal rate, regular rhythm. No murmurs heard on auscultation  Pulmonary: clear to auscultation bilaterally, no wheezes, rales, or stridor. Rales remain in the lower left lobe. GI: abdomen soft, flat, and non-distended. No tenderness or guarding. Musculoskeletal: no deformity, tenderness, or injury. No lower extremity edema. Patient 4/5 strength in upper and lower extremities. Skin: no lesion, rash, or erythema  Neuro: no focal neurological deficits. Patient alert to himself or family. Psych: mood normal, behavior normal    LABS:    CBC:   Recent Labs     10/18/21  0704 10/19/21  0436 10/20/21  0516   WBC 3.6* 3.9* 4.0   HGB 11.3* 11.6* 11.5*   HCT 34.4* 34.3* 33.8*    166 180   MCV 95.0 93.3 92.3     Renal:    Recent Labs     10/18/21  0704 10/19/21  0436 10/20/21  0516    149* 148*   K 3.4* 3.2* 3.6    111* 111*   CO2 24 26 24   BUN 18 19 22*   CREATININE 1.3 1.4* 1.3   GLUCOSE 153* 115* 105*   CALCIUM 10.4 10.8* 10.5   MG 1.80 2.00 2.00   ANIONGAP 12 12 13     Hepatic:   No results for input(s): AST, ALT, BILITOT, BILIDIR, PROT, LABALBU, ALKPHOS in the last 72 hours. -----------------------------------------------------------------  RAD:   CT CHEST WO CONTRAST   Final Result      Mixed alveolar and interstitial abnormalities in both lungs. Correlate clinically for viral pneumonia. Nonobstructing right renal calculus. XR CHEST PORTABLE   Final Result   1. Multifocal bilateral airspace disease slightly improved along bases and slightly worsened in the upper lungs. XR CHEST PORTABLE   Final Result      Increased bilateral airspace disease             FL MODIFIED BARIUM SWALLOW W VIDEO   Final Result      1. Intermittent silent penetration and aspiration with thin liquids. 2.  No evidence of penetration or aspiration with nectar thick liquids or puree consistencies.       Please correlate with the speech pathology report for additional details. XR ABDOMEN (KUB) (SINGLE AP VIEW)   Final Result     No acute findings in the abdomen or pelvis. No evidence of bowel    obstruction. CT Cervical Spine WO Contrast   Final Result   1. No acute abnormality in the head or cervical spine. CT Head WO Contrast   Final Result   1. No acute abnormality in the head or cervical spine. XR CHEST PORTABLE   Final Result   Impression: Interval development of multifocal bilateral pulmonary opacities. Assessment/Plan:   Patient is a 67-year-old male with past medical history of BPH, aspiration pneumonia, hypertension, type 2 diabetes mellitus who presented to the ED via EMS due to two days of increased weakness, lethargy and hypoxia. #Asp PNA vs Previous COVID-19 PNA  CT Chest w/ mixed alveolar & interstitial abnormalities, possible viral PNA  Modified Barium swallow demonstrates 1.) intermittent silent penetration and asp w/ thin liquids. 2.) No evidence of penetration or asp w/ nectar thick liquids or puree  Asp PNA covered w/ Zosyn 7d course, Linezolid d/c'd w/ neg MRSA swab   -Pulm c/s, rec's appreciated  -Supplemental O2 PRN, currently 4.5L NC  -D/w Pulm, leading dx at this time is that the patient is currently recovering from a viral PNA, possibly previous COVID.    -6mg Decadron QD initiated per protocol to ensure we are covering for the possibility of a COVID PNA  -Consults made for Home O2 and [facility]      #Hypernatremia  -Daily RFP  -Monitor     #BPH  -Continue home finasteride  -Held home tamsulosin, can consider adding non-specific alpha-1 blocker if patient is retaining  -Bladder scan q6 to evaluate for urinary retention     #MDD  -Previously d/c'd Wellbutrin due concerns for serotonin syndrome interaction w/ linezolid.     #Type 2 diabetes mellitus  -Lantus 10 Units BID, LDSSI  -Will monitor closely to avoid hypoglycemia, and adjust nightly Lantus per PO intake     #Essential hypertension  -continue home Coreg 6.25 PO BID      #Multiple Falls  -CT head negative  -Likely mechanical in nature 2/2 deconditioning   -Consult PT/OT    #HA on CKD stage 3a, likely prerenal (Resolved)    Code Status: Full Code  FEN: ADULT DIET; Dysphagia - Minced and Moist; 4 carb choices (60 gm/meal); Mildly Thick (Nectar)  PPX: SQH  DISPO: [facility]    Allen Garcia MD, PGY-1  10/20/21  6:53 AM    This patient has been staffed and discussed with Dr. Shyanne Downing    Addendum to Resident H& P/Progress note:  I have personally seen,examined and evaluated the patient. I have reviewed the current history, physical findings, labs and assessment and plan and agree with note as documented by resident MD ( Kristen Villanueva)  Discussed the patient's condition with his daughter, Bnonie Tirador, and the registered nurse, Bharat Rojas.     Accucheck Glucoses: Recent Labs     10/19/21  1735 10/19/21  2241 10/20/21  0310 10/20/21  0740 10/20/21  1200   POCGLU 88 139* 130* 93 152*     Oxygen support is down to 2 L/min via nasal cannula continuously  Hopefully, will be ready for discharge home tomorrow    Shane Toussaint MD, [address]

## 2021-10-20 NOTE — CARE COORDINATION
Case Management Assessment           Daily Note                 Date/ Time of Note: 10/20/2021 2:08 PM         Note completed by: Jeana Acevedo RN    Patient Name: Carmen Gould  YOB: 1935    Diagnosis:Pneumonia [J18.9]  Bacterial pneumonia [J15.9]  Multiple falls [R29.6]  Altered mental status, unspecified altered mental status type [R41.82]  Acute hypoxemic respiratory failure ([address] [J96.01]  Patient Admission Status: Inpatient    Date of Admission:10/12/2021  4:38 PM Length of Stay: 8 GLOS: GMLOS: 4    Current Plan of Care: 2L O2  ________________________________________________________________________________________  PT AM-PAC: 10 / 24 per last evaluation on: 10/20    OT AM-PAC: 15 / 24 per last evaluation on: 10/20    DME Needs for discharge: hospital bed and home O2  ________________________________________________________________________________________  Discharge Plan: Home with Home Health Care: [facility]    Tentative discharge date: 10/21    Current barriers to discharge: medical clearance    Referrals completed: DME: [facility]      ________________________________________________________________________________________  Case Management Notes: CM spoke with patient's daughter over the phone. The plan will be for him to return home tomorrow. He has 24hr caregivers and home care with [facility], new orders need to be sent, home O2 orders sent to [facility] along with order for hospital bed. PATRICA spoke with Emily Patel from [facility] who states they will deliver both hospital bed and home concentrator on 10/21. Patient's daughter unsure about transport at this time. She will follow up with CM in the morning if she would like stretcher transport. No steps to enter home. Roberto and his family were provided with choice of provider; he and his family are in agreement with the discharge plan.     Care Transition Patient: Yes    Jeana Acevedo RN  [facility]  Case Management Department  Ph: [phone]  Fax: [phone]

## 2021-10-20 NOTE — PROGRESS NOTES
Call and spoke with daughter around [phone]. Answered all questions at this time. Transferred into room to speak with patient.    Electronically signed by Naseem Ricardo RN on 10/20/2021 at 6:41 AM

## 2021-10-20 NOTE — PROGRESS NOTES
Physical Therapy  Daily Treatment Note    Discharge Recommendations: Yuriy Barron scored a 10/24 on the AM-PAC short mobility form. Current research shows that an AM-PAC score of 18 or greater is typically associated with a discharge to the patient's home setting. Based on the patient's AM-PAC score and their current functional mobility deficits, it is recommended that the patient have 2-3 sessions per week of Physical Therapy at d/c to increase the patient's independence. At this time, this patient demonstrates the endurance and safety to discharge home with home PT and a follow up treatment frequency of 2-3x/wk. Please see assessment section for further patient specific details. **Plan is for home with 24 hour caregivers. Arrangements are also being made for hospital bed prior to D/C home. Assessment:  Pt with improved activity tolerance this session. Pt continues with generalized weakness. Needing up to 2 person assist for transfers at this time. Pt would benefit from continued PT to maximize strength and independence. Family opting for home with 24 hour caregivers at D/C. No new DME needs. HOME HEALTH CARE: LEVEL 1 STANDARD  - Initial home health evaluation to occur within 24-48 hours, in patient home   - Therapy to evaluate with goal of regaining prior level of functioning   - Therapy to evaluate if patient has [address] needs for personal care    Equipment Needs: No new needs anticipated    Chart Reviewed: Yes     Other position/activity restrictions: up with assist   Additional Pertinent Hx: Admit 10/12 with lethargy and falls at home; (+) hypoxia due to PNA, acute encephalopathy; head CT and c-spine CT: (-); PMHx: asthma, DM, HTN, OA, spinal stenosis      Diagnosis: PNA   Treatment Diagnosis: impaired gait and transfers    Subjective: Pt in bed initially. Video  used at start of session to introduce therapists and discuss plan of therapy session. Pt agreeable.  \"Of course. \"    Pain: No c/o voiced or grimacing noted. Objective:    Bed mobility  Rolling: Mod assist x 1  Supine to sit: Mod assist x 1, HOB up partially  Scooting: Mod assist to EOB    Transfers  Sit to stand: Min assist x 1 (+ CGA x 1) from raised height bed to Erin Lisa; Min assist x 1 from Erin Lisa seat; Dependent (Mod assist x 2) from chair to walker  Stand to sit: Dependent (Mod assist x 2) into chair (twice). Decreased eccentric control  Bed > chair: Dependent via Joe Manjit 1527  Pt sat EOB ~ 7 minutes with CGA to SBA  Sat in Erin Lisa for transfer to chair with CGA  Static stance at walker ~ 15 secs with Min assist x 1    Exercises  10 reps B LE ROM ex while seated in chair: ankle pumps, LAQ, hip abd/add, hip flexion  Other: Pt needing CGA to Min assist for assist, instruction & quality of exercises. Patient Education  Role of PT. Exp[ressed understanding and agreeable to participate (via video .)    Safety Devices  Pt left with needs in reach. In chair (reclined) with chair alarm on. Sitter present.      AM-PAC score  AM-PAC Inpatient Mobility Raw Score : 10  AM-PAC Inpatient T-Scale Score : 32.29  Mobility Inpatient CMS 0-100% Score: 76.75  Mobility Inpatient CMS G-Code Modifier : CL    Goals: (as determined and assessed by primary PT)  Time Frame for Short term goals: discharge  Short term goal 1: Pt will transfer sit <--> stand with SBA   Short term goal 2: Pt will stand x 1 min with B UE support and CGA   Short term goal 3: Pt will participate in gait assessment      Plan:  Times per week: 2-5;    Current Treatment Recommendations: Strengthening, Functional Mobility Training, Balance Training, Gait Training, Endurance Training, Patient/Caregiver Education & Training    Therapy Time    Individual  Concurrent  Group  Co-treatment    Time In  1045            Time Out  1129            Minutes  44              Timed Code Treatment Minutes: 44  Total Treatment Minutes: 44    Will

## 2021-10-20 NOTE — PROGRESS NOTES
Comprehensive Nutrition Assessment    RECOMMENDATIONS:  1. PO Diet: continue minced and moist; 4CC diet with mildly thick liquids   2. ONS: Start Boost Pudding QD start Magic Cup QD    NUTRITION ASSESSMENT:   Type and Reason for Visit:  Type and Reason for Visit: Initial, RD Nutrition Re-Screen/LOS   Nutritional summary & status: Pt assessed for 7d LOS. Po intakes <50% all meals last 3 days. RD ordered updated weight as CBW stated. Noted per MD that pt does not interact with electronic . RD to send Boost pudding and Magic Cups for pt to trial to increase po intakes. Will continue to monitor po intakes and nutrition status throughout adm. Patient admitted d/t weakness, lethargy, hypoxia     PMH significant for: BPH, aspiration pneumonia, HTN, T2DM    MALNUTRITION ASSESSMENT  Context of Malnutrition: Acute Illness   Malnutrition Status: Insufficient data    NUTRITION DIAGNOSIS   · Inadequate oral intake related to acute injury/trauma as evidenced by intake 0-25%, intake 26-50%      NUTRITION INTERVENTION  Food and/or Nutrient Delivery:  Continue Current Diet, Start Oral Nutrition Supplement  Nutrition Education/Counseling:  No recommendation at this time   Goals:  Pt will consistently consume >50% of meals and supplements throuhgout adm. Nutrition Monitoring and Evaluation:   Food/Nutrient Intake Outcomes:  Food and Nutrient Intake, Supplement Intake  Physical Signs/Symptoms Outcomes:  Biochemical Data, Weight     OBJECTIVE DATA: Significant to nutrition assessment  · Nutrition-Focused Physical Findings: Nutrition Related Findings: BM 10/20  · Labs: Reviewed; Na 148  · Meds: Reviewed; vitamin D, senokot   · Wounds: Wound Type: None     CURRENT NUTRITION THERAPIES  ADULT DIET;  Dysphagia - Minced and Moist; 4 carb choices (60 gm/meal); Mildly Thick ([place])     PO Intake: Average Meal Intake: 26-50%   PO Supplement Intake:Average Supplements Intake: None Ordered  IVF: 75 ml/hr ANTHROPOMETRICS  Current Height: 5' 2\" (157.5 cm)  Current Weight: 179 lb (81.2 kg)    Admission weight: 179 lb (81.2 kg)  Ideal Body Weight (lbs) (Calculated): 118 lbs (Ideal Body Weight (Kg) (Calculated): 54 kg)  Usual Bodyweight 177-182 lbs per EMR   Weight Changes JOHNY - CBW stated        BMI BMI (Calculated): 32.8    Wt Readings from Last 50 Encounters:   10/12/21 179 lb (81.2 kg)   08/21/21 179 lb 4.8 oz (81.3 kg)   06/08/21 177 lb (80.3 kg)   03/18/21 180 lb 4.8 oz (81.8 kg)   11/20/20 182 lb 6.4 oz (82.7 kg)       COMPARATIVE STANDARDS  Energy (kcal):  4688-2971; Weight Used for Energy Requirements:  Ideal (54 kg)   Protein (g):  65-76  (1.2-1.4 g/kg); Weight Used for Protein Requirements:  Ideal    Fluid (ml/day):  5942-5464 ml/d; Method Used for Fluid Requirements:  1 ml/kcal      The patient will still be monitored per nutrition standards of care. Consult dietitian if nutrition interventions essential to patient care is needed.      Janette Madison, [address] [place] [address], [address] [place] [address] Drive:  [phone]  Office:  [phone]

## 2021-10-20 NOTE — PROGRESS NOTES
[facility]    Respiratory Therapy     Home Oxygen Evaluation        Name: Jake Dodson  Medical Record Number: [id number]  Age: 81 y.o.   Gender:  male   : 1935  Today's date: 10/20/2021  Room: [facility]4313-      Assessment        BP (!) 141/72   Pulse 78   Temp 96.8 °F (36 °C) (Axillary)   Resp 20   Ht 5' 2\" (1.575 m)   Wt 179 lb (81.2 kg)   SpO2 92%   BMI 32.74 kg/m²     Patient Active Problem List   Diagnosis    Rosacea    Back pain    Obstructive sleep apnea    Chest pain    Transient global amnesia    Obesity    Insomnia    Bilateral cataracts    Osteoarthritis    Lumbar disc disease    Spinal stenosis    Diverticulosis    Benign prostatic hyperplasia    Hyperlipidemia    Diabetes mellitus type 2 in obese (HCC)    Allergic rhinitis    Nausea    Eczema    Dyspnea on exertion    Right shoulder pain    Shortness of breath    Vitamin D deficiency    Post herpetic neuralgia    Neck pain    Right hip pain    Microalbuminuria    Essential hypertension    Type 2 diabetes mellitus without complication (HCC)    Bilateral low back pain with right-sided sciatica    Coronary artery disease of native artery of native heart with stable angina pectoris (HCC)    Peripheral vascular disease ([address]    Acute renal failure superimposed on stage 3a chronic kidney disease (HCC)    Altered mental status    Chronic diastolic HF (heart failure) (HCC)    Multifocal pneumonia    Hypoxia    Hypernatremia    Oropharyngeal dysphagia    Bacterial pneumonia       Social History:  Social History     Tobacco Use    Smoking status: Never Smoker    Smokeless tobacco: Never Used   Substance Use Topics    Alcohol use: Yes     Comment: occasionally    Drug use: No       Patient Room Air saturation at rest 87  %    Oxygen saturations of 88% or less on RA qualifies patient for Home Oxygen    Patient resting on 4  lmp  with an oxygen saturation of  92%     Patient resting onr room are with an oxygen saturation of 87%    Patient resting on 2 lpm with an oxygen saturation of 92%        Qualifying patient for home oxygen with ambulation and continuous flow  2 lpm.      In your clinical assessment does the Patient Require Portable Oxygen Tanks?     Yes               Patient/caregiver was educated on Home Oxygen process:  Yes      Level of patient/caregiver understanding able to:   [] Verbalize understanding   [] Demonstrate understanding       [] Teach back        [] Needs reinforcement        [x]  No available caregiver               []  Other:     Response to education:  Poor     Time Spent with Home O2 Set Up:  10  minutes     Keysha York RCP on 10/20/2021 at 11:43 AM                 .

## 2021-10-20 NOTE — PROGRESS NOTES
Pulmonary Followup Note    CC: acute hypoxemic respiratory failure. Subjective:  Patient gives minimal history, but remains fatigued and dyspneic with exertion. Language barrier is an issue as he doesn't interact with the electronic . He's on 4.5L of oxygen when he keeps it on. ROS:  Unobtainable 2/2 language barrier. 24HR INTAKE/OUTPUT:      Intake/Output Summary (Last 24 hours) at 10/20/2021 1303  Last data filed at 10/20/2021 1019  Gross per 24 hour   Intake 160 ml   Output 1200 ml   Net -1040 ml        dexamethasone  6 mg Oral Daily    enoxaparin  40 mg SubCUTAneous Daily    senna  1 tablet Oral Nightly    carvedilol  6.25 mg Oral BID WC    vitamin D  5,000 Units Oral Daily    finasteride  5 mg Oral Daily    nystatin  500,000 Units Oral 4x Daily    [Held by provider] tamsulosin  0.4 mg Oral Daily    sodium chloride flush  5-40 mL IntraVENous 2 times per day    insulin lispro  0-6 Units SubCUTAneous Q4H    insulin glargine  10 Units SubCUTAneous BID           PHYSICAL EXAMINATION:  /68   Pulse 74   Temp 97.5 °F (36.4 °C) (Axillary)   Resp 18   Ht 5' 2\" (1.575 m)   Wt 179 lb (81.2 kg)   SpO2 91%   BMI 32.74 kg/m²   CURRENT PULSE OXIMETRY:  SpO2: 91 %  24HR PULSE OXIMETRY RANGE:  SpO2  Av.5 %  Min: 90 %  Max: 93 % on 4.5L      Gen: no distress. Speaking minimally. HEENT: PERRL, EOMI, OP nl  Lung:  Bibasilar crackles L>R with trace accessory muscle use  CV: RRR without M/R/R  Abd: +BS, soft, NT/ND  Ext: No edema.     DATA  CBC:   Recent Labs     10/18/21  0704 10/19/21  0436 10/20/21  0516   WBC 3.6* 3.9* 4.0   HGB 11.3* 11.6* 11.5*   HCT 34.4* 34.3* 33.8*   MCV 95.0 93.3 92.3    166 180     BMP:   Recent Labs     10/18/21  0704 10/19/21  0436 10/20/21  0516    149* 148*   K 3.4* 3.2* 3.6    111* 111*   CO2 24 26 24   BUN 18 19 22*   CREATININE 1.3 1.4* 1.3     No results for input(s): PHART, ICB0PBA, PO2ART in the last 72 hours. LIVER PROFILE: No results for input(s): AST, ALT, LIPASE, BILIDIR, BILITOT, ALKPHOS in the last 72 hours. Invalid input(s): AMYLASE,  ALB    CXR REVIEWED BY ME AND SHOWED:  CT CHEST WO CONTRAST   Final Result      Mixed alveolar and interstitial abnormalities in both lungs. Correlate clinically for viral pneumonia. Nonobstructing right renal calculus. XR CHEST PORTABLE   Final Result   1. Multifocal bilateral airspace disease slightly improved along bases and slightly worsened in the upper lungs. XR CHEST PORTABLE   Final Result      Increased bilateral airspace disease             FL MODIFIED BARIUM SWALLOW W VIDEO   Final Result      1. Intermittent silent penetration and aspiration with thin liquids. 2.  No evidence of penetration or aspiration with nectar thick liquids or puree consistencies. Please correlate with the speech pathology report for additional details. XR ABDOMEN (KUB) (SINGLE AP VIEW)   Final Result     No acute findings in the abdomen or pelvis. No evidence of bowel    obstruction. CT Cervical Spine WO Contrast   Final Result   1. No acute abnormality in the head or cervical spine. CT Head WO Contrast   Final Result   1. No acute abnormality in the head or cervical spine. XR CHEST PORTABLE   Final Result   Impression: Interval development of multifocal bilateral pulmonary opacities. ASSESSMENT/PLAN:  This is a 81 y.o. male with acute hypoxemic respiratory failure and multifocal airspace disease. Viral PCR came back negative, which just means he isn't shedding virus. CT chest was reviewed by me yesterday and matches very well with a viral pneumonia with the diffuse ggo and subpleural consolidation. Patient was vaccinated for covid in April, and lives in a facility that tests it's residents weekly. Discussed the case with one of my colleagues who suggested a bronchoscopy.   From a diagnostic standpoint there may be utility. However, if this is from covid then he's in the inflammatory phase, not the viremic phase and likely would not yield a new dx. If it is something other than covid it could be a different virus (also in inflammatory phase given the negative PCR), NSIP or and eosinophilic pneumonia, both of which would improve with steroids, and at least in the case of NSIP is not diagnosable via bronchoscopy. I anticipate that patient will need to be on oxygen for an extended period and discharge on oxygen would be appropriate.     Rah Magdaleno MD

## 2021-10-20 NOTE — PROGRESS NOTES
place;Gait belt;Patient at risk for falls         Patient Diagnosis(es): The primary encounter diagnosis was Bacterial pneumonia. Diagnoses of Acute hypoxemic respiratory failure ([address] Altered mental status, unspecified altered mental status type, and Multiple falls were also pertinent to this visit. has a past medical history of Allergic rhinitis, Asthma, Bilateral cataracts, BPH (benign prostatic hypertrophy), Diabetes mellitus, type 2 ([address] Diverticulosis, Eczema, Hyperlipidemia, Hypertension, Lumbar disc disease, Microalbuminuria, Obstructive sleep apnea, Osteoarthritis, Pneumonia, Post herpetic neuralgia, Recurrent upper respiratory infection (URI), Right shoulder pain, Spinal stenosis, Transient global amnesia, and Vitamin D deficiency. has a past surgical history that includes Prostate surgery (June 13, 2000). Restrictions  Position Activity Restriction  Other position/activity restrictions: up with assist  Subjective   General  Chart Reviewed: Yes  Patient assessed for rehabilitation services?: Yes  Additional Pertinent Hx: 36-year-old male with past medical history of BPH, aspiration pneumonia, hypertension, type 2 diabetes mellitus who presented to the ED via EMS due to complaints of increased weakness, lethargy and hypoxia for the past 2 days. Pt unable to stand after fall and has had poor PO intake. Pt spO2 85% with EMS- now on 4L. CT head and spine (-) ; Chest XR (+) Interval development of multifocal bilateral pulmonary opacities. Family / Caregiver Present: Yes (pts daughter)  Referring Practitioner: Denis Mcguire MD  Diagnosis: pneumonia  Subjective  Subjective: Pt in bed upon arrival, agreeable with ecnouragement,  used in session, pt stating \"I need sleep, tired\" this date      Orientation     Objective    ADL  Feeding: Thickened liquids;Setup; Beverage management;Contact guard assistance  Grooming: Setup;Stand by assistance;Verbal cueing; Increased time to complete (wipe face, oral care, apply deoderant, wipe hands, vomb hair)  UE Bathing: Moderate assistance  LE Bathing: Maximum assistance  LE Dressing: Maximum assistance (brief change)  Toileting: Maximum assistance (brief management)        Balance  Sitting Balance: Contact guard assistance (to SBA increased cues and encouragement, attempting to lay back down. sitting EOB ~7 min)  Standing Balance: Minimal assistance  Standing Balance  Time: 15 sec  Activity: stance in steady pull up brief, stand at chair with RW  Functional Mobility  Functional - Mobility Device:  (LORRIE lepe stand from bed, bed to chair, stand from chair with RW)  Functional Mobility Comments: . Bed mobility  Rolling to Left: Moderate assistance  Supine to Sit: Moderate assistance;2 Person assistance  Comment: left in chair end of session  Transfers  Sit to stand:  (CGA + Arben from EOB to [address] st[address]. ModA x2 from chair to [employer])  Stand to sit:  (CGA + Arben from EOB to [address] x2 from chair to [employer])  Transfer Comments: CGA + Arben from EOB to [address] x2 from chair to RW                       Cognition  Overall Cognitive Status: Exceptions  Arousal/Alertness: Delayed responses to stimuli;Inconsistent responses to stimuli  Following Commands: Follows one step commands with increased time; Follows one step commands with repetition  Attention Span: Attends with cues to redirect  Memory: Decreased recall of biographical Information;Decreased short term memory  Safety Judgement: Decreased awareness of need for assistance;Decreased awareness of need for safety  Problem Solving: Decreased awareness of errors  Insights: Decreased awareness of deficits  Initiation: Requires cues for some  Sequencing: Requires cues for some  Cognition Comment: .                     Type of ROM/Therapeutic Exercise  Type of ROM/Therapeutic Exercise: AROM;AAROM  Comment: in recliner chair  Exercises  Shoulder Elevation: x10 BUE  Shoulder Flexion: x10 BUE  Elbow Flexion: x10 BUE  Wrist Flexion: x10 BUE  Wrist Extension: x10 BUE  Grasp/Release: x10 BUE  Other: .                     Plan   Plan  Times per week: 2-5  Times per day: Daily  Current Treatment Recommendations: Strengthening, Endurance Training, Self-Care / ADL, Functional Mobility Training, Safety Education & Training    AM-PAC Score        AM-PAC Inpatient Daily Activity Raw Score: 15 (10/20/21 1223)  AM-PAC Inpatient ADL T-Scale Score : 34.69 (10/20/21 1223)  ADL Inpatient CMS 0-100% Score: 56.46 (10/20/21 1223)  ADL Inpatient CMS G-Code Modifier : CK (10/20/21 1223)    Goals  Short term goals  Time Frame for Short term goals: by d/c  Short term goal 1: Pt will perform sit<>stand transfer with SBA with 2WW as needed- not met  Short term goal 2: Pt will maintain standing balance for 1 min with no more than CGA- not met  Short term goal 3: Pt will perform UB dressing task with mod I- not addressed  Patient Goals   Patient goals : to be able to transfer independently again from bed to bedside commode and from bed to chair       Therapy Time   Individual Concurrent Group Co-treatment   Time In 1045         Time Out 1129         Minutes 44              Timed Code Treatment Minutes:  44  Total Treatment Minutes:  [address] OT

## 2021-10-21 NOTE — DISCHARGE SUMMARY
IV removed. Patient changed from hospital gown to personal clothes. Report given to transport team that will be transporting him home. All belongings given to son in law. [facility] pharmacy called with questions about the insulin that was ordered for the patient. The patient's insurance does not cover the lispro and the pharmacy asked if the order could be changed to basaglar. Dr. Goran Rosenberg notified.

## 2021-10-21 NOTE — PROGRESS NOTES
Speech Language Pathology  Facility/Department: [address]  Dysphagia Daily Treatment Note/DC    NAME: Tee Welch  : 1935  MRN: [id number]    Patient Diagnosis(es):   Patient Active Problem List    Diagnosis Date Noted    Type 2 diabetes mellitus without complication ([facility]     Essential hypertension 2015    Diabetes mellitus type 2 in obese ([facility] 2010    Hyperlipidemia     Microalbuminuria 2013    Vitamin D deficiency 2012    Diverticulosis     Benign prostatic hyperplasia     Bilateral low back pain with right-sided sciatica 2015    Neck pain 2012    Right hip pain 2012    Post herpetic neuralgia 06/15/2012    Right shoulder pain 2011    Eczema 2011    Allergic rhinitis 2010    Rosacea     Back pain     Obstructive sleep apnea     Chest pain     Obesity     Insomnia     Osteoarthritis     Lumbar disc disease     Spinal stenosis     Bacterial pneumonia     Oropharyngeal dysphagia     Multifocal pneumonia 10/12/2021    Hypoxia     Hypernatremia     Altered mental status     Chronic diastolic HF (heart failure) (HCC)     Acute renal failure superimposed on stage 3a chronic kidney disease ([facility] 2021    Peripheral vascular disease ([facility] 2020    Coronary artery disease of native artery of native heart with stable angina pectoris ([facility] 2016    Shortness of breath 2011    Nausea 2011    Dyspnea on exertion 2011    Transient global amnesia     Bilateral cataracts      Allergies: Allergies   Allergen Reactions    Tramadol      Onset Date: 10/12/2021      CXR (10/12/2021)-  Impression   Impression: Interval development of multifocal bilateral pulmonary opacities. CT Head: (10/12/2021)  Impression   1. No acute abnormality in the head or cervical spine.        Previous MBS (2021):  Pt exhibiting mild oropharyngeal dysphagia.  Pt consumed puree and thin liquids via single sips with no penetration or aspiration.  Decreased timing/coordination of swallow with reduced epiglottal closure resulted in aspiration of consecutive swallows of thin liquids via straw.  Throat clear but no cough occurred. Additional single sips via cup and straw were consumed with no further pen/aspiration. Reduced base of tongue retraction resulted in mild residue in valleculae with solid texture. Pt was not sensate to the residue, however this cleared when instructed to use liquid wash.  Pt demonstrated adequate mastication with solid texture, no pocketing /oral residue noted. Recommend cont regular diet/thin liquids- no straw (daughter reports pt does not typically use a straw).     Chart reviewed. Medical Diagnosis: Pneumonia  Treatment Diagnosis: Dysphagia    BSE Impression (10/13/2021)-  Dysphagia Impression : Suspect oropharyngeal dysphagia; needs further assessment. With pt seated up in bed, assessed tolerance thins via cup and bites of puree. Pt with positive oral acceptance, positive swallow movement, adequate oral clearance. Reactive cough with thins, no s/s aspiration with puree. Recommend completion of MBS; pt confused at this time, however daughter agreeable. Recommend continue NPO pending results. MBS results (10/13/2021)-  Mild-moderate oral phase dysphagia characterized by lingual pumping/rocking, reduced posterior propulsion, holding of bolus, piecemeal swallowing, premature bolus loss to pharynx, and reduced tongue base retraction.   Moderate pharyngeal phase dysphagia characterized by impaired hyolaryngeal mechanics and reduced sensation, with delayed swallow initiation, premature spillage to the valleculae and pyriforms (thin liquids), decreased pharyngeal peristalsis, reduced/delayed epiglottic movement, reduced laryngeal elevation/retraction, with pooling at the valleculae/pyriforms/UES (mostly thins), subsequent deep penetration and suspected silent aspiration of thin liquids (no cough reflex), mild pharyngeal residue and pharyngeal wall weakness. No aspiration or penetration of nectar thick liquids or puree. Trialed chin tuck manuever with thin liquids via cup; on review of images, appeared effective at protecting airway and preventing aspiration, however pt required max cues from daughter to complete. On discussion with daughter, she suspects he may have limited carryover with other caregivers. After further discussion re: QoL wishes, plan of care with daughter (pt present, but currently confused), recommend nectar thick liquids as safest option, with soft foods (minced & moist), with implementation of free water protocol (may have unthickened plan H2O between meals following completion of thorough oral hygiene). Also discussed daughter can try thin liquids with herself cuing her father to chin-tuck. Upper Esophageal Screen: Reduced cricopharyngeal opening    Pain: none indicated by any means    Current Diet :  Dysphagia II minced and moist solids, nectar thick (mildly thick) liquids    Treatment:  Pt seen bedside to address the following goals:  Goal 1: Patient will participate in ongoing assessment of swallowing function as appropriate. 10/13: Addressed via MBS this date. Please see impression statement. 10/14 (session 2): Repositioned pt upright in bed, and assessed with nectar thickened liquids via cup and minced/moist solid from home (stewed apples). Pt with positive oral acceptance, mild oral holding, swallow movement with verbal cue, good oral clearance. Single cough, productive of minimal material. As discussed with granddaughter, pt with notable silent aspiration/penetration on yesterday's MBS, meaning no cough reflex, and given current diagnosis of pneumonia, cough not necessarily indicative of aspiration/penetration. As pt fatiguing and showing disinterest in eating/drinking more at this time, withheld further PO trials.  Per   Cont goal  GOAL MET    NEW GOAL:  Pt will consume recommended diet consistency w/o respiratory decline. 10/15: Family member at side. Pt and family declining use of  system despite education from SLP for rationale for use. Targeted goal via effortful swallows of varying consistencies presented in hierarchical fashion (nectar thick via tsp, thin liquid via tsp, and puree via tsp) - total of 25 reps achieved with max cues from SLP and dtr. Pt with slowed posterior transit and intermittent holding of bolus; fatigue noted as session progressed with increasing amount of time required for posterior transit. Pt consumed nectar thick via tsp for 90% of opps, thin via tsp for 80% of opps, and puree for 80% of opps w/o overt s/s associated with aspiration. Continue goal.  10/18-  Session limited as pt declining PO. No family present as pt moved to 4S, so no visitors permitted on the unit. RN reported no concerns re: swallowing, other than poor intake. Pt analyzed with one trial each of yogurt, pudding and warm nectar thick water by cup. Pt encouraged to perform effortful swallow with all, but question full comprehension given language barrier. Pt displayed no s/s aspiration, however, per MBS, pt silently aspirated, so it's difficult to fully determine tolerance at bedside. Returned with  system (it was charging in another location). Pt analyzed with lunch. Pt was encouraged to perform effortful swallow with all trials. Pt only agreeable to consuming 1 tsp of mashed potatoes, 1 tsp of minced pepper steak, 1tsp of pudding, 1 tsp of applesauce and 1 tsp of thickened lemon water. Pt grimaced with each trial. When asked if pt was experiencing pain, pt stated \"no, but that it felt uncomfortable\". No s/s aspiration observed. Performed oral care with suction and presented with trial of warm, thin water, knowing that pt dislikes cold foods/liquids. Again, pt encouraged to perform effortful swallow.   No s/s aspiration observed, but pt known to silently aspirate per MBS. Pt declined further trials and requested to take a nap.  con't goal   10/21: pt analyzed with portion of breakfast with son present. Pt consuming puree and soft solids as well as nectar thick liquids. Pt exhibited single instance of coughing, occurring after taking puree at rapid rate, not allowing time in between trials. Educated pt/son to eating slowly, small bites/sips and swallowing completely prior to next bite. Pt exhibited no overt signs of aspiration with nectar thick liquids. No further respiratory decline per chart review. Pt to dc home this date per son report and RN. Goal met   Goal 2: Patient/caregiver will demonstrate understanding of swallowing concerns/recommendations. 10/13: Provided extended education to patient/daughter regarding instrumental assessment, results, swallow function/structures, dysphagia, aspiration vs penetration, aspiration concerns, diet options, thickener use/rationale, diet recommendations (NTL/soft solids), free water protocol, safety strategies (upright, small bites/sips, slow rate), chin tuck with thins (daughter only). Updated white board and provided handout in addition to verbal education re: thickener/FWP. Daughter stated good comprehension; will continue to reinforce. Goal met  10/14 (session 1): Received pt awake, resting in bed, on O2 via nc. Granddaughter present. Pt still somewhat confused. Provided education to Granddaughter/patient regarding results of yesterday's MBS, current swallow function, aspiration/penetration, diet recommendations, swallow exercises (effortful swallow, Lola). Ended treatment session d/t pt's need to toilet  10/14 (session 2): Began new treatment session.  Provided lengthy education this session regarding different thickener types, proper consistency, mixing, specifics re: diet consistency (minced/moist), importance of upright position, slow rate, small bites/sips, [employer] Protocol, provided demonstration of oral hygiene completion, assisted granddaughter with denture placement/[place]ova. Currently his dentures are loose; granddaughter confirmed that he has lost weight. Educated her regarding problem with ill-fitting dentures (when very loose, food can get stuck behind them, creating choking risk). Recommended eat w/o unless securely in place. Also provided general education regarding dysphagia and possible causes (e.g., when an individual is sick, and has generalized weakness, can negatively impact swallow function/safety), and recommendation for follow-up MBS as pt's overall strength/conditioning improved. 10/15: Educated to use of effortful swallow, rationale for tx tasks, parameters for [employer] Protocol, and recommended POC. Family verbalized comprehension and demonstrated adequate recall of previous education. Continue goal.  10/18-  Attempted to educate pt re: purpose of the visit, swallowing strategies and use of effortful swallow, but pt with questionable comprehension. No family present. Via  system- pt was educated to the results of MBS, silent aspiration, rational for nectar/mildly thick liquids, rational for swallowing exercises and was encouraged to continue to utilize effortful swallow with all PO intake. Pt indicated comprehension. con't goal   10/21: pt /son educated to recommendations for dc. Reviewed diet texture, son states he understands and knows how to thicken liquids. Provided some samples of thickener. Explained recommendation for follow up home speech as well for treatment/carry over of recommendations. Son stated understanding and had no questions  Goal met    Patient/Family/Caregiver Education:  As above.     Compensatory Strategies:  Upright position  small bites/sips  slow rate  oral hygiene 2-3x/day  assist feed  effortful swallow exercise  no straws     Plan:    Diet recommendations: Dysphagia Minced and Moist (Dysphagia II), Mildly Thick ([place])   Oral care with suction 2-3x/day  [employer] Protocol (thin, plain, unsweetened H2O (not coffee, tea, flavored water, etc.) permitted between meals following completion of thorough oral hygiene and after 30 min wait time)  DC recommendation: ongoing dysphagia tx indicated at this time  Treatment:  15 min  D/W nursingAnderson  Needs met prior to leaving room, call button in reach. RN and family at side  Boo Johnson M.S./[facility]-SLP #[id number]  Pg.  # [phone]

## 2021-10-21 NOTE — PLAN OF CARE
Increase independence with functional transfers and ADLs.
Increase safety and independence with functional mobility.
Problem: Falls - Risk of:  Goal: Will remain free from falls  Description: Will remain free from falls  10/20/2021 0155 by Aretha Bui RN  Outcome: Met This Shift   Fall precautions in place. Sitter at bedside. No attempts to exit bed alone this shift. Problem: Confusion - Acute:  Goal: Absence of continued neurological deterioration signs and symptoms  Description: Absence of continued neurological deterioration signs and symptoms  10/20/2021 0155 by Aretha Bui RN  Outcome: Ongoing   Patient knows his name but forgets where he is an why he is here. No improvements overnight.
Problem: Falls - Risk of:  Goal: Will remain free from falls  Description: Will remain free from falls  10/20/2021 1033 by Keily Manzano RN  Outcome: Ongoing  Note: Pt is a fall risk. Pt has sitter at bedside. Bed alarm in place. Condom cath on. Bed in lowest position with call light within reach. Will continue to monitor patient needs Q2H. Problem: Confusion - Acute:  Goal: Absence of continued neurological deterioration signs and symptoms  Description: Absence of continued neurological deterioration signs and symptoms  10/20/2021 1033 by Keily Manzano RN  Outcome: Ongoing     Problem: Injury - Risk of, Physical Injury:  Goal: Will remain free from falls  Description: Will remain free from falls  10/20/2021 1033 by Keily Manzano RN  Outcome: Ongoing  Note: Pt is a fall risk. Pt has sitter at bedside. Bed alarm in place. Condom cath on. Bed in lowest position with call light within reach. Will continue to monitor patient needs Q2H. Problem: Pain:  Goal: Pain level will decrease  Description: Pain level will decrease  Outcome: Ongoing  Note: Pt is resting comfortably in bed and appearing in no distress at this time.      Problem: Skin Integrity:  Goal: Absence of new skin breakdown  Description: Absence of new skin breakdown  Outcome: Ongoing
Problem: Falls - Risk of:  Goal: Will remain free from falls  Description: Will remain free from falls  Outcome: Ongoing  Note: Bed locked in the lowest position with the bed alarm on. 3/4 side rails up. Video monitoring in the room for patient safety. Pt reminded to call for help when needing assistance. Problem: Pain:  Goal: Control of acute pain  Description: Control of acute pain  Outcome: Ongoing  Note: Pt points to his head saying that he is in pain. PRN tylenol given. Pt asleep after administration.
Problem: Falls - Risk of:  Goal: Will remain free from falls  Description: Will remain free from falls  Outcome: Ongoing  Note: Fall precautions in place. Bed locked in lowest position with side rails up x3. Bed exit alarm in use. Call light and personal belongings within reach. Sitter remains at bedside for safety. Hourly rounding in place. Problem: Skin Integrity:  Goal: Absence of new skin breakdown  Description: Absence of new skin breakdown  Outcome: Ongoing  Note: Pt has blanchable redness to buttocks, heels, and toes. Assisted with turning q2 and as needed with pillow support. Condom cath in place for moisture prevention. Incontinence care provided as needed. No new signs of skin breakdown noted. Will continue to monitor.       Problem: Confusion - Acute:  Goal: Absence of continued neurological deterioration signs and symptoms  Description: Absence of continued neurological deterioration signs and symptoms  Outcome: Ongoing     Problem: Mood - Altered:  Goal: Mood stable  Description: Mood stable  Outcome: Ongoing
Problem: Falls - Risk of:  Goal: Will remain free from falls  Description: Will remain free from falls  Outcome: Ongoing  Note: Pt a high fall risk. Sitter and standard fall precautions in place. Educated patient on fall interventions, no evidence of learning. will continue to monitor      Problem: Confusion - Acute:  Goal: Absence of continued neurological deterioration signs and symptoms  Description: Absence of continued neurological deterioration signs and symptoms  Outcome: Ongoing  Note: Pt only answers name and  orientation questions with this RN and video . Appears disoriented to all or refuses to answer. Will continue to monitor.
Problem: Falls - Risk of:  Goal: Will remain free from falls  Description: Will remain free from falls  Outcome: Ongoing  Note: Pt high fall risk. Pt 2 person assist with a steady. Camera in room for safety. Sitter also at bedside. Bed in lowest position. Problem: Confusion - Acute:  Goal: Absence of continued neurological deterioration signs and symptoms  Description: Absence of continued neurological deterioration signs and symptoms  Outcome: Ongoing     Problem: Injury - Risk of, Physical Injury:  Goal: Will remain free from falls  Description: Will remain free from falls  Outcome: Ongoing  Note: Pt high fall risk. Pt 2 person assist with a steady. Camera in room for safety. Sitter also at bedside. Bed in lowest position. Problem: Mood - Altered:  Goal: Mood stable  Description: Mood stable  Outcome: Ongoing     Problem: Pain:  Goal: Pain level will decrease  Description: Pain level will decrease  Outcome: Ongoing     Problem: Skin Integrity:  Goal: Absence of new skin breakdown  Description: Absence of new skin breakdown  Outcome: Ongoing  Note: Pt with blanchable redness on bilat toes as well as blanchable redness noted on buttocks. Will continue to turn patient Q2H. R knee abrasion also noted.
Problem: Nutrition  Goal: Optimal nutrition therapy  Note: Nutrition Problem #1: Inadequate oral intake  Intervention: Food and/or Nutrient Delivery: Continue Current Diet, Start Oral Nutrition Supplement  Nutritional Goals: Pt will consistently consume >50% of meals and supplements skyler adm.
Problem: Nutrition  Intervention: Swallowing evaluation  SLP completed evaluation. Please refer to notes in EMR.     Nini Chang M.A., [address]  Speech-Language Pathologist
Statement Selected

## 2021-10-21 NOTE — PROGRESS NOTES
Pulmonary Followup Note    CC: acute hypoxemic respiratory failure. Subjective:  Patient continues to have decreased appetite and oxygen requirement. He had weaned down to 1L of oxygen, but was back up to 3L because his saturations were 90%. Communication through his son. ROS:  Denies shortness of breath, chest pain, nausea (does have decreased appetite)    24HR INTAKE/OUTPUT:      Intake/Output Summary (Last 24 hours) at 10/21/2021 1032  Last data filed at 10/21/2021 5563  Gross per 24 hour   Intake 490 ml   Output 1400 ml   Net -910 ml        potassium chloride  40 mEq Oral Once    dexamethasone  6 mg Oral Daily    enoxaparin  40 mg SubCUTAneous Daily    senna  1 tablet Oral Nightly    carvedilol  6.25 mg Oral BID WC    vitamin D  5,000 Units Oral Daily    finasteride  5 mg Oral Daily    nystatin  500,000 Units Oral 4x Daily    [Held by provider] tamsulosin  0.4 mg Oral Daily    sodium chloride flush  5-40 mL IntraVENous 2 times per day    insulin lispro  0-6 Units SubCUTAneous Q4H    insulin glargine  10 Units SubCUTAneous BID           PHYSICAL EXAMINATION:  /65   Pulse 87   Temp 97.6 °F (36.4 °C) (Oral)   Resp 16   Ht 5' 2\" (1.575 m)   Wt 147 lb 7.8 oz (66.9 kg)   SpO2 90%   BMI 26.98 kg/m²   CURRENT PULSE OXIMETRY:  SpO2: 90 %  24HR PULSE OXIMETRY RANGE:  SpO2  Av.1 %  Min: 90 %  Max: 93 % on 3L      Gen: no distress. Speaking minimally. HEENT: PERRL, EOMI, OP nl  Lung:  Bibasilar crackles L>R with trace accessory muscle use  CV: RRR without M/R/R  Abd: +BS, soft, NT/ND  Ext: No edema.     DATA  CBC:   Recent Labs     10/19/21  0436 10/20/21  0516 10/21/21  0634   WBC 3.9* 4.0 5.2   HGB 11.6* 11.5* 11.6*   HCT 34.3* 33.8* 34.4*   MCV 93.3 92.3 93.7    180 160     BMP:   Recent Labs     10/19/21  0436 10/20/21  0516 10/21/21  0634   * 148* 142   K 3.2* 3.6 3.2*   * 111* 107   CO2 26 24 24   BUN 19 22* 24*   CREATININE 1.4* 1.3 1.2     No results for input(s): PHART, SEJ4QKV, PO2ART in the last 72 hours. LIVER PROFILE: No results for input(s): AST, ALT, LIPASE, BILIDIR, BILITOT, ALKPHOS in the last 72 hours. Invalid input(s): AMYLASE,  ALB    CXR REVIEWED BY ME AND SHOWED:  CT CHEST WO CONTRAST   Final Result      Mixed alveolar and interstitial abnormalities in both lungs. Correlate clinically for viral pneumonia. Nonobstructing right renal calculus. XR CHEST PORTABLE   Final Result   1. Multifocal bilateral airspace disease slightly improved along bases and slightly worsened in the upper lungs. XR CHEST PORTABLE   Final Result      Increased bilateral airspace disease             FL MODIFIED BARIUM SWALLOW W VIDEO   Final Result      1. Intermittent silent penetration and aspiration with thin liquids. 2.  No evidence of penetration or aspiration with nectar thick liquids or puree consistencies. Please correlate with the speech pathology report for additional details. XR ABDOMEN (KUB) (SINGLE AP VIEW)   Final Result     No acute findings in the abdomen or pelvis. No evidence of bowel    obstruction. CT Cervical Spine WO Contrast   Final Result   1. No acute abnormality in the head or cervical spine. CT Head WO Contrast   Final Result   1. No acute abnormality in the head or cervical spine. XR CHEST PORTABLE   Final Result   Impression: Interval development of multifocal bilateral pulmonary opacities. ASSESSMENT/PLAN:  This is a 81 y.o. male with acute hypoxemic respiratory failure and multifocal airspace disease. Viral PCR came back negative, which just means he isn't shedding virus. I reviewed the CT with patient's son and showed how it matches very well with a viral, or post-viral, pneumonia with the diffuse ggo and subpleural consolidation. Patient was vaccinated for covid in April, and lives in a facility that tests it's residents weekly.     There are other possible diagnoses, but I think a bronch at this time would be low yield and the treatment would still be steroids. I think a 10 day course of decadron 6mg as an empiric treatment for inflammatory phase covid is appropriate followed by continued supportive care. If his airspace disease or hypoxia worsens after the steroids are completed, then a bronchoscopy would be needed. I anticipate that patient will need to be on oxygen for an extended period and discharge on oxygen would be appropriate. I agree with discharge today.     Des Chavez MD

## 2021-10-21 NOTE — PROGRESS NOTES
Condom cath fell off of patient, sticky part stuck to groin hair. Attempted to work the condom cath off, patient's hair pulled. I tried to explain what was going on but patient speaks [place] and doesn't understand. Condom cath left off, depends on patient.

## 2021-10-21 NOTE — CARE COORDINATION
CM following for  D/cplanning :  Patient plans to return home w/ family and 24/7 caregivers:  Pt active  [facility] [address] called and spoke with : To confirm and  inform of  D/C for resumption of  Care  , @  [address] [phone]  Fax:  [phone]. PATRICA poke with Ezequiel Rodriguez  Who confirmed the  Hospital bed was being delivered  Today along with Home O 2  , and she will also provide portable tank for pt to transport home with .@ D/C  PATRICA recv'd  Message  From zanda requesting transportation  At 1700  CM called  Arlene Marquez [phone]  and arranged  , and confirmed  Address. CM  spoke with Evi Grandchild, [phone] she is aware and in agreement.      Electronically signed by Khushi Toney RN on 10/21/2021 at 12:29 PM                Case Management Assessment            Discharge Note                    Date / Time of Note: 10/21/2021 12:28 PM                  Discharge Note Completed by: Khushi Toney RN    Patient Name: Casandra Read   YOB: 1935  Diagnosis: Pneumonia [J18.9]  Bacterial pneumonia [J15.9]  Multiple falls [R29.6]  Altered mental status, unspecified altered mental status type [R41.82]  Acute hypoxemic respiratory failure ([facility] [address] [J96.01]   Date / Time: 10/12/2021  4:38 PM    Current PCP: Quillian Gosselin, MD  Clinic patient: No    Hospitalization in the last 30 days: No    Advance Directives:  Code Status: Full Code  [place] DNR form completed and on chart: No    Financial:  Payor: Wesley Gutierrez / Plan: Madison Duane / Product Type: *No Product type* /      Pharmacy:    [facility] #[id number] Jaciel Hayes, [address] [facility] [phone] - [address]  [facility] [address]  Phone: [phone] Fax: [phone]      Assistance purchasing medications?: Potential Assistance Purchasing Medications: No  Assistance provided by Case Management: None at this time    Does patient want to participate in local refill/ meds to beds program?: No    Meds To Beds General Rules:  1. Can ONLY be done Monday- Friday between 8:30am-5pm  2. Prescription(s) must be in pharmacy by 3pm to be filled same day  3. Copy of patient's insurance/ prescription drug card and patient face sheet must be sent along with the prescription(s)  4. Cost of Rx cannot be added to hospital bill. If financial assistance is needed, please contact unit  or ;  or  CANNOT provide pharmacy voucher for patients co-pays  5. Patients can then  the prescription on their way out of the hospital at discharge, or pharmacy can deliver to the bedside if staff is available. (payment due at time of pick-up or delivery - cash, check, or card accepted)     Able to afford home medications/ co-pay costs: Yes    ADLS:  Current PT AM-PAC Score: 10 /24  Current OT AM-PAC Score: 15 /24      DISCHARGE Disposition: Home [address]:  [facility],      LOC at discharge: Not Applicable  JILL Completed: Not Indicated    Notification completed in HENS/PAS?:  Not Applicable    IMM Completed:   Not Indicated    Transportation:  Transportation PLAN for discharge: EMS transportation   Mode of Transport: Ambulance stretcher - S  Reason for medical transport: Bed confined: Meets the following criteria 1) unable to get out of bed without assistance or ambulate, 2) unable to safely sit up in a wheelchair, 3) unable to maintain erect seating position in a chair for time needed for transport and Third party assistance/ attendant required to apply, administer or regulate oxygen during transport  Name of Transport Company: [facility]  Phone: [phone]  Time of Transport: 1700    Transport form completed: Yes    Home Care:  [address] ordered at discharge: Yes  [address]:    [facility].  Address: [address] [place] [address] Dr Selvin Mendez, [address]   Phone: [phone]  Fax  [phone]     Called and spoke with Olya Boateng

## 2021-10-21 NOTE — PROGRESS NOTES
Discharge order received. Discharge paperwork given to son in law, instructions gone over with son in law. Son in [facility] questions answered. Patient sleeping in the room. Transport is scheduled to be here around 1730, will remove IV and dress patient closer to this time.

## 2021-10-22 NOTE — TELEPHONE ENCOUNTER
Lantus Solostar was discontinued on 10/21/2021. PA submitted via [facility] for Basaglar KwikPen 100UNIT/ML pen-injectors.   Key: [id number]    STATUS: PENDING

## 2021-10-25 NOTE — CARE COORDINATION
[facility] Transitions Initial Follow Up Call    Call within 2 business days of discharge: Yes    Patient:  [facility]   Patient :  1935  MRN:  [id number]     Reason for Admission: Pneumonia   Discharge Date:  10/21/21    RARS:       Non-face-to-face services provided:    2ND CTC attempt to reach Pt regarding recent hospital discharge. CTC left voice recording with call back number requesting a call back. CTN will close out CTN episode at this time. CTN did confirm with [place] [id number] agency, [place] [address] orders were received.     Follow up appointments:    Future Appointments   Date Time Provider Aaron Neal   10/29/2021 10:40 AM [facility] [address] [place] [facility]       Thank You,    Deny Rivera RN  Care Transition Coordinator  Contact [facility]:[phone]

## 2021-10-25 NOTE — TELEPHONE ENCOUNTER
Pharmacy called into the office to request a refill on pin needles a box of 100. [facility] [facility], [address] [place] [address] P [phone] - f [phone]    Please advise.

## 2021-10-29 NOTE — PROGRESS NOTES
Post-Discharge Transitional Care Management Services or Hospital Follow Up      Juan Aggarwal   YOB: 1935    Date of Office Visit:  10/29/2021  Date of Hospital Admission: 10/12/21  Date of Hospital Discharge: 10/21/21  Risk of hospital readmission (high >=14%.  Medium >=10%) :Readmission Risk Score: 22      Care management risk score Rising risk (score 2-5) and Complex Care (Scores >=6): 8     Non face to face  following discharge, date last encounter closed (first attempt may have been earlier): 10/25/2021 10:10 AM    Call initiated 2 business days of discharge: Yes    Patient Active Problem List   Diagnosis    Rosacea    Back pain    Obstructive sleep apnea    Chest pain    Transient global amnesia    Obesity    Insomnia    Bilateral cataracts    Osteoarthritis    Lumbar disc disease    Spinal stenosis    Diverticulosis    Benign prostatic hyperplasia    Hyperlipidemia    Diabetes mellitus type 2 in obese (HCC)    Allergic rhinitis    Nausea    Eczema    Dyspnea on exertion    Right shoulder pain    Shortness of breath    Vitamin D deficiency    Post herpetic neuralgia    Neck pain    Right hip pain    Microalbuminuria    Essential hypertension    Type 2 diabetes mellitus without complication (HCC)    Bilateral low back pain with right-sided sciatica    Coronary artery disease of native artery of native heart with stable angina pectoris (HCC)    Peripheral vascular disease ([address]    Acute renal failure superimposed on stage 3a chronic kidney disease (HCC)    Altered mental status    Chronic diastolic HF (heart failure) (HCC)    Multifocal pneumonia    Hypoxia    Hypernatremia    Oropharyngeal dysphagia    Bacterial pneumonia       Allergies   Allergen Reactions    Tramadol        Medications listed as ordered at the time of discharge from hospital   Roberto Collado   Home Medication Instructions THERESA:    Printed on:10/29/21 3894   Medication Information buPROPion (WELLBUTRIN) 100 MG tablet  Take 1 tablet by mouth daily             carvedilol (COREG) 6.25 MG tablet  TAKE 1 TABLET BY MOUTH TWICE DAILY             Cholecalciferol (VITAMIN D3) 125 MCG (5000 UT) CAPS  TAKE ONE CAPSULE BY MOUTH EVERY DAY             Continuous Blood Gluc Sensor (FREESTYLE KARISSA 14 DAY SENSOR) MISC  USE EVERY 14 DAYS AS DIRECTED             diclofenac sodium (VOLTAREN) 1 % GEL  APPLY 2 GRAM TOPICALLY FOUR TIMES DAILY AS NEEDED FOR PAIN             finasteride (PROSCAR) 5 MG tablet  TAKE 1 TABLET BY MOUTH DAILY             insulin glargine (BASAGLAR KWIKPEN) 100 UNIT/ML injection pen  Inject 10 Units into the skin 2 times daily             Insulin Pen Needle (PEN NEEDLES) 31G X 6 MM MISC  1 each by Does not apply route daily             nitroGLYCERIN (NITROSTAT) 0.4 MG SL tablet  Place 1 tablet under the tongue every 5 minutes as needed for Chest pain May repeat every 5 minutes until relief is obtained. If pain persists after taking 3 tabs in a 15-minute period, call 9-1-1 immediately.              nystatin (MYCOSTATIN) 654575 UNIT/ML suspension  Take 5 mLs by mouth 4 times daily             tamsulosin (FLOMAX) 0.4 MG capsule  TAKE 2 CAPSULES BY MOUTH EVERY NIGHT                   Medications marked \"taking\" at this time  Outpatient Medications Marked as Taking for the 10/29/21 encounter (Office Visit) with Rissa Shell MD   Medication Sig Dispense Refill    buPROPion (WELLBUTRIN) 100 MG tablet Take 1 tablet by mouth daily 30 tablet 3    Insulin Pen Needle (PEN NEEDLES) 31G X 6 MM MISC 1 each by Does not apply route daily 100 each 3    insulin glargine (BASAGLAR KWIKPEN) 100 UNIT/ML injection pen Inject 10 Units into the skin 2 times daily 5 pen 1    Continuous Blood Gluc Sensor (FREESTYLE KARISSA 14 DAY SENSOR) MISC USE EVERY 14 DAYS AS DIRECTED 2 each 3    nystatin (MYCOSTATIN) 734372 UNIT/ML suspension Take 5 mLs by mouth 4 times daily 140 mL 1    diclofenac sodium (VOLTAREN) 1 % GEL APPLY 2 GRAM TOPICALLY FOUR TIMES DAILY AS NEEDED FOR PAIN 200 g 3    carvedilol (COREG) 6.25 MG tablet TAKE 1 TABLET BY MOUTH TWICE DAILY 180 tablet 2    tamsulosin (FLOMAX) 0.4 MG capsule TAKE 2 CAPSULES BY MOUTH EVERY NIGHT 180 capsule 0    finasteride (PROSCAR) 5 MG tablet TAKE 1 TABLET BY MOUTH DAILY 90 tablet 3    Cholecalciferol (VITAMIN D3) 125 MCG (5000 UT) CAPS TAKE ONE CAPSULE BY MOUTH EVERY DAY 90 capsule 1    nitroGLYCERIN (NITROSTAT) 0.4 MG SL tablet Place 1 tablet under the tongue every 5 minutes as needed for Chest pain May repeat every 5 minutes until relief is obtained. If pain persists after taking 3 tabs in a 15-minute period, call 9-1-1 immediately. 25 tablet 12        Medications patient taking as of now reconciled against medications ordered at time of hospital discharge: Yes    Chief Complaint   Patient presents with    Follow-Up from Hospital     pneumonia       History of Present illness - Follow up of Hospital diagnosis(es): Aby Perez was admitted to the [address] [place] [address] from 10/12 till 10/21/2021 for treatment of multifocal aspiration pneumonia with hypoxia. He also was found to have acute renal failure superimposed on stage IIIa chronic kidney disease. The patient was treated with broad-spectrum antibiotics. He had pulmonary consultation while in the hospital.  He was found to have dysphagia. Testing for COVID-19 virus infection was negative. The patient's condition improved with use of broad-spectrum antibiotics and oral Decadron. Inpatient course: Discharge summary reviewed- see chart. Interval history/Current status: Today he feels better although reports significant fatigue and tiredness. Appetite somewhat poor. Was able to ambulate for 15 foot distance yesterday with physical therapist during home session. Does not report shortness of breath. Oxygen support is down to 1.5-2 L/min via nasal cannula continuously.   Was started on Wellbutrin 100 mg daily. He is on regular consistency diet with nectar thick liquids. Takes all his pills crushed with applesauce. Glucose ranging between 100-  200 mg/dL. Pressure is well controlled      Vitals:    10/29/21 1043   BP: (!) 109/53   Pulse: 83   SpO2: 97%   Height: 5' 4\" (1.626 m)     Body mass index is 25.32 kg/m². Wt Readings from Last 3 Encounters:   10/20/21 147 lb 7.8 oz (66.9 kg)   08/21/21 179 lb 4.8 oz (81.3 kg)   06/08/21 177 lb (80.3 kg)     BP Readings from Last 3 Encounters:   10/29/21 (!) 109/53   10/21/21 134/76   09/28/21 (!) 121/58        Physical Exam:  General Appearance: Ill- appearing; alert and oriented to person, place and time, in no acute distress  Skin: warm and dry, no rash or erythema  Head: normocephalic and atraumatic  Eyes: pupils equal, round, and reactive to light, extraocular eye movements intact, conjunctivae normal  ENT: tympanic membrane, external ear and ear canal normal bilaterally, nose without deformity, nasal mucosa and turbinates normal without polyps  Neck: supple and non-tender without mass, no thyromegaly or thyroid nodules, no cervical lymphadenopathy  Pulmonary/Chest: clear to auscultation bilaterally- no wheezes, rales or rhonchi, normal air movement, no respiratory distress  Cardiovascular: normal rate, regular rhythm, normal S1 and S2, no murmurs, rubs, clicks, or gallops, distal pulses intact, no carotid bruits  Abdomen: soft, non-tender, non-distended, normal bowel sounds, no masses or organomegaly  Extremities: no cyanosis, clubbing or edema  Musculoskeletal: normal range of motion, no joint swelling, deformity or tenderness  Neurologic: reflexes normal and symmetric, no cranial nerve deficit, gait, coordination and speech normal    Assessment/Plan:  1. Hospital discharge follow-up  Stable; continue oxygen support; encouraged oral intake    2. Hypoxia  Continue oxygen support    3.  Physical deconditioning  Continue home physical and

## 2021-11-03 PROBLEM — E86.0 DEHYDRATION: Status: ACTIVE | Noted: 2021-01-01

## 2021-11-04 NOTE — PROGRESS NOTES
Pt seen and assessed at [facility] today for saline infusion per orders from Dr. Aydee Pearson. Infused per [facility] policy. Monitoring completed for infusion reactions - see flowsheet. Pt tolerated infusion well and without incident. Discharged via wheelchair to home with children. Will return tomorrow for second infusion.     Jony Esteves RN

## 2021-11-08 NOTE — TELEPHONE ENCOUNTER
Please refill     [facility] ULTRA strip [[id number]]           [facility] #[id number] [facility], [address] P [phone] Casandra Bernal [phone]   [address] [place] [address] Kristine BEACH, [facility] [address]   Phone:  [phone]  Fax:  [phone]      Please advise and call

## 2021-11-09 NOTE — CARE COORDINATION
The pt's daughter called back and stated the pt has not been doing well and the PCP ordered adult pull ups and pads for the bed and the daughter has not heard back from [facility] for about 6 weeks. The RNNIKKI offered to call [facility] for the daughter. The RNNIKKI spoke with the Aubrie Hartman that stated in order for the pt to obtain the incontinence supplies (pads and adult pull ups) the following info needs to be faxed to [facility] at [phone]  A new prescription with the diagnosis codes for Type 2 DM and Physical Deconditioning  Day of assessment and copy of recent progress noted (06/39/18)  Certificate of Need signed by the PCP. Bibi will fax the CON to the office.

## 2021-11-09 NOTE — CARE COORDINATION
The RNNIKKI called and left a voice message for the pt's daughter, Mitra Silva asking if the pt would be interested in Care Coordination. The pt lives with his daughter.

## 2021-11-10 PROBLEM — G93.40 ACUTE ENCEPHALOPATHY: Status: ACTIVE | Noted: 2021-01-01

## 2021-11-11 PROBLEM — N18.32 ACUTE RENAL FAILURE SUPERIMPOSED ON STAGE 3B CHRONIC KIDNEY DISEASE (HCC): Status: ACTIVE | Noted: 2021-01-01

## 2021-11-11 PROBLEM — E43 SEVERE MALNUTRITION (HCC): Status: ACTIVE | Noted: 2021-01-01

## 2021-11-11 PROBLEM — E43 SEVERE MALNUTRITION (HCC): Chronic | Status: ACTIVE | Noted: 2021-01-01

## 2021-11-11 NOTE — PROGRESS NOTES
Speech Language Pathology  Facility/Department: [address]   CLINICAL BEDSIDE SWALLOW EVALUATION    NAME: Sofia Rendon  : 1935  MRN: [id number]    ADMISSION DATE: 11/10/2021  ADMITTING DIAGNOSIS: has Rosacea; Back pain; Obstructive sleep apnea; Chest pain; Transient global amnesia; Obesity; Insomnia; Bilateral cataracts; Osteoarthritis; Lumbar disc disease; Spinal stenosis; Diverticulosis; Benign prostatic hyperplasia; Hyperlipidemia; Diabetes mellitus type 2 in obese ([address] Allergic rhinitis; Nausea; Eczema; Dyspnea on exertion; Right shoulder pain; Shortness of breath; Vitamin D deficiency; Post herpetic neuralgia; Neck pain; Right hip pain; Microalbuminuria; Essential hypertension; Type 2 diabetes mellitus without complication ([address] Bilateral low back pain with right-sided sciatica; Coronary artery disease of native artery of native heart with stable angina pectoris ([address] Peripheral vascular disease ([address] Acute renal failure superimposed on stage 3a chronic kidney disease ([address] Altered mental status; Chronic diastolic HF (heart failure) ([address] Multifocal pneumonia; Hypoxia; Hypernatremia; Oropharyngeal dysphagia; Bacterial pneumonia; Dehydration; and Acute encephalopathy on their problem list.     ONSET DATE: 11/10/21    Recent Chest Xray/CT of Chest:  (11/10/21)     1.  Worsening of multifocal consolidation compatible with progressive and/or recurrent pneumonia. 2.  New severe narrowing and/or mucous plugging occluding the right upper lobe bronchus and partial lobar collapse in addition to consolidation.         Date of Eval: 2021  Evaluating Therapist: ERAN Martínez    Current Diet level:  Current Diet : NPO  Current Liquid Diet : NPO    Primary Complaint  Patient with hx of dysphagia with recurrent PNA. Patient admitted with AMS.      Pain:  Pain Assessment  Pain Assessment: 0-10  Pain Level: 0  Response to Pain Intervention: Asleep with RR greater than 10  PAINAD (Pain Assessment in Advance Dementia)  Breathing: normal  Negative Vocalization: none  Facial Expression: smiling or inexpressive  Body Language: relaxed  Consolability: no need to console  PAINAD Score: 0    Reason for Referral  Nya Morin was referred for a bedside swallow evaluation to assess the efficiency of his swallow function, identify signs and symptoms of aspiration and make recommendations regarding safe dietary consistencies, effective compensatory strategies, and safe eating environment. Impression  Dysphagia Diagnosis: Moderate oral stage dysphagia; Moderate pharyngeal stage dysphagia  Dysphagia Outcome Severity Scale: Level 3: Moderate dysphagia- Total assisstance, supervision or strategies. Two or more diet consistencies restricted     MBS results (10/13/2021)-  Mild-moderate oral phase dysphagia characterized by lingual pumping/rocking, reduced posterior propulsion, holding of bolus, piecemeal swallowing, premature bolus loss to pharynx, and reduced tongue base retraction. Moderate pharyngeal phase dysphagia characterized by impaired hyolaryngeal mechanics and reduced sensation, with delayed swallow initiation, premature spillage to the valleculae and pyriforms (thin liquids), decreased pharyngeal peristalsis, reduced/delayed epiglottic movement, reduced laryngeal elevation/retraction, with pooling at the valleculae/pyriforms/UES (mostly thins), subsequent deep penetration and suspected silent aspiration of thin liquids (no cough reflex), mild pharyngeal residue and pharyngeal wall weakness. No aspiration or penetration of nectar thick liquids or puree. Trialed chin tuck manuever with thin liquids via cup; on review of images, appeared effective at protecting airway and preventing aspiration, however pt required max cues from daughter to complete. On discussion with daughter, she suspects he may have limited carryover with other caregivers. After further discussion re:  QoL wishes, plan of care with daughter (pt present, but currently confused), recommend nectar thick liquids as safest option, with soft foods (minced & moist), with implementation of free water protocol (may have unthickened plan H2O between meals following completion of thorough oral hygiene). Also discussed daughter can try thin liquids with herself cuing her father to chin-tuck. Upper Esophageal Screen: Reduced cricopharyngeal opening    Patient seen for a bedside swallow. Patient recently here for PNA and discharged with a Dysphagia II, nectar thick liquid (via spoon) diet. Thorough family training was completed here by SLP. Veto Mccracken after MBS. Pt admitted with recurrent PNA and AMS. Pt requiring cues to maintain alertness and attention to trials. He tolerated multiple trials of puree and nectar thick liquids via spoon. Daughter reports following protocol at home and has been primarily pureeing his food secondary to gum discomfort. Daughter reports weight -loss, therefore nutrition consult is warranted for possible oral supplements. Will continue to follow to ensure diet tolerance and continue reinforcement of strategies. Treatment Plan  Requires SLP Intervention: Yes  Duration/Frequency of Treatment: 3-5x/wk  D/C Recommendations: To be determined  Referral To: Dietician    Recommended Diet and Intervention  Diet Solids Recommendation: Dysphagia Pureed (Dysphagia I)  Liquid Consistency Recommendation: Mildly Thick (Nectar)  Recommended Form of Meds: Crushed in puree as able  Recommendations: Total feed    Compensatory Swallowing Strategies  Compensatory Swallowing Strategies: Alternate solids and liquids; No straws; Eat/Feed slowly; Upright as possible for all oral intake; Remain upright for 30-45 minutes after meals; Small bites/sips; Liquid by spoon only    Treatment/Goals  Dysphagia Goals: The patient will tolerate recommended diet without observed clinical signs of aspiration;  The patient/caregiver will demonstrate understanding of compensatory strategies for improved swallowing safety. General  Chart Reviewed: Yes  Behavior/Cognition: Cooperative; Pleasant mood; Confused; Lethargic  Respiratory Status: O2 via nasual cannula  Follows Directions: Simple  Dentition: Edentulous  Patient Positioning: Upright in chair  Baseline Vocal Quality: Normal  Consistencies Administered: Nectar - teaspoon; Dysphagia Pureed (Dysphagia I)    Vision/Hearing  Vision  Vision: Within Functional Limits  Hearing  Hearing: Within functional limits    Oral Motor Deficits  Oral/Motor  Oral Motor: Exceptions to [facility]; unable to chew d/t gum pain per daughter. Difficulty following commands for oral motor assessment. Oral Phase Dysfunction  Oral Phase: Exceptions  Oral Phase Dysfunction  Decreased Anterior to Posterior Transit: All     Indicators of Pharyngeal Phase Dysfunction  Pharyngeal Phase  Pharyngeal Phase: Exceptions- See above for recent MBS results from 10/18    Prognosis  Prognosis  Prognosis for safe diet advancement: guarded  Barriers to reach goals: age; cognitive deficits; inconsistent alertness  Individuals consulted  Consulted and agree with results and recommendations: Patient; Family member; RN  Family member consulted: Daughter    Education  Caregiver educated on SLP scope of practice. Caregiver educated on safe swallow strategies and precautions. Patient educated on risks associated with dysphagia. Patient Education Response: No evidence of learning; Needs reinforcement  Safety Devices in place: Yes  Type of devices:  All fall risk precautions in place       Therapy Time  SLP Individual Minutes  Time In: 1113  Time Out: [facility]  Minutes: [address] [place], [place]  11/11/2021 12:50 PM

## 2021-11-11 NOTE — CARE COORDINATION
to hospital bill. If financial assistance is needed, please contact unit  or ;  or  CANNOT provide pharmacy voucher for patients co-pays  5. Patients can then  the prescription on their way out of the hospital at discharge, or pharmacy can deliver to the bedside if staff is available. (payment due at time of pick-up or delivery - cash, check, or card accepted)     Able to afford home medications/ co-pay costs: Yes    ADLS  Support Systems: Children    PT AM-PAC: 6 /24  OT AM-PAC: 9 /24    [place]: lives at home with family/24/7 care-and has home care  Steps: n/a    Plans to RETURN to current housing: Yes  Barriers to RETURNING to current housing: medical complications/ 2 person [address]  Currently ACTIVE with [address]: Yes  [address]: [facility]  Phone: [phone]  Fax: [phone]    Currently ACTIVE with [place] [facility] Aging: No    Durable Medical Equipment  DME Provider: [facility]  Equipment: walker, bath bench and hospital bed    Home Oxygen and [address] prior to admission: Yes  [address]: [facility]  Phone: [phone]   Other Respiratory Equipment: no    Informed of need to bring portable home O2 tank on day of DISCHARGE for nursing to connect prior to leaving: Yes  Verbalized agreement/Understanding: Yes  Person to bring portable tank at discharge: daughter      DISCHARGE PLAN:  Disposition: Home with [address]: [facility] and 24/7 private duty     Transportation PLAN for discharge: family     Factors facilitating achievement of predicted outcomes: Family support, Caregiver support and Cooperative    Barriers to discharge: Decreased endurance, Lower extremity weakness and Medical complications    Additional Case Management Notes: spoke to daughter in room and she states that she will decide if patient will go to a SNF.  He usually goes to [facility] if needs a SNF. Daughter states that she thinks if he is a 2 person assist at the time just before discharge, he may have to go to [facility]. If not, he has 24/7 caregivers and skilled home care. PT said that needs a cirilo lift if goes home and this CM ordered patient lift for home per Dr. Jose Antonio Nash. Electronically signed by William Bui RN on 11/11/2021 at 12:42 PM       The Plan for Transition of Care is related to the following treatment goals of Acute encephalopathy [G93.40]  Altered mental status, unspecified altered mental status type [R41.82]  Aspiration pneumonia of both lungs, unspecified aspiration pneumonia type, unspecified part of lung ([facility] [J69.0]    The Patient and/or patient representative Roberto and his family were provided with a choice of provider and agrees with the discharge plan Yes    Freedom of choice list was provided with basic dialogue that supports the patient's individualized plan of care/goals and shares the quality data associated with the providers.  Yes    Care Transition patient: No    William Bui RN  [facility] [employer]  Case Management Department  Ph: [phone]

## 2021-11-11 NOTE — H&P
Internal Medicine  History & Physical      CC lethargy    History Obtained From:  patient, family member - daughter    HISTORY OF PRESENT ILLNESS:    Arlin Salazar is a 81 y.o., male with PMH of Asthma, DM2, HTN, HLD, JOANNA, recurrent pna, here with 1 day history of lethargy and AMS. according to daughter patient has been progressively more lethargic over the past week since his discharge from SNF. On the morning of 11/10/2021 he became even less interactive than previously and was no longer walking or interacting. Responsive only to pain. Patient is unable to participate in interview hx/ROS from daughter.       Past Medical History:        Diagnosis Date    Allergic rhinitis 08/27/2010    Asthma     Bilateral cataracts     BPH (benign prostatic hypertrophy)     Diabetes mellitus, type 2 ([facility] 05/28/2010    Diverticulosis     Eczema 03/11/2011    Hyperlipidemia     Hypertension     Lumbar disc disease     Microalbuminuria 06/13/2013    Obstructive sleep apnea     Osteoarthritis     Pneumonia 10/12/2021    Post herpetic neuralgia 06/15/2012    Recurrent upper respiratory infection (URI)     Right shoulder pain 06/17/2011    Spinal stenosis     Transient global amnesia     Vitamin D deficiency 03/17/2012       Past Surgical History:        Procedure Laterality Date    PROSTATE SURGERY  June 13, 2000    TURP       Medications Prior to Admission:    Medications Prior to Admission: carvedilol (COREG) 6.25 MG tablet, TAKE 1 TABLET BY MOUTH TWICE DAILY  blood glucose test strips (ONETOUCH ULTRA) strip, TEST ONCE DAILY  aspirin 81 MG chewable tablet, Take 81 mg by mouth daily  sennosides-docusate sodium (SENOKOT-S) 8.6-50 MG tablet, Take 1 tablet by mouth as needed for Constipation  polyethylene glycol (GLYCOLAX) 17 g packet, Take 17 g by mouth daily as needed for Constipation  folic acid (FOLVITE) 1 MG tablet, Take 1 mg by mouth daily Dose unknown at this time  buPROPion (WELLBUTRIN) 100 MG tablet, Take 1 tablet by mouth daily  Insulin Pen Needle (PEN NEEDLES) 31G X 6 MM MISC, 1 each by Does not apply route daily  insulin glargine (BASAGLAR KWIKPEN) 100 UNIT/ML injection pen, Inject 10 Units into the skin 2 times daily  Continuous Blood Gluc Sensor (FREESTYLE KARISSA 14 DAY SENSOR) MISC, USE EVERY 14 DAYS AS DIRECTED  nystatin (MYCOSTATIN) 197636 UNIT/ML suspension, Take 5 mLs by mouth 4 times daily  diclofenac sodium (VOLTAREN) 1 % GEL, APPLY 2 GRAM TOPICALLY FOUR TIMES DAILY AS NEEDED FOR PAIN  tamsulosin (FLOMAX) 0.4 MG capsule, TAKE 2 CAPSULES BY MOUTH EVERY NIGHT  finasteride (PROSCAR) 5 MG tablet, TAKE 1 TABLET BY MOUTH DAILY  Cholecalciferol (VITAMIN D3) 125 MCG (5000 UT) CAPS, TAKE ONE CAPSULE BY MOUTH EVERY DAY  nitroGLYCERIN (NITROSTAT) 0.4 MG SL tablet, Place 1 tablet under the tongue every 5 minutes as needed for Chest pain May repeat every 5 minutes until relief is obtained. If pain persists after taking 3 tabs in a 15-minute period, call 9-1-1 immediately. Allergies:  Tramadol    Social History:   TOBACCO:   reports that he has never smoked. He has never used smokeless tobacco.  ETOH:   reports current alcohol use. Patient currently lives alone    Family History:       Problem Relation Age of Onset    Diabetes Mother     Diabetes Brother        Review of Systems   Constitutional: Positive for activity change and fatigue. ROS from daughter   Respiratory:        Increased work of breathing   Cardiovascular: Negative for chest pain. Gastrointestinal: Negative for abdominal pain and blood in stool. Musculoskeletal: Positive for gait problem.         Unable to walk       Vitals:    11/10/21 2245 11/10/21 2300 11/10/21 2330 11/11/21 0115   BP: (!) 89/39 (!) 105/56 (!) 100/50 (!) 108/58   Pulse:   86 88   Resp:   22 24   Temp:    98.5 °F (36.9 °C)   TempSrc:    Oral   SpO2: (!) 89% 92% 94% 92%   Weight:       Height:    5' 3\" (1.6 m)       Physical Exam  Constitutional: Appearance: He is ill-appearing. Comments: Patient lying in bed, responsive only to sternal rub   HENT:      Head: Normocephalic and atraumatic. Mouth/Throat:      Mouth: Mucous membranes are dry. Eyes:      Pupils: Pupils are equal, round, and reactive to light. Cardiovascular:      Rate and Rhythm: Normal rate and regular rhythm. Pulses: Normal pulses. Heart sounds: No murmur heard. Pulmonary:      Breath sounds: Rhonchi present. Comments: Increased work of breathing, patient requiring 4L O2  Abdominal:      General: Abdomen is flat. Palpations: Abdomen is soft. Skin:     General: Skin is warm and dry. DATA:    Labs:  CBC:   Recent Labs     11/10/21  2106   WBC 4.9   HGB 10.6*   HCT 31.8*   *       BMP:   Recent Labs     11/10/21  2106      K 4.2      CO2 22   BUN 47*   CREATININE 1.6*   GLUCOSE 163*     LFT's:   Recent Labs     11/10/21  2106   AST 47*   ALT 37   BILITOT 0.5   ALKPHOS 152*     Troponin:   Recent Labs     11/10/21  2106   TROPONINI 0.01     BNP: No results for input(s): BNP in the last 72 hours. ABGs: No results for input(s): PHART, WLS6ZIE, PO2ART in the last 72 hours. INR: No results for input(s): INR in the last 72 hours. U/A:No results for input(s): NITRITE, COLORU, PHUR, LABCAST, WBCUA, RBCUA, MUCUS, TRICHOMONAS, YEAST, BACTERIA, CLARITYU, SPECGRAV, LEUKOCYTESUR, UROBILINOGEN, BILIRUBINUR, BLOODU, GLUCOSEU, AMORPHOUS in the last 72 hours. Invalid input(s): KETONESU    CT CHEST WO CONTRAST   Final Result      1. Worsening of multifocal consolidation compatible with progressive and/or recurrent pneumonia. 2.  New severe narrowing and/or mucous plugging occluding the right upper lobe bronchus and partial lobar collapse in addition to consolidation. CT HEAD WO CONTRAST   Final Result      1. No acute intracranial hemorrhage or mass effect. 2.  Mild white matter chronic small vessel ischemia.    3.  Moderate

## 2021-11-11 NOTE — ED NOTES
Bed: B23-  Expected date:   Expected time:   Means of arrival:   Comments:  43 George Ochoa RN  11/10/21 1926

## 2021-11-11 NOTE — PROGRESS NOTES
Physical Therapy    Facility/Department: [address]  Initial Assessment and Treatment    NAME: Tee Welch  : 1935  MRN: [id number]    Date of Service: 2021    Discharge Recommendations:  Tee Welch scored a 6/24 on the AM-PAC short mobility form. Current research shows that an AM-PAC score of 17 or less is typically not associated with a discharge to the patient's home setting. Based on the patient's AM-PAC score and their current functional mobility deficits, it is recommended that the patient have 3-5 sessions per week of Physical Therapy at d/c to increase the patient's independence. Please see assessment section for further patient specific details. PT Equipment Recommendations  Equipment Needed: Yes (cirilo lift - if home)    Assessment   Assessment: Pt from home with acute encephalopathy. Pt with other hospitalizations over the past few months, showing a steady decline in function. Pt now requires 24hr caregivers and assist for ADLs and stand pivot transfers. Pt has been non-ambulatory since returning home from SNF in September. Pt with increased lethargy this date, needing 2 person assist and LORRIE roberth for safe transfers. Pt is currently below functional baseline and would benefit from skilled PT to increased strength and maximize mobility. Daughter is hopeful to bring patient home at d/c, but would need him to be able to transfer with assist x1. If pt returns home, pt may need cirilo lift to ensure safety pending progress. Will follow. Treatment Diagnosis: Decreased transfers associated with acute encephalopathy. Decision Making: High Complexity  Patient Education: Role of PT. No learning noted. Barriers to Learning: Language  REQUIRES PT FOLLOW UP: Yes         Restrictions  Up with assist     Vision/Hearing  Vision: Within Functional Limits  Hearing: Within functional limits       Subjective  Chart Reviewed:  Yes  Additional Pertinent Hx: Pt to ED 11/10 with increased fatigue and AMS. Head CT (-). Chest CT: Worsening of multifocal consolidation compatible with progressive and/or recurrent PNA. PMH:  asthma, HTN, DM, OA, BPH, diverticulosis, back pain, PNA, spinal stenosis  Diagnosis: Acute Encephalopathy    Subjective  Subjective: Pt found supine in bed with daughter in room. Pt is [place] speaking and daughter assisted with communication. Pt lethargic with minimal conversation. Pain Screening  Patient Currently in Pain: Denies    Orientation  (Pt responeded to name, otherwise disoriented- language barrier)    Social/Functional History  Lives With: Alone  Type of Home: House  Home Layout: One level  Home Access: Level entry  [place] Equipment: 3-in-1 commode  Home Equipment: Rolling walker, 4 wheeled walker, [address] bed, Lift chair, Oxygen (3L Home O2, transport chair)  ADL Assistance: Needs assistance  Homemaking Assistance: Needs assistance  Ambulation Assistance:  (non-ambulatory since September)  Transfer Assistance: Needs assistance (assist x1 stand pivot transfers)  Active : No  Additional Comments: Daughter providing history. Pt very independent and still driving until hospitalization in August.  Pt d/c'd to SNF and returned home in Sept with 24/7 caregivers. Pt non-ambulatory and assist x1 for transfers, using BSC at night 2* urgency and assist with ADLs/dressing. Pt hospitalized in October (10/12-10/21) and returned home with daughter and caregivers 24/7. Pt receives home PT/OT.       Objective  Strength  Strength RLE:  (at least 3/5 throughout LE)  Strength LLE:  (at least 3/5 throughout LE)    Bed mobility  Rolling to Left: Dependent/Total  Rolling to Right: Dependent/Total  Supine to Sit: 2 Person assistance (Max A x2, log roll and HOB flat)     Transfers  Sit to Stand: 2 Person Assistance (Mod A x2 from EOB to LORRIE lepe, max verbal cues from daughter)  Stand to sit: 2 Person Assistance (Mod A x2, poor control to sit)  Bed to Chair: Dependent/Total (via [id number])     Ambulation  Ambulation?: No     Balance  Sitting - Static: Poor (Min A unsupported on EOB)  Standing - Static: Poor (Mod A initially with UE support of LORRIE lepe, progressing to Mod A x2 with fatigue)    Treatment included gait and transfer training with patient education     Plan   Times per week: 2-5  Current Treatment Recommendations: Transfer Training, Functional Mobility Training, Strengthening    Safety Devices  Type of devices: Left in chair, Chair alarm in place, Call light within reach, Nurse notified (daughter in room)      AM-PAC Score  AM-PAC Inpatient Mobility Raw Score : 6 (11/11/21 1023)  AM-PAC Inpatient T-Scale Score : 23.55 (11/11/21 1023)  Mobility Inpatient CMS 0-100% Score: 100 (11/11/21 1023)  Mobility Inpatient CMS G-Code Modifier : CN (11/11/21 1023)    Goals  Short term goals  Time Frame for Short term goals: Discharge  Short term goal 1: supine <> sit Min A  Short term goal 2: sit <> stand Min A  Short term goal 3: bed <> chair Mod A  Patient Goals   Patient goals : Not stated, daughter hopes to return home       Therapy Time   Individual Concurrent Group Co-treatment   Time In 0911         Time Out 1009         Minutes 58         Timed Code Treatment Minutes:  45  Total Treatment Minutes:  63 Ezekiel Tineo, PT

## 2021-11-11 NOTE — PROGRESS NOTES
4 Eyes Admission Assessment     I agree as the admission nurse that 2 RN's have performed a thorough Head to Toe Skin Assessment on the patient. ALL assessment sites listed below have been assessed on admission. Areas assessed by both nurses: Reva Lisa  [x]   Head, Face, and Ears   [x]   Shoulders, Back, and Chest  [x]   Arms, Elbows, and Hands   [x]   Coccyx, Sacrum, and Ischium  [x]   Legs, Feet, and Heels        Does the Patient have Skin Breakdown?   Yes a wound was noted on the Admission Assessment and an LDA was Initiated documentation include the Nguyen-wound, Wound Assessment, Measurements, Dressing Treatment, Drainage, and Color\",  Stage 2 coccyx        Ignacio Prevention initiated:  Yes   Wound Care Orders initiated:  Yes      [id number]  nurse consulted for Pressure Injury (Stage 3,4, Unstageable, DTI, NWPT, and Complex wounds) or Ignacio score 18 or lower:  Yes      Nurse 1 eSignature: Electronically signed by Saw Head RN on 11/11/21 at 5:52 AM EST    **SHARE this note so that the co-signing nurse is able to place an eSignature**    Nurse 2 eSignature: Electronically signed by Estephanie Khalil RN on 11/11/21 at 6:31 AM EST

## 2021-11-11 NOTE — PLAN OF CARE
Problem: Falls - Risk of:  Goal: Will remain free from falls  Description: Will remain free from falls  Outcome: Ongoing  Note: Pt is a fall risk. See Dondra Infante Fall Score. Pt bed is in low position, side rails up, call light and belongings are in reach. Bed alarm is on. Pt encouraged to call for assistance. Will continue with hourly rounds for po intake, pain needs, toileting and repositioning as needed. Will continue to monitor for needs       Problem: Skin Integrity:  Goal: Will show no infection signs and symptoms  Description: Will show no infection signs and symptoms  11/11/2021 1429 by Janine Bonilla RN  Outcome: Ongoing  Note: Pt has stage 2 PU to coccyx, venelex and sacral heart applied. Pt also has scattered abrasions and redness to bilat heels/toes. Pt turned and repositioned q2h. Pt on low air-loss mattress. Incontinence care provided with each BM, flores in place for urinary retention. Problem: Gas Exchange - Impaired:  Goal: Levels of oxygenation will improve  Description: Levels of oxygenation will improve  11/11/2021 1429 by Janine Bonilla RN  Outcome: Ongoing  Note: Pt has crackles throughout lung fields. Pt on O2 2.5-5L. O2 requirements increase when in bed.

## 2021-11-11 NOTE — ED TRIAGE NOTES
Pt from home and sent by squad for altered mental status. Per home care RN pt has been minimally responsive and warmer than normal. Pt received 500 ml of normal saline in route.

## 2021-11-11 NOTE — PROGRESS NOTES
Lactic acid 4.5. Pt on IVF. Notified Dr. Marisa Reynolds via perfect serve and Dr. Hyun Marie in person.

## 2021-11-11 NOTE — DISCHARGE INSTR - COC
Continuity of Care Form    Patient Name: Roxie Da Silva   :  1935  MRN:  [id number]    Admit date:  11/10/2021  Discharge date:  ***    Code Status Order: Full Code   Advance Directives:      Admitting Physician:  John Estrada MD  PCP: John Estrada MD    Discharging Nurse: [facility] Unit/Room#: 1196/5349-46  Discharging Unit Phone Number: ***    Emergency Contact:   Extended Emergency Contact Information  Primary Emergency Contact: [address] of [address] [place] [address] Phone: [phone]  Relation: Child    Past Surgical History:  Past Surgical History:   Procedure Laterality Date    PROSTATE SURGERY  2000    TURP       Immunization History:   Immunization History   Administered Date(s) Administered    COVID-19, Pfizer, PF, 30mcg/0.3mL 2021, 2021    Influenza 2013    Influenza, High Dose (Fluzone 65 yrs and older) 2011, 2012, 2014, 2015, 2016, 12/15/2017, 2019    Influenza, Triv, inactivated, subunit, adjuvanted, IM (Fluad 65 yrs and older) 2019    Pneumococcal Polysaccharide (Efbaxxapi56) 2007       Active Problems:  Patient Active Problem List   Diagnosis Code    Rosacea L71.9    Back pain M54.9    Obstructive sleep apnea G47.33    Chest pain R07.9    Transient global amnesia G45.4    Obesity E66.9    Insomnia G47.00    Bilateral cataracts H26.9    Osteoarthritis M19.90    Lumbar disc disease M51.9    Spinal stenosis M48.00    Diverticulosis K57.90    Benign prostatic hyperplasia N40.0    Hyperlipidemia E78.5    Diabetes mellitus type 2 in obese (HCC) E11.69, E66.9    Allergic rhinitis J30.9    Nausea R11.0    Eczema L30.9    Dyspnea on exertion R06.00    Right shoulder pain M25.511    Shortness of breath R06.02    Vitamin D deficiency E55.9    Post herpetic neuralgia B02.29    Neck pain M54.2    Right hip pain M25.551    Microalbuminuria R80.9    Essential hypertension I10    Type 2 diabetes [facility]:118928629}  Feeding  {P DME QGFY:232764578}  Med Admin  {P DME UJSB:062593558}  Med Delivery   { JILL MED Delivery:900929837}    Wound Care Documentation and Therapy:  Wound Coccyx stage 2 (Active)   Wound Etiology Pressure Stage  2 21 1046   Dressing Status Clean; Dry; Intact 21 1046   Dressing/Treatment Foam; Pharmaceutical agent (see MAR) 21 1046   Number of days:         Elimination:  Continence: Bowel: {YES / NV:20305}  Bladder: {YES / JW:63304}  Urinary Catheter: {Urinary Catheter:586461026}   Colostomy/Ileostomy/Ileal Conduit: {YES / UZ:89048}       Date of Last BM: ***    Intake/Output Summary (Last 24 hours) at 2021 1233  Last data filed at 2021 0426  Gross per 24 hour   Intake 563 ml   Output 350 ml   Net 213 ml     I/O last 3 completed shifts:   In: 563 [I.V.:563]  Out: 350 [Urine:350]    Safety Concerns:     [address] Safety Concerns:812217660}    Impairments/Disabilities:      [address] Impairments/Disabilities:891079906}    Nutrition Therapy:  Current Nutrition Therapy:   [address] Diet List:526896561}    Routes of Feeding: {P DME Other Feedings:540314327}  Liquids: {Slp liquid thickness:99369}  Daily Fluid Restriction: {P DME Yes amt example:184805354}  Last Modified Barium Swallow with Video (Video Swallowing Test): {Done Not Done ONYH:960352725}    Treatments at the Time of Hospital Discharge:   Respiratory Treatments: ***  Oxygen Therapy:  {Therapy; copd oxygen:31182}  Ventilator:    { CC Vent MISU:806264754}    Rehab Therapies: {THERAPEUTIC INTERVENTION:7041416952}  Weight Bearing Status/Restrictions: [address] Weight Bearin}  Other Medical Equipment (for information only, NOT a DME order):  {EQUIPMENT:782701694}  Other Treatments: ***    Patient's personal belongings (please select all that are sent with patient):  {[facility] DME Belongings:181559678}    RN SIGNATURE:  {Esignature:642695014}    CASE MANAGEMENT/SOCIAL WORK SECTION    Inpatient Status Date: 11-10-21    Readmission Risk Assessment Score:  Readmission Risk              Risk of Unplanned Readmission:  23           Discharging to Facility/ [address]  [place] [address], [address] [facility]  [phone]        / signature: Electronically signed by Donovan Rucker RN on 11/11/21 at 12:33 PM EST    PHYSICIAN SECTION    Prognosis: Poor    Condition at Discharge: Stable    Rehab Potential (if transferring to Rehab): Guarded    Recommended Labs or Other Treatments After Discharge:  -Ongoing need for IV Antibiotics (Meropenem and Vancomycin) at [facility] facility  -IV Digoxin and Lasix  -PRN Respiratory treatments (Ex: Airway Suction)  -IV Electrolyte and possible nutrition supplementation  -Daily Physical therapy to prevent contractures  -Daily Labs (CBC & BMP)    Physician Certification: I certify the above information and transfer of Jean Claude London  is necessary for the continuing treatment of the diagnosis listed and that he requires LTAC for less 30 days.      Update Admission H&P: No change in H&P    PHYSICIAN SIGNATURE:  Electronically signed by Moira Gordon MD on 12/2/21 at 1:11 PM EST

## 2021-11-11 NOTE — PLAN OF CARE
D: admitted from ED around [facility] 56 per stretcher to 7913 with daughter at bedside. Very lethargic and unable to follow directions. During mouth care daughter had to hold hands for RN to suction mouth and to remove dentures. Daughter stated pt unable to get OOB for past 2 days d/t weakness. She stated refused to have dentures removed, but was able to remove to perform mouth care. Tongue and mouth skin red, tender, and some bloody secretions suctioned during mouth care. Incont of lg amt of urine and Post void bladder scan >486ml. Lai inserted d/t catheter less flexible than catheter in st cath kit d/t ED unable to st cath d/t enlarged prostate. Lai inserted without difficulties and urine specimen sent to lab. Daughter stated pt had PU on coccyx prior to admission, so special mattress, chair cushion, & wound consult ordered. Addressed code status and possible Palliative care consult and daughter stated wants full code and no Palliative care. Encouraged to reconsider Palliative care consult since she verbalized not sure if can come home d/t progressive weakness, may need rehab, and poor po intake. Pt pulling o2 off frequently overnight.    A: Cont to monitor during hourly rounds    Problem: Skin Integrity:  Goal: Will show no infection signs and symptoms  Description: Will show no infection signs and symptoms  Outcome: Ongoing  Goal: Absence of new skin breakdown  Description: Absence of new skin breakdown  Outcome: Ongoing     Problem: Fluid Volume - Deficit:  Goal: Achieves intake and output within specified parameters  Description: Achieves intake and output within specified parameters  Outcome: Ongoing     Problem: Gas Exchange - Impaired:  Goal: Levels of oxygenation will improve  Description: Levels of oxygenation will improve  Outcome: Ongoing

## 2021-11-11 NOTE — CONSULTS
Comprehensive Nutrition Assessment      Meets ASPEN criteria for severe malnutrition. Aggressive nutrition intervention should be considered, up to and including alternative means of nutrition/hydration, if nutrition status can not improve. RECOMMENDATIONS:  1. PO Diet: Continue pureed diet with mildly thick liquids per SLP. Please encourage po intakes. 2. ONS: Start Boost pudding BID and Magic Cup QD    NUTRITION ASSESSMENT:   Type and Reason for Visit:  Type and Reason for Visit: Initial, Consult   Nutritional summary & status: RD consulted for poor po intakes. Pt resting or working with other medical staff upon attempted encounters. Noted SLP recommended pureed diet. RD to send Boost Pudding and Magic Cups to increase po intakes. 34.5 lb (19%) weight loss noted in last 3 months. Noted pt with poor po intakes during recent adm. Pt meeting ASPEN criteria for severe malnutrition. RD to monitor po intakes and nutrition status throughout adm. Patient admitted d/t lethargy     PMH significant for: normocytic anemia, T2DM, HTN, BPH    MALNUTRITION ASSESSMENT  Context of Malnutrition: Acute Illness   Malnutrition Status: Severe malnutrition  Findings of the 6 clinical characteristics of malnutrition (Minimum of 2 out of 6 clinical characteristics is required to make the diagnosis of moderate or severe Protein Calorie Malnutrition based on AND/ASPEN Guidelines):  Energy Intake: Less than/equal to 50% of estimated energy requirements    Energy Intake Time: Greater than or equal to 7 days    Weight Loss %: 10% loss or greater    Weight loss Time: Greater than or equal to 3 months   Body Fat Loss: Unable to Assess   Body Fat Location: Unable to assess    Body Muscle Loss: Unable to Assess   Body Muscle Loss Location: Unable to assess    Fluid Accumulation: Unable to assess    Fluid Accumulation Location: Unable to assess     Strength: Not Performed;  Not Measured     NUTRITION DIAGNOSIS   · Inadequate oral intake related to cognitive or neurological impairment as evidenced by weight loss, poor intake prior to admission      [address] and/or Nutrient Delivery:  Continue Current Diet, Start Oral Nutrition Supplement  Nutrition Education/Counseling:  No recommendation at this time   Goals:  Pt will consistently consume >50% of meals and supplements throughout adm       Nutrition Monitoring and Evaluation:   Food/Nutrient Intake Outcomes:  Food and Nutrient Intake, Diet Advancement/Tolerance  Physical Signs/Symptoms Outcomes:  Weight, Chewing or Swallowing, Biochemical Data     OBJECTIVE DATA: Significant to nutrition assessment  · Nutrition-Focused Physical Findings: Nutrition Related Findings: +2 BLE edema, BM 11/11   · Labs: Reviewed  · Meds: Reviewed  · Wounds: Wound Type: None     CURRENT NUTRITION THERAPIES  ADULT DIET; Dysphagia - Pureed; Mildly Thick (Nectar); Liquids by spoon only     PO Intake: Average Meal Intake: Unable to assess   PO Supplement Intake:Average Supplements Intake: None Ordered  IVF: 75 ml/hr     ANTHROPOMETRICS  Current Height: 5' 3\" (160 cm)  Current Weight: 144 lb 13.5 oz (65.7 kg)    Admission weight: 155 lb (70.3 kg)  Ideal Body Weight (lbs) (Calculated): 124 lbs (Ideal Body Weight (Kg) (Calculated): 56 kg)  Usual Bodyweight 179-182 lbs per EMR   Weight Changes -34.5 lbs in 3 months       BMI BMI (Calculated): 25.7    Wt Readings from Last 50 Encounters:   11/11/21 144 lb 13.5 oz (65.7 kg)   10/20/21 147 lb 7.8 oz (66.9 kg)   08/21/21 179 lb 4.8 oz (81.3 kg)   06/08/21 177 lb (80.3 kg)   03/18/21 180 lb 4.8 oz (81.8 kg)   11/20/20 182 lb 6.4 oz (82.7 kg)       COMPARATIVE STANDARDS  Energy (kcal):  7916-4629 (20-25); Weight Used for Energy Requirements:  Current (65.7 kg)     Protein (g):  56-62 (1.0-1.2);  Weight Used for Protein Requirements:  Ideal (56)        Fluid (ml/day):  2044-9893 ml; Method Used for Fluid Requirements:  1 ml/kcal      The patient will still be monitored per nutrition standards of care. Consult dietitian if nutrition interventions essential to patient care is needed.      Janette Madison, [address] [place] [address], [address]:  [phone]  Office:  [phone]

## 2021-11-11 NOTE — ED PROVIDER NOTES
[address]          ATTENDING PHYSICIAN NOTE       Date of evaluation: 11/10/2021    ADDENDUM:      Care of this patient was assumed from Dr. Frankie Langston. The patient was seen for Altered Mental Status  . The patient's initial evaluation and plan have been discussed with the prior provider who initially evaluated the patient. Nursing Notes, Past Medical Hx, Past Surgical Hx, Social Hx, Allergies, and Family Hx were all reviewed. Patient is an 81-year-old male who presents from home for altered mental status. Patient has frequent admissions to the hospital for altered mental status and aspiration pneumonia. Patient presented for change in mental status per family. On arrival, patient is unable to provide history. Vital signs are significant for borderline hypotension. Laboratory studies demonstrate a normal CBC. Renal panel has a creatinine of 1.6 which is at the patient's baseline. Troponin is 0.01. Chest x-ray shows persistent moderate multifocal bilateral airspace disease, slightly increased from prior study. At time of turnover, CT scan of the head was pending. Diagnostic Results     EKG   EKG shows no acute ischemic abnormalities. RADIOLOGY:  CT HEAD WO CONTRAST   Final Result      1. No acute intracranial hemorrhage or mass effect. 2.  Mild white matter chronic small vessel ischemia. 3.  Moderate atrophy. XR CHEST PORTABLE   Final Result      Persistent moderate multifocal bilateral airspace disease, slightly worsened from prior study.        CT CHEST WO CONTRAST    (Results Pending)       LABS:   Results for orders placed or performed during the hospital encounter of 11/10/21   CBC Auto Differential   Result Value Ref Range    WBC 4.9 4.0 - 11.0 K/uL    RBC 3.46 (L) 4.20 - 5.90 M/uL    Hemoglobin 10.6 (L) 13.5 - 17.5 g/dL    Hematocrit 31.8 (L) 40.5 - 52.5 %    MCV 92.0 80.0 - 100.0 fL    MCH 30.6 26.0 - 34.0 pg    MCHC 33.3 31.0 - 36.0 g/dL    RDW 15.7 (H) 12.4 - 15.4 %    Platelets 636 (L) 333 - 450 K/uL    MPV 9.9 5.0 - 10.5 fL    Neutrophils % 68.9 %    Lymphocytes % 22.8 %    Monocytes % 6.5 %    Eosinophils % 1.4 %    Basophils % 0.4 %    Neutrophils Absolute 3.4 1.7 - 7.7 K/uL    Lymphocytes Absolute 1.1 1.0 - 5.1 K/uL    Monocytes Absolute 0.3 0.0 - 1.3 K/uL    Eosinophils Absolute 0.1 0.0 - 0.6 K/uL    Basophils Absolute 0.0 0.0 - 0.2 K/uL   Basic Metabolic Panel w/ Reflex to MG   Result Value Ref Range    Sodium 136 136 - 145 mmol/L    Potassium reflex Magnesium 4.2 3.5 - 5.1 mmol/L    Chloride 100 99 - 110 mmol/L    CO2 22 21 - 32 mmol/L    Anion Gap 14 3 - 16    Glucose 163 (H) 70 - 99 mg/dL    BUN 47 (H) 7 - 20 mg/dL    CREATININE 1.6 (H) 0.8 - 1.3 mg/dL    GFR Non- 41 (A) >60    GFR  50 (A) >60    Calcium 10.9 (H) 8.3 - 10.6 mg/dL   Hepatic Function Panel   Result Value Ref Range    Total Protein 6.3 (L) 6.4 - 8.2 g/dL    Albumin 2.4 (L) 3.4 - 5.0 g/dL    Alkaline Phosphatase 152 (H) 40 - 129 U/L    ALT 37 10 - 40 U/L    AST 47 (H) 15 - 37 U/L    Total Bilirubin 0.5 0.0 - 1.0 mg/dL    Bilirubin, Direct <0.2 0.0 - 0.3 mg/dL    Bilirubin, Indirect see below 0.0 - 1.0 mg/dL   Troponin   Result Value Ref Range    Troponin 0.01 <0.01 ng/mL   Brain Natriuretic Peptide   Result Value Ref Range    Pro-BNP 1,289 (H) 0 - 449 pg/mL   Blood Gas, Venous   Result Value Ref Range    pH, Eder 7.434 7.350 - 7.450    pCO2, Eder 41.4 41.0 - 51.0 mmHg    pO2, Eder 71.7 (H) 25.0 - 40.0 mmHg    HCO3, Venous 27.7 24.0 - 28.0 mmol/L    Base Excess, Eder 3.1 (H) -2.0 - 3.0 mmol/L    O2 Sat, Eder 94 Not established %    Carboxyhemoglobin 1.5 0.0 - 1.5 %    MetHgb, Eder 0.0 0.0 - 1.5 %    TC02 (Calc), Eder 29 mmol/L    Hemoglobin, Eder, Reduced 5.80 %   Ammonia   Result Value Ref Range    Ammonia 55 16 - 60 umol/L       RECENT VITALS:  BP: (!) 105/56, Temp: 97.4 °F (36.3 °C), Pulse: 81, Resp: 18, SpO2: 92 %     Procedures     N/A    ED

## 2021-11-11 NOTE — PROGRESS NOTES
Occupational Therapy   Occupational Therapy Initial Assessment and Treatment    Date: 2021   Patient Name: Yuriy Barron  MRN: [id number]     : 1935    Date of Service: 2021    Discharge Recommendations:  Yuriy Barron scored a 9/24 on the AM-PAC ADL Inpatient form. Current research shows that an AM-PAC score of 17 or less is typically not associated with a discharge to the patient's home setting. Based on the patient's AM-PAC score and their current ADL deficits, it is recommended that the patient have 3-5 sessions per week of Occupational Therapy at d/c to increase the patient's independence. Please see assessment section for further patient specific details. If patient discharges prior to next session this note will serve as a discharge summary. Please see below for the latest assessment towards goals. OT Equipment Recommendations  Equipment Needed: No    Assessment   Performance deficits / Impairments: Decreased functional mobility ; Decreased ADL status; Decreased safe awareness; Decreased endurance; Decreased strength; Decreased balance  Assessment: Pt w/ 2 admissions (August and October). Per discussion w/ dtr at bedside, recent baseline is one person assist w/ pivot transfers. Pt requires assist w/ toileting and ADLs. Pt has private duty 24 hr A care setup at home - caregivers have limited transfer ability. Dtr's goal is for pt to return home if pt requires if pt can transfer w/ one person assist. Pt may benefit from ongoing IP OT upon discharge to progress mobility and decrease burden of care for caregivers. Continue per POC.   Treatment Diagnosis: impaired ADLs/transfers and decreased functional activity tolerance  OT Education: OT Role; Plan of Care  Patient Education: pt very lethargic and having difficulty keeping eyes open  Barriers to Learning: language - speaks [place]  REQUIRES OT FOLLOW UP: Yes  Activity Tolerance  Activity Tolerance: Patient limited by fatigue  Safety Devices  Safety Devices in place: Yes  Type of devices: Nurse notified; Left in chair; Chair alarm in place; Call light within reach (pressure reduction pillow in place; pillows placed for trunk support)           Patient Diagnosis(es): The primary encounter diagnosis was Altered mental status, unspecified altered mental status type. A diagnosis of Aspiration pneumonia of both lungs, unspecified aspiration pneumonia type, unspecified part of lung ([facility] [address] was also pertinent to this visit. has a past medical history of Allergic rhinitis, Asthma, Bilateral cataracts, BPH (benign prostatic hypertrophy), Diabetes mellitus, type 2 ([address] Diverticulosis, Eczema, Hyperlipidemia, Hypertension, Lumbar disc disease, Microalbuminuria, Obstructive sleep apnea, Osteoarthritis, Pneumonia, Post herpetic neuralgia, Recurrent upper respiratory infection (URI), Right shoulder pain, Spinal stenosis, Transient global amnesia, and Vitamin D deficiency. has a past surgical history that includes Prostate surgery (June 13, 2000). Treatment Diagnosis: impaired ADLs/transfers and decreased functional activity tolerance      Restrictions  Position Activity Restriction  Other position/activity restrictions: Up with assist    Subjective   General  Chart Reviewed: Yes  Additional Pertinent Hx: 81 y.o. M presenting w/ AMS and aspiration pneumonia. Hospital Course: CXR: Persistent moderate multifocal bilateral airspace disease; CT head: (-); CT Chest: compatible with progressive and/or recurrent pneumonia, New severe narrowing and/or mucous plugging occluding the right upper lobe bronchus and partial lobar collapse. PMH:  Family / Caregiver Present: Yes (daughter)  Referring Practitioner: Damion Ramon MD  Diagnosis: Acute Encephalopathy    Subjective  Subjective: In bed sleeping on arrival. Dtr at bedside. No attempts to verbalized during interaction. Pt appearing to be very sleepy.  Daughter speaking [place] and attempting to keep pt engaged. Patient Currently in Pain: Denies      Social/Functional History  Social/Functional History  Lives With: Alone  Type of Home: House  Home Layout: One level  Home Access: Level entry  [address] Equipment: 3-in-1 commode  Home Equipment: Rolling walker, 4 wheeled walker, [address] bed, Lift chair, Oxygen (3L Home O2, transport chair)  ADL Assistance: Needs assistance  Homemaking Assistance: Needs assistance  Ambulation Assistance:  (non-ambulatory since September)  Transfer Assistance: Needs assistance (assist x1 stand pivot transfers)  Active : No  Additional Comments: Daughter providing history. Pt very independent and still driving until hospitalization in August.  Pt d/c'd to SNF and returned home in Sept with 24/7 caregivers. Pt non-ambulatory and assist x1 for transfers, using BSC at night 2* urgency and assist with ADLs/dressing. Pt hospitalized in October (10/12-10/21) and returned home with daughter and caregivers 24/7. Pt receives home PT/OT.        Objective   Vision: Within Functional Limits  Hearing: Within functional limits      Orientation  Overall Orientation Status:  (not formally tested; responds to name)        Balance  Sitting Balance: Minimal assistance  Standing Balance:  (Mod A progressing to Max A w/ fatigue; flexed neck and trunk)    Standing Balance  Time: ~30 seconds  Activity: transfer  Comment: fatigued w/ activity; wearing 4.5 L O2 (increased from baseline 3)    ADL  Feeding: NPO (per notes on whiteboard - thickened liquids at home)  LE Dressing: Dependent/Total (socks)  Toileting: Dependent/Total (flores catheter; depends checked - BM incontinence (pericare provided and clean depends donned - PCA notified that BM observed to be in rectal vault, pt will need to be inspected shortly))           Bed mobility  Rolling to Left: Dependent/Total  Rolling to Right: Dependent/Total  Supine to Sit: 2 Person assistance (Max A x2, log roll and HOB flat) Transfers  Sit to stand: 2 Person assistance (attempted to stand w/o AD - unsuccesful despite Max A of 2; Mod A of 2 (to Manny Cantrell - assist to place UEs on bar, pt did initiate sit-stand w/ prompts))  Stand to sit: 2 Person assistance (Mod A of 2)  Transfer Comments: Note: bed > chair dependent via [facility]  Overall Cognitive Status: Exceptions  Arousal/Alertness: Delayed responses to stimuli  Following Commands: Inconsistently follows commands  Attention Span: Difficulty attending to directions  Initiation: Requires cues for all  Sequencing: Requires cues for all  Cognition Comment: Semaj speaking - dtr at bedside providing cues and encouragement                         Pt seen by OT for eval and treat.  Treatment included: bed mobility, unsupported sitting, transfer, ADL                    Plan   Plan  Times per week: 2-5  Times per day: Daily  Current Treatment Recommendations: Strengthening, Balance Training, Functional Mobility Training, Endurance Training, Safety Education & Training, Self-Care / ADL, Equipment Evaluation, Education, & procurement                                                      AM-PAC Score        [facility] Inpatient Daily Activity Raw Score: 9 (11/11/21 1027)  AM-PAC Inpatient ADL T-Scale Score : 25.33 (11/11/21 1027)  ADL Inpatient CMS 0-100% Score: 79.59 (11/11/21 1027)  ADL Inpatient CMS G-Code Modifier : CL (11/11/21 1027)    Goals  Short term goals  Time Frame for Short term goals: Discharge  Short term goal 1: supine to sit w/ Mod A  Short term goal 2: unsupported sitting x 5 minutes EOB, CGA  Short term goal 3: participate in simple grooming task w/ setup, Mod A  Short term goal 4: sit-stand w/ Min A of 2  Patient Goals   Patient goals : dtr/caregiver goal: for pt to require 1 person assist for transfer       Therapy Time   Individual Concurrent Group Co-treatment   Time In 0911         Time Out 1009         Minutes 58           Timed Code Treatment Minutes:

## 2021-11-11 NOTE — PROGRESS NOTES
Physician Progress Note      Esau Spears  CSN #:                  [id number]  :                       1935  ADMIT DATE:       11/10/2021 7:26 PM  [address] [place] DATE:  RESPONDING  PROVIDER #:        Dallas Yen MD          QUERY TEXT:    Pt admitted 11/10 with AMS and pneumonia. Noted documentation of aspiration   pneumonia per ED on 11/10. Per attending resident H&P: multifocal recurrent   pneumonia. Antibiotic switched from Azithromcyin to Zosyn. If possible,   please document in progress notes and discharge summary, further suspected   specifity of presenting pneumonia. The medical record reflects the following:  Risk Factors: 81 y.o. male w/mucus plugging in RUL, AMS, PMH and frequent   admissions for aspiration pneumonia  Clinical Indicators: per H&P: fatigue, per CT Chest: Worsening of multifocal   consolidation compatible with progressive and/or recurrent pneumonia. New   severe narrowing and/or mucous plugging occluding the right upper lobe   bronchus and partial lobar collapse in addition to consolidation. Treatment: Initially on Azithromycin, then switched to Zosyn  Options provided:  -- Gram negative pneumonia likely present on admission  -- Aspiration pneumonia likely present on admission  -- Other - I will add my own diagnosis  -- Disagree - Not applicable / Not valid  -- Disagree - Clinically unable to determine / Unknown  -- Refer to Clinical Documentation Reviewer    PROVIDER RESPONSE TEXT:    The diagnosis of aspiration pneumonia was likely present on admission.     Query created by: Soco Morales on 2021 10:28 AM      Electronically signed by:  Dallas Yen MD 2021 6:53 PM

## 2021-11-11 NOTE — PROGRESS NOTES
Progress Note    Admit Date: 11/10/2021  Diet: ADULT DIET; Dysphagia - Pureed; Mildly Thick (Nectar); Liquids by spoon only    CC: Lethargy    Interval history:  -NAEO  -Afeb, SBP soft but otherwise HDS, on 4L NC this AM weaned to 2.5L  -Spoke w/ daughter at bedside to answer questions and discuss patient's status.  -Pt is lethargic appearing, but is communicative when engaged in his native language. Responses however are very limited. Medications:     Scheduled Meds:   sodium chloride flush  5-40 mL IntraVENous 2 times per day    heparin (porcine)  5,000 Units SubCUTAneous 3 times per day    insulin glargine  5 Units SubCUTAneous Nightly    insulin lispro  0-6 Units SubCUTAneous TID WC    insulin lispro  0-3 Units SubCUTAneous Nightly    Venelex   Topical BID    sodium chloride (Inhalant)  15 mL Nebulization BID    sodium chloride  500 mL IntraVENous Once     Continuous Infusions:   sodium chloride 25 mL (11/11/21 0056)    dextrose      lactated ringers 100 mL/hr at 11/11/21 1047     PRN Meds:sodium chloride flush, sodium chloride, acetaminophen **OR** acetaminophen, glucose, dextrose, glucagon (rDNA), dextrose, medicated lip ointment    Objective:   Vitals:   T-max:  Patient Vitals for the past 8 hrs:   BP Temp Temp src Pulse Resp SpO2   11/11/21 1046 (!) 93/53 96.4 °F (35.8 °C) Axillary 85 30 92 %   11/11/21 0758 (!) 90/38 -- -- -- -- --   11/11/21 0748 (!) 82/40 98.7 °F (37.1 °C) Axillary 83 26 100 %       Intake/Output Summary (Last 24 hours) at 11/11/2021 1347  Last data filed at 11/11/2021 0426  Gross per 24 hour   Intake 563 ml   Output 350 ml   Net 213 ml       Review of Systems   Unable to perform ROS: Mental status change     Physical Exam  Constitutional:       General: He is not in acute distress. Appearance: He is obese. He is ill-appearing (chronically). HENT:      Head: Normocephalic and atraumatic.       Right Ear: External ear normal.      Left Ear: External ear normal. Nose: Nose normal.      Mouth/Throat:      Mouth: Mucous membranes are moist.      Pharynx: Oropharynx is clear. Eyes:      General: No scleral icterus. Extraocular Movements: Extraocular movements intact. Conjunctiva/sclera: Conjunctivae normal.      Pupils: Pupils are equal, round, and reactive to light. Cardiovascular:      Rate and Rhythm: Normal rate and regular rhythm. Heart sounds: Normal heart sounds. No murmur heard. No friction rub. No gallop. Pulmonary:      Effort: Pulmonary effort is normal. No respiratory distress. Breath sounds: Rhonchi present. No wheezing. Comments: On 4L of NC  Abdominal:      General: There is no distension. Palpations: Abdomen is soft. Tenderness: There is no abdominal tenderness. There is no guarding. Musculoskeletal:      Cervical back: Normal range of motion and neck supple. Right lower leg: Edema present. Left lower leg: Edema present. Comments: Edema is 1+   Skin:     General: Skin is warm and dry. Capillary Refill: Capillary refill takes less than 2 seconds. Neurological:      General: No focal deficit present. Mental Status: He is alert. Comments: Difficult to assess 2/2 to language barrier, not focal motor weaknesses apparent or cranial nerve deficits. Psychiatric:      Comments: Difficult to assess 2/2 language barrier       LABS:    CBC:   Recent Labs     11/10/21  2106   WBC 4.9   HGB 10.6*   HCT 31.8*   *   MCV 92.0     Renal:    Recent Labs     11/10/21  2106      K 4.2      CO2 22   BUN 47*   CREATININE 1.6*   GLUCOSE 163*   CALCIUM 10.9*   ANIONGAP 14     Hepatic:   Recent Labs     11/10/21  2106   AST 47*   ALT 37   BILITOT 0.5   BILIDIR <0.2   PROT 6.3*   LABALBU 2.4*   ALKPHOS 152*     Troponin:   Recent Labs     11/10/21  2106   TROPONINI 0.01       -----------------------------------------------------------------  RAD:   CT CHEST WO CONTRAST   Final Result      1. Worsening of multifocal consolidation compatible with progressive and/or recurrent pneumonia. 2.  New severe narrowing and/or mucous plugging occluding the right upper lobe bronchus and partial lobar collapse in addition to consolidation. CT HEAD WO CONTRAST   Final Result      1. No acute intracranial hemorrhage or mass effect. 2.  Mild white matter chronic small vessel ischemia. 3.  Moderate atrophy. XR CHEST PORTABLE   Final Result      Persistent moderate multifocal bilateral airspace disease, slightly worsened from prior study. Assessment/Plan:     Lethargy 2/2 PNA  Suspected Recurrent Aspiration PNA  CT consistent with recurrent pneumonia, mucous plugging in the right upper lobe/multifocal.  -Zosyn  -Supplemental O2 PRN, wean as tolerated   -3% saline nebulizer treatments  -SLP evaluation ordered  -Continue to monitor     HA on CKD 3b  Patient still making urine, cr to 1.6. patient with rhonchi on exam and 2+ pittinng edema; bnp elevated. S/p bolus in ED  -Judicious IVF boluses as patient has soft BP but also CHF  -Monitor w/ daily BMP    Chronic Issues:  Normocytic Anemia (Baseline Hgb ~11, 10.6 on admission- Daily CBC  T2DM- Glargine 5 U BID, LDSSI, POCTs x4 AC & HS  HTN- Holding home meds as normotensive  BPH- Home Proscar and Flomax    Code Status: FULL  FEN: Dysphagia - Pureed w/ Nectar thick liquids  PPX: SQH  DISPO: [facility]    Bertin Hidalgo MD, PGY-1  11/11/21  1:47 PM    This patient has been staffed and discussed with Tri Tracey MD.     Addendum to Resident H& P/Progress note:  I have personally seen,examined and evaluated the patient. I have reviewed the current history, physical findings, labs and assessment and plan and agree with note as documented by resident MD ( Soumya Degroot)  Discussed the patient's condition with her daughter, Alec Conklin, and the registered nurse, Elma Diaz.     Tri Tracey MD, FACP

## 2021-11-11 NOTE — PLAN OF CARE
Problem: Nutrition  Intervention: Swallowing evaluation  Note: Bedside swallow evaluation completed this date. Randal Jones M.A, CCC-SLP  Speech-language pathologist    Intervention: Aspiration precautions  Note: Bedside swallow evaluation completed this date.     Deepak Terry CCC-SLP  Speech-language pathologist

## 2021-11-11 NOTE — PLAN OF CARE
Problem: Nutrition  Goal: Optimal nutrition therapy  Outcome: Ongoing  Note: Nutrition Problem #1: Inadequate oral intake  Intervention: Food and/or Nutrient Delivery: Continue Current Diet, Start Oral Nutrition Supplement  Nutritional Goals: Pt will consistently consume >50% of meals and supplements throughout adm    Intervention: Swallowing evaluation  11/11/2021 1303 by Sergio Sarabia, SLP  Note: Bedside swallow evaluation completed this date. Velta Sandhoff, CCC-SLP  Speech-language pathologist    Intervention: Aspiration precautions  11/11/2021 1303 by ERAN Sung  Note: Bedside swallow evaluation completed this date.     Velta Sandhoff, CCC-SLP  Speech-language pathologist

## 2021-11-12 NOTE — CONSULTS
Pulmonary Consult Note      Reason for Consult: Possible bronchoscopy  Requesting Physician: Bandar Calderón     Subjective:      CHIEF COMPLAINT / HPI:                The patient is a 81 y.o. male with significant past medical history of asthma, DM 2, hypertension, JOANNA, CKD 3, recurrent pneumonia presented with complaints of increasing lethargy her daughter since his recent discharge from SNF. He has been less interactive at this time and no longer walking. Interactive to pain. On evaluation a CT was completed which showed recurrent pneumonia as well as mucous plugging in the right upper lobe patient was started on Zosyn. We were consulted for possible bronchoscopy. Patient was seen and evaluated this morning. No acute overnight events per medical team.  On evaluation today granddaughter was at bedside. And states that patient was doing well with no significant complaints. Denied any fevers, chills, chest pain but did have mucus production. Discussed the option of a bronchoscopy with granddaughter and daughter Aleena Andrews) of patient who are both agreeable to a possible bronchoscopy.      Past Medical History:      Diagnosis Date    Allergic rhinitis 08/27/2010    Asthma     Bilateral cataracts     BPH (benign prostatic hypertrophy)     Diabetes mellitus, type 2 ([facility] [address] 05/28/2010    Diverticulosis     Eczema 03/11/2011    Hyperlipidemia     Hypertension     Lumbar disc disease     Microalbuminuria 06/13/2013    Obstructive sleep apnea     Osteoarthritis     Pneumonia 10/12/2021    Post herpetic neuralgia 06/15/2012    Recurrent upper respiratory infection (URI)     Right shoulder pain 06/17/2011    Spinal stenosis     Transient global amnesia     Vitamin D deficiency 03/17/2012      Past Surgical History:        Procedure Laterality Date    PROSTATE SURGERY  June 13, 2000    TURP     Current Medications:     meropenem  1,000 mg IntraVENous Q12H    sodium chloride flush  5-40 mL IntraVENous 2 times per day    heparin (porcine)  5,000 Units SubCUTAneous 3 times per day    insulin glargine  5 Units SubCUTAneous Nightly    insulin lispro  0-6 Units SubCUTAneous TID WC    insulin lispro  0-3 Units SubCUTAneous Nightly    Venelex   Topical BID    sodium chloride (Inhalant)  15 mL Nebulization BID    finasteride  5 mg Oral Daily    tamsulosin  0.4 mg Oral Daily    albuterol  2.5 mg Nebulization BID     Allergies: Allergies   Allergen Reactions    Tramadol      Social History:    TOBACCO:   reports that he has never smoked. He has never used smokeless tobacco.  ETOH:   reports current alcohol use. Family History:       Problem Relation Age of Onset    Diabetes Mother     Diabetes Brother        REVIEW OF SYSTEMS:    CONSTITUTIONAL:  negative for fevers, chills, diaphoresis, activity change, appetite change, fatigue, night sweats and unexpected weight change.    EYES:  negative for blurred vision, eye discharge, visual disturbance and icterus  HEENT:  negative for hearing loss, tinnitus, ear drainage, sinus pressure, nasal congestion, epistaxis and snoring  RESPIRATORY:  See HPI  CARDIOVASCULAR:  negative for chest pain, palpitations, exertional chest pressure/discomfort, edema, syncope  GASTROINTESTINAL:  negative for nausea, vomiting, diarrhea, constipation, blood in stool and abdominal pain  GENITOURINARY:  negative for frequency, dysuria, urinary incontinence, decreased urine volume, and hematuria  HEMATOLOGIC/LYMPHATIC:  negative for easy bruising, bleeding and lymphadenopathy  ALLERGIC/IMMUNOLOGIC:  negative for recurrent infections, angioedema, anaphylaxis and drug reactions  ENDOCRINE:  negative for weight changes and diabetic symptoms including polyuria, polydipsia and polyphagia  MUSCULOSKELETAL:  negative for  pain, joint swelling, decreased range of motion and muscle weakness  NEUROLOGICAL:  negative for headaches, slurred speech, unilateral weakness  PSYCHIATRIC/BEHAVIORAL: negative for hallucinations, behavioral problems, confusion and agitation. Objective:   PHYSICAL EXAM:      VITALS:  BP (!) 90/47   Pulse 80   Temp 97.2 °F (36.2 °C) (Axillary)   Resp 20   Ht 5' 3\" (1.6 m)   Wt 158 lb 8.2 oz (71.9 kg)   SpO2 98%   BMI 28.08 kg/m²   24HR INTAKE/OUTPUT:      Intake/Output Summary (Last 24 hours) at 2021 1116  Last data filed at 2021 0715  Gross per 24 hour   Intake 4438 ml   Output 1225 ml   Net 3213 ml     CURRENT PULSE OXIMETRY:  SpO2: 98 %  24HR PULSE OXIMETRY RANGE:  SpO2  Av.8 %  Min: 92 %  Max: 98 % on 2 L    CONSTITUTIONAL:  awake, alert, cooperative, no acute distress, and appears stated age  NECK:  Supple, symmetrical, trachea midline, no adenopathy, thyroid symmetric, not enlarged and no tenderness, skin normal  LUNGS: No increased work of breathing and clear to auscultation. No accessory muscle use, minor rales in the right side over the left  CARDIOVASCULAR:  normal S1 and S2, no significant edema and no JVD  ABDOMEN:  normal bowel sounds, non-distended and no masses palpated, and no tenderness to palpation. No hepatospleenomegaly  LYMPHADENOPATHY:  no axillary or supraclavicular adenopathy. No cervical adnenopathy  PSYCHIATRIC: Oriented to person place and time. No obvious depression or anxiety. MUSCULOSKELETAL: No obvious misalignment or effusion of the joints. No clubbing, cyanosis of the digits. SKIN:  normal skin color, texture, turgor and no redness, warmth, or swelling.  No palpable nodules    DATA:    Old records have been reviewed  CBC with Differential:    Lab Results   Component Value Date    WBC 4.0 2021    RBC 3.17 2021    HGB 9.8 2021    HCT 29.2 2021     2021    MCV 92.2 2021    MCH 30.9 2021    MCHC 33.5 2021    RDW 16.0 2021    SEGSPCT 60.6 2013    BANDSPCT 6 2021    LYMPHOPCT 17.0 2021    MONOPCT 9.0 2021    MYELOPCT 1 2021    EOSPCT 3.0 03/16/2012    BASOPCT 2.0 11/12/2021    MONOSABS 0.4 11/12/2021    LYMPHSABS 0.7 11/12/2021    EOSABS 0.1 11/12/2021    BASOSABS 0.1 11/12/2021    DIFFTYPE Auto-K 03/04/2013     BMP:    Lab Results   Component Value Date     11/12/2021    K 3.8 11/12/2021    K 3.5 11/12/2021     11/12/2021    CO2 23 11/12/2021    BUN 37 11/12/2021    CREATININE 1.6 11/12/2021    CALCIUM 9.8 11/12/2021    GFRAA 50 11/12/2021    GFRAA >60 06/10/2013    LABGLOM 41 11/12/2021    GLUCOSE 153 11/12/2021    GLUCOSE 120 09/16/2011     Hepatic Function Panel:    Lab Results   Component Value Date    ALKPHOS 152 11/10/2021    ALT 37 11/10/2021    AST 47 11/10/2021    PROT 6.3 11/10/2021    PROT 6.5 03/04/2013    BILITOT 0.5 11/10/2021    BILIDIR <0.2 11/10/2021    IBILI see below 11/10/2021     ABG:  No results found for: MPG8JIP, BEART, U5PBGETP, PHART, THGBART, KCI6HYC, PO2ART, CQN3GIV    Radiology Review:  All pertinent images / reports were reviewed as a part of this visit. imaging reveals the following:  XR CHEST PORTABLE   Final Result   1. Diffuse multifocal consolidation, pneumonia pattern is favored over congestive failure in the absence of effusions and peripheral septal thickening. 2. Based on patient's azotemia, follow-up studies are suggested but may have to wait for normalization of renal function to evaluate the presence of adenopathy suspected on computed tomography      CT CHEST WO CONTRAST   Final Result      1. Worsening of multifocal consolidation compatible with progressive and/or recurrent pneumonia. 2.  New severe narrowing and/or mucous plugging occluding the right upper lobe bronchus and partial lobar collapse in addition to consolidation. CT HEAD WO CONTRAST   Final Result      1. No acute intracranial hemorrhage or mass effect. 2.  Mild white matter chronic small vessel ischemia. 3.  Moderate atrophy.       XR CHEST PORTABLE   Final Result      Persistent moderate multifocal bilateral airspace disease, slightly worsened from prior study. Other diagnostic test:    MBS results (10/13/2021)-  Mild-moderate oral phase dysphagia characterized by lingual pumping/rocking, reduced posterior propulsion, holding of bolus, piecemeal swallowing, premature bolus loss to pharynx, and reduced tongue base retraction. Moderate pharyngeal phase dysphagia characterized by impaired hyolaryngeal mechanics and reduced sensation, with delayed swallow initiation, premature spillage to the valleculae and pyriforms (thin liquids), decreased pharyngeal peristalsis, reduced/delayed epiglottic movement, reduced laryngeal elevation/retraction, with pooling at the valleculae/pyriforms/UES (mostly thins), subsequent deep penetration and suspected silent aspiration of thin liquids (no cough reflex), mild pharyngeal residue and pharyngeal wall weakness. No aspiration or penetration of nectar thick liquids or puree. Trialed chin tuck manuever with thin liquids via cup; on review of images, appeared effective at protecting airway and preventing aspiration, however pt required max cues from daughter to complete. On discussion with daughter, she suspects he may have limited carryover with other caregivers. After further discussion re: QoL wishes, plan of care with daughter (pt present, but currently confused), recommend nectar thick liquids as safest option, with soft foods (minced & moist), with implementation of free water protocol (may have unthickened plan H2O between meals following completion of thorough oral hygiene). Also discussed daughter can try thin liquids with herself cuing her father to chin-tuck. Upper Esophageal Screen: Reduced cricopharyngeal opening    Echo 08/24/21   Summary   Normal left ventricle size, wall thickness, and systolic function with an   estimated ejection fraction of 55-60%. Unable to exclude completely exclude   regional wall motion abnormalities.    Mild to moderate tricuspid regurgitation. Estimated pulmonary artery systolic pressure is 59 mmHg assuming a right   atrial pressure of 3 mmHg. Normal right ventricular size and function. Doppler Studies: 03/23/18   Summary        Normal bilateral lower extremity ankle brachial indices.    Possible left inflow disease and tibial occlusive disease. Assessment/Plan   81 y.o. male with past medical history of asthma, DM 2, hypertension, JOANNA, CKD 3, recurrent pneumonia presented with complaints of increasing lethargy her daughter since his recent discharge from SNF. Hypoxia 2/2 Mucous plugging right upper lobe and pneumonia  -Less concern for aspiration given the location of the plugging  -2 L oxygen at home baseline after last discharge on 10/21/2021  -Have discussed the option of a bronchoscopy with daughter [facility]) who is agreeable to this at this time. Will discuss with the attending in regards to plans for this. Do not believe general anesthesia will be required and that a bedside bronc can be completed  -Continue to wean oxygen for goal above 90%  -F/U pro-Michael.  Will evaluate option of abx    Plan and assessment discussed with attending, Guillermina Paget, MD Bartolo Rex, DO, PGY1       Patient was seen, examined and discussed with Dr. Dayanara Chauhan. I agree with the history of present illness, past medical/surgical histories, family history, social history, medication list and allergies as listed. The review of systems is as noted above. My physical exam confirms the findings listed   Chart was reviewed including labs, CXR, CT scan and medical records confirm the findings noted     Severe sepsis with borderline hypotension and lactic acidemia   Recurrent pneumonia   Recurrent aspiration   Mucus plugging vs aspirate in RUL     Switch ABX to Vanc and merrem  Option of doing bronchoscopy discussed with the daughter.   Given his low BP, tachypnea and ongoing sepsis I felt bronchoscopy will be risky at this time especially area of involvement with plugging is not large.    Continue maintenance IVF   Low threshold to transfer to the ICU

## 2021-11-12 NOTE — PROGRESS NOTES
Progress Note    Admit Date: 11/10/2021  Diet: ADULT DIET; Dysphagia - Pureed; Mildly Thick (Nectar); Liquids by spoon only  ADULT ORAL NUTRITION SUPPLEMENT; Breakfast, Lunch; Fortified Pudding Oral Supplement  ADULT ORAL NUTRITION SUPPLEMENT; Dinner; Frozen Oral Supplement    CC: Lethargy    Interval history:  -Pt had brief run of afib overnight. Given a fluid bolus and electrolyte replacement by the night team and patient reportedly returned to NSR.  -LA increased to 4.5 yesterday PM, given fluids over the course of the day and now normalized to 1.7   -Afeb, SBP soft but otherwise HDS, on 2L NC this AM   -Pt is lethargic appearing, but is communicative when engaged in his native language. Responses however continue to be limited. -Pulmonology c/s'd for the possibility of a bronch for his suspected mucus plugging based on CT imaging.        Medications:     Scheduled Meds:   sodium chloride flush  5-40 mL IntraVENous 2 times per day    heparin (porcine)  5,000 Units SubCUTAneous 3 times per day    insulin glargine  5 Units SubCUTAneous Nightly    insulin lispro  0-6 Units SubCUTAneous TID WC    insulin lispro  0-3 Units SubCUTAneous Nightly    Venelex   Topical BID    sodium chloride (Inhalant)  15 mL Nebulization BID    finasteride  5 mg Oral Daily    tamsulosin  0.4 mg Oral Daily    piperacillin-tazobactam  3,375 mg IntraVENous Q8H    albuterol  2.5 mg Nebulization BID     Continuous Infusions:   sodium chloride 50 mL/hr at 11/12/21 0509    sodium chloride 25 mL (11/12/21 0236)    dextrose       PRN Meds:sodium chloride flush, sodium chloride, [DISCONTINUED] acetaminophen **OR** acetaminophen, glucose, dextrose, glucagon (rDNA), dextrose, medicated lip ointment, acetaminophen, docusate    Objective:   Vitals:   T-max:  Patient Vitals for the past 8 hrs:   BP Temp Temp src Pulse Resp SpO2   11/12/21 0845 -- -- -- -- -- 94 %   11/12/21 0658 98/62 98 °F (36.7 °C) Axillary 88 (!) 36 94 % Intake/Output Summary (Last 24 hours) at 11/12/2021 0955  Last data filed at 11/12/2021 0715  Gross per 24 hour   Intake 4438 ml   Output 1225 ml   Net 3213 ml       Review of Systems   Unable to perform ROS: Mental status change     Physical Exam  Constitutional:       General: He is not in acute distress. Appearance: He is obese. He is ill-appearing (chronically). HENT:      Head: Normocephalic and atraumatic. Right Ear: External ear normal.      Left Ear: External ear normal.      Nose: Nose normal.      Mouth/Throat:      Mouth: Mucous membranes are moist.      Pharynx: Oropharynx is clear. Eyes:      General: No scleral icterus. Extraocular Movements: Extraocular movements intact. Conjunctiva/sclera: Conjunctivae normal.      Pupils: Pupils are equal, round, and reactive to light. Cardiovascular:      Rate and Rhythm: Normal rate and regular rhythm. Heart sounds: Normal heart sounds. No murmur heard. No friction rub. No gallop. Comments: Pt not in afib this AM when auscultated at bedside. Pulmonary:      Effort: Pulmonary effort is normal. No respiratory distress. Breath sounds: Rhonchi present. No wheezing. Comments: On 2L of NC  Abdominal:      General: There is no distension. Palpations: Abdomen is soft. Tenderness: There is no abdominal tenderness. There is no guarding. Musculoskeletal:      Cervical back: Normal range of motion and neck supple. Right lower leg: Edema present. Left lower leg: Edema present. Comments: Edema is 1+   Skin:     General: Skin is warm and dry. Capillary Refill: Capillary refill takes less than 2 seconds. Neurological:      General: No focal deficit present. Mental Status: He is alert. Comments: Difficult to assess 2/2 to language barrier, not focal motor weaknesses apparent or cranial nerve deficits.    Psychiatric:      Comments: Difficult to assess 2/2 language barrier LABS:    CBC:   Recent Labs     11/10/21  2106 11/10/21  2106 11/11/21  2112 11/12/21  0017 11/12/21  0449   WBC 4.9   < > 4.3 4.3 4.0   HGB 10.6*   < > 10.3* 10.3* 9.8*   HCT 31.8*   < > 31.3* 30.4* 29.2*   *  --  104* 109*  --    MCV 92.0   < > 91.7 90.7 92.2    < > = values in this interval not displayed. Renal:    Recent Labs     11/11/21 2112 11/12/21 0017 11/12/21 0449    141 143   K 3.6 3.5 3.8    107 109   CO2 21 21 23   BUN 39* 39* 37*   CREATININE 1.5* 1.5* 1.6*   GLUCOSE 159* 139* 153*   CALCIUM 10.0 9.9 9.8   MG  --  1.70*  --    PHOS 2.9  --   --    ANIONGAP 13 13 11     Hepatic:   Recent Labs     11/10/21  2106 11/11/21  2112   AST 47*  --    ALT 37  --    BILITOT 0.5  --    BILIDIR <0.2  --    PROT 6.3*  --    LABALBU 2.4* 2.2*   ALKPHOS 152*  --      Troponin:   Recent Labs     11/10/21  2106   TROPONINI 0.01       -----------------------------------------------------------------  RAD:   CT CHEST WO CONTRAST   Final Result      1. Worsening of multifocal consolidation compatible with progressive and/or recurrent pneumonia. 2.  New severe narrowing and/or mucous plugging occluding the right upper lobe bronchus and partial lobar collapse in addition to consolidation. CT HEAD WO CONTRAST   Final Result      1. No acute intracranial hemorrhage or mass effect. 2.  Mild white matter chronic small vessel ischemia. 3.  Moderate atrophy. XR CHEST PORTABLE   Final Result      Persistent moderate multifocal bilateral airspace disease, slightly worsened from prior study.        XR CHEST PORTABLE    (Results Pending)       Assessment/Plan:     Lethargy 2/2 PNA  Suspected Recurrent Aspiration PNA  CT consistent with recurrent pneumonia, mucous plugging in the right upper lobe/multifocal.  -Zosyn  -Supplemental O2 PRN, wean as tolerated   -3% saline nebulizer treatments  -SLP evaluation ordered; diet recommendations made.  -Pulmonology c/s'd, rec's appreciated  -CXR reordered, \"pneumonia pattern is favored over congestive failure in the absence of effusions and peripheral septal thickening\"     HA on CKD 3b  Patient still making urine, cr to 1.6. patient with rhonchi on exam and 2+ pittinng edema; bnp elevated. S/p bolus in ED  -D5-1/2 NS gtt at 75 ml/hr  -Monitor w/ daily BMP    Afib, paroxsymal  Run of afib noted overnight, resolved s/p fluid bolus and electrolyte replacement  -Will continue to monitor w/ continuous telemetry     Chronic Issues:  Normocytic Anemia (Baseline Hgb ~11, 10.6 on admission- Daily CBC  T2DM- Glargine 5 U BID, LDSSI, POCTs x4 AC & HS  HTN- Holding home meds as normotensive  BPH- Home Proscar and Flomax    Code Status: FULL  FEN: Dysphagia - Pureed w/ Nectar thick liquids  PPX: SQH  DISPO: [employer]    Antonio Pope MD, PGY-1  11/12/21  9:55 AM    This patient has been staffed and discussed with Christina Ennis MD.     Addendum to Resident H& P/Progress note:  I have personally seen,examined and evaluated the patient. I have reviewed the current history, physical findings, labs and assessment and plan and agree with note as documented by resident MD ( )  Recommendations of Dr Kim Gomez, pulmonology, were reviewed.     Christina Ennis MD, FACP

## 2021-11-12 NOTE — PROGRESS NOTES
Granddaughter complaining that patient is having chest pain again. PCA says granddaughter has been getting him in and out of bed all day. Patient is sleeping contently in bed. Physician notified. EKG requested.

## 2021-11-12 NOTE — RT PROTOCOL NOTE
RT Nebulizer Bronchodilator Protocol Note    There is a bronchodilator order in the chart from a provider indicating to follow the RT Bronchodilator Protocol and there is an Initiate RT Bronchodilator Protocol order as well (see protocol at bottom of note). CXR Findings:  XR CHEST PORTABLE    Result Date: 11/12/2021  1. Diffuse multifocal consolidation, pneumonia pattern is favored over congestive failure in the absence of effusions and peripheral septal thickening. 2. Based on patient's azotemia, follow-up studies are suggested but may have to wait for normalization of renal function to evaluate the presence of adenopathy suspected on computed tomography    XR CHEST PORTABLE    Result Date: 11/10/2021  Persistent moderate multifocal bilateral airspace disease, slightly worsened from prior study. The findings from the last RT Protocol Assessment were as follows:  Smoking: None or smoker <15 pack years  Respiratory Pattern: Regular pattern and RR 12-20 bpm  Breath Sounds: Slightly diminished and/or crackles  Cough: Strong, spontaneous, non-productive  Indication for Bronchodilator Therapy:    Bronchodilator Assessment Score: 2    Continue Albuterol Q4h prn and Duoneb BID with 3% NaCl    Aerosolized bronchodilator medication orders have been revised according to the RT Nebulizer Bronchodilator Protocol below. Respiratory Therapist to perform RT Therapy Protocol Assessment initially then follow the protocol. Repeat RT Therapy Protocol Assessment PRN for score 0-3 or on second treatment, BID, and PRN for scores above 3. No Indications - adjust the frequency to every 6 hours PRN wheezing or bronchospasm, if no treatments needed after 48 hours then discontinue using Per Protocol order mode. If indication present, adjust the RT bronchodilator orders based on the Bronchodilator Assessment Score as indicated below.   If a patient is on this medication at home then do not decrease Frequency below that used at home. 0-3 - enter or revise RT bronchodilator order(s) to equivalent RT Bronchodilator order with Frequency of every 4 hours PRN for wheezing or increased work of breathing using Per Protocol order mode. 4-6 - enter or revise RT Bronchodilator order(s) to two equivalent RT bronchodilator orders with one order with BID Frequency and one order with Frequency of every 4 hours PRN wheezing or increased work of breathing using Per Protocol order mode. 7-10 - enter or revise RT Bronchodilator order(s) to two equivalent RT bronchodilator orders with one order with TID Frequency and one order with Frequency of every 4 hours PRN wheezing or increased work of breathing using Per Protocol order mode. 11-13 - enter or revise RT Bronchodilator order(s) to one equivalent RT bronchodilator order with QID Frequency and an Albuterol order with Frequency of every 4 hours PRN wheezing or increased work of breathing using Per Protocol order mode. Greater than 13 - enter or revise RT Bronchodilator order(s) to one equivalent RT bronchodilator order with every 4 hours Frequency and an Albuterol order with Frequency of every 2 hours PRN wheezing or increased work of breathing using Per Protocol order mode. RT to enter RT Home Evaluation for COPD & MDI Assessment order using Per Protocol order mode.     Electronically signed by Elissa Grayson RCP on 11/12/2021 at 4:13 PM no

## 2021-11-12 NOTE — PROGRESS NOTES
Occupational Therapy  Facility/Department: [address]  Daily Treatment Note  NAME: Nya Morin  : 1935  MRN: [id number]    Date of Service: 2021    Discharge Recommendations:  Nya Morin scored a 9/24 on the AM-PAC ADL Inpatient form. Current research shows that an AM-PAC score of 17 or less is typically not associated with a discharge to the patient's home setting. Based on the patient's AM-PAC score and their current ADL deficits, it is recommended that the patient have 3-5 sessions per week of Occupational Therapy at d/c to increase the patient's independence. Please see assessment section for further patient specific details. If patient discharges prior to next session this note will serve as a discharge summary. Please see below for the latest assessment towards goals. OT Equipment Recommendations  Other: Family interested in Kaden Gonzalez for home. Pt would benefit to ease caregiver burden. He demonstrates ability to use with min A x1 vs needing two people for pivot transfers. Assessment   Performance deficits / Impairments: Decreased functional mobility ; Decreased ADL status; Decreased safe awareness; Decreased endurance; Decreased strength; Decreased balance; Decreased cognition; Decreased posture  Assessment: Pt w/ 2 admissions (August and October). Per discussion w/ dtr at bedside, recent baseline is one person assist w/ pivot transfers. Pt requires assist w/ toileting and ADLs. Pt has private duty 24 hr A care setup at home. Dtr's goal is for pt to return home if pt requires if pt can transfer w/ one person assist. Pt may benefit from ongoing IP OT upon discharge to progress mobility and decrease burden of care for caregivers. Continue per POC.   Treatment Diagnosis: impaired ADLs/transfers and decreased functional activity tolerance  Prognosis: Good  REQUIRES OT FOLLOW UP: Yes  Activity Tolerance  Activity Tolerance: Patient Tolerated treatment well  Safety Devices  Safety Devices in place: Yes  Type of devices: Nurse notified; Left in chair; Chair alarm in place; Call light within reach; Gait belt       Patient Diagnosis(es): The primary encounter diagnosis was Altered mental status, unspecified altered mental status type. A diagnosis of Aspiration pneumonia of both lungs, unspecified aspiration pneumonia type, unspecified part of lung ([address] was also pertinent to this visit. has a past medical history of Allergic rhinitis, Asthma, Bilateral cataracts, BPH (benign prostatic hypertrophy), Diabetes mellitus, type 2 ([address] Diverticulosis, Eczema, Hyperlipidemia, Hypertension, Lumbar disc disease, Microalbuminuria, Obstructive sleep apnea, Osteoarthritis, Pneumonia, Post herpetic neuralgia, Recurrent upper respiratory infection (URI), Right shoulder pain, Spinal stenosis, Transient global amnesia, and Vitamin D deficiency. has a past surgical history that includes Prostate surgery (June 13, 2000). Restrictions  Position Activity Restriction  Other position/activity restrictions: Up with assist     Subjective   General  Chart Reviewed: Yes  Patient assessed for rehabilitation services?: Yes  Additional Pertinent Hx: 81 y.o. M presenting w/ AMS and aspiration pneumonia. Hospital Course: CXR: Persistent moderate multifocal bilateral airspace disease; CT head: (-); CT Chest: compatible with progressive and/or recurrent pneumonia, New severe narrowing and/or mucous plugging occluding the right upper lobe bronchus and partial lobar collapse. PMH:  Family / Caregiver Present: Yes (granddaughter)  Referring Practitioner: Kristal Bledsoe MD  Diagnosis: Acute Encephalopathy  Subjective  Subjective: Pt in chair upon arrival. RN cleared pt for tx.      Vital Signs  Patient Currently in Pain: Yes (unspecified level, able to participate)     Orientation  Orientation  Overall Orientation Status:  (NT formally, but alert, appropriate, and cooperative)     Objective ADL  LE Dressing: Dependent/Total (TA for brief management standing in [facility] with rest breaks)  Toileting: Dependent/Total (flores and TA for BM hygiene/clothing management standing in [facility], multiple rest breaks 2/2 fatigue)  Additional Comments: Pt able to complete partial stand for max of 5s in [facility] with min A prior to fatigue during ADL. Balance  Sitting Balance: Minimal assistance  Standing Balance: Dependent/Total (min A in [facility])  Standing Balance  Time: Pt able to complete partial stands during ADL and t/fs for max of 5s in [facility] with min A prior to fatigue. Multiple rest breaks at seated level. Stood roughly 8x. Toilet Transfers  Equipment Used: Standard toilet  Toilet Transfer: Dependent/Total (min A in [facility])        Transfers  Sit to stand: Dependent/Total (min A in [facility])  Stand to sit: Dependent/Total (min A in [facility])                          Cognition  Overall Cognitive Status: Exceptions  Arousal/Alertness: Delayed responses to stimuli  Following Commands: Follows one step commands with repetition;  Follows one step commands consistently  Attention Span: Difficulty attending to directions  Safety Judgement: Decreased awareness of need for assistance; Decreased awareness of need for safety  Problem Solving: Assistance required to generate solutions; Assistance required to implement solutions; Assistance required to identify errors made; Assistance required to correct errors made; Decreased awareness of errors  Insights: Decreased awareness of deficits  Initiation: Requires cues for some  Sequencing: Requires cues for some  Cognition Comment: [place] speaking - family at bedside providing cues and encouragement                                      Education: Role of OT, safe t/f training, safe use of DME, awareness of deficits, discharge planning, ADL as therapeutic exercise, importance of OOB, energy conservation techniques        Plan   Plan  Times per week: 2-5  Times per day: Daily  Current Treatment Recommendations: Strengthening, Balance Training, Functional Mobility Training, Endurance Training, Safety Education & Training, Self-Care / ADL, Equipment Evaluation, Education, & procurement     AM-PAC Score        AM-PAC Inpatient Daily Activity Raw Score: 9 (11/12/21 1102)  AM-PAC Inpatient ADL T-Scale Score : 25.33 (11/12/21 1102)  ADL Inpatient CMS 0-100% Score: 79.59 (11/12/21 1102)  ADL Inpatient CMS G-Code Modifier : CL (11/12/21 1102)    Goals  Short term goals  Time Frame for Short term goals: Discharge  Short term goal 1: supine to sit w/ Mod A  Short term goal 2: unsupported sitting x 5 minutes EOB, CGA  Short term goal 3: participate in simple grooming task w/ setup, Mod A  Short term goal 4: sit-stand w/ Min A of 2 - 11/12 met in [place], continue for progress to SPT  Patient Goals   Patient goals : dtr/caregiver goal: for pt to require 1 person assist for transfer       Therapy Time   Individual Concurrent Group Co-treatment   Time In 0939         Time Out 1008         Minutes 29         Timed Code Treatment Minutes: 29 Minutes       If patient is discharged prior to next treatment session, this note will serve as the discharge summary.   Yohana Swartz OTR/L #[id number]

## 2021-11-12 NOTE — PLAN OF CARE
Problem: Falls - Risk of:  Goal: Will remain free from falls  Description: Will remain free from falls  Outcome: Ongoing     Problem: Falls - Risk of:  Goal: Absence of physical injury  Description: Absence of physical injury  Outcome: Ongoing   Patient confused and speaks [place], very little english. Bed alarm on, fall slippers on, rails up. Patient's granddaughter asked if we could get the patient onto a bedpan while he was in the chair, I refused. This is very dangerous especially with somebody who is as weak and stiff as Rossi Ann Marie Taylor. Therapy helped the patient into the bathroom at which point he had a large bowel movement.

## 2021-11-12 NOTE — PROGRESS NOTES
Physical Therapy  Facility/Department: [address]  Daily Treatment Note  NAME: Bj Centeno  : 1935  MRN: [id number]    Date of Service: 2021    Discharge Recommendations:  Bj Centeno scored a 8/24 on the AM-PAC short mobility form. Current research shows that an AM-PAC score of 17 or less is typically not associated with a discharge to the patient's home setting. Based on the patient's AM-PAC score and their current functional mobility deficits, it is recommended that the patient have 3-5 sessions per week of Physical Therapy at d/c to increase the patient's independence. Please see assessment section for further patient specific details. If patient discharges prior to next session this note will serve as a discharge summary. Please see below for the latest assessment towards goals. If pt going home:HOME HEALTH CARE: LEVEL 3 SAFETY     - Initial home health evaluation to occur within 24-48 hours, in patient home   - Therapy evaluations in home within 24-48 hours of discharge; including DME and home safety   - Frontload therapy 5 days, then 3x a week   - Therapy to evaluate if patient has [address] needs for personal care   -  evaluation within 24-48 hours, includes evaluation of resources and insurance to determine [place], [place], LTC, and Medicaid options     PT Equipment Recommendations  Equipment Needed: Yes (lolly stedy or similar)    Assessment   Assessment: Pt progressing with lolly stedy transfers. Now able to perform with assist x 1. Domonique Roberto reports family plans to take pt home with 24-hr care upon D/C. If pt going home, recommend lolly stedy or similar equipment for transfers. Lino agreeable. Will follow per plan of care. PT Education: Equipment; Family Education; Transfer Training  Patient Education: lino verbalized understanding. pt will require reinforcement.   Barriers to Learning: Language  REQUIRES PT FOLLOW UP: Yes  Activity Tolerance  Activity Tolerance: Patient limited by fatigue; Patient limited by endurance     Patient Diagnosis(es): The primary encounter diagnosis was Altered mental status, unspecified altered mental status type. A diagnosis of Aspiration pneumonia of both lungs, unspecified aspiration pneumonia type, unspecified part of lung [address] [address] was also pertinent to this visit. has a past medical history of Allergic rhinitis, Asthma, Bilateral cataracts, BPH (benign prostatic hypertrophy), Diabetes mellitus, type 2 ([facility] [address] Diverticulosis, Eczema, Hyperlipidemia, Hypertension, Lumbar disc disease, Microalbuminuria, Obstructive sleep apnea, Osteoarthritis, Pneumonia, Post herpetic neuralgia, Recurrent upper respiratory infection (URI), Right shoulder pain, Spinal stenosis, Transient global amnesia, and Vitamin D deficiency. has a past surgical history that includes Prostate surgery (June 13, 2000). Restrictions  Position Activity Restriction  Other position/activity restrictions: Up with assist  Subjective   General  Chart Reviewed: Yes  Additional Pertinent Hx: Pt to ED 11/10 with increased fatigue and AMS. Head CT (-). Chest CT: Worsening of multifocal consolidation compatible with progressive and/or recurrent PNA. PMH:  asthma, HTN, DM, OA, BPH, diverticulosis, back pain, PNA, spinal stenosis  Family / Caregiver Present: Yes (lino)  Subjective  Subjective: Pt seated in chair. Agreeable to PT (lino assisting with communication d/t pt being [facility] speaking).   Pain Screening  Patient Currently in Pain: Denies  Vital Signs  Patient Currently in Pain: Denies               Objective      Transfers  Sit to Stand: Minimal Assistance (from chair & toilet to [address])  Stand to sit: Moderate Assistance; Minimal Assistance (decreased eccentric control)  Comment: pt transferred to bathroom toilet via lolly stedy  Ambulation  Ambulation?: No     Balance  Standing - Static: Fair; Good (min A initially progressing to mod A with fatigue. (in lolly lepe). )  Comments: pt stood for ~1 min x 3 in [address] [place] [address] with assist as above. pt fatiguing quickly. required cues for upright poture. Exercises  Comments: per lino, pt too fatigued after going to bathroom to perform LE ther ex. lino educated on seated & supine LE ther ex for pt to perform as able. she verbalized understanding. Comment: pt toileted. dep for grecia-care & depends mamagement. AM-PAC Score  AM-PAC Inpatient Mobility Raw Score : 8 (11/12/21 1345)  AM-PAC Inpatient T-Scale Score : 28.52 (11/12/21 1345)  Mobility Inpatient CMS 0-100% Score: 86.62 (11/12/21 1345)  Mobility Inpatient CMS G-Code Modifier : CM (11/12/21 1345)          Goals  Short term goals  Time Frame for Short term goals: Discharge  Short term goal 1: supine <> sit Min A  Short term goal 2: sit <> stand Min A (met for lolly lepe). new goal: sit<->stand CGA to lolly lepe.   Short term goal 3: bed <> chair Mod A  Patient Goals   Patient goals : Not stated, daughter hopes to return home    Plan    Plan  Times per week: 2-5  Current Treatment Recommendations: Transfer Training, Functional Mobility Training, Strengthening  Safety Devices  Type of devices: Left in chair, Chair alarm in place, Call light within reach, Nurse notified     Therapy Time   Individual Concurrent Group Co-treatment   Time In 0953         Time Out 1026         Minutes [address], PT

## 2021-11-12 NOTE — PROGRESS NOTES
Consulted for possible Stage 2 on coccyx, pt up in chair, spoke to primary RN, pt has F/C and can notify staff when having a BM, use sacrum foam. Will attempt to evaluate pt on Monday prior to family arriving since they prefer pt in up in chair.

## 2021-11-12 NOTE — PROGRESS NOTES
Lab called for critical lactic acid of 4.1.  Residents on the floor assessing him at the same time, residents notified of critical.

## 2021-11-12 NOTE — PROGRESS NOTES
CHF/HFpEF  -- Other - I will add my own diagnosis  -- Disagree - Not applicable / Not valid  -- Disagree - Clinically unable to determine / Unknown  -- Refer to Clinical Documentation Reviewer    PROVIDER RESPONSE TEXT:    This patient has chronic diastolic CHF/HFpEF. Query created by: Gabriella Goodell on 11/12/2021 3:38 PM      QUERY TEXT:    Pt admitted with aspiration. Noted documentation of sepsis 11/12 by   pulmonary/intensivist consultant. If possible, please document in progress   notes and discharge summary:    The medical record reflects the following:  Risk Factors: 81 y.o. male w/recurrent aspiration pneumonia, HA on CKD 3b, DM   2, HTN, anemia  Clinical Indicators: No SIRS Criteria on admission. Hypotension 2 hrs   following admission, tachypnic 4 hrs following admission. per Pulmonary:   Pro-Michael: 0.55, Lactic acid: 4.5 on 11/11/21. WBC 4.0 on 11/12,  hypotension  Treatment: Brandy Lusty, IVF  Options provided:  -- Sepsis developed following admission, Please document supporting evidence. -- Sepsis confirmed present on admission, Please document supporting evidence. -- Sepsis ruled out  -- Other - I will add my own diagnosis  -- Disagree - Not applicable / Not valid  -- Disagree - Clinically unable to determine / Unknown  -- Refer to Clinical Documentation Reviewer    PROVIDER RESPONSE TEXT:    The diagnosis of sepsis was ruled out.     Query created by: Gabriella Goodell on 11/12/2021 4:05 PM      Electronically signed by:  Quynh Go 11/12/2021 4:33 PM

## 2021-11-12 NOTE — CONSULTS
Clinical Pharmacy Progress Note    Vancomycin - Management by Pharmacy    Consult Date(s): 11/12/21   Consulting Provider(s): Dr Emeli Kevin / Plan    Aspiration pneumonia - Vancomycin   Concurrent Antimicrobials: meropenem   Day of Vanc Therapy: 1   Current Dosing Method: Intermittent Dosing by Levels   Therapeutic Goal: 15-20 mcg/mL   Plan / Rationale:   o As patient has CKD will initially dose based on levels at this time. o Will send 1250 mg IV x1 dose today  o Will order level to be drawn ~24h after dose.  Will continue to monitor clinical condition and make adjustments to regimen as appropriate. Please call with any questions. Tashi Regalado, PharmD, BCPS  Wireless: [id number]  Main pharmacy: [id number]  11/12/2021 12:12 PM        Interval update: Antibiotics broadened today. Subjective/Objective: Mr. Rai Villafuerte is a 81 y.o. male with a PMHx significant for asthma, DM, CKD, presented with c/o increasing lethargy since his discharge from SNF. CT in ED showed recurrent PNA. Pharmacy has been consulted to dose vancomycin. Height:   Ht Readings from Last 1 Encounters:   11/11/21 5' 3\" (1.6 m)     Weight:   Wt Readings from Last 1 Encounters:   11/12/21 158 lb 8.2 oz (71.9 kg)       Current & Prior Antimicrobial Regimen(s):   (11/11-11/12)   Meropenem 1000 mg IV q12h (11/12-current)    Level(s) / Doses:    Date Time Dose Level / Type of Level Interpretation                 Note: Serum levels collected for AUC-based dosing may be high if collected in close proximity to the dose administered. This is not necessarily an indicator of toxicity. Cultures & Sensitivities:    Date Site Micro Susceptibility / Result   11/11 Blood x2 NGTD            Labs / Ancillary Data:    Estimated Creatinine Clearance: 30 mL/min (A) (based on SCr of 1.6 mg/dL (H)).     Recent Labs     11/11/21  2112 11/12/21  0017 11/12/21  0449   CREATININE 1.5* 1.5* 1.6*   BUN 39* 39* 37*   WBC 4.3 4.3 4.0

## 2021-11-12 NOTE — PROGRESS NOTES
PCA called to tell me that the patient's granddaughter claimed the patient was having chest pain. When I went in, the granddaughter said it may have been because she had sternal rubbed him because he fell asleep in the chair and also due to his lungs. Vitals stable, telemetry shows normal sinus. Resident notified to be safe.

## 2021-11-12 NOTE — PLAN OF CARE
D; Hr elevated in 140's and converted to a-fib around 2130. Stat EKG obtained and MD notified. Dr. Archana Little MD came to bedside and read EKG and ordered NS bolus 500ml. Bolus given, but HR remained elevated. Fever 100 and tylenol 650mg Tylenol suppository given at 2217. Temp currently 99.2. A; MD notified and 2nd Ns 500ml bolus started. Cont to monitor during hourly rounds  0200 R: Converted to NSR around 2 am. Cont to monitor during hourly rounds    Problem: Fluid Volume - Deficit:  Goal: Achieves intake and output within specified parameters  Description: Achieves intake and output within specified parameters  Outcome: Ongoing     Problem: Gas Exchange - Impaired:  Goal: Levels of oxygenation will improve  Description: Levels of oxygenation will improve  11/11/2021 2359 by Tamiko Stone RN  Outcome: Ongoing  11/11/2021 1429 by David Terry RN  Outcome: Ongoing  Note: Pt has crackles throughout lung fields. Pt on O2 2.5-5L. O2 requirements increase when in bed.

## 2021-11-12 NOTE — CARE COORDINATION
Case Management Assessment           Daily Note                 Date/ Time of Note: 11/12/2021 12:34 PM         Note completed by: Eden Campbell RN    Patient Name: Halie Tesfaye  YOB: 1935    Diagnosis:Acute encephalopathy [G93.40]  Altered mental status, unspecified altered mental status type [R41.82]  Aspiration pneumonia of both lungs, unspecified aspiration pneumonia type, unspecified part of lung ([address] [J69.0]  Patient Admission Status: Inpatient    Date of Admission:11/10/2021  7:26 PM Length of Stay: 2 GLOS: GMLOS: 3.1    Current Plan of Care:  CC: Lethargy  2/2 PNA  Suspected Recurrent Aspiration PNADysphasia:  SLP consulted:    Recommended Diet and Intervention  Diet Solids Recommendation: Dysphagia Pureed (Dysphagia I)  Liquid Consistency Recommendation: Mildly Thick (Nectar)  Recommended Form of Meds: Crushed in puree as able  Recommendations: Total feed  ________________________________________________________________________________________  PT AM-PAC: 6 / 24 per last evaluation on: 11/12/2021    OT AM-PAC: 9 / 24 per last evaluation on: 11/11/2021    DME Needs for discharge: cirilo lift  If goes home   ________________________________________________________________________________________  Discharge Plan: Home with [address] [place] [address] Way: VS  SNF  @ [facility]:     Tentative discharge date:      Current barriers to discharge: medical Pulm clearance       Referrals completed: Not Applicable    Resources/ information provided: Not indicated at this time  ________________________________________________________________________________________  Case Management Notes:    CM cont to follow for  D/c planning :    - Patient's dgtr Angelique Door  will decide if patient will go to a SNF. He usually goes to - [facility] if needs a SNF.   - Daughter states that she thinks if he is a 2 person assist at the time just before discharge, he may have to go to [facility].    -  If not, he has 24/7 caregivers and skilled home care. PT said that needs a cirilo lift if goes home. -   CM ordered patient lyft for home per Dr. Stefanie Prather. Roberto and his family were provided with choice of provider; he and his family are in agreement with the discharge plan. Care Transition Patient:  Yes    Lobito Chirinos RN  Sherrell Wilkes  Case Management Department  Ph: [phone]

## 2021-11-13 NOTE — PROGRESS NOTES
Around 2250 HR is sustaining in 130s to 150s. latest BP 92/55. patient does grunt but asked if he had chest pain and he shook his head no. trop and lactic acid just drawn.  And residents made aware

## 2021-11-13 NOTE — CONSULTS
Clinical Pharmacy Progress Note    Vancomycin - Management by Pharmacy    Consult Date(s): 11/12/21   Consulting Provider(s): Dr Obdulio Sandoval / Plan    Aspiration pneumonia - Vancomycin   Concurrent Antimicrobials: meropenem   Day of Vanc Therapy: 2   Current Dosing Method: Intermittent Dosing by Levels   Therapeutic Goal: 15-20 mcg/mL   Plan / Rationale:   o As patient has CKD will initially dose based on levels at this time. o 1250 mg IV x1 dose 11/12, random 11/13 = 7.3 mcg/mL  o Serum Cr improved from 1.6 to 1.4; will give 1500mg and check level tomorrow    Will continue to monitor clinical condition and make adjustments to regimen as appropriate. Thanks for consulting pharmacy! Please call with questions [phone] [facility]   11/13/2021 1:30 PM      Interval update: Afib with RVR (130s) last night, controlled with amio drip    Subjective/Objective: Mr. Jake Dodson is a 81 y.o. male with a PMHx significant for asthma, DM, CKD, presented with c/o increasing lethargy since his discharge from SNF. CT in ED showed recurrent PNA. Pharmacy has been consulted to dose vancomycin. Height:   Ht Readings from Last 1 Encounters:   11/11/21 5' 3\" (1.6 m)     Weight:   Wt Readings from Last 1 Encounters:   11/12/21 158 lb 8.2 oz (71.9 kg)       Current & Prior Antimicrobial Regimen(s):   (11/11-11/12)   Meropenem 1000 mg IV q12h (11/12-current)    Level(s) / Doses:    Date Time Dose Level / Type of Level Interpretation   11/13 11:40 1250 mg IV x1 Random = 7.3 mcg/mL Ordered 1500mg today, reassess tomorrow          Note: Serum levels collected for AUC-based dosing may be high if collected in close proximity to the dose administered. This is not necessarily an indicator of toxicity.     Cultures & Sensitivities:    Date Site Micro Susceptibility / Result   11/11 Blood x2 NGTD    11/13 MRSA Nasal DNA       Labs / Ancillary Data:    Estimated Creatinine Clearance: 34 mL/min (A) (based on SCr of 1.4 mg/dL (H)).     Recent Labs     11/12/21  0017 11/12/21  0449 11/13/21  0445   CREATININE 1.5* 1.6* 1.4*   BUN 39* 37* 29*   WBC 4.3 4.0 3.8*

## 2021-11-13 NOTE — PROGRESS NOTES
patient HR changed to afib running in the 140s. Pt grunts places hand on chest. Crackles present in lung sounds.  Resident team made aware

## 2021-11-13 NOTE — PROGRESS NOTES
Pulmonary Followup Note    Indication for visit:  Bronchoscopy    Subjective:  Patient seen and examined at bedside. Atrial fibrillation with RVR (130s) overnight. Controlled with amiodarone gtt. Patient offers no complaints and does not interact with the treatment team.       aspirin  81 mg Oral Daily    midodrine  5 mg Oral TID WC    digoxin  250 mcg IntraVENous Q6H    meropenem  1,000 mg IntraVENous Q12H    ipratropium-albuterol  1 ampule Inhalation BID    lidocaine  1 patch TransDERmal Daily    sodium chloride flush  5-40 mL IntraVENous 2 times per day    heparin (porcine)  5,000 Units SubCUTAneous 3 times per day    insulin glargine  5 Units SubCUTAneous Nightly    insulin lispro  0-6 Units SubCUTAneous TID WC    insulin lispro  0-3 Units SubCUTAneous Nightly    Venelex   Topical BID    sodium chloride (Inhalant)  15 mL Nebulization BID    finasteride  5 mg Oral Daily    tamsulosin  0.4 mg Oral Daily       Continuous Infusions:   [Held by provider] dextrose 5% and 0.45% NaCl with KCl 20 mEq Stopped (11/13/21 0627)    sodium chloride 25 mL (11/12/21 1443)    dextrose         ROS: Denies fever, chills, chest pain, cough, diarrhea, constipation or dysuria. PHYSICAL EXAMINATION:  BP 97/63   Pulse 124   Temp 97.7 °F (36.5 °C) (Axillary)   Resp 18   Ht 5' 3\" (1.6 m)   Wt 158 lb 8.2 oz (71.9 kg)   SpO2 96% Comment: try 3.5  BMI 28.08 kg/m²     Gen: Well developed, well nourished. Not in acute distress. Offers minimal interaction   Eyes: PERRL, EOMI, normal conjunctivae  Ears, Nose, Mouth and Throat: Hearing is normal. Oropharynx is normal  Neck: No lymphadenopathy  Respiratory: Bilateral crackles appreciated. No wheezing, rales, increased work of breath or accessory muscle usage. CV: RRR without M/R/R  Abd: +BS, soft, NT/ND  Musculoskeletal: No cyanosis, clubbing, 1+ left lower extremity edema.   Skin: No rashes, ulcers, or subcutaneous nodules  Psychiatric: Somnolent and does not offer much interaction, choosing to remain silent    DATA  CBC:   Recent Labs     11/12/21 0017 11/12/21 0449 11/13/21 0445   WBC 4.3 4.0 3.8*   HGB 10.3* 9.8* 9.9*   HCT 30.4* 29.2* 29.8*   MCV 90.7 92.2 92.5   * 96* 108*     BMP:   Recent Labs     11/11/21 2112 11/11/21 2112 11/12/21 0017 11/12/21 0449 11/13/21 0445      < > 141 143 140   K 3.6   < > 3.5 3.8 3.9      < > 107 109 109   CO2 21   < > 21 23 22   PHOS 2.9  --   --   --   --    BUN 39*   < > 39* 37* 29*   CREATININE 1.5*   < > 1.5* 1.6* 1.4*    < > = values in this interval not displayed. No results for input(s): PHART, PXX0PXS, PO2ART in the last 72 hours. LIVER PROFILE:   Recent Labs     11/10/21  2106   AST 47*   ALT 37   BILIDIR <0.2   BILITOT 0.5   ALKPHOS 152*       Radiology Review:  Pertinent images / reports were reviewed as a part of this visit. Imaging reveals the following:    XR CHEST PORTABLE   Final Result   1. Diffuse multifocal consolidation, pneumonia pattern is favored over congestive failure in the absence of effusions and peripheral septal thickening. 2. Based on patient's azotemia, follow-up studies are suggested but may have to wait for normalization of renal function to evaluate the presence of adenopathy suspected on computed tomography       CT CHEST WO CONTRAST   Final Result       1. Worsening of multifocal consolidation compatible with progressive and/or recurrent pneumonia. 2.  New severe narrowing and/or mucous plugging occluding the right upper lobe bronchus and partial lobar collapse in addition to consolidation.       CT HEAD WO CONTRAST   Final Result       1. No acute intracranial hemorrhage or mass effect. 2.  Mild white matter chronic small vessel ischemia.    3.  Moderate atrophy.       XR CHEST PORTABLE   Final Result       Persistent moderate multifocal bilateral airspace disease, slightly worsened from prior study.      ASSESSMENT/PLAN:  Diallo Feng is a 43-year-old who was admitted for increased lethargy in setting of recurrent pneumonia. Acute Hypoxic Respiratory Failure 2/2 Recurrent PNA & Mucus Plugging  - Supplemental oxygen with SpO2 goal >90%; wean as tolerated  - Possible bronchoscopy planned for Monday if respiratory status improves  - Continue 3% Hypertonic saline nebulizer treatments  - Continue DuoNebs  - Continue IVF  - Blood cultures, MRSA pending  - Continue Broad spectrum antibiotics   - Merrem D#2   - Vancomycin D#2    Paroxysmal Atrial Fibrillation  - Start Digoxin  - Telemetry  - Heparin gtt for prophylaxis     Patient has been staffed and plan discussed with Alta Sanchez MD.    Sangeeta Helm MD, PGY-2    Patient seen, examined and discussed with the resident and I agree with the assessment and plan. Briefly, this is a 81 y.o. male  with acute on chronic hypoxemic respiratory failure    Patient's daughter was at bedside and was able to help provide some history. She states that patient had improved after his last admission a month ago at home and had gotten down to 1-1/2 L of oxygen when he completed the steroid burst. However several days later the oxygen requirement started going up and he was becoming more lethargic again. I reviewed the CT scan from October against the 1 from admission with her and pointed out how the bulk of the diffuse groundglass opacities and subpleural consolidation seen on his scan last month had improved, especially on the left side but there were new areas of consolidation in the right upper lobe and the lower lobes bilaterally. It makes little sense that some portion of his disease would have improved from last month and others would have worsened, but makes more sense if he had improvement in the issue going on last month and a new problem has arisen. Alternatively the same problem is causing the new changes but just in different places.   Last month I was fairly convinced that patient had a post viral pneumonitis and that is why he did not improve with antibiotics. The improvement with steroids may have been causative or correlative, it's unclear. His current imaging looks much more significant for aspiration, especially with his predilection to lay on his right side (per the daughter). I don't think a bronchoscopy would be that helpful unless he were in more distress and would only provide a short term benefit, if any, because he's too weak to cough up his own secretions. He's already on hypertonic saline and albuterol nebs to thin secretions. He could do those at home. His daughter asked about a chest vest.  He does appear to be developing BRONCHIECTASIS, so a chest vest is reasonable. Without the strength though it may not provide the benefit she hopes. If I am correct and aspiration, either from dysphagia or reflux, is the underlying cause of this respiratory failure, and possibly the previous one, then the likelihood of finding a solution that prevents it from happening is low. I suggested palliative care or hospice and she rejected that flat out. I think that would be the best option for him. However, since that is rejected, I recommend completing his antibiotic course and continue aggressive pulmonary toileting. I will write for a chest vest to trial here in the hospital.  If it helps then it can be prescribed outpatient as well. If he won't participate, or if his cough is too weak, which I suspect to be the case, then there is little utility in trying to get it as an outpatient.         Giles Cates MD

## 2021-11-13 NOTE — PROGRESS NOTES
Internal Medicine Progress Note    Admit Date: 11/10/2021  Day:   Diet: ADULT DIET; Dysphagia - Pureed; Mildly Thick (Nectar); Liquids by spoon only  ADULT ORAL NUTRITION SUPPLEMENT; Breakfast, Lunch; Fortified Pudding Oral Supplement  ADULT ORAL NUTRITION SUPPLEMENT; Dinner; Frozen Oral Supplement    CC: AMS    Interval history: Patient was again noted to go into A. fib RVR overnight. Patient has been receiving fluids because likely was dehydrated on admission. Repeat chest x-ray shows diffuse multifocal consolidation, which she is receiving antibiotics. Repeat EKG at the time shows A. fib RVR. Patient was started on amnio drip due to low blood pressure.       Medications:     Scheduled Meds:   aspirin  81 mg Oral Daily    meropenem  1,000 mg IntraVENous Q12H    ipratropium-albuterol  1 ampule Inhalation BID    lidocaine  1 patch TransDERmal Daily    sodium chloride flush  5-40 mL IntraVENous 2 times per day    heparin (porcine)  5,000 Units SubCUTAneous 3 times per day    insulin glargine  5 Units SubCUTAneous Nightly    insulin lispro  0-6 Units SubCUTAneous TID WC    insulin lispro  0-3 Units SubCUTAneous Nightly    Venelex   Topical BID    sodium chloride (Inhalant)  15 mL Nebulization BID    finasteride  5 mg Oral Daily    tamsulosin  0.4 mg Oral Daily     Continuous Infusions:   amiodarone 450mg/250ml D5W infusion 1 mg/min (11/13/21 0744)    dextrose 5% and 0.45% NaCl with KCl 20 mEq Stopped (11/13/21 0627)    sodium chloride 25 mL (11/12/21 1443)    dextrose       PRN Meds:albuterol, sodium chloride flush, sodium chloride, [DISCONTINUED] acetaminophen **OR** acetaminophen, glucose, dextrose, glucagon (rDNA), dextrose, medicated lip ointment, acetaminophen, docusate    Objective:   Vitals:   T-max:  Patient Vitals for the past 8 hrs:   BP Temp Temp src Pulse Resp SpO2   11/13/21 0743 97/63 97.7 °F (36.5 °C) Axillary 124 18 96 %   11/13/21 0614 117/71 -- -- -- -- --   11/13/21 0604 (!) 88/57 -- -- -- -- --   11/13/21 0348 104/62 98.3 °F (36.8 °C) Axillary 101 21 94 %       Intake/Output Summary (Last 24 hours) at 11/13/2021 0802  Last data filed at 11/13/2021 0351  Gross per 24 hour   Intake 50 ml   Output 1100 ml   Net -1050 ml       Physical Exam  Constitutional:       General: He is not in acute distress. HENT:      Head: Normocephalic and atraumatic. Eyes:      General: No scleral icterus. Right eye: No discharge. Left eye: No discharge. Extraocular Movements: Extraocular movements intact. Conjunctiva/sclera: Conjunctivae normal.      Pupils: Pupils are equal, round, and reactive to light. Cardiovascular:      Rate and Rhythm: Tachycardia present. Rhythm irregular. Pulses: Normal pulses. Heart sounds: Normal heart sounds. No murmur heard. No friction rub. No gallop. Pulmonary:      Effort: Pulmonary effort is normal. No respiratory distress. Breath sounds: Rales (over bases bilaterally) present. No wheezing. Chest:      Chest wall: Tenderness (upper lateral left side) present. Abdominal:      General: Abdomen is flat. Bowel sounds are normal. There is no distension. Palpations: Abdomen is soft. Musculoskeletal:      Cervical back: Normal range of motion and neck supple. No rigidity. Right lower leg: Edema present. Left lower leg: Edema present. Skin:     General: Skin is warm and dry. Coloration: Skin is not jaundiced. Neurological:      Mental Status: He is alert.       Comments: Oriented to self            LABS:    CBC:   Recent Labs     11/12/21 0017 11/12/21 0449 11/13/21 0445   WBC 4.3 4.0 3.8*   HGB 10.3* 9.8* 9.9*   HCT 30.4* 29.2* 29.8*   * 96* 108*   MCV 90.7 92.2 92.5     Renal:    Recent Labs     11/11/21 2112 11/11/21 2112 11/12/21 0017 11/12/21 0449 11/13/21 0445      < > 141 143 140   K 3.6   < > 3.5 3.8 3.9      < > 107 109 109   CO2 21   < > 21 23 22   BUN 39*   < > 39* 37* 29*   CREATININE 1.5*   < > 1.5* 1.6* 1.4*   GLUCOSE 159*   < > 139* 153* 149*   CALCIUM 10.0   < > 9.9 9.8 9.6   MG  --   --  1.70*  --   --    PHOS 2.9  --   --   --   --    ANIONGAP 13   < > 13 11 9    < > = values in this interval not displayed. Hepatic:   Recent Labs     11/10/21  2106 11/11/21  2112   AST 47*  --    ALT 37  --    BILITOT 0.5  --    BILIDIR <0.2  --    PROT 6.3*  --    LABALBU 2.4* 2.2*   ALKPHOS 152*  --      Troponin:   Recent Labs     11/12/21  1849 11/12/21  2248 11/13/21  0445   TROPONINI 0.02* 0.02* 0.02*     -----------------------------------------------------------------  RAD:   XR CHEST PORTABLE   Final Result   1. Diffuse multifocal consolidation, pneumonia pattern is favored over congestive failure in the absence of effusions and peripheral septal thickening. 2. Based on patient's azotemia, follow-up studies are suggested but may have to wait for normalization of renal function to evaluate the presence of adenopathy suspected on computed tomography      CT CHEST WO CONTRAST   Final Result      1. Worsening of multifocal consolidation compatible with progressive and/or recurrent pneumonia. 2.  New severe narrowing and/or mucous plugging occluding the right upper lobe bronchus and partial lobar collapse in addition to consolidation. CT HEAD WO CONTRAST   Final Result      1. No acute intracranial hemorrhage or mass effect. 2.  Mild white matter chronic small vessel ischemia. 3.  Moderate atrophy. XR CHEST PORTABLE   Final Result      Persistent moderate multifocal bilateral airspace disease, slightly worsened from prior study.              Assessment/Plan:   Lethargy 2/2 PNA  Suspected Recurrent Aspiration PNA  CT consistent with recurrent pneumonia, mucous plugging in the right upper lobe/multifocal.  -Zosyn  -Supplemental O2 PRN, wean as tolerated   -3% saline nebulizer treatments  -SLP evaluation ordered; diet recommendations made.  -Pulmonology c/s'd, rec's cardiopulmonary emergency. Nutritional status was discussed as well. PEG tube placement is not an option.     Samantha Pettit MD, FACP

## 2021-11-14 PROBLEM — J69.0 ASPIRATION PNEUMONIA (HCC): Status: ACTIVE | Noted: 2021-01-01

## 2021-11-14 NOTE — CONSULTS
Clinical Pharmacy Progress Note    Vancomycin - Management by Pharmacy    Consult Date(s): 11/12/21   Consulting Provider(s): Dr Diaz Salmon / Plan    Aspiration pneumonia - Vancomycin   Concurrent Antimicrobials: meropenem   Day of Vanc Therapy: 2   Current Dosing Method: AUC   Therapeutic Goal: -600   Current dose: 750mg IV every 24 hours   Plan / Rationale:   o Patient with CKD, renal function seems to be at baseline (1.3-1.4)  o Given patient at baseline, will schedule 750mg q24h  o Predicts an AUC of 418 and trough of 13.5   Will continue to monitor clinical condition and make adjustments to regimen as appropriate. Thanks for consulting pharmacy! Please call with questions [phone] [facility]. [facility] Resident   11/14/2021 10:52 AM      Interval update: Afib with RVR episodes, controlled with amio drip    Subjective/Objective: Mr. Diallo Feng is a 81 y.o. male with a PMHx significant for asthma, DM, CKD, presented with c/o increasing lethargy since his discharge from SNF. CT in ED showed recurrent PNA. Pharmacy has been consulted to dose vancomycin. Height:   Ht Readings from Last 1 Encounters:   11/11/21 5' 3\" (1.6 m)     Weight:   Wt Readings from Last 1 Encounters:   11/12/21 158 lb 8.2 oz (71.9 kg)       Current & Prior Antimicrobial Regimen(s):   (11/11-11/12)   Meropenem 1000 mg IV q12h (11/12-current)    Level(s) / Doses:    Date Time Dose Level / Type of Level Interpretation   11/13 11:40 1250 mg IV x1 Random = 7.3 mcg/mL Ordered 1500mg today, reassess tomorrow   11/14 07:06 1500mg IV x1 Random = 18.9 mcg/mL Will schedule 750mg q24h    Note: Serum levels collected for AUC-based dosing may be high if collected in close proximity to the dose administered. This is not necessarily an indicator of toxicity.     Cultures & Sensitivities:    Date Site Micro Susceptibility / Result   11/11 Blood x2 NGTD    11/13 MRSA Nasal DNA Negative     11/14 MRSA Nasal

## 2021-11-14 NOTE — ED PROVIDER NOTES
[address] [facility] [address] ENCOUNTER          ATTENDING PHYSICIAN NOTE       Date of evaluation: 11/10/2021    Chief Complaint     Altered Mental Status      History of Present Illness     Diallo Feng is a 81 y.o. male who presents to the emergency department today with family due to concern for confusion, lethargy and respiratory distress. Patient apparently was quite functional up until about 4 to 5 months ago when he was admitted to the hospital with a respiratory illness. Since that time he has had progressive decline with recurrent aspiration events and pneumonias. He was doing well since hospital discharge last week and then yesterday seemed to start having a decline with decreased alertness, trouble breathing standing and moving as usual.  No recent changes in medications. No fever at home. No witnessed aspiration events though family does note that his diet was advanced yesterday. History is largely obtained via his daughter who accompanies the patient due to a language barrier and altered mental status. Review of Systems     Review of Systems  All systems were reviewed with the family and negative except as otherwise documented in the HPI. Past Medical, Surgical, Family, and Social History     He has a past medical history of Allergic rhinitis, Asthma, Bilateral cataracts, BPH (benign prostatic hypertrophy), Diabetes mellitus, type 2 ([address] Diverticulosis, Eczema, Hyperlipidemia, Hypertension, Lumbar disc disease, Microalbuminuria, Obstructive sleep apnea, Osteoarthritis, Pneumonia, Post herpetic neuralgia, Recurrent upper respiratory infection (URI), Right shoulder pain, Spinal stenosis, Transient global amnesia, and Vitamin D deficiency. He has a past surgical history that includes Prostate surgery (June 13, 2000). His family history includes Diabetes in his brother and mother. He reports that he has never smoked.  He has never used smokeless tobacco. He reports current alcohol use. He reports that he does not use drugs. Medications     Current Discharge Medication List      CONTINUE these medications which have NOT CHANGED    Details   carvedilol (COREG) 6.25 MG tablet TAKE 1 TABLET BY MOUTH TWICE DAILY  Qty: 180 tablet, Refills: 2    Associated Diagnoses: Coronary artery disease of native artery of native heart with stable angina pectoris ([facility] [address]  Essential hypertension      blood glucose test strips (ONETOUCH ULTRA) strip TEST ONCE DAILY  Qty: 100 strip, Refills: 1    Associated Diagnoses: Type 2 diabetes mellitus without complication, without long-term current use of insulin ([facility])      aspirin 81 MG chewable tablet Take 81 mg by mouth daily      sennosides-docusate sodium (SENOKOT-S) 8.6-50 MG tablet Take 1 tablet by mouth as needed for Constipation      polyethylene glycol (GLYCOLAX) 17 g packet Take 17 g by mouth daily as needed for Constipation      folic acid (FOLVITE) 1 MG tablet Take 1 mg by mouth daily Dose unknown at this time      Insulin Pen Needle (PEN NEEDLES) 31G X 6 MM MISC 1 each by Does not apply route daily  Qty: 100 each, Refills: 3      insulin glargine (BASAGLAR KWIKPEN) 100 UNIT/ML injection pen Inject 10 Units into the skin 2 times daily  Qty: 5 pen, Refills: 1    Associated Diagnoses: Type 2 diabetes mellitus without complication, without long-term current use of insulin ([facility])      Continuous Blood Gluc Sensor (FREESTYLE KARISSA 14 DAY SENSOR) MISC USE EVERY 14 DAYS AS DIRECTED  Qty: 2 each, Refills: 3      nystatin (MYCOSTATIN) 128114 UNIT/ML suspension Take 5 mLs by mouth 4 times daily  Qty: 140 mL, Refills: 1    Associated Diagnoses: Oral thrush      diclofenac sodium (VOLTAREN) 1 % GEL APPLY 2 GRAM TOPICALLY FOUR TIMES DAILY AS NEEDED FOR PAIN  Qty: 200 g, Refills: 3    Associated Diagnoses: Chronic right-sided low back pain with right-sided sciatica      finasteride (PROSCAR) 5 MG tablet TAKE 1 TABLET BY MOUTH DAILY  Qty: 90 tablet, Refills: 3 Associated Diagnoses: Benign non-nodular prostatic hyperplasia with lower urinary tract symptoms      nitroGLYCERIN (NITROSTAT) 0.4 MG SL tablet Place 1 tablet under the tongue every 5 minutes as needed for Chest pain May repeat every 5 minutes until relief is obtained. If pain persists after taking 3 tabs in a 15-minute period, call 9-1-1 immediately. Qty: 25 tablet, Refills: 12    Associated Diagnoses: Coronary artery disease of native artery of native heart with stable angina pectoris ([facility])      buPROPion (WELLBUTRIN) 100 MG tablet Take 1 tablet by mouth daily  Qty: 30 tablet, Refills: 3      tamsulosin (FLOMAX) 0.4 MG capsule TAKE 2 CAPSULES BY MOUTH EVERY NIGHT  Qty: 180 capsule, Refills: 0    Associated Diagnoses: Benign non-nodular prostatic hyperplasia with lower urinary tract symptoms      Cholecalciferol (VITAMIN D3) 125 MCG (5000 UT) CAPS TAKE ONE CAPSULE BY MOUTH EVERY DAY  Qty: 90 capsule, Refills: 1    Associated Diagnoses: Vitamin D deficiency             Allergies     He is allergic to tramadol. Physical Exam     INITIAL VITALS: BP: (!) 105/58, Temp: 97.4 °F (36.3 °C), Pulse: 81, Resp: 18, SpO2: 96 %   Physical Exam  Constitutional: Elderly male sitting up in the hospital stretcher uncomfortable appearing but otherwise in no immediate distress  Eyes:  Sclera anicteric. Pupils midrange and reactive bilaterally  HEENT:  Normocephalic, atraumatic, oropharynx moist.   Neck: normal range of motion, supple, no JVD. Respiratory:  Even and non-labored, no distress while on nasal cannula oxygen, coarse breath sounds in the bilateral lung bases  Cardiovascular:  Extremities warm, well perfused, no audible murmurs rubs or gallops  GI:  Soft, mildly distended nontender to palpation  Musculoskeletal:  No edema, no deformities.    Skin:  No rash, no lesions, no pallor   Neurologic:  Awake and alert, slow to respond to questions but does regard examiner, participation with neuro exam seems to be limited by confusion and language barrier. . Moving all extremities purposefully and with grossly normal coordination. Psychiatric:  Normal mood. Behavior appropriate. Diagnostic Results     EKG   Normal sinus rhythm, normal axis, normal intervals, no acute ischemic appearing changes      RADIOLOGY:  XR CHEST PORTABLE   Final Result   1. Diffuse multifocal consolidation, pneumonia pattern is favored over congestive failure in the absence of effusions and peripheral septal thickening. 2. Based on patient's azotemia, follow-up studies are suggested but may have to wait for normalization of renal function to evaluate the presence of adenopathy suspected on computed tomography      CT CHEST WO CONTRAST   Final Result      1. Worsening of multifocal consolidation compatible with progressive and/or recurrent pneumonia. 2.  New severe narrowing and/or mucous plugging occluding the right upper lobe bronchus and partial lobar collapse in addition to consolidation. CT HEAD WO CONTRAST   Final Result      1. No acute intracranial hemorrhage or mass effect. 2.  Mild white matter chronic small vessel ischemia. 3.  Moderate atrophy. XR CHEST PORTABLE   Final Result      Persistent moderate multifocal bilateral airspace disease, slightly worsened from prior study. LABS:   Results for orders placed or performed during the hospital encounter of 11/10/21   Culture, Blood 2    Specimen: Blood   Result Value Ref Range    Culture, Blood 2       No Growth to date. Any change in status will be called. Culture, Blood 1    Specimen: Blood   Result Value Ref Range    Blood Culture, Routine       No Growth to date. Any change in status will be called. MRSA DNA Probe, Nasal    Specimen: Nares   Result Value Ref Range    MRSA SCREEN RT-PCR       Negative  MRSA DNA not detected.   Normal Range: Not detected     CBC Auto Differential   Result Value Ref Range    WBC 4.9 4.0 - 11.0 K/uL    RBC 3.46 (L) 4.20 - 5.90 M/uL    Hemoglobin 10.6 (L) 13.5 - 17.5 g/dL    Hematocrit 31.8 (L) 40.5 - 52.5 %    MCV 92.0 80.0 - 100.0 fL    MCH 30.6 26.0 - 34.0 pg    MCHC 33.3 31.0 - 36.0 g/dL    RDW 15.7 (H) 12.4 - 15.4 %    Platelets 125 (L) 371 - 450 K/uL    MPV 9.9 5.0 - 10.5 fL    Neutrophils % 68.9 %    Lymphocytes % 22.8 %    Monocytes % 6.5 %    Eosinophils % 1.4 %    Basophils % 0.4 %    Neutrophils Absolute 3.4 1.7 - 7.7 K/uL    Lymphocytes Absolute 1.1 1.0 - 5.1 K/uL    Monocytes Absolute 0.3 0.0 - 1.3 K/uL    Eosinophils Absolute 0.1 0.0 - 0.6 K/uL    Basophils Absolute 0.0 0.0 - 0.2 K/uL   Basic Metabolic Panel w/ Reflex to MG   Result Value Ref Range    Sodium 136 136 - 145 mmol/L    Potassium reflex Magnesium 4.2 3.5 - 5.1 mmol/L    Chloride 100 99 - 110 mmol/L    CO2 22 21 - 32 mmol/L    Anion Gap 14 3 - 16    Glucose 163 (H) 70 - 99 mg/dL    BUN 47 (H) 7 - 20 mg/dL    CREATININE 1.6 (H) 0.8 - 1.3 mg/dL    GFR Non- 41 (A) >60    GFR  50 (A) >60    Calcium 10.9 (H) 8.3 - 10.6 mg/dL   Hepatic Function Panel   Result Value Ref Range    Total Protein 6.3 (L) 6.4 - 8.2 g/dL    Albumin 2.4 (L) 3.4 - 5.0 g/dL    Alkaline Phosphatase 152 (H) 40 - 129 U/L    ALT 37 10 - 40 U/L    AST 47 (H) 15 - 37 U/L    Total Bilirubin 0.5 0.0 - 1.0 mg/dL    Bilirubin, Direct <0.2 0.0 - 0.3 mg/dL    Bilirubin, Indirect see below 0.0 - 1.0 mg/dL   Troponin   Result Value Ref Range    Troponin 0.01 <0.01 ng/mL   Brain Natriuretic Peptide   Result Value Ref Range    Pro-BNP 1,289 (H) 0 - 449 pg/mL   Blood Gas, Venous   Result Value Ref Range    pH, Eder 7.434 7.350 - 7.450    pCO2, Eder 41.4 41.0 - 51.0 mmHg    pO2, Eder 71.7 (H) 25.0 - 40.0 mmHg    HCO3, Venous 27.7 24.0 - 28.0 mmol/L    Base Excess, Eder 3.1 (H) -2.0 - 3.0 mmol/L    O2 Sat, Eder 94 Not established %    Carboxyhemoglobin 1.5 0.0 - 1.5 %    MetHgb, Eder 0.0 0.0 - 1.5 %    TC02 (Calc), Eder 29 mmol/L    Hemoglobin, Eder, Reduced 5.80 %   Urinalysis, reflex to microscopic   Result Value Ref Range    Color, UA Yellow Straw/Yellow    Clarity, UA Clear Clear    Glucose, Ur Negative Negative mg/dL    Bilirubin Urine Negative Negative    Ketones, Urine Negative Negative mg/dL    Specific Gravity, UA 1.025 1.005 - 1.030    Blood, Urine SMALL (A) Negative    pH, UA 6.0 5.0 - 8.0    Protein, UA TRACE (A) Negative mg/dL    Urobilinogen, Urine 0.2 <2.0 E.U./dL    Nitrite, Urine Negative Negative    Leukocyte Esterase, Urine Negative Negative    Microscopic Examination YES     Urine Type NotGiven    Ammonia   Result Value Ref Range    Ammonia 55 16 - 60 umol/L   Microscopic Urinalysis   Result Value Ref Range    WBC, UA 0-2 0 - 5 /HPF    RBC, UA 5-10 (A) 0 - 4 /HPF    Bacteria, UA Rare (A) None Seen /HPF    Crystals, UA 2+ Ca.  Oxalate (A) None Seen /HPF   Lactic acid, plasma   Result Value Ref Range    Lactic Acid 4.5 (HH) 0.4 - 2.0 mmol/L   Lactic acid, plasma   Result Value Ref Range    Lactic Acid 2.5 (H) 0.4 - 2.0 mmol/L   Lactic acid, plasma   Result Value Ref Range    Lactic Acid 1.7 0.4 - 2.0 mmol/L   Basic Metabolic Panel w/ Reflex to MG   Result Value Ref Range    Sodium 143 136 - 145 mmol/L    Potassium reflex Magnesium 3.8 3.5 - 5.1 mmol/L    Chloride 109 99 - 110 mmol/L    CO2 23 21 - 32 mmol/L    Anion Gap 11 3 - 16    Glucose 153 (H) 70 - 99 mg/dL    BUN 37 (H) 7 - 20 mg/dL    CREATININE 1.6 (H) 0.8 - 1.3 mg/dL    GFR Non- 41 (A) >60    GFR  50 (A) >60    Calcium 9.8 8.3 - 10.6 mg/dL   CBC auto differential   Result Value Ref Range    WBC 4.0 4.0 - 11.0 K/uL    RBC 3.17 (L) 4.20 - 5.90 M/uL    Hemoglobin 9.8 (L) 13.5 - 17.5 g/dL    Hematocrit 29.2 (L) 40.5 - 52.5 %    MCV 92.2 80.0 - 100.0 fL    MCH 30.9 26.0 - 34.0 pg    MCHC 33.5 31.0 - 36.0 g/dL    RDW 16.0 (H) 12.4 - 15.4 %    Platelets 96 (L) 190 - 450 K/uL    MPV 9.2 5.0 - 10.5 fL    Neutrophils % 70.0 %    Lymphocytes % 17.0 %    Monocytes % 9.0 %    Eosinophils % 2.0 % Basophils % 2.0 %    Neutrophils Absolute 2.8 1.7 - 7.7 K/uL    Lymphocytes Absolute 0.7 (L) 1.0 - 5.1 K/uL    Monocytes Absolute 0.4 0.0 - 1.3 K/uL    Eosinophils Absolute 0.1 0.0 - 0.6 K/uL    Basophils Absolute 0.1 0.0 - 0.2 K/uL   CBC auto differential   Result Value Ref Range    WBC 4.3 4.0 - 11.0 K/uL    RBC 3.41 (L) 4.20 - 5.90 M/uL    Hemoglobin 10.3 (L) 13.5 - 17.5 g/dL    Hematocrit 31.3 (L) 40.5 - 52.5 %    MCV 91.7 80.0 - 100.0 fL    MCH 30.2 26.0 - 34.0 pg    MCHC 33.0 31.0 - 36.0 g/dL    RDW 16.0 (H) 12.4 - 15.4 %    Platelets 364 (L) 102 - 450 K/uL    MPV 9.7 5.0 - 10.5 fL    PLATELET SLIDE REVIEW Decreased     Neutrophils % 70.0 %    Lymphocytes % 11.0 %    Monocytes % 6.0 %    Eosinophils % 1.0 %    Basophils % 0.0 %    Neutrophils Absolute 3.3 1.7 - 7.7 K/uL    Lymphocytes Absolute 0.7 (L) 1.0 - 5.1 K/uL    Monocytes Absolute 0.3 0.0 - 1.3 K/uL    Eosinophils Absolute 0.0 0.0 - 0.6 K/uL    Basophils Absolute 0.0 0.0 - 0.2 K/uL    Bands Relative 6 0 - 7 %    Atypical Lymphocytes Relative 5 0 - 6 %    Myelocyte Percent 1 (A) %    Ovalocytes 1+ (A)     Spherocytes 1+ (A)    Renal function panel   Result Value Ref Range    Sodium 140 136 - 145 mmol/L    Potassium 3.6 3.5 - 5.1 mmol/L    Chloride 106 99 - 110 mmol/L    CO2 21 21 - 32 mmol/L    Anion Gap 13 3 - 16    Glucose 159 (H) 70 - 99 mg/dL    BUN 39 (H) 7 - 20 mg/dL    CREATININE 1.5 (H) 0.8 - 1.3 mg/dL    GFR Non- 44 (A) >60    GFR  54 (A) >60    Calcium 10.0 8.3 - 10.6 mg/dL    Phosphorus 2.9 2.5 - 4.9 mg/dL    Albumin 2.2 (L) 3.4 - 5.0 g/dL   Lactic acid, plasma   Result Value Ref Range    Lactic Acid 3.0 (H) 0.4 - 2.0 mmol/L   Lactic acid, plasma   Result Value Ref Range    Lactic Acid 2.2 (H) 0.4 - 2.0 mmol/L   CBC auto differential   Result Value Ref Range    WBC 4.3 4.0 - 11.0 K/uL    RBC 3.35 (L) 4.20 - 5.90 M/uL    Hemoglobin 10.3 (L) 13.5 - 17.5 g/dL    Hematocrit 30.4 (L) 40.5 - 52.5 %    MCV 90.7 80.0 - 100.0 fL    MCH 30.6 26.0 - 34.0 pg    MCHC 33.8 31.0 - 36.0 g/dL    RDW 15.7 (H) 12.4 - 15.4 %    Platelets 836 (L) 735 - 450 K/uL    MPV 9.6 5.0 - 10.5 fL    Neutrophils % 62.4 %    Lymphocytes % 27.1 %    Monocytes % 7.8 %    Eosinophils % 2.2 %    Basophils % 0.5 %    Neutrophils Absolute 2.7 1.7 - 7.7 K/uL    Lymphocytes Absolute 1.2 1.0 - 5.1 K/uL    Monocytes Absolute 0.3 0.0 - 1.3 K/uL    Eosinophils Absolute 0.1 0.0 - 0.6 K/uL    Basophils Absolute 0.0 0.0 - 0.2 K/uL   Basic metabolic panel   Result Value Ref Range    Sodium 141 136 - 145 mmol/L    Potassium 3.5 3.5 - 5.1 mmol/L    Chloride 107 99 - 110 mmol/L    CO2 21 21 - 32 mmol/L    Anion Gap 13 3 - 16    Glucose 139 (H) 70 - 99 mg/dL    BUN 39 (H) 7 - 20 mg/dL    CREATININE 1.5 (H) 0.8 - 1.3 mg/dL    GFR Non- 44 (A) >60    GFR  54 (A) >60    Calcium 9.9 8.3 - 10.6 mg/dL   Magnesium   Result Value Ref Range    Magnesium 1.70 (L) 1.80 - 2.40 mg/dL   Fibrinogen   Result Value Ref Range    Fibrinogen 139 (L) 200 - 397 mg/dL   Path Review, Smear   Result Value Ref Range    Blood Smear Review SLIDE READY    D-dimer, quantitative   Result Value Ref Range    D-Dimer, Quant 651 (H) 0 - 229 ng/mL DDU   Lactic acid, plasma   Result Value Ref Range    Lactic Acid 4.1 (HH) 0.4 - 2.0 mmol/L   Lactic acid, plasma   Result Value Ref Range    Lactic Acid 2.0 0.4 - 2.0 mmol/L   Procalcitonin   Result Value Ref Range    Procalcitonin 0.55 (H) 0.00 - 0.15 ng/mL   Troponin   Result Value Ref Range    Troponin 0.02 (H) <0.01 ng/mL   Lactic acid, plasma   Result Value Ref Range    Lactic Acid 1.8 0.4 - 2.0 mmol/L   Basic Metabolic Panel w/ Reflex to MG   Result Value Ref Range    Sodium 140 136 - 145 mmol/L    Potassium reflex Magnesium 3.9 3.5 - 5.1 mmol/L    Chloride 109 99 - 110 mmol/L    CO2 22 21 - 32 mmol/L    Anion Gap 9 3 - 16    Glucose 149 (H) 70 - 99 mg/dL    BUN 29 (H) 7 - 20 mg/dL    CREATININE 1.4 (H) 0.8 - 1.3 mg/dL    GFR Non- 48 (A) >60    GFR  58 (A) >60    Calcium 9.6 8.3 - 10.6 mg/dL   CBC auto differential   Result Value Ref Range    WBC 3.8 (L) 4.0 - 11.0 K/uL    RBC 3.22 (L) 4.20 - 5.90 M/uL    Hemoglobin 9.9 (L) 13.5 - 17.5 g/dL    Hematocrit 29.8 (L) 40.5 - 52.5 %    MCV 92.5 80.0 - 100.0 fL    MCH 30.8 26.0 - 34.0 pg    MCHC 33.3 31.0 - 36.0 g/dL    RDW 16.0 (H) 12.4 - 15.4 %    Platelets 810 (L) 483 - 450 K/uL    MPV 9.4 5.0 - 10.5 fL    Neutrophils % 63.9 %    Lymphocytes % 24.0 %    Monocytes % 9.0 %    Eosinophils % 2.4 %    Basophils % 0.7 %    Neutrophils Absolute 2.4 1.7 - 7.7 K/uL    Lymphocytes Absolute 0.9 (L) 1.0 - 5.1 K/uL    Monocytes Absolute 0.3 0.0 - 1.3 K/uL    Eosinophils Absolute 0.1 0.0 - 0.6 K/uL    Basophils Absolute 0.0 0.0 - 0.2 K/uL   Troponin   Result Value Ref Range    Troponin 0.02 (H) <0.01 ng/mL   Troponin   Result Value Ref Range    Troponin 0.02 (H) <0.01 ng/mL   Vancomycin, random   Result Value Ref Range    Vancomycin Rm 7.3 ug/mL   Magnesium   Result Value Ref Range    Magnesium 1.90 1.80 - 2.40 mg/dL   T4, free   Result Value Ref Range    T4 Free 0.8 (L) 0.9 - 1.8 ng/dL   POCT Glucose   Result Value Ref Range    POC Glucose 128 (H) 70 - 99 mg/dl    Performed on ACCU-CHEK    POCT Glucose   Result Value Ref Range    POC Glucose 115 (H) 70 - 99 mg/dl    Performed on ACCU-CHEK    POCT Glucose   Result Value Ref Range    POC Glucose 99 70 - 99 mg/dl    Performed on ACCU-CHEK    POCT Glucose   Result Value Ref Range    POC Glucose 160 (H) 70 - 99 mg/dl    Performed on ACCU-CHEK    POCT Glucose   Result Value Ref Range    POC Glucose 145 (H) 70 - 99 mg/dl    Performed on ACCU-CHEK    POCT Glucose   Result Value Ref Range    POC Glucose 157 (H) 70 - 99 mg/dl    Performed on ACCU-CHEK    POCT Glucose   Result Value Ref Range    POC Glucose 155 (H) 70 - 99 mg/dl    Performed on ACCU-CHEK    POCT Glucose   Result Value Ref Range    POC Glucose 202 (H) 70 - 99 mg/dl Performed on ACCU-CHEK    POCT Glucose   Result Value Ref Range    POC Glucose 128 (H) 70 - 99 mg/dl    Performed on ACCU-CHEK    POCT Glucose   Result Value Ref Range    POC Glucose 169 (H) 70 - 99 mg/dl    Performed on ACCU-CHEK    POCT Glucose   Result Value Ref Range    POC Glucose 164 (H) 70 - 99 mg/dl    Performed on ACCU-CHEK    POCT Glucose   Result Value Ref Range    POC Glucose 201 (H) 70 - 99 mg/dl    Performed on ACCU-CHEK    POCT Glucose   Result Value Ref Range    POC Glucose 245 (H) 70 - 99 mg/dl    Performed on ACCU-CHEK    POCT Glucose   Result Value Ref Range    POC Glucose 265 (H) 70 - 99 mg/dl    Performed on ACCU-CHEK    EKG 12 Lead   Result Value Ref Range    Ventricular Rate 77 BPM    Atrial Rate 77 BPM    P-R Interval 142 ms    QRS Duration 92 ms    Q-T Interval 382 ms    QTc Calculation (Bazett) 432 ms    P Axis 38 degrees    R Axis 42 degrees    T Axis 60 degrees    Diagnosis       EKG performed in ER and to be interpreted by ER physician. Confirmed by MD, ER ([id number]),  [facility] ([id number]) on 11/11/2021 5:58:54 AM   EKG 12 Lead   Result Value Ref Range    Ventricular Rate 135 BPM    Atrial Rate 192 BPM    QRS Duration 80 ms    Q-T Interval 292 ms    QTc Calculation (Bazett) 438 ms    R Axis -7 degrees    T Axis 60 degrees    Diagnosis       Atrial fibrillation with rapid ventricular response with premature ventricular or aberrantly conducted complexesPossible Anterior infarct , age undeterminedAbnormal ECGConfirmed by [facility] LYUDMILA JAIN, [facility] [phone]) on 11/12/2021 9:51:32 AM   EKG 12 Lead   Result Value Ref Range    Ventricular Rate 86 BPM    Atrial Rate 86 BPM    P-R Interval 130 ms    QRS Duration 88 ms    Q-T Interval 356 ms    QTc Calculation (Bazett) 426 ms    P Axis 29 degrees    R Axis 34 degrees    T Axis -42 degrees    Diagnosis       Sinus rhythm with sinus arrhythmia with occasional Premature ventricular complexesCannot rule out Inferior infarct , age undeterminedAbnormal ECG   EKG 12 Lead   Result Value Ref Range    Ventricular Rate 136 BPM    Atrial Rate 300 BPM    QRS Duration 78 ms    Q-T Interval 324 ms    QTc Calculation (Bazett) 487 ms    R Axis 21 degrees    T Axis 58 degrees    Diagnosis       Atrial flutter with variable A-V block with premature ventricular or aberrantly conducted complexesLow voltage QRSAbnormal ECG       ED BEDSIDE ULTRASOUND:      RECENT VITALS:  BP: (!) 149/66,Temp: 98.5 °F (36.9 °C), Pulse: 101, Resp: 18, SpO2: 91 %     Procedures         ED Course     Nursing Notes, Past Medical Hx, Past Surgical Hx, Social Hx,Allergies, and Family Hx were reviewed.     patient was given the following medications:  Orders Placed This Encounter   Medications    0.9 % sodium chloride bolus    piperacillin-tazobactam (ZOSYN) 3,375 mg in dextrose 5 % 50 mL IVPB (mini-bag)     Order Specific Question:   Antimicrobial Indications     Answer:   Aspiration Pneumonia    DISCONTD: azithromycin (ZITHROMAX) 500 mg in dextrose 5 % 250 mL IVPB     Order Specific Question:   Antimicrobial Indications     Answer:   Aspiration Pneumonia    sodium chloride flush 0.9 % injection 5-40 mL    sodium chloride flush 0.9 % injection 5-40 mL    0.9 % sodium chloride infusion    DISCONTD: acetaminophen (TYLENOL) tablet 650 mg    acetaminophen (TYLENOL) suppository 650 mg    heparin (porcine) injection 5,000 Units    insulin glargine (LANTUS;BASAGLAR) injection pen 5 Units    glucose (GLUTOSE) 40 % oral gel 15 g    dextrose 50 % IV solution    glucagon (rDNA) injection 1 mg    dextrose 5 % solution    insulin lispro (1 Unit Dial) 0-6 Units    insulin lispro (1 Unit Dial) 0-3 Units    medicated lip ointment (BLISTEX)    Venelex ointment    DISCONTD: 0.9 % sodium chloride infusion    DISCONTD: lactated ringers infusion    lactated ringers bolus    sodium chloride (Inhalant) 3 % nebulizer solution 15 mL    0.9 % sodium chloride bolus    finasteride (PROSCAR) tablet 5 mg    tamsulosin (FLOMAX) capsule 0.4 mg    DISCONTD: 0.9 % sodium chloride infusion    DISCONTD: piperacillin-tazobactam (ZOSYN) 3,375 mg in dextrose 5 % 50 mL IVPB extended infusion (mini-bag)     Order Specific Question:   Antimicrobial Indications     Answer:   Pneumonia (CAP)    DISCONTD: sennosides-docusate sodium (SENOKOT-S) 8.6-50 MG tablet 1 tablet    acetaminophen (TYLENOL) 160 MG/5ML solution 650 mg    docusate (COLACE) 50 MG/5ML liquid 50 mg    DISCONTD: albuterol (PROVENTIL) nebulizer solution 2.5 mg     Order Specific Question:   Initiate RT Bronchodilator Protocol     Answer:   No    0.9 % sodium chloride bolus    DISCONTD: albuterol (PROVENTIL) nebulizer solution 2.5 mg     Order Specific Question:   Initiate RT Bronchodilator Protocol     Answer: Yes    0.9 % sodium chloride bolus    magnesium sulfate 2000 mg in 50 mL IVPB premix    potassium chloride 10 mEq/100 mL IVPB (Peripheral Line)    DISCONTD: 0.9 % sodium chloride infusion    dextrose 5 % and 0.45 % NaCl with KCl 20 mEq infusion    meropenem (MERREM) 1,000 mg in sodium chloride 0.9 % 100 mL IVPB (mini-bag)     Order Specific Question:   Antimicrobial Indications     Answer:   Aspiration Pneumonia    vancomycin (VANCOCIN) 1,250 mg in dextrose 5 % 250 mL IVPB     Order Specific Question:   Antimicrobial Indications     Answer:   Aspiration Pneumonia    DISCONTD: ipratropium-albuterol (DUONEB) nebulizer solution 1 ampule     Order Specific Question:   Initiate RT Bronchodilator Protocol     Answer: Yes    ipratropium-albuterol (DUONEB) nebulizer solution 1 ampule     Order Specific Question:   Initiate RT Bronchodilator Protocol     Answer: Yes    albuterol (PROVENTIL) nebulizer solution 2.5 mg     Order Specific Question:   Initiate RT Bronchodilator Protocol     Answer:    Yes    0.9 % sodium chloride bolus    lidocaine 4 % external patch 1 patch    aspirin chewable tablet 81 mg    DISCONTD: amiodarone (CORDARONE) 450 mg in dextrose 5 % 250 mL infusion    midodrine (PROAMATINE) tablet 5 mg    DISCONTD: digoxin (LANOXIN) injection 250 mcg    digoxin (LANOXIN) injection 250 mcg    vancomycin (VANCOCIN) 1,500 mg in dextrose 5 % 250 mL IVPB     Order Specific Question:   Antimicrobial Indications     Answer:   Aspiration Pneumonia       CONSULTS:  IP CONSULT TO DIETITIAN  IP CONSULT TO SOCIAL WORK  IP CONSULT TO PULMONOLOGY  IP CONSULT TO PHARMACY    MEDICAL DECISIONMAKING / ASSESSMENT / Vasiliy Haynes is a 81 y.o. male  With past medical history of diabetes, malnutrition and recurrent aspiration pneumonias who presents to the emergency room today due to concern for lethargy altered mental status. On arrival he is alert but slow to respond he appears frail has a new oxygen requirement he is not hypotensive or febrile or otherwise have symptoms of sepsis. Differential for this patient's apparent lethargy and confusion is quite broad. Extensive work-up was ordered including chest x-ray labs urinalysis blood cultures. Patient was signed out at this time to the oncoming provider awaiting results of the studies and anticipated admission to the hospital for his altered mental status and lethargy. Clinical Impression     1. Altered mental status, unspecified altered mental status type    2. Aspiration pneumonia of both lungs, unspecified aspiration pneumonia type, unspecified part of lung ([facility] [address]        Disposition     PATIENT REFERRED TO:  No follow-up provider specified.     DISCHARGE MEDICATIONS:  Current Discharge Medication List          DISPOSITION Admitted 11/10/2021 11:33:22 PM       Dinesh Mckay MD  11/14/21 0235

## 2021-11-14 NOTE — PROGRESS NOTES
Pulmonary Followup Note    Indication for visit:  Bronchoscopy    Subjective:  Seen and evaluated this morning. No acute overnight events. Daughter was seen at bedside and was able to help translate. Patient extremely fatigued today and only able to provide 1 word answers. Otherwise daughter states that he seems to be doing better. Had tried to elicit conversation with patient but patient was unable to provide significant speech. No significant concerns from a pulmonary perspective. Had discuss the option of hospice/palliative again today but understand that daughter is not wanting this option at this time.  vancomycin  750 mg IntraVENous Q24H    aspirin  81 mg Oral Daily    midodrine  5 mg Oral TID WC    meropenem  1,000 mg IntraVENous Q12H    ipratropium-albuterol  1 ampule Inhalation BID    lidocaine  1 patch TransDERmal Daily    sodium chloride flush  5-40 mL IntraVENous 2 times per day    heparin (porcine)  5,000 Units SubCUTAneous 3 times per day    insulin glargine  5 Units SubCUTAneous Nightly    insulin lispro  0-6 Units SubCUTAneous TID WC    insulin lispro  0-3 Units SubCUTAneous Nightly    Venelex   Topical BID    sodium chloride (Inhalant)  15 mL Nebulization BID    finasteride  5 mg Oral Daily    tamsulosin  0.4 mg Oral Daily       Continuous Infusions:   [Held by provider] dextrose 5% and 0.45% NaCl with KCl 20 mEq Stopped (11/13/21 0627)    sodium chloride 25 mL (11/12/21 1443)    dextrose         ROS: Denies fever, chills, chest pain, cough, diarrhea, constipation or dysuria. PHYSICAL EXAMINATION:  BP (!) 92/57   Pulse 109   Temp 97.1 °F (36.2 °C) (Axillary)   Resp 20   Ht 5' 3\" (1.6 m)   Wt 158 lb 8.2 oz (71.9 kg)   SpO2 95%   BMI 28.08 kg/m²     Gen: Well developed, not well nourished. Not in acute distress.  Offers minimal interaction   Eyes: PERRL, EOMI, normal conjunctivae  Ears, Nose, Mouth and Throat: Hearing is normal. Oropharynx is normal  Neck: No lymphadenopathy  Respiratory: Bilateral crackles appreciated more on the right over left. No wheezing, no increased work of breath or accessory muscle usage. CV: Tachycardic with a systolic murmur  Abd: +BS, soft, NT/ND  Musculoskeletal: No cyanosis, clubbing, 1+ left lower extremity edema. Skin: No rashes, ulcers, or subcutaneous nodules  Psychiatric: Somnolent and does not offer much interaction, choosing to remain silent    DATA  CBC:   Recent Labs     11/12/21 0449 11/13/21 0445 11/14/21  0706   WBC 4.0 3.8* 4.3   HGB 9.8* 9.9* 10.6*   HCT 29.2* 29.8* 31.7*   MCV 92.2 92.5 90.7   PLT 96* 108* 125*     BMP:   Recent Labs     11/11/21 2112 11/12/21  0017 11/12/21 0449 11/13/21 0445 11/14/21  0706      < > 143 140 142   K 3.6   < > 3.8 3.9 4.1      < > 109 109 110   CO2 21   < > 23 22 21   PHOS 2.9  --   --   --   --    BUN 39*   < > 37* 29* 25*   CREATININE 1.5*   < > 1.6* 1.4* 1.4*    < > = values in this interval not displayed. No results for input(s): PHART, SCR6KGC, PO2ART in the last 72 hours. LIVER PROFILE:   No results for input(s): AST, ALT, LIPASE, BILIDIR, BILITOT, ALKPHOS in the last 72 hours. Invalid input(s): AMYLASE,  ALB    Radiology Review:  Pertinent images / reports were reviewed as a part of this visit. Imaging reveals the following:    XR CHEST PORTABLE   Final Result   1. Diffuse multifocal consolidation, pneumonia pattern is favored over congestive failure in the absence of effusions and peripheral septal thickening. 2. Based on patient's azotemia, follow-up studies are suggested but may have to wait for normalization of renal function to evaluate the presence of adenopathy suspected on computed tomography       CT CHEST WO CONTRAST   Final Result       1. Worsening of multifocal consolidation compatible with progressive and/or recurrent pneumonia.    2.  New severe narrowing and/or mucous plugging occluding the right upper daughter asked about a chest vest.  He does appear to be developing BRONCHIECTASIS, so a chest vest is reasonable. Without the strength though it may not provide the benefit she hopes. If I am correct and aspiration, either from dysphagia or reflux, is the underlying cause of this respiratory failure, and possibly the previous one, then the likelihood of finding a solution that prevents it from happening is low. - Supplemental oxygen with SpO2 goal >90%; wean as tolerated  -Continue holding for bronchoscopy unless patient becomes in more distress. Unlikely to provide significant benefit as patient is weak and coughing is unable to remove significant secretions. Can continue with chest vest and if shows significant benefits can do on an outpatient basis but unlikely secondary to patient's weakness.  - Continue 3% Hypertonic saline nebulizer treatments  - Continue DuoNebs  - Continue IVF  - Blood cultures, MRSA pending  - Continue Broad spectrum antibiotics   - Merrem D#3   - Vancomycin D#3 pending MRSA swab  -Have previously discussed that palliative/hospice may be a good option but understand at this time family is not wanting this option. Paroxysmal Atrial Fibrillation  -Continue digoxin  - Telemetry  - Heparin gtt for prophylaxis     Patient has been staffed and plan discussed with Merissa Tracey MD.    Kika Rouse DO, PGY-1    Patient seen, examined and discussed with the resident and I agree with the assessment and plan as edited above. Started chest vest yesterday and thus far has tolerated the lowest setting, according to his daughter.     Erin Lr MD

## 2021-11-14 NOTE — PROGRESS NOTES
Internal Medicine Progress Note    Admit Date: 11/10/2021  Day:   Diet: ADULT DIET; Dysphagia - Pureed; Mildly Thick (Nectar); Liquids by spoon only  ADULT ORAL NUTRITION SUPPLEMENT; Breakfast, Lunch; Fortified Pudding Oral Supplement  ADULT ORAL NUTRITION SUPPLEMENT; Dinner; Frozen Oral Supplement    CC: AMS    Interval history:  -Patient in NSR ON per tele, was briefly in RVR this AM but then back to NSR  -Abx broadened to vanc and merrem per pulm, will attempt chest vest  -Afeb, HDS, on 5L. Occasional bouts of afib, now on digoxin. BP seems stable on midodrine instead of IVF at this time.   -Limited x1, no chest compressions  -Patient responsive to stimuli this AM, discussed w/ daughter at bedside.     Medications:     Scheduled Meds:   vancomycin (VANCOCIN) intermittent dosing (placeholder)   Other See Admin Instructions    aspirin  81 mg Oral Daily    midodrine  5 mg Oral TID WC    meropenem  1,000 mg IntraVENous Q12H    ipratropium-albuterol  1 ampule Inhalation BID    lidocaine  1 patch TransDERmal Daily    sodium chloride flush  5-40 mL IntraVENous 2 times per day    heparin (porcine)  5,000 Units SubCUTAneous 3 times per day    insulin glargine  5 Units SubCUTAneous Nightly    insulin lispro  0-6 Units SubCUTAneous TID WC    insulin lispro  0-3 Units SubCUTAneous Nightly    Venelex   Topical BID    sodium chloride (Inhalant)  15 mL Nebulization BID    finasteride  5 mg Oral Daily    tamsulosin  0.4 mg Oral Daily     Continuous Infusions:   [Held by provider] dextrose 5% and 0.45% NaCl with KCl 20 mEq Stopped (11/13/21 0627)    sodium chloride 25 mL (11/12/21 1443)    dextrose       PRN Meds:albuterol, sodium chloride flush, sodium chloride, [DISCONTINUED] acetaminophen **OR** acetaminophen, glucose, dextrose, glucagon (rDNA), dextrose, medicated lip ointment, acetaminophen, docusate    Objective:   Vitals:   T-max:  Patient Vitals for the past 8 hrs:   BP Temp Temp src Pulse Resp SpO2 11/14/21 0905 -- -- -- -- 20 95 %   11/14/21 0841 (!) 92/57 97.1 °F (36.2 °C) Axillary 109 20 97 %   11/14/21 0425 101/60 97.4 °F (36.3 °C) Axillary 85 18 94 %       Intake/Output Summary (Last 24 hours) at 11/14/2021 1059  Last data filed at 11/14/2021 0905  Gross per 24 hour   Intake 200 ml   Output 1350 ml   Net -1150 ml       Physical Exam  Constitutional:       General: He is not in acute distress. HENT:      Head: Normocephalic and atraumatic. Eyes:      General: No scleral icterus. Right eye: No discharge. Left eye: No discharge. Extraocular Movements: Extraocular movements intact. Conjunctiva/sclera: Conjunctivae normal.      Pupils: Pupils are equal, round, and reactive to light. Cardiovascular:      Rate and Rhythm: Tachycardia present. Rhythm irregular. Pulses: Normal pulses. Heart sounds: Normal heart sounds. No murmur heard. No friction rub. No gallop. Pulmonary:      Effort: Pulmonary effort is normal. No respiratory distress. Breath sounds: Rales (over bases bilaterally) present. No wheezing. Chest:      Chest wall: Tenderness (upper lateral left side) present. Abdominal:      General: Abdomen is flat. Bowel sounds are normal. There is no distension. Palpations: Abdomen is soft. Musculoskeletal:      Cervical back: Normal range of motion and neck supple. No rigidity. Right lower leg: Edema present. Left lower leg: Edema present. Skin:     General: Skin is warm and dry. Coloration: Skin is not jaundiced. Neurological:      Mental Status: He is alert.       Comments: Oriented to self            LABS:    CBC:   Recent Labs     11/12/21 0449 11/13/21 0445 11/14/21  0706   WBC 4.0 3.8* 4.3   HGB 9.8* 9.9* 10.6*   HCT 29.2* 29.8* 31.7*   PLT 96* 108* 125*   MCV 92.2 92.5 90.7     Renal:    Recent Labs     11/11/21 2112 11/11/21 2112 11/12/21  0017 11/12/21  0017 11/12/21 0449 11/13/21  0445 11/14/21  0706      < > 141 < > 143 140 142   K 3.6   < > 3.5  --  3.8 3.9 4.1      < > 107   < > 109 109 110   CO2 21   < > 21   < > 23 22 21   BUN 39*   < > 39*   < > 37* 29* 25*   CREATININE 1.5*   < > 1.5*   < > 1.6* 1.4* 1.4*   GLUCOSE 159*   < > 139*   < > 153* 149* 200*   CALCIUM 10.0   < > 9.9   < > 9.8 9.6 9.9   MG  --   --  1.70*  --   --  1.90  --    PHOS 2.9  --   --   --   --   --   --    ANIONGAP 13   < > 13   < > 11 9 11    < > = values in this interval not displayed. Hepatic:   Recent Labs     11/11/21  2112   LABALBU 2.2*     Troponin:   Recent Labs     11/12/21  1849 11/12/21  2248 11/13/21  0445   TROPONINI 0.02* 0.02* 0.02*     -----------------------------------------------------------------  RAD:   XR CHEST PORTABLE   Final Result   1. Diffuse multifocal consolidation, pneumonia pattern is favored over congestive failure in the absence of effusions and peripheral septal thickening. 2. Based on patient's azotemia, follow-up studies are suggested but may have to wait for normalization of renal function to evaluate the presence of adenopathy suspected on computed tomography      CT CHEST WO CONTRAST   Final Result      1. Worsening of multifocal consolidation compatible with progressive and/or recurrent pneumonia. 2.  New severe narrowing and/or mucous plugging occluding the right upper lobe bronchus and partial lobar collapse in addition to consolidation. CT HEAD WO CONTRAST   Final Result      1. No acute intracranial hemorrhage or mass effect. 2.  Mild white matter chronic small vessel ischemia. 3.  Moderate atrophy. XR CHEST PORTABLE   Final Result      Persistent moderate multifocal bilateral airspace disease, slightly worsened from prior study.              Assessment/Plan:   Lethargy 2/2 PNA  Suspected Recurrent Aspiration PNA  CT consistent with recurrent pneumonia, mucous plugging in the right upper lobe/multifocal.  -Vanc and Merrem  -Supplemental O2 PRN, wean as tolerated   -3%

## 2021-11-14 NOTE — PROGRESS NOTES
pt shivering, shaking stating \"I'm cold. \" Temp 97.8 axillary, . Heat packs and warm blankets applied to pt, no relief. Daughter at bedside stating the same thing happens at the same time every night. Asking if \"the doctors can fix it with medicine. \" Requesting cortisol level be checked. Dr Jose Angel Ball notified via perfect serve.

## 2021-11-15 PROBLEM — G93.41 ACUTE METABOLIC ENCEPHALOPATHY: Status: ACTIVE | Noted: 2021-01-01

## 2021-11-15 NOTE — PROGRESS NOTES
Pulmonary Followup Note    Indication for visit:  Bronchoscopy    Subjective:  No acute overnight events. Was feeling more cold overnight. Otherwise seen while stepson was in the room. Patient states that he has a diffuse mild chest pain otherwise no new symptoms and continues to be short of breath. No new fevers, night sweats, cough.  vancomycin  750 mg IntraVENous Q24H    digoxin  250 mcg IntraVENous Q6H    aspirin  81 mg Oral Daily    midodrine  5 mg Oral TID WC    meropenem  1,000 mg IntraVENous Q12H    ipratropium-albuterol  1 ampule Inhalation BID    lidocaine  1 patch TransDERmal Daily    sodium chloride flush  5-40 mL IntraVENous 2 times per day    heparin (porcine)  5,000 Units SubCUTAneous 3 times per day    insulin glargine  5 Units SubCUTAneous Nightly    insulin lispro  0-6 Units SubCUTAneous TID WC    insulin lispro  0-3 Units SubCUTAneous Nightly    Venelex   Topical BID    sodium chloride (Inhalant)  15 mL Nebulization BID    finasteride  5 mg Oral Daily    tamsulosin  0.4 mg Oral Daily       Continuous Infusions:   [Held by provider] dextrose 5% and 0.45% NaCl with KCl 20 mEq Stopped (11/13/21 0627)    sodium chloride 25 mL (11/12/21 1443)    dextrose         ROS: Denies fever, chills, chest pain, cough, diarrhea, constipation or dysuria. PHYSICAL EXAMINATION:  /64   Pulse 93   Temp 97 °F (36.1 °C) (Axillary)   Resp 18   Ht 5' 3\" (1.6 m)   Wt 158 lb 8.2 oz (71.9 kg)   SpO2 98%   BMI 28.08 kg/m²     Gen: Well developed, not well nourished. Not in acute distress. Offers minimal interaction   Eyes: PERRL, EOMI, normal conjunctivae  Ears, Nose, Mouth and Throat: Hearing is normal. Oropharynx is normal  Neck: No lymphadenopathy  Respiratory: Crackles heard bilaterally right over left. No wheezing, no increased work of breath or accessory muscle usage.   CV: Tachycardic with a systolic murmur  Abd: +BS, soft, NT/ND  Musculoskeletal: No cyanosis, clubbing, 1+ left lower extremity edema. Skin: No rashes, ulcers, or subcutaneous nodules  Psychiatric: Somnolent and does not offer much interaction, choosing to remain silent    DATA  CBC:   Recent Labs     11/13/21  0445 11/14/21  0706 11/15/21  0712   WBC 3.8* 4.3 3.2*   HGB 9.9* 10.6* 10.3*   HCT 29.8* 31.7* 30.9*   MCV 92.5 90.7 92.5   * 125* 121*     BMP:   Recent Labs     11/13/21 0445 11/14/21 0706 11/15/21  0712    142 142   K 3.9 4.1 4.2    110 110   CO2 22 21 24   BUN 29* 25* 25*   CREATININE 1.4* 1.4* 1.4*     No results for input(s): PHART, CPE5RMZ, PO2ART in the last 72 hours. LIVER PROFILE:   No results for input(s): AST, ALT, LIPASE, BILIDIR, BILITOT, ALKPHOS in the last 72 hours. Invalid input(s): AMYLASE,  ALB    Radiology Review:  Pertinent images / reports were reviewed as a part of this visit. Imaging reveals the following:    XR CHEST PORTABLE   Final Result   1. Diffuse multifocal consolidation, pneumonia pattern is favored over congestive failure in the absence of effusions and peripheral septal thickening. 2. Based on patient's azotemia, follow-up studies are suggested but may have to wait for normalization of renal function to evaluate the presence of adenopathy suspected on computed tomography       CT CHEST WO CONTRAST   Final Result       1. Worsening of multifocal consolidation compatible with progressive and/or recurrent pneumonia. 2.  New severe narrowing and/or mucous plugging occluding the right upper lobe bronchus and partial lobar collapse in addition to consolidation.       CT HEAD WO CONTRAST   Final Result       1. No acute intracranial hemorrhage or mass effect. 2.  Mild white matter chronic small vessel ischemia.    3.  Moderate atrophy.       XR CHEST PORTABLE   Final Result       Persistent moderate multifocal bilateral airspace disease, slightly worsened from prior study.      if he's still having difficult coughing up phlegm. He is developing bronchiectasis from his recurrent infection/inflammatory lung issues. Will sign off, but please call if pulmonary can be of further assistance.       Sandy Hill MD

## 2021-11-15 NOTE — PROGRESS NOTES
Internal Medicine Progress Note    Admit Date: 11/10/2021  Day:   Diet: ADULT DIET; Dysphagia - Pureed; Mildly Thick (Nectar); Liquids by spoon only  ADULT ORAL NUTRITION SUPPLEMENT; Breakfast, Lunch; Fortified Pudding Oral Supplement  ADULT ORAL NUTRITION SUPPLEMENT; Dinner; Frozen Oral Supplement    CC: AMS    Interval history:  -Patient had several brief runs of afib overnight but rate has remained controlled  -Remains on merrem, vacn d/c'd per pulm  -Afeb, HDS, still on 5L. Occasional runs of afib overnight however his rate has largely stayed controlled. Will check his digoxin level given his loading dose and re-evaluate dosing.  -Limited x1, no chest compressions  -Patient responsive to stimuli this AM, discussed w/ son-in-law at bedside.     Medications:     Scheduled Meds:   aspirin  81 mg Oral Daily    midodrine  5 mg Oral TID WC    meropenem  1,000 mg IntraVENous Q12H    ipratropium-albuterol  1 ampule Inhalation BID    lidocaine  1 patch TransDERmal Daily    sodium chloride flush  5-40 mL IntraVENous 2 times per day    heparin (porcine)  5,000 Units SubCUTAneous 3 times per day    insulin glargine  5 Units SubCUTAneous Nightly    insulin lispro  0-6 Units SubCUTAneous TID WC    insulin lispro  0-3 Units SubCUTAneous Nightly    Venelex   Topical BID    sodium chloride (Inhalant)  15 mL Nebulization BID    finasteride  5 mg Oral Daily    tamsulosin  0.4 mg Oral Daily     Continuous Infusions:   [Held by provider] dextrose 5% and 0.45% NaCl with KCl 20 mEq Stopped (11/13/21 0627)    sodium chloride 25 mL (11/12/21 1443)    dextrose       PRN Meds:albuterol, sodium chloride flush, sodium chloride, [DISCONTINUED] acetaminophen **OR** acetaminophen, glucose, dextrose, glucagon (rDNA), dextrose, medicated lip ointment, acetaminophen, docusate    Objective:   Vitals:   T-max:  Patient Vitals for the past 8 hrs:   BP Temp Temp src Pulse Resp SpO2   11/15/21 0830 100/64 97 °F (36.1 °C) Axillary 93 18 98 %   11/15/21 0618 99/61 -- -- -- -- --   11/15/21 0615 (!) 90/51 -- -- -- -- --   11/15/21 0412 114/69 97.2 °F (36.2 °C) Axillary 97 18 97 %       Intake/Output Summary (Last 24 hours) at 11/15/2021 1030  Last data filed at 11/15/2021 0412  Gross per 24 hour   Intake 120 ml   Output 1075 ml   Net -955 ml       Physical Exam  Constitutional:       General: He is not in acute distress. HENT:      Head: Normocephalic and atraumatic. Eyes:      General: No scleral icterus. Right eye: No discharge. Left eye: No discharge. Extraocular Movements: Extraocular movements intact. Conjunctiva/sclera: Conjunctivae normal.      Pupils: Pupils are equal, round, and reactive to light. Cardiovascular:      Rate and Rhythm: Normal rate and regular rhythm. Pulses: Normal pulses. Heart sounds: Normal heart sounds. No murmur heard. No friction rub. No gallop. Pulmonary:      Effort: Pulmonary effort is normal. No respiratory distress. Breath sounds: Rales (over bases bilaterally) present. No wheezing. Chest:      Chest wall: Tenderness (upper lateral left side) present. Abdominal:      General: Abdomen is flat. Bowel sounds are normal. There is no distension. Palpations: Abdomen is soft. Musculoskeletal:      Cervical back: Normal range of motion and neck supple. No rigidity. Right lower leg: Edema present. Left lower leg: Edema present. Skin:     General: Skin is warm and dry. Coloration: Skin is not jaundiced. Neurological:      Mental Status: He is alert.       Comments: Oriented to self            LABS:    CBC:   Recent Labs     11/13/21  0445 11/14/21  0706 11/15/21  0712   WBC 3.8* 4.3 3.2*   HGB 9.9* 10.6* 10.3*   HCT 29.8* 31.7* 30.9*   * 125* 121*   MCV 92.5 90.7 92.5     Renal:    Recent Labs     11/13/21 0445 11/14/21 0706 11/15/21  0712    142 142   K 3.9 4.1 4.2    110 110   CO2 22 21 24   BUN 29* 25* 25*   CREATININE 1.4* 1.4* 1.4*   GLUCOSE 149* 200* 208*   CALCIUM 9.6 9.9 10.0   MG 1.90  --   --    ANIONGAP 9 11 8     Hepatic:   No results for input(s): AST, ALT, BILITOT, BILIDIR, PROT, LABALBU, ALKPHOS in the last 72 hours. Troponin:   Recent Labs     11/12/21  1849 11/12/21  2248 11/13/21  0445   TROPONINI 0.02* 0.02* 0.02*     -----------------------------------------------------------------  RAD:   XR CHEST PORTABLE   Final Result   1. Diffuse multifocal consolidation, pneumonia pattern is favored over congestive failure in the absence of effusions and peripheral septal thickening. 2. Based on patient's azotemia, follow-up studies are suggested but may have to wait for normalization of renal function to evaluate the presence of adenopathy suspected on computed tomography      CT CHEST WO CONTRAST   Final Result      1. Worsening of multifocal consolidation compatible with progressive and/or recurrent pneumonia. 2.  New severe narrowing and/or mucous plugging occluding the right upper lobe bronchus and partial lobar collapse in addition to consolidation. CT HEAD WO CONTRAST   Final Result      1. No acute intracranial hemorrhage or mass effect. 2.  Mild white matter chronic small vessel ischemia. 3.  Moderate atrophy. XR CHEST PORTABLE   Final Result      Persistent moderate multifocal bilateral airspace disease, slightly worsened from prior study.              Assessment/Plan:   Lethargy 2/2 PNA  Suspected Recurrent Aspiration PNA  CT consistent with recurrent pneumonia, mucous plugging in the right upper lobe/multifocal.  -Merrem  -Supplemental O2 PRN, wean as tolerated   -3% saline nebulizer treatments, Chest Vest per pulm  -SLP evaluation ordered; diet recommendations made.  -Pulmonology c/s'd, rec's appreciated  -Discussion had with family about PEG tube and they do not want it, Limited x1 (No Compressions)     HA on CKD 3b  Patient still making urine, cr to 1.6. patient with rhonchi on exam and 2+ pittinng edema; bnp elevated. S/p bolus in ED  -Holding fluids 2/2 to concerns of overload  -Cr stable, Monitor w/ daily BMP     Afib, paroxsymal  Run of afib noted overnight, resolved s/p fluid bolus and electrolyte replacement  -Will continue to monitor w/ continuous telemetry   -Digoxin level ordered, will re-evaluate dosing based on value  -[id number] Score for Atrial Fibrillation Stroke Risk =4.   Has bled score is 1  -Dr Jose Antonio Nash discussed with family about starting Anticoagulation as pt was having a lot of falls so we have deferred starting and will just continue ASA     Chronic Issues:  Normocytic Anemia (Baseline Hgb ~11, 10.6 on admission- Daily CBC  T2DM- Glargine 5 U BID, LDSSI, POCTs x4 AC & HS  HTN- Holding home meds as normotensive  BPH- Home Proscar and Flomax     Code Status: Limited x1, No to compressions  FEN: Dysphagia - Pureed w/ Nectar thick liquids  PPX: [facility]  DISPO: [facility]     Nolberto Prescott MD, PGY-1  11/15/21  10:30 AM    This patient has been staffed and discussed with Tulio Gonzalez MD.

## 2021-11-15 NOTE — CARE COORDINATION
Case Management Assessment           Daily Note                 Date/ Time of Note: 11/15/2021 8:55 AM         Note completed by: Zeynep Aggarwal RN    Patient Name: Anneliese Moscoso  YOB: 1935    Diagnosis:Acute encephalopathy [G93.40]  Altered mental status, unspecified altered mental status type [R41.82]  Aspiration pneumonia of both lungs, unspecified aspiration pneumonia type, unspecified part of lung ([facility] [J69.0]  Aspiration pneumonia, unspecified aspiration pneumonia type, unspecified laterality, unspecified part of lung ([facility] [J69.0]  Patient Admission Status: Inpatient    Date of Admission:11/10/2021  7:26 PM Length of Stay: 5 GLOS: GMLOS: 4    Current Plan of Care:  CC: Lethargy  2/2 PNA  Suspected Recurrent Aspiration PNADysphasia:  SLP consulted:    Recommended Diet and Intervention  Diet Solids Recommendation: Dysphagia Pureed (Dysphagia I)  Liquid Consistency Recommendation: Mildly Thick (Nectar)  Recommended Form of Meds: Crushed in puree as able  Recommendations: Total feed    Pulm following :  D/w dgtr  Hospice [facility]  But not wanting this option at this time . O 2  %L HF turned down to 4 L  HF this am    ________________________________________________________________________________________  PT AM-PAC: 8 / 24 per last evaluation on: 11/12/2021    OT AM-PAC: 9 / 24 per last evaluation on: 11/11/2021    DME Needs for discharge: cirilo lift  If goes home   ________________________________________________________________________________________  Discharge Plan: Home with [address] [place] [address] Way: VS  SNF  @ [facility]:     Tentative discharge date:      Current barriers to discharge: medical Pulm clearance  Weaning O 2  Down to 4 L HF  From 5 L HF  .         Referrals completed: Not Applicable    Resources/ information provided: Not indicated at this time  ________________________________________________________________________________________  Case Management Notes:    CM cont to

## 2021-11-15 NOTE — PROGRESS NOTES
DIAGNOSIS   · Inadequate oral intake related to cognitive or neurological impairment as evidenced by weight loss, poor intake prior to admission      [address] and/or Nutrient Delivery:  Continue Current Diet, Start Oral Nutrition Supplement  Nutrition Education/Counseling:  No recommendation at this time   Goals:  Pt will consistently consume >50% of meals and supplements throughout adm       Nutrition Monitoring and Evaluation:   Food/Nutrient Intake Outcomes:  Food and Nutrient Intake, Diet Advancement/Tolerance  Physical Signs/Symptoms Outcomes:  Weight, Chewing or Swallowing, Biochemical Data     OBJECTIVE DATA: Significant to nutrition assessment  · Nutrition-Focused Physical Findings: Nutrition Related Findings: BM 11/12; worsening edema +3 BLE   · Labs: Reviewed  · Meds: Reviewed  · Wounds: Wound Type: None     CURRENT NUTRITION THERAPIES  ADULT DIET; Dysphagia - Pureed; Mildly Thick (Nectar); Liquids by spoon only  ADULT ORAL NUTRITION SUPPLEMENT; Breakfast, Lunch; Fortified Pudding Oral Supplement  ADULT ORAL NUTRITION SUPPLEMENT; Dinner; Frozen Oral Supplement     PO Intake: Average Meal Intake: Unable to assess   PO Supplement Intake:Average Supplements Intake: None Ordered  IVF: 75 ml/hr     ANTHROPOMETRICS  Current Height: 5' 3\" (160 cm)  Current Weight: 158 lb 8.2 oz (71.9 kg)    Admission weight: 155 lb (70.3 kg)  Ideal Body Weight (lbs) (Calculated): 124 lbs (Ideal Body Weight (Kg) (Calculated): 56 kg)  Usual Bodyweight 179-182 lbs per EMR   Weight Changes -34.5 lbs in 3 months       BMI BMI (Calculated): 28.1    Wt Readings from Last 50 Encounters:   11/11/21 144 lb 13.5 oz (65.7 kg)   10/20/21 147 lb 7.8 oz (66.9 kg)   08/21/21 179 lb 4.8 oz (81.3 kg)   06/08/21 177 lb (80.3 kg)   03/18/21 180 lb 4.8 oz (81.8 kg)   11/20/20 182 lb 6.4 oz (82.7 kg)       COMPARATIVE STANDARDS  Energy (kcal):  2751-5708 (20-25);  Weight Used for Energy Requirements:  Current (65.7 kg)     Protein (g):  56-62 (1.0-1.2); Weight Used for Protein Requirements:  Ideal (56)        Fluid (ml/day):  0225-9628 ml; Method Used for Fluid Requirements:  1 ml/kcal      The patient will still be monitored per nutrition standards of care. Consult dietitian if nutrition interventions essential to patient care is needed.      Monica Iqbal [address], [address] [place] [address]  [place]:  [phone]  Office:  [phone]

## 2021-11-15 NOTE — PROGRESS NOTES
Physical Therapy  Facility/Department: [address]  Daily Treatment Note  NAME: Yves Ta  : 1935  MRN: [id number]    Date of Service: 11/15/2021    Discharge Recommendations:  Yves Ta scored a 8/24 on the AM-PAC short mobility form. Current research shows that an AM-PAC score of 17 or less is typically not associated with a discharge to the patient's home setting. Based on the patient's AM-PAC score and their current functional mobility deficits, it is recommended that the patient have 3-5 sessions per week of Physical Therapy at d/c to increase the patient's independence. Please see assessment section for further patient specific details. If patient discharges prior to next session this note will serve as a discharge summary. Please see below for the latest assessment towards goals. If home, recommend:  HOME HEALTH CARE: LEVEL 3 SAFETY     - Initial home health evaluation to occur within 24-48 hours, in patient home   - Therapy evaluations in home within 24-48 hours of discharge; including DME and home safety   - Frontload therapy 5 days, then 3x a week   - Therapy to evaluate if patient has [address] needs for personal care   -  evaluation within 24-48 hours, includes evaluation of resources and insurance to determine [place], [place], LTC, and Medicaid options     PT Equipment Recommendations  Equipment Needed: Yes (lolly stedy or similar)    Assessment   Assessment: Pt continues to require min A for lolly stedy transfers. Endurance remaind poor. Continue to recommend lolly stedy or similar equipment for home and 24-hr care. Will follow per plan of care. Treatment Diagnosis: Decreased transfers associated with acute encephalopathy. PT Education: Equipment;  Family Education; Transfer Training; Home Exercise Program  Barriers to Learning: Language  REQUIRES PT FOLLOW UP: Yes  Activity Tolerance  Activity Tolerance: Patient limited by fatigue; Patient limited by endurance     Patient Diagnosis(es): The primary encounter diagnosis was Altered mental status, unspecified altered mental status type. A diagnosis of Aspiration pneumonia of both lungs, unspecified aspiration pneumonia type, unspecified part of lung ([facility] [address] was also pertinent to this visit. has a past medical history of Allergic rhinitis, Asthma, Bilateral cataracts, BPH (benign prostatic hypertrophy), Diabetes mellitus, type 2 ([facility] [address] Diverticulosis, Eczema, Hyperlipidemia, Hypertension, Lumbar disc disease, Microalbuminuria, Obstructive sleep apnea, Osteoarthritis, Pneumonia, Post herpetic neuralgia, Recurrent upper respiratory infection (URI), Right shoulder pain, Spinal stenosis, Transient global amnesia, and Vitamin D deficiency. has a past surgical history that includes Prostate surgery (June 13, 2000). Restrictions  Position Activity Restriction  Other position/activity restrictions: Up with assist  Subjective   General  Chart Reviewed: Yes  Additional Pertinent Hx: Pt to ED 11/10 with increased fatigue and AMS. Head CT (-). Chest CT: Worsening of multifocal consolidation compatible with progressive and/or recurrent PNA. PMH:  asthma, HTN, DM, OA, BPH, diverticulosis, back pain, PNA, spinal stenosis  Family / Caregiver Present: Yes (son-in-law)  Subjective  Subjective: Pt seated in chair. Agreeable to PT (son-in-law assisting with communication d/t pt being [facility] speaking). Pain Screening  Patient Currently in Pain: Denies  Vital Signs  Patient Currently in Pain: Denies         Objective      Transfers  Sit to Stand: Minimal Assistance (from chair to stedy)  Stand to sit: Minimal Assistance  Comment: sit<->stand performed x 4 fro chair to stedy  Ambulation  Ambulation?: No     Balance  Standing - Static: Fair; Poor (min A initially, progressing to mod A as pt fatigued.)  Comments: pt stood for ~20-30 sec x 4 in lolly stedy with assist as above. pt fatiguing quickly.  required cues for upright poture. Exercises  Heelslides: x 10 bilat  Hip Flexion: x 10 bilat  Hip Abduction: x 10 bilat  Knee Long Arc Quad: x 10 bilat  Ankle Pumps: x 10 bilat  Comments: all exercises AAROM to PROM. pt participating very minimally. AM-PAC Score  AM-PAC Inpatient Mobility Raw Score : 8 (11/15/21 1633)  AM-PAC Inpatient T-Scale Score : 28.52 (11/15/21 1633)  Mobility Inpatient CMS 0-100% Score: 86.62 (11/15/21 1633)  Mobility Inpatient CMS G-Code Modifier : CM (11/15/21 1633)          Goals  Short term goals  Time Frame for Short term goals: Discharge  Short term goal 1: supine <> sit Min A  Short term goal 2: sit <> stand Min A (met for lolly lepe). new goal: sit<->stand CGA to lolly lepe.   Short term goal 3: bed <> chair Mod A  Patient Goals   Patient goals : Not stated, daughter hopes to return home    Plan    Plan  Times per week: 2-5  Current Treatment Recommendations: Transfer Training, Functional Mobility Training, Strengthening, Endurance Training, Balance Training  Safety Devices  Type of devices: Left in chair, Chair alarm in place, Call light within reach, Nurse notified     Therapy Time   Individual Concurrent Group Co-treatment   Time In 1509         Time Out 1535         Minutes 26                 [address], PT

## 2021-11-15 NOTE — PROGRESS NOTES
Speech Language Pathology  Facility/Department: [address] TELEMETRY  Dysphagia Daily Treatment Note    NAME: Jean Claude London  : 1935  MRN: [id number]    Patient Diagnosis(es):   Patient Active Problem List    Diagnosis Date Noted    Type 2 diabetes mellitus without complication ([address]     Essential hypertension 2015    Diabetes mellitus type 2 in obese ([address] 2010    Hyperlipidemia     Microalbuminuria 2013    Vitamin D deficiency 2012    Diverticulosis     Benign prostatic hyperplasia     Bilateral low back pain with right-sided sciatica 2015    Neck pain 2012    Right hip pain 2012    Post herpetic neuralgia 06/15/2012    Right shoulder pain 2011    Eczema 2011    Allergic rhinitis 2010    Rosacea     Back pain     Obstructive sleep apnea     Chest pain     Obesity     Insomnia     Osteoarthritis     Lumbar disc disease     Spinal stenosis     Aspiration pneumonia ([address] 2021    Rapid atrial fibrillation (HCC)     Severe malnutrition ([address] 2021    Aspiration pneumonia of right upper lobe due to regurgitated food ([address]     Volume depletion     Physical deconditioning     Acute encephalopathy 11/10/2021    Dehydration 2021    Bacterial pneumonia     Oropharyngeal dysphagia     Multifocal pneumonia 10/12/2021    Hypoxia     Hypernatremia     Altered mental status     Chronic diastolic HF (heart failure) (HCC)     Acute renal failure superimposed on stage 3b chronic kidney disease ([address] 2021    Peripheral vascular disease ([address] 2020    Coronary artery disease of native artery of native heart with stable angina pectoris ([address] 2016    Shortness of breath 2011    Nausea 2011    Dyspnea on exertion 2011    Transient global amnesia     Bilateral cataracts      Allergies:    Allergies   Allergen Reactions    Tramadol      Onset Date: 11/10/2021      CXR (11/12/2021)-    1. Diffuse multifocal consolidation, pneumonia pattern is favored over congestive failure in the absence of effusions and peripheral septal thickening. 2. Based on patient's azotemia, follow-up studies are suggested but may have to wait for normalization of renal function to evaluate the presence of adenopathy suspected on computed tomography       Previous MBS (10/13/2021)-  Mild-moderate oral phase dysphagia characterized by lingual pumping/rocking, reduced posterior propulsion, holding of bolus, piecemeal swallowing, premature bolus loss to pharynx, and reduced tongue base retraction. Moderate pharyngeal phase dysphagia characterized by impaired hyolaryngeal mechanics and reduced sensation, with delayed swallow initiation, premature spillage to the valleculae and pyriforms (thin liquids), decreased pharyngeal peristalsis, reduced/delayed epiglottic movement, reduced laryngeal elevation/retraction, with pooling at the valleculae/pyriforms/UES (mostly thins), subsequent deep penetration and suspected silent aspiration of thin liquids (no cough reflex), mild pharyngeal residue and pharyngeal wall weakness. No aspiration or penetration of nectar thick liquids or puree. Trialed chin tuck manuever with thin liquids via cup; on review of images, appeared effective at protecting airway and preventing aspiration, however pt required max cues from daughter to complete. On discussion with daughter, she suspects he may have limited carryover with other caregivers. After further discussion re: QoL wishes, plan of care with daughter (pt present, but currently confused), recommend nectar thick liquids as safest option, with soft foods (minced & moist), with implementation of free water protocol (may have unthickened plan H2O between meals following completion of thorough oral hygiene). Also discussed daughter can try thin liquids with herself cuing her father to chin-tuck.   Upper Esophageal Screen: Reduced cricopharyngeal opening    Chart reviewed. Medical Diagnosis: Acute encephalopathy [G93.40]  Altered mental status, unspecified altered mental status type [R41.82]  Aspiration pneumonia of both lungs, unspecified aspiration pneumonia type, unspecified part of lung ([address] [J69.0]  Aspiration pneumonia, unspecified aspiration pneumonia type, unspecified laterality, unspecified part of lung ([address] [J69.0]     Treatment Diagnosis: Dysphagia    BSE Impression (11/11/2021)-  Patient seen for a bedside swallow. Patient recently here for PNA and discharged with a Dysphagia II, nectar thick liquid (via spoon) diet. Thorough family training was completed here by SLP. Paz Boudreaux after MBS. Pt admitted with recurrent PNA and AMS. Pt requiring cues to maintain alertness and attention to trials. He tolerated multiple trials of puree and nectar thick liquids via spoon. Daughter reports following protocol at home and has been primarily pureeing his food secondary to gum discomfort. Daughter reports weight -loss, therefore nutrition consult is warranted for possible oral supplements. Will continue to follow to ensure diet tolerance and continue reinforcement of strategies. Dysphagia Diagnosis: Moderate to severe oral stage dysphagia, Moderate pharyngeal stage dysphagia    MBS results -  None this admit    Pain: none indicated by any means    Current Diet : ADULT DIET; Dysphagia - Pureed; Mildly Thick (Nectar); Liquids by spoon only  ADULT ORAL NUTRITION SUPPLEMENT; Breakfast, Lunch; Fortified Pudding Oral Supplement  ADULT ORAL NUTRITION SUPPLEMENT; Dinner; Frozen Oral Supplement   Recommended Form of Meds: Crushed in puree as able  Compensatory Swallowing Strategies:  Alternate solids and liquids, No straws, Eat/Feed slowly, Upright as possible for all oral intake, Remain upright for 30-45 minutes after meals, Small bites/sips, Liquid by spoon only     Treatment:  Pt seen bedside to address the following goals:  Goal 1: The patient will tolerate recommended diet without observed clinical signs of aspiration  11/15: Cleared by RN to see pt. Per chart review/RN, pt tolerating diet. Pt seen resting in bed, on 3L O2 via nc, awake, alert, though somewhat lethargic. Repositioned pt up in bed and completed oral hygiene with suction kit. Assessed tolerance current diet (puree, nectar via tsp); pt with limited PO acceptance, positive swallow movement, good oral clearance. Single delayed cough, not clearly associated w/ PO intake; no further cough w/ prior/subsequent trials. Of note, on recent MBS, pt with SILENT aspiration, so unclear/unable to determine bedside. As session progressed, pt fatigued. Will continue to monitor need/ability to participate/complete repeat MBS. Cont goal    Goal 2: The patient/caregiver will demonstrate understanding of compensatory strategies for improved swallowing safety. 11/15: Provided education to patient re: purpose of visit, swallow function, diet recommendation, previous MBS, oral care completion/rationale. No indication of comprehension. Cont goal.    Patient/Family/Caregiver Education:  See goal 2 above    Compensatory Strategies:  Compensatory Swallowing Strategies: Alternate solids and liquids, No straws, Eat/Feed slowly, Upright as possible for all oral intake, Remain upright for 30-45 minutes after meals, Small bites/sips, Liquid by spoon only      Plan:  Continued daily Dysphagia treatment with goals per  plan of care. Diet recommendations:     - continue puree solids / mildly thick liquid, no straws; as tolerated  - oral hygiene 2-3x/day     DC recommendation: TBD  Treatment: 15  D/W nursing[place]  Needs met prior to leaving room, call button in reach. [facility] Jaya Nance [phone]  Pg.  # [phone]    If patient is discharged prior to next treatment, this note will serve as the discharge summary

## 2021-11-15 NOTE — PROGRESS NOTES
Occupational Therapy  Daily Treatment Note  Patient Name: Mila Reardon  MRN: [id number]    Chart Reviewed: Yes       Other position/activity restrictions: Up with assist     Discharge Recommendations: Mila Reardon scored a 9/24 on the AM-PAC ADL Inpatient form. Current research shows that an AM-PAC score of 17 or less is typically not associated with a discharge to the patient's home setting. Based on the patient's AM-PAC score and their current ADL deficits, it is recommended that the patient have 3-5 sessions per week of Occupational Therapy at d/c to increase the patient's independence. Please see assessment section for further patient specific details. If patient discharges prior to next session this note will serve as a discharge summary. Please see below for the latest assessment towards goals. If home: HOME HEALTH CARE: LEVEL 4 SICK     - Initial home health evaluation to occur within 24-48 hours, in patient home   - Therapy evaluations in home within 24-48 hours of discharge; including DME and home safety   - Frontload therapy 5 days, then 3x a week   - Therapy to evaluate if patient has [address] needs for personal care   -  evaluation within 24-48 hours, includes evaluation of resources and insurance to determine [place], [place], LTC, and Medicaid options    Additional Pertinent Hx: Pt to ED 11/10 with increased fatigue and AMS. Head CT (-). Chest CT: Worsening of multifocal consolidation compatible with progressive and/or recurrent PNA. PMH:  asthma, HTN, DM, OA, BPH, diverticulosis, back pain, PNA, spinal stenosis      Diagnosis: Acute Encephalopathy  Treatment Diagnosis: impaired ADLs/transfers and decreased functional activity tolerance    Subjective: Pt sleeping upon arrival, seated in bedside chair. Son in law at bedside assisting with encouraging pt to participate in transfers/ mobility.     Pain: pt denied pain      Objective:    Cognition/Orientation:  Pt lethargic throughout session. Pt demonstrated min A to initiate transfers, max A to initiate functional tasks and grooming. Functional Mobility   Sit to Stand: Min A standing to lolly stedy  Stand to Sit: mod A for eccentric control back to chair  Standing balance: standing in lolly stedy with initial min, progressing to max A at times (possibly due to fatigue and wanting to sit back in chair). Standing x 4 trials    ADLs   Grooming: mod A to wash face, assist to initiate washing face and for completeness of task. Son assisting with encouraging pt      Activity Tolerance:  Standing at lolly stedy x 10-30 sec. Pt demonstrated mod to max fatigue. Patient Education:   Educated pt PARRIS on use of lolly stedy, PARRIS verb understanding. Safety Devices in Place:  CA activated, call light in reach and all needs met. Assessment:   Pt demonstrated increased sit to stand transfers and increased participation in standing balance tasks. Pt with limited standing tolerance, able to stand x 10-30 sec requiring max encouragement to increase standing tolerance. Pt tolerated seated grooming task with mod to max A. Pt would benefit from continued OT while in acute care. AMPAC score indicates non homebound discharge. Family planning for pt to discharge home however, if home pt would require 24 hr assistance and lift equipment. Continue OT POC. Equipment Needs: Ed pt son in law on lift equipment    Timed Code Treatment Minutes:     Individual Concurrent Group Co-treatment   Time In 1502         Time Out 1540         Minutes 38             Total Treatment Time: 38    Goals:  Short term goals  Time Frame for Short term goals: Discharge- all goals ongoing  Short term goal 1: supine to sit w/ Mod A  Short term goal 2: unsupported sitting x 5 minutes EOB, CGA  Short term goal 3: participate in simple grooming task w/ setup, Mod A  Short term goal 4: sit-stand w/ Min A of 2 - 11/12 met in [facility] [place], continue for progress to SPT Plan:      Times per week: 2-5   Times per day: Daily    If patient is discharged prior to next treatment, this note will serve as the discharge summary. Norman Lowe.  [address] [facility], OTR/L [id number]

## 2021-11-15 NOTE — CONSULTS
Clinical Pharmacy Progress Note    Vancomycin has been discontinued. Will sign off pharmacy to dose Vancomycin consult. If medication is restarted and pharmacy is to manage dosing, please re-consult at that time.     Please call with questions--  Thanks--  Rod Orozco, PharmD, [address]  [id number] ([facility])   11/15/2021 9:16 AM

## 2021-11-15 NOTE — PLAN OF CARE
Problem: Nutrition  Goal: Optimal nutrition therapy  Outcome: Ongoing     Nutrition Problem #1: Inadequate oral intake  Intervention: Food and/or Nutrient Delivery: Continue Current Diet, Start Oral Nutrition Supplement  Nutritional Goals: Pt will consistently consume >50% of meals and supplements throughout adm

## 2021-11-16 NOTE — PROGRESS NOTES
LEIF called me to tell me that family of patient asked to have oxygen increased. Upon assessment oxygen is 95% and pulse is 88. Patient resting peacefully. No increase of oxygen needed at this time.

## 2021-11-16 NOTE — CARE COORDINATION
Case Management Assessment           Daily Note                 Date/ Time of Note: 11/16/2021 10:44 AM         Note completed by: Ko Aguilar RN    Patient Name: Yuriy Barron  YOB: 1935    Diagnosis:Acute encephalopathy [G93.40]  Altered mental status, unspecified altered mental status type [R41.82]  Aspiration pneumonia of both lungs, unspecified aspiration pneumonia type, unspecified part of lung ([address] [J69.0]  Aspiration pneumonia, unspecified aspiration pneumonia type, unspecified laterality, unspecified part of lung ([address] [G43.3]  Acute metabolic encephalopathy [W63.77]  Patient Admission Status: Inpatient    Date of Admission:11/10/2021  7:26 PM Length of Stay: 6 GLOS: GMLOS: 5.4    Current Plan of Care:  CC: Lethargy  2/2 PNA  Suspected Recurrent Aspiration PNADysphasia:  SLP consulted:    Recommended Diet and Intervention  Diet Solids Recommendation: Dysphagia Pureed (Dysphagia I)  Liquid Consistency Recommendation: Mildly Thick (Nectar)  Recommended Form of Meds: Crushed in puree as able  Recommendations: Total feed    Discussed  PEG  /  Family declines:   Pulm following :  D/w dgtr  Hospice / PC  But not wanting this option at this time . O 2  %L HF turned down to 4 L  HF this am ( 3 l O 2 is  Home baseline    ________________________________________________________________________________________  PT AM-PAC: 8 / 24 per last evaluation on: 11/15/2021    OT AM-PAC: 9 / 24 per last evaluation on: 11/15/2021    DME Needs for discharge: cirilo lift  If goes home   ________________________________________________________________________________________  Discharge Plan: Home with [address]: VS  SNF  @ [facility]:     Tentative discharge date:      Current barriers to discharge: medical Pulm clearance  Weaning O 2  Down to 3 L HF  From 4 L HF  .         Referrals completed: Not Applicable    Resources/ information provided: Not indicated at this time  ________________________________________________________________________________________  Case Management Notes:    CM cont to follow for  D/c planning : PT OT worked w/ patient  yest  / updated :     - Patient's dgtr Anca Diez  will decide if patient will go to a SNF. He usually goes to [facility] if needs a SNF.   - Daughter states that she thinks if he is a 2 person assist at the time just before discharge, he may have to go to [facility]. -  If not, he has 24/7 caregivers and skilled home care. PT said that needs a cirilo lift if goes home. -   CM ordered patient seth, ( dgtr  Looking to getting Gabriella Bone  steady  Like equip)  for home per Dr. Capri Duvall. Roberto and his family were provided with choice of provider; he and his family are in agreement with the discharge plan. Care Transition Patient:  Yes    Rachelle Starkey RN  [facility]  Case Management Department  Ph: [phone]

## 2021-11-16 NOTE — PROGRESS NOTES
Speech Language Pathology  Treatment attempt    Chart reviewed. Attempted to see pt this date to address dysphagia. Pt sleeping soundly. Family member present- reported pt doing ok with eating. Will attempt again tomorrow.     Shruthi Tinajero, [address]  Speech-Language Pathologist  Pager [phone]

## 2021-11-16 NOTE — PLAN OF CARE
D; Poor po intake last pm. No po intake overnight d/t being lethargic. Aroused easily for turn & repositioning, but falls back to sleep. Pulling at IV & pulling o2 off at times. Tachypneic, but sat 92-94% on 3lpnc. Occasional congested NPC.  Did not take pudding supplement last pm.   A: Cont to monitor during hourly rounds    Problem: Fluid Volume - Deficit:  Goal: Achieves intake and output within specified parameters  Description: Achieves intake and output within specified parameters  Outcome: Ongoing     Problem: Gas Exchange - Impaired:  Goal: Levels of oxygenation will improve  Description: Levels of oxygenation will improve  Outcome: Ongoing     Problem: Nutrition  Goal: Optimal nutrition therapy  11/15/2021 1500 by Sukhdev Arevalo MS, RD, LD  Outcome: Ongoing     Problem: Nutrition  Goal: Optimal nutrition therapy  11/16/2021 0443 by Elizabeth Way RN  Outcome: Ongoing  11/15/2021 1500 by Sukhdev Arevalo MS, RD, LD  Outcome: Ongoing

## 2021-11-16 NOTE — PROGRESS NOTES
Internal Medicine Progress Note    Admit Date: 11/10/2021  Day:   Diet: ADULT DIET; Dysphagia - Pureed; Mildly Thick (Nectar); Liquids by spoon only  ADULT ORAL NUTRITION SUPPLEMENT; Breakfast, Lunch; Fortified Pudding Oral Supplement    CC:  AMS    Interval history:  -NAEO  -Remains on merrem  -Afeb, HDS, down to 3L NC.  -Digoxin level yesterday of 1.6, given another dose  -Mildly tachycardic this AM to low 100's.  -Patient responsive to stimuli this AM, ordered senna for constipation per family's request.     Medications:     Scheduled Meds:   digoxin  125 mcg Oral Daily    aspirin  81 mg Oral Daily    midodrine  5 mg Oral TID WC    meropenem  1,000 mg IntraVENous Q12H    ipratropium-albuterol  1 ampule Inhalation BID    lidocaine  1 patch TransDERmal Daily    sodium chloride flush  5-40 mL IntraVENous 2 times per day    heparin (porcine)  5,000 Units SubCUTAneous 3 times per day    insulin glargine  5 Units SubCUTAneous Nightly    insulin lispro  0-6 Units SubCUTAneous TID WC    insulin lispro  0-3 Units SubCUTAneous Nightly    Venelex   Topical BID    sodium chloride (Inhalant)  15 mL Nebulization BID    finasteride  5 mg Oral Daily    tamsulosin  0.4 mg Oral Daily     Continuous Infusions:   sodium chloride 50 mL/hr at 11/15/21 1518    sodium chloride 25 mL (11/12/21 1443)    dextrose       PRN Meds:senna, albuterol, sodium chloride flush, sodium chloride, [DISCONTINUED] acetaminophen **OR** acetaminophen, glucose, dextrose, glucagon (rDNA), dextrose, medicated lip ointment, acetaminophen, docusate    Objective:   Vitals:   T-max:  Patient Vitals for the past 8 hrs:   BP Temp Temp src Pulse Resp SpO2 Weight   11/16/21 1134 107/65 97.4 °F (36.3 °C) Axillary 102 22 96 % --   11/16/21 0737 107/70 97.5 °F (36.4 °C) Axillary 102 24 94 % --   11/16/21 0617 -- -- -- -- -- -- 157 lb 6.5 oz (71.4 kg)       Intake/Output Summary (Last 24 hours) at 11/16/2021 1146  Last data filed at 11/16/2021 1003  Gross per 24 hour   Intake 4751 ml   Output 1375 ml   Net 3376 ml       Physical Exam  Constitutional:       General: He is not in acute distress. HENT:      Head: Normocephalic and atraumatic. Eyes:      General: No scleral icterus. Right eye: No discharge. Left eye: No discharge. Extraocular Movements: Extraocular movements intact. Conjunctiva/sclera: Conjunctivae normal.      Pupils: Pupils are equal, round, and reactive to light. Cardiovascular:      Rate and Rhythm: Normal rate and regular rhythm. Pulses: Normal pulses. Heart sounds: Normal heart sounds. No murmur heard. No friction rub. No gallop. Pulmonary:      Effort: Pulmonary effort is normal. No respiratory distress. Breath sounds: Rales (over bases bilaterally) present. No wheezing. Abdominal:      General: Abdomen is flat. Bowel sounds are normal. There is no distension. Palpations: Abdomen is soft. Musculoskeletal:      Cervical back: Normal range of motion and neck supple. No rigidity. Right lower leg: Edema present. Left lower leg: Edema present. Skin:     General: Skin is warm and dry. Coloration: Skin is not jaundiced. Neurological:      Mental Status: He is alert. Comments: Oriented to self          LABS:    CBC:   Recent Labs     11/14/21  0706 11/15/21  0712 11/16/21  0702   WBC 4.3 3.2* 3.1*   HGB 10.6* 10.3* 9.8*   HCT 31.7* 30.9* 29.5*   * 121* 131*   MCV 90.7 92.5 92.0     Renal:    Recent Labs     11/14/21  0706 11/15/21  0712 11/16/21  0702    142 143   K 4.1 4.2 4.1    110 111*   CO2 21 24 25   BUN 25* 25* 27*   CREATININE 1.4* 1.4* 1.4*   GLUCOSE 200* 208* 178*   CALCIUM 9.9 10.0 10.0   ANIONGAP 11 8 7     Hepatic:   No results for input(s): AST, ALT, BILITOT, BILIDIR, PROT, LABALBU, ALKPHOS in the last 72 hours.   Troponin:   No results for input(s): TROPONINI in the last 72 electrolyte replacement  -Will continue to monitor w/ continuous telemetry   -Digoxin level ordered, daily maintence dose of 0.125 mg given CrCl and ideal body weight  -[id number] Score for Atrial Fibrillation Stroke Risk =4.   Has bled score is 1  -Dr Erasto King discussed with family about starting Anticoagulation as pt was having a lot of falls so we have deferred starting and will just continue ASA     Chronic Issues:  Normocytic Anemia (Baseline Hgb ~11, 10.6 on admission- Daily CBC  T2DM- Glargine 5 U BID, LDSSI, POCTs x4 AC & HS  HTN- Holding home meds as normotensive  BPH- Home Proscar and Flomax     Code Status: Limited x1, No to compressions  FEN: Dysphagia - Pureed w/ Nectar thick liquids  PPX: SQH  DISPO: FABBY Ha MD, PGY-1  11/16/21  11:46 AM    This patient has been staffed and discussed with Fady Waters MD.

## 2021-11-17 NOTE — PLAN OF CARE
Problem: Skin Integrity:  Goal: Will show no infection signs and symptoms  Description: Will show no infection signs and symptoms  Outcome: Ongoing  Note: WBC not elevated. Afebrile. Pt receiving IV abx for pneumonia. IV fluids infusing @ 50 mL/hr. No new signs of skin breakdown noted. Will continue to monitor. Problem: Gas Exchange - Impaired:  Goal: Levels of oxygenation will improve  Description: Levels of oxygenation will improve  Outcome: Ongoing  Note: Pt remains on 3 L O2 with sats 92-95%. Crackles noted to bases bilaterally. Respirations easy at rest. Will continue to monitor. Problem: Skin Integrity:  Goal: Absence of new skin breakdown  Description: Absence of new skin breakdown  Outcome: Ongoing  Note: Pt has stage 2 to coccyx - cleansed with saline and venelex applied. Pt also has some redness to grecia area and scattered bruising. Turned and repositioned q2 and as needed with pillow support. Heels elevated off bed. Flores remains in place, flores care provided. No new signs of skin breakdown noted. Will continue to monitor. Problem: Falls - Risk of:  Goal: Will remain free from falls  Description: Will remain free from falls  Outcome: Ongoing  Note: Fall precautions in place. Bed locked in lowest position with side rails up x3. Bed exit alarm in use. Nonskid footwear in use. Call light and personal belongings within reach. Hourly rounding in place.

## 2021-11-17 NOTE — PLAN OF CARE
Problem: Falls - Risk of:  Goal: Will remain free from falls  Description: Will remain free from falls  11/17/2021 1813 by Corwin Cordon RN  Outcome: Ongoing  Note: Discussed with pt importance to keep bed low and locked with alarm activated, nonskid socks on when out of bed, call light and belongings within reach- will monitor. Problem: Falls - Risk of:  Goal: Absence of physical injury  Description: Absence of physical injury  Outcome: Ongoing  Note: No new injury noted. Problem: Pain:  Goal: Pain level will decrease  Description: Pain level will decrease  Outcome: Ongoing  Note: Pt denies pain at this time. Will monitor.

## 2021-11-17 NOTE — CONSULTS
[facility]  Palliative Medicine Consultation Note      Date Of Admission:11/10/2021  Date of consult: 11/17/21  Seen by [facility] in the past:  No    Recommendations:        Met with the pt and his son at the bedside, introduced palliative care. Pt is altered and lethargic. Discussed his son's understanding of his condition. Explained concerns for aspiration, recurrent PNA, general weakness. Pt reports that his sister, Scott Patrick helps primarily with medical decision making as she was a pediatrician in [facility] and works in PT here. He reports that he and Scott Patrick are the pt's only children. He requests a family meeting tomorrow at 10am to discuss pt's plan of care. Will plan to meet in [place] room. D/w Dr. Geovany Lorenzo. 1. Goals of Care/Advanced Care planning/Code status: Limited - no to compressions, yes to intubation, defibrillation and resuscitative medications. Plan to discuss goals of care further during family meeting. 2. Pain: Pt unable to state, does not appear to be in acute distress. Per son, has not been complaining of pain. 3. SOB 2/2 recurrent PNA, likely aspiration: Pt on 3LNC, no increased WOB today. Per son has not been complaining of SOB today - has been too lethargic. MBS performed 10/13/21 on previous admission showed silent aspiration of thin liquids with recommendation of nectar thick liquids and puree's via spoon. Pt re-admitted one week after SNF stay with concern for aspiration PNA. 4. Malnutrition: weight loss reported by daughter per chart review, unsure how much. Albumin is 2.2. BMI 27. Pt has pudding nutritional supplements ordered. Will discuss at family meeting tomorrow.   5. Disposition: TBD pending family decision    Reason for Consult:         [x]  Goals of Care  [x]  Code Status Discussion/Advanced Care Planning   []  Psychosocial/Family Support  []  Symptom Management  []  Other (Specify)    Requesting Physician: Dr. Linda Landin:  lethargy    History Obtained From:  patient, electronic medical record    History of Present Illness:         Kym Gomez is a 81 y.o. male with PMH of asthma, DMII, HTN, HLD, JOANNA, recurrent PNA who presented with lethargy and altered mental status. He was discharged from SNF one week prior to presentation, his daughter reported that he became progressively more lethargic and less interactive than usual. He was found to have recurrent PNA with concern for recurrent aspiration.      Subjective:         Past Medical History:        Diagnosis Date    Allergic rhinitis 08/27/2010    Asthma     Bilateral cataracts     BPH (benign prostatic hypertrophy)     Diabetes mellitus, type 2 ([facility] 05/28/2010    Diverticulosis     Eczema 03/11/2011    Hyperlipidemia     Hypertension     Lumbar disc disease     Microalbuminuria 06/13/2013    Obstructive sleep apnea     Osteoarthritis     Pneumonia 10/12/2021    Post herpetic neuralgia 06/15/2012    Recurrent upper respiratory infection (URI)     Right shoulder pain 06/17/2011    Spinal stenosis     Transient global amnesia     Vitamin D deficiency 03/17/2012       Past Surgical History:        Procedure Laterality Date    PROSTATE SURGERY  June 13, 2000    TURP       Current Medications:    Medications Prior to Admission: carvedilol (COREG) 6.25 MG tablet, TAKE 1 TABLET BY MOUTH TWICE DAILY  blood glucose test strips (ONETOUCH ULTRA) strip, TEST ONCE DAILY  aspirin 81 MG chewable tablet, Take 81 mg by mouth daily  sennosides-docusate sodium (SENOKOT-S) 8.6-50 MG tablet, Take 1 tablet by mouth as needed for Constipation  polyethylene glycol (GLYCOLAX) 17 g packet, Take 17 g by mouth daily as needed for Constipation  folic acid (FOLVITE) 1 MG tablet, Take 1 mg by mouth daily Dose unknown at this time  Insulin Pen Needle (PEN NEEDLES) 31G X 6 MM MISC, 1 each by Does not apply route daily  insulin glargine (BASAGLAR KWIKPEN) 100 UNIT/ML injection pen, Inject 10 Units into the skin Can't do any work; Considerable assist; intake normal  Or reduced; LOC full/confusion  [] 40% Mainly in bed; Extensive disease; Mainly assist; intake normal or reduced; occasional assist; LOC full/confusion  [] 30% Bed Bound; Extensive disease; Total care; intake reduced; LOC full/confusion  [] 20% Bed Bound; Extensive disease; Total care; intake minimal; Drowsy/coma  [] 10% Bed Bound; Extensive disease; Total care; Mouth care only; Drowsy/coma  [] 0% Death    PPS: 50    Vitals:    /65   Pulse 89   Temp 97.3 °F (36.3 °C) (Axillary)   Resp 18   Ht 5' 3\" (1.6 m)   Wt 157 lb 6.5 oz (71.4 kg)   SpO2 92%   BMI 27.88 kg/m²     Labs:    BMP:   Recent Labs     11/15/21  0712 11/16/21  0702 11/17/21  0615    143 144   K 4.2 4.1 3.9    111* 112*   CO2 24 25 25   BUN 25* 27* 26*   CREATININE 1.4* 1.4* 1.3   GLUCOSE 208* 178* 162*     CBC:   Recent Labs     11/15/21  0712 11/16/21  0702 11/17/21  0615   WBC 3.2* 3.1* 3.2*   HGB 10.3* 9.8* 9.8*   HCT 30.9* 29.5* 29.3*   * 131* 127*       LFT's: No results for input(s): AST, ALT, ALB, BILITOT, ALKPHOS in the last 72 hours. Troponin: No results for input(s): TROPONINI in the last 72 hours. BNP: No results for input(s): BNP in the last 72 hours. ABGs: No results for input(s): PHART, VRM6BRR, PO2ART in the last 72 hours. INR: No results for input(s): INR in the last 72 hours. U/A:No results for input(s): NITRITE, COLORU, PHUR, LABCAST, WBCUA, RBCUA, MUCUS, TRICHOMONAS, YEAST, BACTERIA, CLARITYU, SPECGRAV, LEUKOCYTESUR, UROBILINOGEN, BILIRUBINUR, BLOODU, GLUCOSEU, AMORPHOUS in the last 72 hours. Invalid input(s): KETONESU    XR CHEST PORTABLE   Final Result   1. Diffuse multifocal consolidation, pneumonia pattern is favored over congestive failure in the absence of effusions and peripheral septal thickening.    2. Based on patient's azotemia, follow-up studies are suggested but may have to wait for normalization of renal function to evaluate the presence of adenopathy suspected on computed tomography      CT CHEST WO CONTRAST   Final Result      1. Worsening of multifocal consolidation compatible with progressive and/or recurrent pneumonia. 2.  New severe narrowing and/or mucous plugging occluding the right upper lobe bronchus and partial lobar collapse in addition to consolidation. CT HEAD WO CONTRAST   Final Result      1. No acute intracranial hemorrhage or mass effect. 2.  Mild white matter chronic small vessel ischemia. 3.  Moderate atrophy. XR CHEST PORTABLE   Final Result      Persistent moderate multifocal bilateral airspace disease, slightly worsened from prior study. Conclusion/Time spent:         Recommendations see above    Time spent with patient and/or family: 20  Time reviewing records: 10 min   Time communicating with staff: 5 min     A total of 35 minutes spent with the patient and family on unit greater than 50% in counseling regarding palliative care and in goals of care for the patient. Thank you to Dr. Niyah Scott for this consultation. We will continue to follow Mr. Collado's care as needed.     [address] [facility]  Inpatient Palliative Care  [phone]

## 2021-11-17 NOTE — PROGRESS NOTES
Occupational Therapy  Facility/Department: [address]  Daily Treatment Note  NAME: Domi Lopez  : 1935  MRN: [id number]    Date of Service: 2021    Discharge Recommendations:  Domi Lopez scored a /24 on the AM-PAC ADL Inpatient form. Current research shows that an AM-PAC score of 17 or less is typically not associated with a discharge to the patient's home setting. Based on the patient's AM-PAC score and their current ADL deficits, it is recommended that the patient have 5-7 sessions per week of Occupational Therapy at d/c to increase the patient's independence. At this time, this patient demonstrates the endurance, and/or tolerance for 3 hours of therapy each day, with a treatment frequency of 5-7x/wk. Please see assessment section for further patient specific details. If patient discharges prior to next session this note will serve as a discharge summary. Please see below for the latest assessment towards goals. OT Equipment Recommendations  Other: Family interested in [place] for home. Pt would benefit to ease caregiver burden. He demonstrates ability to use with min A x1 vs needing two people for pivot transfers. Assessment   Performance deficits / Impairments: Decreased functional mobility ; Decreased ADL status; Decreased safe awareness; Decreased endurance; Decreased strength; Decreased balance; Decreased cognition; Decreased posture  Assessment: Pt has increased lethargy on this date. He is dep of 2 with supine to sit and needs max A for static sitting balance for majority of time. Pt tolerated use of lolly stedy-dep of 2 to transfer to chair. If pt returns home, he needs 24 hr A, home OT, lolly stedy vs cirilo lift.   Treatment Diagnosis: impaired ADLs/transfers and decreased functional activity tolerance  Prognosis: Good  OT Education: OT Role; Plan of Care  Patient Education: pt very lethargic and having difficulty keeping eyes open  REQUIRES OT FOLLOW UP: Yes  Safety Devices  Safety Devices in place: Yes  Type of devices: Nurse notified; Left in chair; Chair alarm in place; Call light within reach; Gait belt         Patient Diagnosis(es): The primary encounter diagnosis was Altered mental status, unspecified altered mental status type. A diagnosis of Aspiration pneumonia of both lungs, unspecified aspiration pneumonia type, unspecified part of lung ([address] was also pertinent to this visit. has a past medical history of Allergic rhinitis, Asthma, Bilateral cataracts, BPH (benign prostatic hypertrophy), Diabetes mellitus, type 2 ([address] Diverticulosis, Eczema, Hyperlipidemia, Hypertension, Lumbar disc disease, Microalbuminuria, Obstructive sleep apnea, Osteoarthritis, Pneumonia, Post herpetic neuralgia, Recurrent upper respiratory infection (URI), Right shoulder pain, Spinal stenosis, Transient global amnesia, and Vitamin D deficiency. has a past surgical history that includes Prostate surgery (June 13, 2000). Restrictions  Position Activity Restriction  Other position/activity restrictions: Up with assist  Subjective   General  Chart Reviewed: Yes  Patient assessed for rehabilitation services?: Yes  Additional Pertinent Hx: 81 y.o. M presenting w/ AMS and aspiration pneumonia. Hospital Course: CXR: Persistent moderate multifocal bilateral airspace disease; CT head: (-); CT Chest: compatible with progressive and/or recurrent pneumonia, New severe narrowing and/or mucous plugging occluding the right upper lobe bronchus and partial lobar collapse. PMH:  Family / Caregiver Present: Yes (son)  Referring Practitioner: Jhonny Andersen MD  Diagnosis: Acute Encephalopathy  Subjective  Subjective: Pt in bed, cues to open eyes, agreeable to work with OT.   Vital Signs  Patient Currently in Pain: Denies   Orientation  Orientation  Overall Orientation Status:  (no formally assessed)  Objective             Balance-for ~ 5min on edge of bed  Sitting Balance: Maximum assistance (posterior lean, progressing to [place])  Bed mobility  Supine to Sit: 2 Person assistance (dep of 2)     Transfers  Sit to stand: 2 Person assistance (mod A of 2)  Stand to sit: 2 Person assistance (mod A of 2 for controlled descent)   Pt transferred bed to simulated 3 in1 commode with lolly lepe, dep of 2                    Cognition  Overall Cognitive Status: Exceptions  Arousal/Alertness: Delayed responses to stimuli  Following Commands: Follows one step commands with repetition;  Follows one step commands consistently  Attention Span: Difficulty attending to directions  Safety Judgement: Decreased awareness of need for assistance; Decreased awareness of need for safety  Problem Solving: Assistance required to generate solutions; Assistance required to implement solutions; Assistance required to identify errors made; Assistance required to correct errors made; Decreased awareness of errors  Insights: Decreased awareness of deficits  Initiation: Requires cues for some  Sequencing: Requires cues for some  Cognition Comment: Semaj speaking - family at bedside providing cues and encouragement                    Type of ROM/Therapeutic Exercise  [facility] ~5 reps shoulder flex, alternating arms                    Plan   Plan  Times per week: 2-5  Times per day: Daily  Current Treatment Recommendations: Strengthening, Balance Training, Functional Mobility Training, Endurance Training, Safety Education & Training, Self-Care / ADL, Equipment Evaluation, Education, & procurement  G-Code     OutComes Score                                                  AM-PAC Score             Goals  Short term goals  Time Frame for Short term goals: Discharge- all goals ongoing  Short term goal 1: supine to sit w/ Mod A-11/17 goal not met  Short term goal 2: unsupported sitting x 5 minutes EOB, CGA-11/17 goal not met  Short term goal 3: participate in simple grooming task w/ setup, Mod A-11/17 goal

## 2021-11-17 NOTE — PROGRESS NOTES
Physical Therapy    Daily Treatment Note      Discharge Recommendations:  Tee Welch scored a 6/24 on the AM-PAC short mobility form. Current research shows that an AM-PAC score of 17 or less is typically not associated with a discharge to the patient's home setting. Based on the patient's AM-PAC score and their current functional mobility deficits, it is recommended that the patient have 3-5 sessions per week of Physical Therapy at d/c to increase the patient's independence. Please see assessment section for further patient specific details. Assessment:  Increased lethargy today. Pt needing increased assist for bed mobility and sitting balance. Decreased participation overall. Continues to need LORRIE stedy to ensure safety with transfers. If pt returns home, pt needs 24hr assist, home PT and either a jaime lift or LORRIE stedy for transfers. Equipment Needs:  Jaime vs [address] stedy (if home)    Chart Reviewed: Yes     Other position/activity restrictions: Up with assist   Additional Pertinent Hx: Pt to ED 11/10 with increased fatigue and AMS. Head CT (-). Chest CT: Worsening of multifocal consolidation compatible with progressive and/or recurrent PNA. PMH:  asthma, HTN, DM, OA, BPH, diverticulosis, back pain, PNA, spinal stenosis      Diagnosis: Acute Encephalopathy   Treatment Diagnosis: Decreased transfers associated with acute encephalopathy. Subjective:  Pt found supine in bed, son at bedside to interpret. Pt very lethargic with eyes closed most of the time. \"Tired. \"    Pain:  Denies      Objective:    Bed mobility  Supine to sit:  Dependent Assist (x2 person assist)    Transfers  Sit to stand: Mod A x2 (from EOB to LORRIE stedy)  Stand to sit:  Mod A x2 (to lower self to chair)  Bed <> chair:  Dependent Assist (via LORRIE stedy)    Ambulation  No      Neuro/balance  Sitting balance:  Pt sat EOB x5 min with Max A.   Pt with head in flexed position and rounded posture, eyes closed. Patient Education  Role of PT. Pt would benefit from ongoing education. Safety Devices  Pt left with needs in reach, seated in chair with LEs elevated, alarm in place and RN aware. Son present. AM-PAC score  AM-PAC Inpatient Mobility Raw Score : 6  AM-PAC Inpatient T-Scale Score : 23.55  Mobility Inpatient CMS 0-100% Score: 100  Mobility Inpatient CMS G-Code Modifier : CN    Goals:  Goals not met this treatment, continue with current goals. Time Frame for Short term goals: Discharge  Short term goal 1: supine <> sit Min A   Short term goal 2: sit <> stand Min A (met for lolly lepe). new goal: sit<->stand CGA to lolly lepe. Short term goal 3: bed <> chair Mod A    Plan:  Times per week: 2-5;    Current Treatment Recommendations: Transfer Training, Functional Mobility Training, Strengthening, Endurance Training, Balance Training    Therapy Time    Individual  Concurrent  Group  Co-treatment    Time In  1101            Time Out  1125            Minutes  24              Timed Code Treatment Minutes:  24  Total Treatment Minutes:  24      If patient is discharged prior to next treatment, this note will serve as the discharge summary.     Magda Alaniz, PT

## 2021-11-17 NOTE — PROGRESS NOTES
Internal Medicine Progress Note    Admit Date: 11/10/2021  Day:   Diet: ADULT DIET; Dysphagia - Pureed; Mildly Thick (Nectar); Liquids by spoon only  ADULT ORAL NUTRITION SUPPLEMENT; Breakfast, Lunch; Fortified Pudding Oral Supplement    CC: AMS    Interval history:  -NAEO  -Remains on merrem  -Afeb, HDS, remains on 3L NC.  -Patient not in RVR on current digoxin regimen  -Patient responsive to stimuli this AM, discussed pt's current status with family at bedside that this may be his new functional status unfortunately due to his ongoing infections. Offered a palliative consult to which the family was agreeable.      Medications:     Scheduled Meds:   digoxin  125 mcg Oral Daily    aspirin  81 mg Oral Daily    midodrine  5 mg Oral TID WC    meropenem  1,000 mg IntraVENous Q12H    ipratropium-albuterol  1 ampule Inhalation BID    lidocaine  1 patch TransDERmal Daily    sodium chloride flush  5-40 mL IntraVENous 2 times per day    heparin (porcine)  5,000 Units SubCUTAneous 3 times per day    insulin glargine  5 Units SubCUTAneous Nightly    insulin lispro  0-6 Units SubCUTAneous TID WC    insulin lispro  0-3 Units SubCUTAneous Nightly    Venelex   Topical BID    sodium chloride (Inhalant)  15 mL Nebulization BID    finasteride  5 mg Oral Daily    tamsulosin  0.4 mg Oral Daily     Continuous Infusions:   sodium chloride 75 mL/hr at 11/17/21 1026    sodium chloride 25 mL (11/12/21 1443)    dextrose       PRN Meds:senna, albuterol, sodium chloride flush, sodium chloride, [DISCONTINUED] acetaminophen **OR** acetaminophen, glucose, dextrose, glucagon (rDNA), dextrose, medicated lip ointment, acetaminophen, docusate    Objective:   Vitals:   T-max:  Patient Vitals for the past 8 hrs:   BP Temp Temp src Pulse Resp SpO2   11/17/21 0844 -- -- -- -- -- 92 %   11/17/21 0842 -- -- -- -- -- (!) 79 %   11/17/21 0811 111/65 97.3 °F (36.3 °C) Axillary 89 18 95 %   11/17/21 0330 107/66 97.7 °F (36.5 °C) Axillary 110 18 92 %       Intake/Output Summary (Last 24 hours) at 11/17/2021 1051  Last data filed at 11/17/2021 0537  Gross per 24 hour   Intake 0 ml   Output 1000 ml   Net -1000 ml       Physical Exam  Constitutional:       General: He is not in acute distress. HENT:      Head: Normocephalic and atraumatic. Eyes:      General: No scleral icterus. Right eye: No discharge. Left eye: No discharge. Extraocular Movements: Extraocular movements intact. Conjunctiva/sclera: Conjunctivae normal.      Pupils: Pupils are equal, round, and reactive to light. Cardiovascular:      Rate and Rhythm: Normal rate and regular rhythm. Pulses: Normal pulses. Heart sounds: Normal heart sounds. No murmur heard. No friction rub. No gallop. Pulmonary:      Effort: Pulmonary effort is normal. No respiratory distress. Breath sounds: Rales (over bases bilaterally) present. No wheezing. Abdominal:      General: Abdomen is flat. Bowel sounds are normal. There is no distension. Palpations: Abdomen is soft. Musculoskeletal:      Cervical back: Normal range of motion and neck supple. No rigidity. Right lower leg: No edema. Left lower leg: No edema. Skin:     General: Skin is warm and dry. Coloration: Skin is not jaundiced. Neurological:      Mental Status: He is alert. Comments: Oriented to self          LABS:    CBC:   Recent Labs     11/15/21  0712 11/16/21  0702 11/17/21  0615   WBC 3.2* 3.1* 3.2*   HGB 10.3* 9.8* 9.8*   HCT 30.9* 29.5* 29.3*   * 131* 127*   MCV 92.5 92.0 91.6     Renal:    Recent Labs     11/15/21  0712 11/16/21  0702 11/17/21  0615    143 144   K 4.2 4.1 3.9    111* 112*   CO2 24 25 25   BUN 25* 27* 26*   CREATININE 1.4* 1.4* 1.3   GLUCOSE 208* 178* 162*   CALCIUM 10.0 10.0 9.9   ANIONGAP 8 7 7     Hepatic:   No results for input(s): AST, ALT, BILITOT, BILIDIR, PROT, LABALBU, ALKPHOS in the last 72 hours.   Troponin:   No results for input(s): TROPONINI in the last 72 hours. -----------------------------------------------------------------  RAD:   XR CHEST PORTABLE   Final Result   1. Diffuse multifocal consolidation, pneumonia pattern is favored over congestive failure in the absence of effusions and peripheral septal thickening. 2. Based on patient's azotemia, follow-up studies are suggested but may have to wait for normalization of renal function to evaluate the presence of adenopathy suspected on computed tomography      CT CHEST WO CONTRAST   Final Result      1. Worsening of multifocal consolidation compatible with progressive and/or recurrent pneumonia. 2.  New severe narrowing and/or mucous plugging occluding the right upper lobe bronchus and partial lobar collapse in addition to consolidation. CT HEAD WO CONTRAST   Final Result      1. No acute intracranial hemorrhage or mass effect. 2.  Mild white matter chronic small vessel ischemia. 3.  Moderate atrophy. XR CHEST PORTABLE   Final Result      Persistent moderate multifocal bilateral airspace disease, slightly worsened from prior study. Assessment/Plan:   Lethargy 2/2 PNA  Suspected Recurrent Aspiration PNA  CT consistent with recurrent pneumonia, mucous plugging in the right upper lobe/multifocal.  -Merrem day 6, 7 days of abx  -Supplemental O2 PRN, wean as tolerated   -3% saline nebulizer treatments, Chest Vest per pulm  -SLP evaluation ordered; diet recommendations made.  -Pulmonology c/s'd, no indication for bronchoscopy at this time, signed off.  -Discussion had with family about PEG tube and they do not want it, Limited x1 (No Compressions)  -Palliative consult offered today and accepted. Planning on family meeting tomorrow.      HA on CKD 3b  Patient still making urine, cr to 1.6. patient with rhonchi on exam and 2+ pittinng edema; bnp elevated.  S/p bolus in ED  -IVF @ 75 ml/hr  -Cr slowly improving, near baseline     Afib, paroxsymal  Run of afib noted overnight, resolved s/p fluid bolus and electrolyte replacement  -Will continue to monitor w/ continuous telemetry   -Digoxin maintence dose of 0.125 mg QD  -[id number] Score for Atrial Fibrillation Stroke Risk =4.   Has bled score is 1  -Dr Goran Rosenberg discussed with family about starting Anticoagulation as pt was having a lot of falls so we have deferred starting and will just continue ASA     Chronic Issues:  Normocytic Anemia (Baseline Hgb ~11, 10.6 on admission- Daily CBC  T2DM- Glargine 5 U BID, LDSSI, POCTs x4 AC & HS  HTN- Holding home meds as normotensive  BPH- Home Proscar and Flomax     Code Status: Limited x1, No to compressions  FEN: Dysphagia - Pureed w/ Nectar thick liquids  PPX: SQH  DISPO: FABBY Brand MD, PGY-1  11/17/21  10:51 AM    This patient has been staffed and discussed with Sarah Beth Kraft MD.

## 2021-11-18 NOTE — PLAN OF CARE
HR sustaining 120-130s with RR 24-28. More agitated/restless. Notified on-call resident Dr. Gema Gonzalez via [facility]. New orders for 1x dose of dilaudid. HR and RR still elevated after admin of dilaudid. New orders for 1x dose morphine. Breathing treatment also given. O2 increased to 6L HF with sats 91-93%. Crackles still present in bases bilaterally. Pt remains afebrile. Other VSS. Fall precautions and hourly rounding in place. Will continue to monitor. Problem: Skin Integrity:  Goal: Will show no infection signs and symptoms  Description: Will show no infection signs and symptoms  Outcome: Ongoing  Note: Pt remains afebrile. Still receiving IV abx for pneumonia. IV fluids infusing @ 75 mL/hr. No new signs of infection noted. Will continue to monitor. Problem: Gas Exchange - Impaired:  Goal: Levels of oxygenation will improve  Description: Levels of oxygenation will improve  Outcome: Ongoing  Note: O2 increased from 3 to 6L O2 sats 91-93%. Crackles still noted to bases bilaterally. Respirations more labored tonight. Will continue to monitor. Problem: Fluid Volume - Deficit:  Goal: Achieves intake and output within specified parameters  Description: Achieves intake and output within specified parameters  Outcome: Ongoing  Note: IVF still infusing @ 75 mL/hr. Pt has had good urine output so far throughout shift. Will continue to monitor. Problem: Pain:  Goal: Control of acute pain  Description: Control of acute pain  Outcome: Ongoing  Note: Pt slightly more agitated tonight with -130s, new orders for 1x dose of dilaudid for pain. Will continue to monitor and reassess pain. Problem: Skin Integrity:  Goal: Absence of new skin breakdown  Description: Absence of new skin breakdown  Outcome: Ongoing  Note: Pt has stage 2 to coccyx - cleansed with saline and venelex applied with sacral heart. Pt also has some redness to grecia area and scattered bruising.  Turned and repositioned q2 and as needed with pillow support. Heels elevated off bed. Flores remains in place, flores care provided. No new signs of skin breakdown noted. Will continue to monitor.       Problem: Falls - Risk of:  Goal: Will remain free from falls  Description: Will remain free from falls  11/18/2021 0434 by Ana Maria Dixon RN  Outcome: Ongoing

## 2021-11-18 NOTE — PROGRESS NOTES
Internal Medicine Progress Note    Admit Date: 11/10/2021  Day:   Diet: ADULT DIET; Dysphagia - Pureed; Mildly Thick (Nectar); Liquids by spoon only  ADULT ORAL NUTRITION SUPPLEMENT; Breakfast, Lunch; Fortified Pudding Oral Supplement    CC: AMS    Interval history:  -Pt had discomfort and increased work of breathing overnight for which the patient received . 25 mg dilaudid and 1 mg of morphine.   -Remains on merrem  -Afeb, Tachycardic (Sinus Tach, not in RVR) otherwise VSS, now on 5L NC.  -Patient not in RVR on current digoxin regimen  -Patient lethargic this AM. Discussed patient's current course with daughter at bedside. I explained that currently he did not seem to be improving but instead slowly deteriorating on his current course. Discussed current medical therapies and that we were providing maximal medical therapy. Later, met with palliative care and family to discuss future options.  -At this time, the family would not like chest compressions or intubation in the event of a medical emergency.      Medications:     Scheduled Meds:   digoxin  125 mcg Oral Daily    aspirin  81 mg Oral Daily    midodrine  5 mg Oral TID WC    meropenem  1,000 mg IntraVENous Q12H    ipratropium-albuterol  1 ampule Inhalation BID    lidocaine  1 patch TransDERmal Daily    sodium chloride flush  5-40 mL IntraVENous 2 times per day    heparin (porcine)  5,000 Units SubCUTAneous 3 times per day    insulin glargine  5 Units SubCUTAneous Nightly    insulin lispro  0-6 Units SubCUTAneous TID WC    insulin lispro  0-3 Units SubCUTAneous Nightly    Venelex   Topical BID    sodium chloride (Inhalant)  15 mL Nebulization BID    finasteride  5 mg Oral Daily    tamsulosin  0.4 mg Oral Daily     Continuous Infusions:   sodium chloride 75 mL/hr at 11/17/21 2058    sodium chloride 25 mL (11/12/21 1443)    dextrose       PRN Meds:senna, albuterol, sodium chloride flush, sodium chloride, [DISCONTINUED] acetaminophen **OR** acetaminophen, glucose, dextrose, glucagon (rDNA), dextrose, medicated lip ointment, acetaminophen, docusate    Objective:   Vitals:   T-max:  Patient Vitals for the past 8 hrs:   BP Temp Temp src Pulse Resp SpO2   11/18/21 1048 111/68 97.9 °F (36.6 °C) Axillary 111 30 94 %   11/18/21 1020 -- -- -- -- -- 94 %   11/18/21 0805 129/68 97.8 °F (36.6 °C) Axillary 121 (!) 40 92 %   11/18/21 0804 126/71 97.8 °F (36.6 °C) Axillary 105 (!) 38 95 %   11/18/21 0545 -- -- -- -- -- 94 %   11/18/21 0358 136/74 98.2 °F (36.8 °C) Oral 93 24 92 %   11/18/21 0300 -- -- -- -- 26 92 %       Intake/Output Summary (Last 24 hours) at 11/18/2021 1050  Last data filed at 11/18/2021 0546  Gross per 24 hour   Intake 400 ml   Output 1450 ml   Net -1050 ml       Physical Exam  Constitutional:       General: He is not in acute distress. HENT:      Head: Normocephalic and atraumatic. Eyes:      General: No scleral icterus. Right eye: No discharge. Left eye: No discharge. Extraocular Movements: Extraocular movements intact. Conjunctiva/sclera: Conjunctivae normal.      Pupils: Pupils are equal, round, and reactive to light. Cardiovascular:      Rate and Rhythm: Normal rate and regular rhythm. Pulses: Normal pulses. Heart sounds: Normal heart sounds. No murmur heard. No friction rub. No gallop. Pulmonary:      Effort: Pulmonary effort is normal. No respiratory distress. Breath sounds: Rales (over bases bilaterally) present. No wheezing. Abdominal:      General: Abdomen is flat. Bowel sounds are normal. There is no distension. Palpations: Abdomen is soft. Musculoskeletal:      Cervical back: Normal range of motion and neck supple. No rigidity. Right lower leg: No edema. Left lower leg: No edema. Skin:     General: Skin is warm and dry. Coloration: Skin is not jaundiced.    Neurological:      Comments: Patient is lethargic         LABS:    CBC:   Recent Labs     11/16/21  0702 11/17/21  0615 11/18/21  0812   WBC 3.1* 3.2* 3.6*   HGB 9.8* 9.8* 10.6*   HCT 29.5* 29.3* 32.5*   * 127* 136   MCV 92.0 91.6 93.0     Renal:    Recent Labs     11/16/21  0702 11/17/21  0615 11/18/21  0812    144 141   K 4.1 3.9 4.2   * 112* 110   CO2 25 25 21   BUN 27* 26* 28*   CREATININE 1.4* 1.3 1.2   GLUCOSE 178* 162* 237*   CALCIUM 10.0 9.9 9.7   ANIONGAP 7 7 10     Hepatic:   No results for input(s): AST, ALT, BILITOT, BILIDIR, PROT, LABALBU, ALKPHOS in the last 72 hours. Troponin:   No results for input(s): TROPONINI in the last 72 hours. -----------------------------------------------------------------  RAD:   XR CHEST PORTABLE   Final Result   1. Diffuse multifocal consolidation, pneumonia pattern is favored over congestive failure in the absence of effusions and peripheral septal thickening. 2. Based on patient's azotemia, follow-up studies are suggested but may have to wait for normalization of renal function to evaluate the presence of adenopathy suspected on computed tomography      CT CHEST WO CONTRAST   Final Result      1. Worsening of multifocal consolidation compatible with progressive and/or recurrent pneumonia. 2.  New severe narrowing and/or mucous plugging occluding the right upper lobe bronchus and partial lobar collapse in addition to consolidation. CT HEAD WO CONTRAST   Final Result      1. No acute intracranial hemorrhage or mass effect. 2.  Mild white matter chronic small vessel ischemia. 3.  Moderate atrophy. XR CHEST PORTABLE   Final Result      Persistent moderate multifocal bilateral airspace disease, slightly worsened from prior study.              Assessment/Plan:   Lethargy 2/2 PNA  Suspected Recurrent Aspiration PNA  CT consistent with recurrent pneumonia, mucous plugging in the right upper lobe/multifocal.  -Merrem day 7, 8 days of abx  -Supplemental O2 PRN, wean as tolerated   -3% saline nebulizer treatments, Chest Vest per pulm  -SLP evaluation ordered; diet recommendations made.  -Pulmonology c/s'd, no indication for bronchoscopy at this time, signed off.  -Discussion had with family about PEG tube and they do not want it, Limited x2 (No Compressions, No to Intubation)  -Palliative Care meeting w/ family today. Discussed current options. Code Status updated, Daughter did not want to make long-term decisions today but wanted to wait and re-evaluate given the patient's course.  -Holding PO meds as patient is currently too somnolent to tolerate PO intake.      HA on CKD 3b  Patient still making urine, cr to 1.6. patient with rhonchi on exam and 2+ pittinng edema; bnp elevated. S/p bolus in ED  -IVF @ 75 ml/hr  -Cr slowly improving, near baseline     Afib, paroxsymal  Run of afib noted overnight, resolved s/p fluid bolus and electrolyte replacement  -Will continue to monitor w/ continuous telemetry   -Digoxin maintence dose of 0.125 mg QD, changed to IV instead of PO  -[id number] Score for Atrial Fibrillation Stroke Risk =4.   Has bled score is 1  -Dr. Flores Porter discussed with family about starting Anticoagulation as pt was having a lot of falls so we have deferred starting and will just continue ASA     Chronic Issues:  Normocytic Anemia (Baseline Hgb ~11, 10.6 on admission- Daily CBC  T2DM- Glargine 5 U BID, LDSSI, POCTs x4 AC & HS  HTN- Holding home meds as normotensive  BPH- Holding home  Proscar and Flomax     Code Status: Limited x2, No to compressions and intubation  FEN: Dysphagia - Pureed w/ Nectar thick liquids  PPX: SQH  DISPO: [facility]     Neil Peterson MD, PGY-1  11/18/21  10:50 AM    This patient has been staffed and discussed with Iris Florentino MD.

## 2021-11-18 NOTE — PROGRESS NOTES
Pt unable to state, per nursing reports pt was indicating that he was in pain overnight. 3. SOB 2/2 recurrent PNA, likely aspiration: Pt on 5LNC, with increased WOB today. MBS performed 10/13/21 on previous admission showed silent aspiration of thin liquids with recommendation of nectar thick liquids and puree's via spoon. Pt re-admitted one week after SNF stay with concern for aspiration PNA. 4. Malnutrition: weight loss reported by daughter per chart review, unsure how much. Albumin is 2.2. BMI 27. Pt has pudding nutritional supplements ordered, however pt appears too lethargic to take much PO. Family appropriately declined PEG tube placement. They did ask about TPN today - explained why that may not be indicated in his case. 5. Disposition: TBD pending family decision    Subjective:     Scheduled Meds:   digoxin  125 mcg Oral Daily    aspirin  81 mg Oral Daily    midodrine  5 mg Oral TID WC    meropenem  1,000 mg IntraVENous Q12H    ipratropium-albuterol  1 ampule Inhalation BID    lidocaine  1 patch TransDERmal Daily    sodium chloride flush  5-40 mL IntraVENous 2 times per day    heparin (porcine)  5,000 Units SubCUTAneous 3 times per day    insulin glargine  5 Units SubCUTAneous Nightly    insulin lispro  0-6 Units SubCUTAneous TID WC    insulin lispro  0-3 Units SubCUTAneous Nightly    Venelex   Topical BID    sodium chloride (Inhalant)  15 mL Nebulization BID    finasteride  5 mg Oral Daily    tamsulosin  0.4 mg Oral Daily       Continuous Infusions:   sodium chloride 75 mL/hr at 11/17/21 2058    sodium chloride 25 mL (11/12/21 1443)    dextrose         PRN Meds:senna, albuterol, sodium chloride flush, sodium chloride, [DISCONTINUED] acetaminophen **OR** acetaminophen, glucose, dextrose, glucagon (rDNA), dextrose, medicated lip ointment, acetaminophen, docusate    Review of Systems. Review of Systems - History obtained from unobtainable from patient due to mental status      Performance status 30      Objective:     Patient Vitals for the past 8 hrs:   BP Temp Temp src Pulse Resp SpO2   11/18/21 0805 129/68 97.8 °F (36.6 °C) Axillary 121 (!) 40 92 %   11/18/21 0804 126/71 97.8 °F (36.6 °C) Axillary 105 (!) 38 95 %   11/18/21 0545 -- -- -- -- -- 94 %   11/18/21 0358 136/74 98.2 °F (36.8 °C) Oral 93 24 92 %   11/18/21 0300 -- -- -- -- 26 92 %     I/O last 3 completed shifts: In: 500 [P.O.:500]  Out: 1450 [Urine:1450]  No intake/output data recorded. Physical Exam  Constitutional:       General: He is in acute distress. Appearance: He is ill-appearing. Cardiovascular:      Rate and Rhythm: Tachycardia present. Rhythm irregular. Heart sounds: Normal heart sounds. Pulmonary:      Breath sounds: Rhonchi present. Comments: Increased work of breathing  Abdominal:      General: Bowel sounds are normal.      Palpations: Abdomen is soft. Musculoskeletal:      Right lower leg: No edema. Left lower leg: No edema. Skin:     General: Skin is warm and dry. Neurological:      Comments: obtunded          WBC/Hgb/Hct/Plts:  3.2/9.8/29.3/127 (11/17 0615)           Assessment:     Active Problems:    Acute renal failure superimposed on stage 3b chronic kidney disease (HCC)    Acute encephalopathy    Severe malnutrition (HCC)    Aspiration pneumonia of right upper lobe due to regurgitated food (HCC)    Volume depletion    Physical deconditioning    Rapid atrial fibrillation (HCC)    Aspiration pneumonia (HCC)    Acute metabolic encephalopathy    Acute on chronic respiratory failure with hypoxia (HCC)  Resolved Problems:    * No resolved hospital problems. *      Time spent with patient and/or family: 27  Time reviewing records: 5  Time communicating with providers: 10    A total of 45 minutes spent with the patient and family on unit greater than 50% face to face time in counseling regarding palliative care and goals of care for the patient.            Brianna Goldsmith [address]  [phone]

## 2021-11-18 NOTE — PROGRESS NOTES
Notified md unable to give morning meds as pt is too sleepy. rr 40, hr 121, bp 129/68, 97.8, 92% on 5L O2.

## 2021-11-18 NOTE — PROGRESS NOTES
Speech Language Pathology  Dysphagia - attempt    Chart reviewed, d/w RN Dennis Gaston who states pt has been lethargic today. Pt did not open eyes to verbal stim or follow commands. Used toothette to swab oral cavity for stim. Then attempted to present tsp of applesauce however pt exhibited no response to remove from spoon. Pt too lethargic for PO at this time. Will follow up as pt able and schedule allows      Zhou Carter M.S./CCC-SLP #[id number]  Pg.  # [phone] 10:13 AM

## 2021-11-18 NOTE — CARE COORDINATION
Palliative Care had meeting with family. Angelique Barajas (daughter) is usually primary decision maker. Pt's children report that their mother was in hospice [facility] at [facility] 9 years ago, she  of cancer, and they are familiar with hospice services. Angelique Barajas does not feel prepared to make a decision about hospice today but will consider it if he continues to decline. CM will continue to follow patient until discharge.  Electronically signed by Hoda Borja RN on 2021 at 12:22 PM

## 2021-11-19 NOTE — PROGRESS NOTES
Speech Language Pathology  Facility/Department: [address] TELEMETRY  Dysphagia Daily Treatment Note    NAME: Carmen Gould  : 1935  MRN: [id number]    Patient Diagnosis(es):   Patient Active Problem List    Diagnosis Date Noted    Type 2 diabetes mellitus without complication ([address]     Essential hypertension 2015    Diabetes mellitus type 2 in obese ([address] 2010    Hyperlipidemia     Microalbuminuria 2013    Vitamin D deficiency 2012    Diverticulosis     Benign prostatic hyperplasia     Bilateral low back pain with right-sided sciatica 2015    Neck pain 2012    Right hip pain 2012    Post herpetic neuralgia 06/15/2012    Right shoulder pain 2011    Eczema 2011    Allergic rhinitis 2010    Rosacea     Back pain     Obstructive sleep apnea     Chest pain     Obesity     Insomnia     Osteoarthritis     Lumbar disc disease     Spinal stenosis     Acute metabolic encephalopathy 5419    Acute on chronic respiratory failure with hypoxia (HCC)     Aspiration pneumonia ([address] 2021    Rapid atrial fibrillation (HCC)     Severe malnutrition ([address] 2021    Aspiration pneumonia of right upper lobe due to regurgitated food ([address]     Volume depletion     Physical deconditioning     Acute encephalopathy 11/10/2021    Dehydration 2021    Bacterial pneumonia     Oropharyngeal dysphagia     Multifocal pneumonia 10/12/2021    Hypoxia     Hypernatremia     Altered mental status     Chronic diastolic HF (heart failure) (HCC)     Acute renal failure superimposed on stage 3b chronic kidney disease ([address] 2021    Peripheral vascular disease ([address] 2020    Coronary artery disease of native artery of native heart with stable angina pectoris ([address] 2016    Shortness of breath 2011    Nausea 2011    Dyspnea on exertion 2011    Transient global amnesia  Bilateral cataracts      Allergies: Allergies   Allergen Reactions    Tramadol      Onset Date: 11/10/2021      CXR (11/12/2021)-    1. Diffuse multifocal consolidation, pneumonia pattern is favored over congestive failure in the absence of effusions and peripheral septal thickening. 2. Based on patient's azotemia, follow-up studies are suggested but may have to wait for normalization of renal function to evaluate the presence of adenopathy suspected on computed tomography       Previous MBS (10/13/2021)-  Mild-moderate oral phase dysphagia characterized by lingual pumping/rocking, reduced posterior propulsion, holding of bolus, piecemeal swallowing, premature bolus loss to pharynx, and reduced tongue base retraction. Moderate pharyngeal phase dysphagia characterized by impaired hyolaryngeal mechanics and reduced sensation, with delayed swallow initiation, premature spillage to the valleculae and pyriforms (thin liquids), decreased pharyngeal peristalsis, reduced/delayed epiglottic movement, reduced laryngeal elevation/retraction, with pooling at the valleculae/pyriforms/UES (mostly thins), subsequent deep penetration and suspected silent aspiration of thin liquids (no cough reflex), mild pharyngeal residue and pharyngeal wall weakness. No aspiration or penetration of nectar thick liquids or puree. Trialed chin tuck manuever with thin liquids via cup; on review of images, appeared effective at protecting airway and preventing aspiration, however pt required max cues from daughter to complete. On discussion with daughter, she suspects he may have limited carryover with other caregivers. After further discussion re: QoL wishes, plan of care with daughter (pt present, but currently confused), recommend nectar thick liquids as safest option, with soft foods (minced & moist), with implementation of free water protocol (may have unthickened plan H2O between meals following completion of thorough oral hygiene). Also discussed daughter can try thin liquids with herself cuing her father to chin-tuck. Upper Esophageal Screen: Reduced cricopharyngeal opening    Chart reviewed. Medical Diagnosis: Acute encephalopathy [G93.40]  Altered mental status, unspecified altered mental status type [R41.82]  Aspiration pneumonia of both lungs, unspecified aspiration pneumonia type, unspecified part of lung ([address] [J69.0]  Aspiration pneumonia, unspecified aspiration pneumonia type, unspecified laterality, unspecified part of lung ([address] [L29.0]  Acute metabolic encephalopathy [W88.81]     Treatment Diagnosis: Dysphagia    BSE Impression (11/11/2021)-  Patient seen for a bedside swallow. Patient recently here for PNA and discharged with a Dysphagia II, nectar thick liquid (via spoon) diet. Thorough family training was completed here by SLP. Parviz Age after MBS. Pt admitted with recurrent PNA and AMS. Pt requiring cues to maintain alertness and attention to trials. He tolerated multiple trials of puree and nectar thick liquids via spoon. Daughter reports following protocol at home and has been primarily pureeing his food secondary to gum discomfort. Daughter reports weight -loss, therefore nutrition consult is warranted for possible oral supplements. Will continue to follow to ensure diet tolerance and continue reinforcement of strategies. Dysphagia Diagnosis: Moderate to severe oral stage dysphagia, Moderate pharyngeal stage dysphagia    MBS results -  None this admit    Pain: none indicated by any means    Current Diet : ADULT DIET; Dysphagia - Pureed; Mildly Thick (Nectar); Liquids by spoon only  ADULT ORAL NUTRITION SUPPLEMENT; Breakfast, Lunch; Fortified Pudding Oral Supplement   Recommended Form of Meds: Crushed in puree as able  Compensatory Swallowing Strategies:  Alternate solids and liquids, No straws, Eat/Feed slowly, Upright as possible for all oral intake, Remain upright for 30-45 minutes after meals, Small bites/sips, Liquid by spoon only     Treatment:  Pt seen bedside to address the following goals:  Goal 1: The patient will tolerate recommended diet without observed clinical signs of aspiration  11/15: Cleared by RN to see pt. Per chart review/RN, pt tolerating diet. Pt seen resting in bed, on 3L O2 via nc, awake, alert, though somewhat lethargic. Repositioned pt up in bed and completed oral hygiene with suction kit. Assessed tolerance current diet (puree, nectar via tsp); pt with limited PO acceptance, positive swallow movement, good oral clearance. Single delayed cough, not clearly associated w/ PO intake; no further cough w/ prior/subsequent trials. Of note, on recent MBS, pt with SILENT aspiration, so unclear/unable to determine bedside. As session progressed, pt fatigued. Will continue to monitor need/ability to participate/complete repeat MBS. Cont goal  11/19: cleared by RN to see pt prior to visit. Received pt resting in bed, on O2 via nc. Lethargic, but roused to voice. Repositioned up in bed. Attempted oral hygiene, but pt orally guarding. Pt accepted a few very small sized tsps nectar thick liquid, with slowed oral prep, slowed but positive laryngeal movement, no overt s/s aspiration. Pt with continued fluctuating lethargy and with arrival of respiratory therapist for breathing treatment, ended dysphagia session. As fluctuating, persistent lethargy continues, and pt accepting only limited PO trials, question benefit/ability to complete repeat MBS. Recommend continue prior diet recommendations, oral hygiene as able, as pt sufficiently alert. Goal 2: The patient/caregiver will demonstrate understanding of compensatory strategies for improved swallowing safety. 11/15: Provided education to patient re: purpose of visit, swallow function, diet recommendation, previous MBS, oral care completion/rationale. No indication of comprehension.   Cont goal.  11/19: Provided education to patient regarding purpose of visit, recommendations, role of SLP. No indication of comprehension. No family present. Cont goal as indicated    Patient/Family/Caregiver Education:  See goal 2 above    Compensatory Strategies:  Compensatory Swallowing Strategies: Alternate solids and liquids, No straws, Eat/Feed slowly, Upright as possible for all oral intake, Remain upright for 30-45 minutes after meals, Small bites/sips, Liquid by spoon only      Plan:  Continued daily Dysphagia treatment with goals per plan of care. Diet recommendations:     - continue puree solids / mildly thick liquid, no straws; as tolerated  - oral hygiene 2-3x/day     DC recommendation: TBD  Treatment: 12 minutes  D/W nursing, Julio Rivas  Needs met prior to leaving room, call button in reach. [place], [place], [address] [facility], [id number]  Pg.  # [phone]    If patient is discharged prior to next treatment, this note will serve as the discharge summary

## 2021-11-19 NOTE — PROGRESS NOTES
Pt's daughter expressed concerns that pt had increased oxygen demand of 7L and new NP cough. Respirations increased to 32. Pt's daughter has been monitoring oxygen sats with personal oximeter and adjusting O2. Medical residents notified, came to evaluate pt. New orders noted.

## 2021-11-19 NOTE — PROGRESS NOTES
PT lethargic throughout the night, low grade fever off 99%, PT is responsive to voice, will continue to monitor

## 2021-11-19 NOTE — PLAN OF CARE
Problem: Nutrition  Goal: Optimal nutrition therapy  Outcome: Ongoing     Nutrition Problem #1: Inadequate oral intake  Intervention: Food and/or Nutrient Delivery: Continue Current Diet, Continue Oral Nutrition Supplement  Nutritional Goals: Pt will consistently consume >50% of meals and supplements throughout adm

## 2021-11-19 NOTE — CARE COORDINATION
Case Management Assessment           Daily Note                 Date/ Time of Note: 11/19/2021 12:25 PM         Note completed by: Bony Arenas RN    Patient Name: Tee Welch  YOB: 1935    Diagnosis:Acute encephalopathy [G93.40]  Altered mental status, unspecified altered mental status type [R41.82]  Aspiration pneumonia of both lungs, unspecified aspiration pneumonia type, unspecified part of lung ([address] [J69.0]  Aspiration pneumonia, unspecified aspiration pneumonia type, unspecified laterality, unspecified part of lung ([address] [L79.7]  Acute metabolic encephalopathy [J42.99]  Patient Admission Status: Inpatient    Date of Admission:11/10/2021  7:26 PM Length of Stay: 9 GLOS: GMLOS: 5.4    Current Plan of Care:  CC: Lethargy  2/2 PNA  Suspected Recurrent Aspiration PNADysphasia:  SLP consulted:    Recommended Diet and Intervention  Discussed  PEG  /  Family declines:   Pulm following :  D/w dgtr  Hospice /   PC  But not wanting this option at this time . PC following    O 2  %L HF turned up to  6L HF   to 4 L  HF this am ( 3 l O 2 is  Home baseline    ________________________________________________________________________________________  PT AM-PAC: 6 / 24 per last evaluation on: 11/17/2021    OT AM-PAC: 9 / 24 per last evaluation on: 11/17/2021    DME Needs for discharge: cirilo lift  If goes home   ________________________________________________________________________________________  Discharge Plan: Home with [address] [place] [address] Way: VS  SNF  @ [facility]:     Tentative discharge date:      Current barriers to discharge: medical Pulm clearance  Need to Weaning O 2  Down  .         Referrals completed: Not Applicable    Resources/ information provided: Not indicated at this time  ________________________________________________________________________________________  Case Management Notes:    CM cont to follow for  D/C planning :     - Patient's dgtr Evaline Deis  will decide if patient will go to a SNF. He usually goes to - [facility] [place] if needs a SNF.   - Daughter states that she thinks if he is a 2 person assist at the time just before discharge, he may have to go to [facility]. -  If not, he has 24/7 caregivers and skilled home care. PT said that needs a cirilo lift if goes home. -   CM ordered patient seth, ( dgtr  Looking to getting [facility] Comment  steady  Like equip)  for home per Dr. Aretha Menendez. Roberto and his family were provided with choice of provider; he and his family are in agreement with the discharge plan. Care Transition Patient:  Yes    Lis Mckeon, FARHAT  [facility]  Case Management Department  Ph: [phone]

## 2021-11-19 NOTE — PROGRESS NOTES
Internal Medicine Progress Note    Admit Date: 11/10/2021  Day:   Diet: ADULT DIET; Dysphagia - Pureed; Mildly Thick (Nectar); Liquids by spoon only  ADULT ORAL NUTRITION SUPPLEMENT; Breakfast, Lunch; Fortified Pudding Oral Supplement    CC: AMS    Interval history:  -Remains on merrem  -Afeb, Tachycardic to the 120's briefly this AM, otherwise VSS, on 5L NC.  -Patient lethargic this AM but seemingly improved from prior. Discussed patient's current course with daughter at bedside. Family would like to avoid morphine as they believe it would artificially shorten the length of his life. Discussed that we will continue his course of merrem for 10 days and then will further discuss next steps about management. Discussed the option of inpatient hospice however the family has had a poor experience with this in the past.   -At this time, the family would not like chest compressions or intubation in the event of a medical emergency.      Medications:     Scheduled Meds:   digoxin  125 mcg IntraVENous Daily    [Held by provider] aspirin  81 mg Oral Daily    [Held by provider] midodrine  5 mg Oral TID     meropenem  1,000 mg IntraVENous Q12H    ipratropium-albuterol  1 ampule Inhalation BID    lidocaine  1 patch TransDERmal Daily    sodium chloride flush  5-40 mL IntraVENous 2 times per day    heparin (porcine)  5,000 Units SubCUTAneous 3 times per day    insulin glargine  5 Units SubCUTAneous Nightly    insulin lispro  0-6 Units SubCUTAneous TID WC    insulin lispro  0-3 Units SubCUTAneous Nightly    Venelex   Topical BID    sodium chloride (Inhalant)  15 mL Nebulization BID    [Held by provider] finasteride  5 mg Oral Daily    [Held by provider] tamsulosin  0.4 mg Oral Daily     Continuous Infusions:   sodium chloride 100 mL/hr at 11/19/21 1002    sodium chloride 25 mL (11/12/21 1443)    dextrose       PRN Meds:senna, albuterol, sodium chloride flush, sodium chloride, [DISCONTINUED] acetaminophen **OR** 111*   CO2 25 21 24   BUN 26* 28* 26*   CREATININE 1.3 1.2 1.2   GLUCOSE 162* 237* 161*   CALCIUM 9.9 9.7 9.6   ANIONGAP 7 10 9     Hepatic:   No results for input(s): AST, ALT, BILITOT, BILIDIR, PROT, LABALBU, ALKPHOS in the last 72 hours. Troponin:   No results for input(s): TROPONINI in the last 72 hours. -----------------------------------------------------------------  RAD:   XR CHEST PORTABLE   Final Result   1. Diffuse multifocal consolidation, pneumonia pattern is favored over congestive failure in the absence of effusions and peripheral septal thickening. 2. Based on patient's azotemia, follow-up studies are suggested but may have to wait for normalization of renal function to evaluate the presence of adenopathy suspected on computed tomography      CT CHEST WO CONTRAST   Final Result      1. Worsening of multifocal consolidation compatible with progressive and/or recurrent pneumonia. 2.  New severe narrowing and/or mucous plugging occluding the right upper lobe bronchus and partial lobar collapse in addition to consolidation. CT HEAD WO CONTRAST   Final Result      1. No acute intracranial hemorrhage or mass effect. 2.  Mild white matter chronic small vessel ischemia. 3.  Moderate atrophy. XR CHEST PORTABLE   Final Result      Persistent moderate multifocal bilateral airspace disease, slightly worsened from prior study.              Assessment/Plan:   Lethargy 2/2 PNA  Suspected Recurrent Aspiration PNA  CT consistent with recurrent pneumonia, mucous plugging in the right upper lobe/multifocal.  -Merrem day 8, 9 days of abx  -Supplemental O2 PRN, wean as tolerated   -3% saline nebulizer treatments, Chest Vest per pulm  -SLP evaluation ordered; diet recommendations made.  -Pulmonology c/s'd, no indication for bronchoscopy at this time, signed off.  -Discussion had with family about PEG tube and they do not want it, Limited x2 (No Compressions, No to Intubation)  -Holding PO meds as patient is currently too somnolent to tolerate PO intake.  -Patient's family does not want morphine to be given as they would not want to engage in any therapies that could shorten his lifespan. Will continue current medical course.      HA on CKD 3b  Patient still making urine, cr to 1.6. patient with rhonchi on exam and 2+ pittinng edema; bnp elevated. S/p bolus in ED  -IVF @ 100 ml/hr  -Cr of 1.2 today     Afib, paroxsymal  Run of afib noted overnight, resolved s/p fluid bolus and electrolyte replacement  -Will continue to monitor w/ continuous telemetry   -Digoxin maintence dose of 0.125 mg QD IV  -[id number] Score for Atrial Fibrillation Stroke Risk =4.   Has bled score is 1  -Dr. Anders Durand discussed with family about starting Anticoagulation as pt was having a lot of falls so we have deferred starting and will just continue ASA     Chronic Issues:  Normocytic Anemia (Baseline Hgb ~11, 10.6 on admission- Daily CBC  T2DM- Glargine 5 U BID, LDSSI, POCTs x4 AC & HS  HTN- Holding home meds as normotensive  BPH- Holding home  Proscar and Flomax     Code Status: Limited x2, No to compressions and intubation  FEN: Dysphagia - Pureed w/ Nectar thick liquids  PPX: SQH  DISPO: CRISPIN Liu MD, PGY-1  11/19/21  11:26 AM    This patient has been staffed and discussed with Renata Lynch MD.

## 2021-11-19 NOTE — PROGRESS NOTES
Comprehensive Nutrition Assessment      Meets ASPEN criteria for severe malnutrition. Aggressive nutrition intervention should be considered, up to and including alternative means of nutrition/hydration, if nutrition status can not improve. RECOMMENDATIONS:  1. PO Diet: Continue pureed diet with mildly thick liquids per SLP. Please encourage po intakes. 2. ONS: Continue Boost pudding BID    NUTRITION ASSESSMENT:   Type and Reason for Visit:  Type and Reason for Visit: Reassess   Nutritional summary & status: Pt sleeping during visit. Per family, pt continues ~50%. GI previously discussed PEG placement, but family denied. Palliative care following. Code status changed to prevent intubation or chest compressions if needed. Family considering hospice care but have not made a decision as of yet. Continue to monitor while inpatient. Patient admitted d/t lethargy     PMH significant for: normocytic anemia, T2DM, HTN, BPH    MALNUTRITION ASSESSMENT  Context of Malnutrition: Acute Illness   Malnutrition Status: Severe malnutrition  Findings of the 6 clinical characteristics of malnutrition (Minimum of 2 out of 6 clinical characteristics is required to make the diagnosis of moderate or severe Protein Calorie Malnutrition based on AND/ASPEN Guidelines):  Energy Intake: Less than/equal to 50% of estimated energy requirements    Energy Intake Time: Greater than or equal to 7 days    Weight Loss %: 10% loss or greater    Weight loss Time: Greater than or equal to 3 months   Body Fat Loss: Unable to Assess   Body Fat Location: Unable to assess    Body Muscle Loss: Unable to Assess   Body Muscle Loss Location: Unable to assess    Fluid Accumulation: Unable to assess    Fluid Accumulation Location: Unable to assess     Strength: Not Performed;  Not Measured     NUTRITION DIAGNOSIS   · Inadequate oral intake related to cognitive or neurological impairment as evidenced by weight loss, poor intake prior to admission      NUTRITION INTERVENTION  Food and/or Nutrient Delivery:  Continue Current Diet, Continue Oral Nutrition Supplement  Nutrition Education/Counseling:  No recommendation at this time   Goals:  Pt will consistently consume >50% of meals and supplements throughout adm       Nutrition Monitoring and Evaluation:   Food/Nutrient Intake Outcomes:  Food and Nutrient Intake, Diet Advancement/Tolerance  Physical Signs/Symptoms Outcomes:  Weight, Chewing or Swallowing, Biochemical Data     OBJECTIVE DATA: Significant to nutrition assessment  · Nutrition-Focused Physical Findings: Nutrition Related Findings: BM 11/17; improved edema- +1   · Labs: Reviewed; -   · Meds: Reviewed; NaCl IVF  · Wounds: Wound Type: None     CURRENT NUTRITION THERAPIES  ADULT DIET; Dysphagia - Pureed; Mildly Thick (Nectar); Liquids by spoon only  ADULT ORAL NUTRITION SUPPLEMENT; Breakfast, Lunch; Fortified Pudding Oral Supplement     PO Intake: Average Meal Intake: Unable to assess   PO Supplement Intake:Average Supplements Intake: None Ordered  IVF: 100 ml/hr     ANTHROPOMETRICS  Current Height: 5' 3\" (160 cm)  Current Weight: 157 lb 6.5 oz (71.4 kg)    Admission weight: 155 lb (70.3 kg)  Ideal Body Weight (lbs) (Calculated): 124 lbs (Ideal Body Weight (Kg) (Calculated): 56 kg)  Usual Bodyweight 179-182 lbs per EMR   Weight Changes -34.5 lbs in 3 months       BMI BMI (Calculated): 27.9    Wt Readings from Last 50 Encounters:   11/11/21 144 lb 13.5 oz (65.7 kg)   10/20/21 147 lb 7.8 oz (66.9 kg)   08/21/21 179 lb 4.8 oz (81.3 kg)   06/08/21 177 lb (80.3 kg)   03/18/21 180 lb 4.8 oz (81.8 kg)   11/20/20 182 lb 6.4 oz (82.7 kg)       COMPARATIVE STANDARDS  Energy (kcal):  4671-1414 (20-25); Weight Used for Energy Requirements:  Current (65.7 kg)     Protein (g):  56-62 (1.0-1.2);  Weight Used for Protein Requirements:  Ideal (56)        Fluid (ml/day):  2846-2769 ml; Method Used for Fluid Requirements:  1 ml/kcal      The patient will still be monitored per nutrition standards of care. Consult dietitian if nutrition interventions essential to patient care is needed.      Monica Diggs [address], [address] [place] [address],   [place]:  [phone]  Office:  [phone]

## 2021-11-20 NOTE — PROGRESS NOTES
Internal Medicine Progress Note    Admit Date: 11/10/2021  Day:   Diet: ADULT DIET; Dysphagia - Pureed; Mildly Thick (Nectar); Liquids by spoon only  ADULT ORAL NUTRITION SUPPLEMENT; Breakfast, Lunch; Fortified Pudding Oral Supplement    CC: AMS    Interval history:  No acute events overnight. Patient remains on 6 L of nasal cannula from 5 yesterday morning. Patient remains tachycardic in the low 100s. Patient's IV fluids increased to 100ml/hr. overall no change in mental status. Patient awakes to touch and sound and opens his eyes however falls back asleep. Unable to obtain any ROS.     Medications:     Scheduled Meds:   digoxin  125 mcg IntraVENous Daily    [Held by provider] aspirin  81 mg Oral Daily    [Held by provider] midodrine  5 mg Oral TID     meropenem  1,000 mg IntraVENous Q12H    ipratropium-albuterol  1 ampule Inhalation BID    lidocaine  1 patch TransDERmal Daily    sodium chloride flush  5-40 mL IntraVENous 2 times per day    heparin (porcine)  5,000 Units SubCUTAneous 3 times per day    insulin glargine  5 Units SubCUTAneous Nightly    insulin lispro  0-6 Units SubCUTAneous TID     insulin lispro  0-3 Units SubCUTAneous Nightly    Venelex   Topical BID    sodium chloride (Inhalant)  15 mL Nebulization BID    [Held by provider] finasteride  5 mg Oral Daily    [Held by provider] tamsulosin  0.4 mg Oral Daily     Continuous Infusions:   sodium chloride 100 mL/hr at 11/20/21 0851    sodium chloride 25 mL (11/12/21 1443)    dextrose       PRN Meds:senna, albuterol, sodium chloride flush, sodium chloride, [DISCONTINUED] acetaminophen **OR** acetaminophen, glucose, dextrose, glucagon (rDNA), dextrose, medicated lip ointment, acetaminophen, docusate    Objective:   Vitals:   T-max:  Patient Vitals for the past 8 hrs:   BP Temp Temp src Pulse Resp SpO2   11/20/21 0825 -- -- -- -- -- 97 %   11/20/21 0815 121/74 97.6 °F (36.4 °C) Axillary 99 (!) 32 97 %   11/20/21 0316 120/64 99.9 °F (37.7 °C) Axillary 119 22 --       Intake/Output Summary (Last 24 hours) at 11/20/2021 1109  Last data filed at 11/20/2021 0043  Gross per 24 hour   Intake --   Output 1750 ml   Net -1750 ml       Physical Exam  Constitutional:       General: He is not in acute distress. HENT:      Head: Normocephalic and atraumatic. Eyes:      General: No scleral icterus. Right eye: No discharge. Left eye: No discharge. Extraocular Movements: Extraocular movements intact. Conjunctiva/sclera: Conjunctivae normal.      Pupils: Pupils are equal, round, and reactive to light. Cardiovascular:      Rate and Rhythm: Normal rate and regular rhythm. Pulses: Normal pulses. Heart sounds: Normal heart sounds. No murmur heard. No friction rub. No gallop. Pulmonary:      Effort: Pulmonary effort is normal. No respiratory distress. Breath sounds: Rales (over bases bilaterally) present. No wheezing. Abdominal:      General: Abdomen is flat. Bowel sounds are normal. There is no distension. Palpations: Abdomen is soft. Musculoskeletal:      Cervical back: Normal range of motion and neck supple. No rigidity. Right lower leg: No edema. Left lower leg: No edema. Skin:     General: Skin is warm and dry. Coloration: Skin is not jaundiced. Neurological:      Comments: Patient is lethargic         LABS:    CBC:   Recent Labs     11/18/21  0812 11/19/21  0652 11/20/21  0815   WBC 3.6* 3.4* 3.0*   HGB 10.6* 10.1* 9.7*   HCT 32.5* 30.2* 29.1*    133* 118*   MCV 93.0 91.3 91.4     Renal:    Recent Labs     11/18/21  0812 11/19/21  0652 11/20/21  0815    144 143   K 4.2 4.0 3.8    111* 111*   CO2 21 24 24   BUN 28* 26* 24*   CREATININE 1.2 1.2 1.1   GLUCOSE 237* 161* 181*   CALCIUM 9.7 9.6 9.5   ANIONGAP 10 9 8     Hepatic:   No results for input(s): AST, ALT, BILITOT, BILIDIR, PROT, LABALBU, ALKPHOS in the last 72 hours.   Troponin:   No results for input(s): TROPONINI in the last 72 hours. -----------------------------------------------------------------  RAD:   XR CHEST PORTABLE   Final Result   1. Diffuse multifocal consolidation, pneumonia pattern is favored over congestive failure in the absence of effusions and peripheral septal thickening. 2. Based on patient's azotemia, follow-up studies are suggested but may have to wait for normalization of renal function to evaluate the presence of adenopathy suspected on computed tomography      CT CHEST WO CONTRAST   Final Result      1. Worsening of multifocal consolidation compatible with progressive and/or recurrent pneumonia. 2.  New severe narrowing and/or mucous plugging occluding the right upper lobe bronchus and partial lobar collapse in addition to consolidation. CT HEAD WO CONTRAST   Final Result      1. No acute intracranial hemorrhage or mass effect. 2.  Mild white matter chronic small vessel ischemia. 3.  Moderate atrophy. XR CHEST PORTABLE   Final Result      Persistent moderate multifocal bilateral airspace disease, slightly worsened from prior study. Assessment/Plan:   Lethargy 2/2 PNA  Suspected Recurrent Aspiration PNA  CT consistent with recurrent pneumonia, mucous plugging in the right upper lobe/multifocal.  -Merrem day 9, 10 days of abx  -Supplemental O2 PRN, wean as tolerated   -3% saline nebulizer treatments, Chest Vest per pulm  -SLP evaluation ordered; diet recommendations made.  -Pulmonology c/s'd, no indication for bronchoscopy at this time, signed off.  -Discussion had with family about PEG tube and they do not want it, Limited x2 (No Compressions, No to Intubation)  -Holding PO meds as patient is currently too somnolent to tolerate PO intake.  -Patient's family does not want morphine to be given as they would not want to engage in any therapies that could shorten his lifespan.  Will continue current medical course.      HA on CKD 3b  Patient still making urine, cr to 1.6. patient with rhonchi on exam and 2+ pittinng edema; bnp elevated. S/p bolus in ED  -IVF @ 100 ml/hr  -Cr of 1.1 today     Afib, paroxsymal  Run of afib noted overnight, resolved s/p fluid bolus and electrolyte replacement  -Will continue to monitor w/ continuous telemetry   -Digoxin maintence dose of 0.125 mg QD IV  -[id number] Score for Atrial Fibrillation Stroke Risk =4.   Has bled score is 1  -Dr. Oren Pozo discussed with family about starting Anticoagulation as pt was having a lot of falls so we have deferred starting and will just continue ASA     Chronic Issues:  Normocytic Anemia (Baseline Hgb ~11, 10.6 on admission- Daily CBC  T2DM- Glargine 5 U BID, LDSSI, POCTs x4 AC & HS  HTN- Holding home meds as normotensive  BPH- Holding home  Proscar and Flomax     Code Status: Limited x2, No to compressions and intubation  FEN: Dysphagia - Pureed w/ Nectar thick liquids  PPX: SQH  DISPO: FABBY Allen MD, PGY-2  11/20/21  11:09 AM    This patient has been staffed and discussed with Alyssa Marr MD.

## 2021-11-20 NOTE — PROGRESS NOTES
PT RR in the 30s and O2 at 88% on 5L, HR 130s, Attending notified and came to see PT, PT improved upon reposition and advancing 02 to 6L HR now 119, PT is still very lethargic, Will continue to monitor.

## 2021-11-21 NOTE — PROGRESS NOTES
Internal Medicine Progress Note    Admit Date: 11/10/2021  Day:   Diet: ADULT DIET; Dysphagia - Pureed; Mildly Thick (Nectar); Liquids by spoon only  ADULT ORAL NUTRITION SUPPLEMENT; Breakfast, Lunch; Fortified Pudding Oral Supplement    CC: AMS    Interval history:  -NAEO  -Afeb, HDS, on 6L NC  -No runs of RVR overnight  -This AM, pt lethargic but responding to stimuli.        Medications:     Scheduled Meds:   digoxin  125 mcg IntraVENous Daily    [Held by provider] aspirin  81 mg Oral Daily    [Held by provider] midodrine  5 mg Oral TID     meropenem  1,000 mg IntraVENous Q12H    ipratropium-albuterol  1 ampule Inhalation BID    lidocaine  1 patch TransDERmal Daily    sodium chloride flush  5-40 mL IntraVENous 2 times per day    heparin (porcine)  5,000 Units SubCUTAneous 3 times per day    insulin glargine  5 Units SubCUTAneous Nightly    insulin lispro  0-6 Units SubCUTAneous TID WC    insulin lispro  0-3 Units SubCUTAneous Nightly    Venelex   Topical BID    sodium chloride (Inhalant)  15 mL Nebulization BID    [Held by provider] finasteride  5 mg Oral Daily    [Held by provider] tamsulosin  0.4 mg Oral Daily     Continuous Infusions:   sodium chloride 100 mL/hr at 11/21/21 0245    sodium chloride 25 mL (11/12/21 1443)    dextrose       PRN Meds:senna, albuterol, sodium chloride flush, sodium chloride, [DISCONTINUED] acetaminophen **OR** acetaminophen, glucose, dextrose, glucagon (rDNA), dextrose, medicated lip ointment, acetaminophen, docusate    Objective:   Vitals:   T-max:  Patient Vitals for the past 8 hrs:   BP Temp Temp src Pulse Resp SpO2   11/21/21 0424 120/68 97.6 °F (36.4 °C) Axillary 95 24 97 %   11/21/21 0035 123/73 98.2 °F (36.8 °C) Axillary 93 26 95 %   11/20/21 2351 -- -- -- -- 30 94 %       Intake/Output Summary (Last 24 hours) at 11/21/2021 0702  Last data filed at 11/21/2021 0540  Gross per 24 hour   Intake 100 ml   Output 2075 ml   Net -1975 ml       Physical Exam  Constitutional:       General: He is not in acute distress. HENT:      Head: Normocephalic and atraumatic. Eyes:      General: No scleral icterus. Right eye: No discharge. Left eye: No discharge. Extraocular Movements: Extraocular movements intact. Conjunctiva/sclera: Conjunctivae normal.      Pupils: Pupils are equal, round, and reactive to light. Cardiovascular:      Rate and Rhythm: Normal rate and regular rhythm. Pulses: Normal pulses. Heart sounds: Normal heart sounds. No murmur heard. No friction rub. No gallop. Pulmonary:      Effort: Pulmonary effort is normal. No respiratory distress. Breath sounds: Rales (over bases bilaterally) present. No wheezing. Abdominal:      General: Abdomen is flat. Bowel sounds are normal. There is no distension. Palpations: Abdomen is soft. Musculoskeletal:      Cervical back: Normal range of motion and neck supple. No rigidity. Right lower leg: No edema. Left lower leg: No edema. Skin:     General: Skin is warm and dry. Coloration: Skin is not jaundiced. Neurological:      Comments: Patient is lethargic         LABS:    CBC:   Recent Labs     11/18/21  0812 11/19/21  0652 11/20/21  0815   WBC 3.6* 3.4* 3.0*   HGB 10.6* 10.1* 9.7*   HCT 32.5* 30.2* 29.1*    133* 118*   MCV 93.0 91.3 91.4     Renal:    Recent Labs     11/18/21  0812 11/19/21  0652 11/20/21  0815    144 143   K 4.2 4.0 3.8    111* 111*   CO2 21 24 24   BUN 28* 26* 24*   CREATININE 1.2 1.2 1.1   GLUCOSE 237* 161* 181*   CALCIUM 9.7 9.6 9.5   ANIONGAP 10 9 8     Hepatic:   No results for input(s): AST, ALT, BILITOT, BILIDIR, PROT, LABALBU, ALKPHOS in the last 72 hours. Troponin:   No results for input(s): TROPONINI in the last 72 hours. -----------------------------------------------------------------  RAD:   XR CHEST PORTABLE   Final Result   1.  Diffuse multifocal consolidation, pneumonia pattern is favored over congestive failure in the absence of effusions and peripheral septal thickening. 2. Based on patient's azotemia, follow-up studies are suggested but may have to wait for normalization of renal function to evaluate the presence of adenopathy suspected on computed tomography      CT CHEST WO CONTRAST   Final Result      1. Worsening of multifocal consolidation compatible with progressive and/or recurrent pneumonia. 2.  New severe narrowing and/or mucous plugging occluding the right upper lobe bronchus and partial lobar collapse in addition to consolidation. CT HEAD WO CONTRAST   Final Result      1. No acute intracranial hemorrhage or mass effect. 2.  Mild white matter chronic small vessel ischemia. 3.  Moderate atrophy. XR CHEST PORTABLE   Final Result      Persistent moderate multifocal bilateral airspace disease, slightly worsened from prior study. Assessment/Plan:   Lethargy 2/2 PNA  Suspected Recurrent Aspiration PNA  CT consistent with recurrent pneumonia, mucous plugging in the right upper lobe/multifocal.  -Merrem day 10, 11 days of abx (Last day of merrem)  -Supplemental O2 PRN, wean as tolerated   -3% saline nebulizer treatments, Chest Vest per pulm  -SLP evaluation ordered; diet recommendations made.  -Pulmonology c/s'd, no indication for bronchoscopy at this time, signed off.  -Discussion had with family about PEG tube and they do not want it, Limited x2 (No Compressions, No to Intubation)  -Holding PO meds as patient is currently too somnolent to tolerate PO intake.  -Patient's family does not want morphine to be given as they would not want to engage in any therapies that could shorten his lifespan.  Will continue current medical course.       Afib, paroxsymal  Run of afib noted overnight, resolved s/p fluid bolus and electrolyte replacement  -Will continue to monitor w/ continuous telemetry   -Digoxin maintence dose of 0.125 mg QD IV  -IVF @ 100 ml/hr  -[id number] Score for Atrial Fibrillation Stroke Risk =4.   Has bled score is 1  -Dr. Patti Caban discussed with family about starting Anticoagulation as pt was having a lot of falls so we have deferred starting and will just continue ASA     HA on CKD 3b (Resolved)    Chronic Issues:  Normocytic Anemia (Baseline Hgb ~11, 10.6 on admission- Daily CBC  T2DM- Glargine 5 U BID, LDSSI, POCTs x4 AC & HS  HTN- Holding home meds as normotensive  BPH- Holding home  Proscar and Flomax     Code Status: Limited x2, No to compressions and intubation  FEN: Dysphagia - Pureed w/ Nectar thick liquids  PPX: SQH  DISPO: [facility]     Moira Gordon MD, PGY-1  11/21/21  7:02 AM    This patient has been staffed and discussed with Marisela Johnson MD.

## 2021-11-22 NOTE — PROGRESS NOTES
Physical Therapy    Daily Treatment Note      Discharge Recommendations:  Domi Lopez scored a 6/24 on the AM-PAC short mobility form. Current research shows that an AM-PAC score of 17 or less is typically not associated with a discharge to the patient's home setting. Based on the patient's AM-PAC score and their current functional mobility deficits, it is recommended that the patient have 3-5 sessions per week of Physical Therapy at d/c to increase the patient's independence. Please see assessment section for further patient specific details. Assessment:  Pt with increased fatigue, eyes closed most of the time. Pt participated in EOB activity. Overall sitting balance is poor. Pt did attempt to stand from EOB to unweight buttocks due to sacral wound. Pt unable to tolerate OOB activity this date. Daughter plans to take patient home with 24hr assist provided by family and private caregivers. Pt has hospital bed for home, but would require cirilo lift if OOB is desired. Will continue to follow for ongoing PT to maximize mobility. Equipment Needs:  Cirilo lift (if home)    Chart Reviewed: Yes     Other position/activity restrictions: Up with assist   Additional Pertinent Hx: Pt to ED 11/10 with increased fatigue and AMS. Head CT (-). Chest CT: Worsening of multifocal consolidation compatible with progressive and/or recurrent PNA. PMH:  asthma, HTN, DM, OA, BPH, diverticulosis, back pain, PNA, spinal stenosis      Diagnosis: Acute Encephalopathy   Treatment Diagnosis: Decreased transfers associated with acute encephalopathy. Subjective:  Pt found supine in bed, daughter present in room. Pt with eyes closed most of the time. Pain:  Denies      Objective:    Bed mobility  Rolling:  Dependent assist x1  Supine to sit:   Max A x2 (HOB raised)  Sit to Supine:  Max A x2 (bed flat)    Transfers  Sit to stand:  Pt attempted to stand 4x total from EOB to un-weight buttocks from EOB with Min A x2, partial stance, unable to clear buttocks from bed  Stand to sit:  Unable to assess  Bed <> chair:  Unable to assess    Ambulation  No    Neuro/balance  Pt participated in 12 min of EOB activity. Pt requiring Max A x2 initially for sitting balance, progressing to Max A for posterior lean/support. Pt able to progress to brief periods of CGA at EOB. Pt with flexed and rounded posture. Performed B pec stretch to promote upright posture. Pt holds head in flexed position with downward gaze. AAROM exercises cervical spine for extension, as well as neck rotation and sidebending. UE rowing performed per OT. Pt did attempts to stand 4x total from EOB. Pt achieved partial stance with B knees blocked. Pt held partial stance position for about 10-20 seconds each time. Patient Education  Role of PT. No learning due to language barrier. Daughter verbalized understanding. Safety Devices  Pt left with needs in reach, supine in bed with alarm in place and RN aware. Pt sidelying on L side for comfort. Daughter in room. AM-PAC score  AM-PAC Inpatient Mobility Raw Score : 6  AM-PAC Inpatient T-Scale Score : 23.55  Mobility Inpatient CMS 0-100% Score: 100  Mobility Inpatient CMS G-Code Modifier : CN    Goals:  Goals not met this treatment, continue with current goals. Time Frame for Short term goals: Discharge  Short term goal 1: supine <> sit Min A   Short term goal 2: sit <> stand Min A (met for lolly lepe). new goal: sit<->stand CGA to lolly lepe.    Short term goal 3: bed <> chair Mod A          Plan:  Times per week: 2-5;    Current Treatment Recommendations: Transfer Training, Functional Mobility Training, Strengthening, Endurance Training, Balance Training    Therapy Time    Individual  Concurrent  Group  Co-treatment    Time In  1320            Time Out  1400            Minutes  40              Timed Code Treatment Minutes:  40  Total Treatment Minutes:  40      If patient is discharged prior to next treatment, this note will serve as the discharge summary.     Rehana Villa, PT

## 2021-11-22 NOTE — PROGRESS NOTES
Speech Language Pathology  HOLD    Chart reviewed, spoke with RN, Mulugeta Melgar. It is documented by PCP that pt is too somnolent to consume PO and medications are held due to this (transitioned to IV) secondary to concerns about alertness/safety. Despite this documented poor alertness, the patient's family is continuing to feed the patient per verbal report / written report and also feed the patient PO not on recommended diet per chart review. It is documented that the family has verbalized an \"understanding of the risk of aspiration\" to staff. Will re-attempt x1 additional time and sign off if pt continues in current altered state as there does not appear to be a therapeutic benefit to the patient for dysphagia services given his inability to actively participate.      Ericka Cruz M.A., Sammy  Speech-Language Pathologist

## 2021-11-22 NOTE — PLAN OF CARE
Problem: Skin Integrity:  Goal: Will show no infection signs and symptoms  Description: Will show no infection signs and symptoms  Outcome: Ongoing  Note: WBC not elevated. Pt has remained afebrile so far throughout shift. . Receiving IV abx for pneumonia. IV fluids infusing @ 75 mL/hr. No new signs of infection noted. Will continue to monitor. Problem: Fluid Volume - Deficit:  Goal: Achieves intake and output within specified parameters  Description: Achieves intake and output within specified parameters  Outcome: Ongoing  Note: IVF infusing @ 75 mL/hr. Poor PO intake. Pt has had good urine output so far throughout shift. Will continue to monitor. Problem: Gas Exchange - Impaired:  Goal: Levels of oxygenation will improve  Description: Levels of oxygenation will improve  Outcome: Ongoing  Note: Pt remains on 6 L HF O2 with sats 93-94%. Lungs clear/diminished with crackles noted to bases bilaterally. Pt remains tachypneic. Will continue to monitor. Problem: Skin Integrity:  Goal: Absence of new skin breakdown  Description: Absence of new skin breakdown  Outcome: Ongoing  Note: Pt has stage 2 to coccyx - cleansed with saline and venelex applied. Sacral heart applied. Pt also has some redness to grecia area and scattered bruising. Turned and repositioned q2 and as needed with pillow support. Heels elevated off bed. Lai removed 11/20, condom cath in place for moisture prevention. No new signs of skin breakdown noted. Will continue to monitor.       Problem: Falls - Risk of:  Goal: Will remain free from falls  Description: Will remain free from falls  Outcome: Ongoing

## 2021-11-22 NOTE — PROGRESS NOTES
Occupational Therapy  Facility/Department: [address]  Daily Treatment Note  NAME: Rai Villafuerte  : 1935  MRN: [id number]    Date of Service: 2021    Discharge Recommendations:  Rai Villafuerte scored a 6/24 on the AM-PAC ADL Inpatient form. Current research shows that an AM-PAC score of 17 or less is typically not associated with a discharge to the patient's home setting. Based on the patient's AM-PAC score and their current ADL deficits, it is recommended that the patient have 3-5 sessions per week of Occupational Therapy at d/c to increase the patient's independence. Please see assessment section for further patient specific details. If patient discharges prior to next session this note will serve as a discharge summary. Please see below for the latest assessment towards goals. OT Equipment Recommendations  Other: Family interested in [employer] vs. cirilo for home. Pt would benefit to ease caregiver burden    Assessment   Performance deficits / Impairments: Decreased functional mobility ; Decreased ADL status; Decreased safe awareness; Decreased endurance; Decreased strength; Decreased balance; Decreased cognition; Decreased posture  Assessment: Increased lethargy this date. Increased O2 requirements and elevated HR on min exertion. Pt requires Max/Dependent of 2 for bed mobilty; TA for ADLs (including feeding). Dtr (PT) present at bedside indicates plans are to take pt home - plannign to purchase a [place] type device. Educated dtr (caregiver) in bed rolling/depends changing techniques to minimize discomfort and improve efficiency w/ tasks - receptive to education. Pt will benefit from ongoing OT. Continue per POC.   Treatment Diagnosis: impaired ADLs/transfers and decreased functional activity tolerance  OT Education: OT Role; Plan of Care; ADL Adaptive Strategies; Transfer Training; Family Education  Patient Education: pt very lethargic and having difficulty keeping eyes open  Barriers to Learning: language - speaks [facility]  REQUIRES OT FOLLOW UP: Yes  Activity Tolerance  Activity Tolerance: Patient limited by fatigue  Activity Tolerance: on min exertion; O2 sats on 5L O2 89%, HR 120s (after returning to supine - nurse aware)  Safety Devices  Safety Devices in place: Yes  Type of devices: Left in bed; Call light within reach; Bed alarm in place; Nurse notified (dtr at bedside; positioned in bed w/ pillows to promote comfort and pressure relief)         Patient Diagnosis(es): The primary encounter diagnosis was Altered mental status, unspecified altered mental status type. A diagnosis of Aspiration pneumonia of both lungs, unspecified aspiration pneumonia type, unspecified part of lung ([address] was also pertinent to this visit. has a past medical history of Allergic rhinitis, Asthma, Bilateral cataracts, BPH (benign prostatic hypertrophy), Diabetes mellitus, type 2 ([address] Diverticulosis, Eczema, Hyperlipidemia, Hypertension, Lumbar disc disease, Microalbuminuria, Obstructive sleep apnea, Osteoarthritis, Pneumonia, Post herpetic neuralgia, Recurrent upper respiratory infection (URI), Right shoulder pain, Spinal stenosis, Transient global amnesia, and Vitamin D deficiency. has a past surgical history that includes Prostate surgery (June 13, 2000). Restrictions  Position Activity Restriction  Other position/activity restrictions: Up with assist  Subjective   General  Chart Reviewed: Yes  Patient assessed for rehabilitation services?: Yes  Additional Pertinent Hx: 81 y.o. M presenting w/ AMS and aspiration pneumonia. Hospital Course: CXR: Persistent moderate multifocal bilateral airspace disease; CT head: (-); CT Chest: compatible with progressive and/or recurrent pneumonia, New severe narrowing and/or mucous plugging occluding the right upper lobe bronchus and partial lobar collapse.    PMH:  Family / Caregiver Present: Yes (daughter)  Referring Practitioner: Benitez Johnson, safety  Insights: Decreased awareness of deficits  Initiation: Requires cues for all  Sequencing: Requires cues for all  Cognition Comment: [place] speaking - family at bedside providing cues and encouragement; pt appearing somnolent - keeping eyes closed much of interaction                                         Plan   Plan  Times per week: 2-5  Times per day: Daily  Current Treatment Recommendations: Strengthening, Balance Training, Functional Mobility Training, Endurance Training, Safety Education & Training, Self-Care / ADL, Equipment Evaluation, Education, & procurement                                               AM-PAC Score        AM-PAC Inpatient Daily Activity Raw Score: 6 (11/22/21 1525)  AM-PAC Inpatient ADL T-Scale Score : 17.07 (11/22/21 1525)  ADL Inpatient CMS 0-100% Score: 100 (11/22/21 1525)  ADL Inpatient CMS G-Code Modifier : CN (11/22/21 1525)    Goals  Short term goals  Time Frame for Short term goals: Discharge- all goals ongoing  Short term goal 1: supine to sit w/ Mod A (not met)  Short term goal 2: unsupported sitting x 5 minutes EOB CGA (not met)  Short term goal 3: participate in simple grooming task w/ setup, Mod A (not met)  Short term goal 4: sit-stand w/ Min A of 2 - 11/12 met in [place], continue for progress to SPT-11/17 goal not met  Patient Goals   Patient goals : dtr/caregiver goal: for pt to require 1 person assist for transfer       Therapy Time   Individual Concurrent Group Co-treatment   Time In 1320         Time Out 1400         Minutes 40                 Michelle Schmid OTR/L #[id number]

## 2021-11-22 NOTE — CARE COORDINATION
Case Management Assessment           Daily Note                 Date/ Time of Note: 11/22/2021 4:23 PM         Note completed by: Ioana Garnica RN    Patient Name: Diallo Feng  YOB: 1935    Diagnosis:Acute encephalopathy [G93.40]  Altered mental status, unspecified altered mental status type [R41.82]  Aspiration pneumonia of both lungs, unspecified aspiration pneumonia type, unspecified part of lung ([address] [J69.0]  Aspiration pneumonia, unspecified aspiration pneumonia type, unspecified laterality, unspecified part of lung ([address] [N06.7]  Acute metabolic encephalopathy [M53.03]  Patient Admission Status: Inpatient    Date of Admission:11/10/2021  7:26 PM Length of Stay: 12 GLOS: GMLOS: 5.4    Current Plan of Care:  CC: Lethargy  2/2 PNA  Suspected Recurrent Aspiration PNADysphasia:  SLP consulted:    Recommended Diet and Intervention  Discussed  PEG  /  Family declines:   Pulm following :  D/w dgtr  Hospice /   PC  But not wanting this option at this time . PC following    O 2  %L HF  @ 6 L  HF   Down from 8  HF  ( 3 l O 2 is  Home baseline    ________________________________________________________________________________________  PT AM-PAC: 6 / 24 per last evaluation on: 11/22/2021    OT AM-PAC: 6 / 24 per last evaluation on: 11/22/2021    DME Needs for discharge: cirilo lift  If goes home   ________________________________________________________________________________________  Discharge Plan: Home with [address] [place] [address] Way: VS  SNF  @ [facility]:     Tentative discharge date:      Current barriers to discharge: medical Pulm clearance  Need to Weaning O 2  Down  .         Referrals completed: Not Applicable    Resources/ information provided: Not indicated at this time  ________________________________________________________________________________________  Case Management Notes:    CM cont to follow for  D/C planning :     - Patient's dgtr Swathi Rule  will decide if patient will go to a SNF. He usually goes to - [facility] if needs a SNF.   - Daughter states that she thinks if he is a 2 person assist at the time just before discharge, he may have to go to [facility]. -  If not, he has 24/7 caregivers and skilled home care. PT said that needs a cirilo lift if goes home. -   CM ordered patient seth, ( dgtr  Looking to getting Colleen Arevalo  steady  Like equip)  for home per Dr. Esperanza Bender. If Patient needs  HF  O2  <  [facility]  Will fatou to supply another  concentrator to be Y 'd into the one he has  Already at home . Cn do up to 7-8 L HF w/ 2 concentrators. Roberto and his family were provided with choice of provider; he and his family are in agreement with the discharge plan. Care Transition Patient:  Yes    Neema Bacon, FARHAT  [facility]  Case Management Department  Ph: [phone]

## 2021-11-22 NOTE — PROGRESS NOTES
Internal Medicine Progress Note    Admit Date: 11/10/2021  Day:   Diet: ADULT DIET; Dysphagia - Pureed; Mildly Thick (Nectar); Liquids by spoon only  ADULT ORAL NUTRITION SUPPLEMENT; Breakfast, Lunch; Fortified Pudding Oral Supplement    CC: AMS    Interval history:  -NAEO  -Afeb, HDS, on 6L NC  -No runs of RVR overnight  -This AM, pt appears slightly more responsive to stimuli. Pt has been provided meals from home from family and they have verbalized an understanding of the risk of aspiration  -Merrem d/c'd, 10d course completed.        Medications:     Scheduled Meds:   digoxin  125 mcg IntraVENous Daily    [Held by provider] aspirin  81 mg Oral Daily    [Held by provider] midodrine  5 mg Oral TID WC    ipratropium-albuterol  1 ampule Inhalation BID    lidocaine  1 patch TransDERmal Daily    sodium chloride flush  5-40 mL IntraVENous 2 times per day    heparin (porcine)  5,000 Units SubCUTAneous 3 times per day    insulin glargine  5 Units SubCUTAneous Nightly    insulin lispro  0-6 Units SubCUTAneous TID WC    insulin lispro  0-3 Units SubCUTAneous Nightly    Venelex   Topical BID    sodium chloride (Inhalant)  15 mL Nebulization BID    [Held by provider] finasteride  5 mg Oral Daily    [Held by provider] tamsulosin  0.4 mg Oral Daily     Continuous Infusions:   sodium chloride 75 mL/hr at 11/22/21 0004    sodium chloride 25 mL (11/12/21 1443)    dextrose       PRN Meds:senna, albuterol, sodium chloride flush, sodium chloride, [DISCONTINUED] acetaminophen **OR** acetaminophen, glucose, dextrose, glucagon (rDNA), dextrose, medicated lip ointment, acetaminophen, docusate    Objective:   Vitals:   T-max:  Patient Vitals for the past 8 hrs:   BP Temp Temp src Pulse Resp SpO2   11/22/21 1010 -- -- -- -- -- 92 %   11/22/21 0807 116/61 97.3 °F (36.3 °C) Axillary 106 30 93 %   11/22/21 0354 117/70 97.8 °F (36.6 °C) Axillary 108 24 97 %       Intake/Output Summary (Last 24 hours) at 11/22/2021 1028  Last data filed at 11/22/2021 1053  Gross per 24 hour   Intake 120 ml   Output 1375 ml   Net -1255 ml       Physical Exam  Constitutional:       General: He is not in acute distress. HENT:      Head: Normocephalic and atraumatic. Eyes:      General: No scleral icterus. Right eye: No discharge. Left eye: No discharge. Extraocular Movements: Extraocular movements intact. Conjunctiva/sclera: Conjunctivae normal.      Pupils: Pupils are equal, round, and reactive to light. Cardiovascular:      Rate and Rhythm: Normal rate and regular rhythm. Pulses: Normal pulses. Heart sounds: Normal heart sounds. No murmur heard. No friction rub. No gallop. Pulmonary:      Effort: Pulmonary effort is normal. No respiratory distress. Breath sounds: Rales (over bases bilaterally) present. No wheezing. Abdominal:      General: Abdomen is flat. Bowel sounds are normal. There is no distension. Palpations: Abdomen is soft. Musculoskeletal:      Cervical back: Normal range of motion and neck supple. No rigidity. Right lower leg: No edema. Left lower leg: No edema. Skin:     General: Skin is warm and dry. Coloration: Skin is not jaundiced. Neurological:      Comments: Patient is lethargic         LABS:    CBC:   Recent Labs     11/20/21  0815 11/21/21  0730 11/22/21  0753   WBC 3.0* 3.5* 3.3*   HGB 9.7* 10.1* 9.9*   HCT 29.1* 30.7* 29.5*   * 108* 111*   MCV 91.4 93.0 92.4     Renal:    Recent Labs     11/20/21  0815 11/21/21  0730 11/22/21  0753    142 141   K 3.8 4.0 3.7   * 110 110   CO2 24 26 24   BUN 24* 23* 22*   CREATININE 1.1 1.0 0.9   GLUCOSE 181* 150* 146*   CALCIUM 9.5 9.6 9.1   ANIONGAP 8 6 7     Hepatic:   No results for input(s): AST, ALT, BILITOT, BILIDIR, PROT, LABALBU, ALKPHOS in the last 72 hours.   Troponin:   No results for input(s): TROPONINI in the last 72 hours.  -----------------------------------------------------------------  RAD:   XR CHEST PORTABLE   Final Result   1. Diffuse multifocal consolidation, pneumonia pattern is favored over congestive failure in the absence of effusions and peripheral septal thickening. 2. Based on patient's azotemia, follow-up studies are suggested but may have to wait for normalization of renal function to evaluate the presence of adenopathy suspected on computed tomography      CT CHEST WO CONTRAST   Final Result      1. Worsening of multifocal consolidation compatible with progressive and/or recurrent pneumonia. 2.  New severe narrowing and/or mucous plugging occluding the right upper lobe bronchus and partial lobar collapse in addition to consolidation. CT HEAD WO CONTRAST   Final Result      1. No acute intracranial hemorrhage or mass effect. 2.  Mild white matter chronic small vessel ischemia. 3.  Moderate atrophy. XR CHEST PORTABLE   Final Result      Persistent moderate multifocal bilateral airspace disease, slightly worsened from prior study. Assessment/Plan:   Lethargy 2/2 PNA  Suspected Recurrent Aspiration PNA  CT consistent with recurrent pneumonia, mucous plugging in the right upper lobe/multifocal.  -Abx course completed (11 days total, 10 days of Meropenem)  -Supplemental O2 PRN, wean as tolerated   -3% saline nebulizer treatments, Chest Vest per pulm  -SLP evaluation ordered; diet recommendations made.  -Pulmonology c/s'd, no indication for bronchoscopy at this time, signed off.  -Discussion had with family about PEG tube and they do not want it, Limited x2 (No Compressions, No to Intubation)  -Holding PO meds as patient is currently too somnolent to tolerate PO intake.  -Patient's family does not want morphine to be given as they would not want to engage in any therapies that could shorten his lifespan.  Will continue current medical course.       Afib, paroxsymal  Run of afib noted overnight, resolved s/p fluid bolus and electrolyte replacement  -Will continue to monitor w/ continuous telemetry   -Digoxin maintence dose of 0.125 mg QD IV  -IVF @ 75 ml/hr  -[id number] Score for Atrial Fibrillation Stroke Risk =4. Has bled score is 1  -Dr. 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     Addendum to Resident H& P/Progress note:  I have personally seen,examined and evaluated the patient. I have reviewed the current history, physical findings, labs and assessment and plan and agree with note as documented by resident MD ( Lindie Hammans)  Discussed the patient's condition with his RN, Amelie Lomax.     John Estrada MD, FACP

## 2021-11-23 NOTE — PLAN OF CARE
Problem: Gas Exchange - Impaired:  Goal: Levels of oxygenation will improve  Description: Levels of oxygenation will improve  Outcome: Ongoing  Note: Remains on 6 L HF O2 with sats 92-95%. Lungs clear/diminished with crackles noted to bases bilaterally. Pt remains tachypneic. Cont pulse ox in place. Will continue to monitor. Problem: Skin Integrity:  Goal: Will show no infection signs and symptoms  Description: Will show no infection signs and symptoms  Outcome: Ongoing  Note: Pt has remained afebrile so far throughout shift. Started on IV vanc 11/22. IV fluids still infusing @ 75 mL/hr. No new signs of infection noted. Will continue to monitor. Problem: Fluid Volume - Deficit:  Goal: Achieves intake and output within specified parameters  Description: Achieves intake and output within specified parameters  Outcome: Ongoing  Note: IVF still infusing @ 75 mL/hr. Poor PO intake. Pt has had good urine output so far throughout shift. Will continue to monitor     Problem: Skin Integrity:  Goal: Absence of new skin breakdown  Description: Absence of new skin breakdown  Outcome: Ongoing  Note: Pt has stage 2 to coccyx, small amount of serosanguinous drainage noted. Wound cleansed with saline, venelex applied with sacral heart. Pt also has some redness to grecia area and scattered bruising. Turned and repositioned q2 and as needed with pillow support. Heels elevated off bed. Condom cath in place for moisture prevention. No new signs of skin breakdown noted. Will continue to monitor.       Problem: Falls - Risk of:  Goal: Will remain free from falls  Description: Will remain free from falls  Outcome: Ongoing

## 2021-11-23 NOTE — CONSULTS
Clinical Pharmacy Progress Note    Vancomycin - Management by Pharmacy    Consult Date(s):  11/23  Consulting Provider(s):  Dr. Amaya Carson / Plan  1) Continued fevers - Vancomycin   Concurrent Antimicrobials: n/a. Previously completed Meropenem x10 day course.  Day of Vanc Therapy: 2   Current Dosing Method: AUC  o Therapeutic Goal: AUC = 400-600 mg/L*hr  o Received 1000mg IV x1 last evening. Will continue with 1000mg IV q24h based on kinetics from last course of Vanc earlier this admission. o Will check random level tomorrow AM to further evaluate above regimen.  Will continue to monitor clinical condition and make adjustments to regimen as appropriate. Please call with questions--  Thanks--  Alric Koyanagi, PharmD, BCPS, BCGP  [id number] ([facility])   11/23/2021 12:22 PM      Interval update:  Pt with continued low grade temps. Vancomycin is being restarted today. Repeat blood cultures sent. Subjective/Objective:   Ethelene Bamberger is a 81 y.o. y/o male with a PMHx that includes DM 2, BPH, HTN, HLD, JOANNA, OA, and spinal stenosis who is admitted with lethargy thought to be 2/2 PNA. Pt completed 10-day course of Meropenem 11/22. Also found to have A.fib and HA on CKD 3 (now resolved). Pharmacy is consulted to dose Vancomycin. Current antibiotics:   (11/12-11/22)    (11/12-11/15)  Vancomycin - Pharmacy to dose   1000mg IV q24h (11/22-current)  Ht Readings from Last 1 Encounters:   11/11/21 5' 3\" (1.6 m)     Wt Readings from Last 1 Encounters:   11/16/21 157 lb 6.5 oz (71.4 kg)       Vanc Level(s) / Doses:  Date Time Dose Level / Type of Level Interpretation   11/24 06:00 1000mg q24h Random = ordered           Note: Serum levels collected for AUC-based dosing may be high if collected in close proximity to the dose administered. This is not necessarily an indicator of toxicity.     Recent Labs     11/21/21  0730 11/22/21  0753 11/23/21  0943   CREATININE 1.0 0.9 0.9   BUN 23* 22* 21*   WBC 3.5* 3.3* 3.5*       Estimated Creatinine Clearance: 53 mL/min (based on SCr of 0.9 mg/dL).     Cultures & Sensitivities:  Date Site Micro Susceptibility / Result   11/11 Blood x2 No growth to date    11/12 MRSA Nasal PCR negative    11/22 Blood x2 sent

## 2021-11-23 NOTE — PROGRESS NOTES
Internal Medicine Progress Note    Admit Date: 11/10/2021  Day:   Diet: ADULT DIET; Dysphagia - Pureed; Mildly Thick (Nectar); Liquids by spoon only  ADULT ORAL NUTRITION SUPPLEMENT; Breakfast, Lunch; Fortified Pudding Oral Supplement    CC: AMS    Interval history:  -NAEO  -Low grade fever to 100.3 yesterday, BCx2, UA, and Vanc ordered  -Afeb, HDS, on 6L NC  -No runs of RVR overnight  -This AM, pt appears  more responsive to stimuli. Discussed w/ granddaughter at bedside his current course and treatment.  -Patient in sinus tach at bedside, suspected 2/2 discomfort given his sacral wound.  Wound care requested for evaluation and treatment.   -Pt now on vanc 2/2 previous course of meropenem and low grade fever  -One time dose of morphine given this morning for patient's ongoing discomfort, Dr. Capri Duvall to discuss with family this PM    Medications:     Scheduled Meds:   digoxin  125 mcg IntraVENous Daily    [Held by provider] aspirin  81 mg Oral Daily    [Held by provider] midodrine  5 mg Oral TID     ipratropium-albuterol  1 ampule Inhalation BID    lidocaine  1 patch TransDERmal Daily    sodium chloride flush  5-40 mL IntraVENous 2 times per day    heparin (porcine)  5,000 Units SubCUTAneous 3 times per day    insulin glargine  5 Units SubCUTAneous Nightly    insulin lispro  0-6 Units SubCUTAneous TID WC    insulin lispro  0-3 Units SubCUTAneous Nightly    Venelex   Topical BID    sodium chloride (Inhalant)  15 mL Nebulization BID    [Held by provider] finasteride  5 mg Oral Daily    [Held by provider] tamsulosin  0.4 mg Oral Daily     Continuous Infusions:   sodium chloride 75 mL/hr at 11/23/21 0419    sodium chloride 25 mL (11/12/21 1443)    dextrose       PRN Meds:perflutren lipid microspheres, senna, albuterol, sodium chloride flush, sodium chloride, [DISCONTINUED] acetaminophen **OR** acetaminophen, glucose, dextrose, glucagon (rDNA), dextrose, medicated lip ointment, acetaminophen, docusate    Objective:   Vitals:   T-max:  Patient Vitals for the past 8 hrs:   BP Temp Temp src Pulse Resp SpO2   11/23/21 1020 105/69 96.4 °F (35.8 °C) Axillary 126 22 92 %   11/23/21 0923 -- -- -- -- 24 90 %   11/23/21 0555 130/77 -- -- 106 22 --   11/23/21 0439 (!) 145/53 98.3 °F (36.8 °C) Axillary 92 20 92 %   11/23/21 0429 -- -- -- -- 24 93 %       Intake/Output Summary (Last 24 hours) at 11/23/2021 1057  Last data filed at 11/23/2021 6699  Gross per 24 hour   Intake 180 ml   Output 850 ml   Net -670 ml       Physical Exam  Constitutional:       General: He is not in acute distress. HENT:      Head: Normocephalic and atraumatic. Eyes:      General: No scleral icterus. Right eye: No discharge. Left eye: No discharge. Extraocular Movements: Extraocular movements intact. Conjunctiva/sclera: Conjunctivae normal.      Pupils: Pupils are equal, round, and reactive to light. Cardiovascular:      Rate and Rhythm: Regular rhythm. Tachycardia present. Pulses: Normal pulses. Heart sounds: Normal heart sounds. No murmur heard. No friction rub. No gallop. Pulmonary:      Effort: Pulmonary effort is normal. No respiratory distress. Breath sounds: Rales (over bases bilaterally) present. No wheezing. Abdominal:      General: Abdomen is flat. Bowel sounds are normal. There is no distension. Palpations: Abdomen is soft. Musculoskeletal:      Cervical back: Normal range of motion and neck supple. No rigidity. Right lower leg: No edema. Left lower leg: No edema. Skin:     General: Skin is warm and dry. Coloration: Skin is not jaundiced.    Neurological:      Comments: Patient is lethargic         LABS:    CBC:   Recent Labs     11/21/21  0730 11/22/21  0753 11/23/21  0943   WBC 3.5* 3.3* 3.5*   HGB 10.1* 9.9* 10.0*   HCT 30.7* 29.5* 29.6*   * 111* 108*   MCV 93.0 92.4 91.0     Renal:    Recent Labs     11/21/21  0730 11/22/21  0753 11/23/21  0943  141 136   K 4.0 3.7 4.2    110 110   CO2 26 24 21   BUN 23* 22* 21*   CREATININE 1.0 0.9 0.9   GLUCOSE 150* 146* 130*   CALCIUM 9.6 9.1 9.3   ANIONGAP 6 7 5     Hepatic:   No results for input(s): AST, ALT, BILITOT, BILIDIR, PROT, LABALBU, ALKPHOS in the last 72 hours. Troponin:   No results for input(s): TROPONINI in the last 72 hours. -----------------------------------------------------------------  RAD:   XR CHEST PORTABLE   Final Result   1. Diffuse multifocal consolidation, pneumonia pattern is favored over congestive failure in the absence of effusions and peripheral septal thickening. 2. Based on patient's azotemia, follow-up studies are suggested but may have to wait for normalization of renal function to evaluate the presence of adenopathy suspected on computed tomography      CT CHEST WO CONTRAST   Final Result      1. Worsening of multifocal consolidation compatible with progressive and/or recurrent pneumonia. 2.  New severe narrowing and/or mucous plugging occluding the right upper lobe bronchus and partial lobar collapse in addition to consolidation. CT HEAD WO CONTRAST   Final Result      1. No acute intracranial hemorrhage or mass effect. 2.  Mild white matter chronic small vessel ischemia. 3.  Moderate atrophy. XR CHEST PORTABLE   Final Result      Persistent moderate multifocal bilateral airspace disease, slightly worsened from prior study.              Assessment/Plan:   Lethargy 2/2 PNA  Suspected Recurrent Aspiration PNA  CT consistent with recurrent pneumonia, mucous plugging in the right upper lobe/multifocal.  -Meropenem course completed, 10 days total  -Given concern for low-grade fever yesterday (100.3), Vanc 15mg/kg started  -Repeat UA and BCx2, pending  -Supplemental O2 PRN, wean as tolerated   -3% saline nebulizer treatments, Chest Vest per pulm  -SLP evaluation ordered; diet recommendations made.  -Pulmonology c/s'd, no indication for bronchoscopy at this time, signed off.  -Discussion had with family about PEG tube and they do not want it, Limited x2 (No Compressions, No to Intubation)  -Holding PO meds as patient is currently too somnolent to tolerate PO intake.  -Patient's family does not want morphine to be given as they would not want to engage in any therapies that could shorten his lifespan. Dr. Calhoun Best to discuss this PM.      Afib, paroxsymal  Run of afib noted overnight, resolved s/p fluid bolus and electrolyte replacement  -Will continue to monitor w/ continuous telemetry   -Digoxin maintence dose of 0.125 mg QD IV  -IVF @ 75 ml/hr  -[id number] Score for Atrial Fibrillation Stroke Risk =4. Has bled score is 1  -Dr. Calhoun Best discussed with family about starting Anticoagulation as pt was having a lot of falls so we have deferred starting and will just continue ASA     HA on CKD 3b (Resolved)    Chronic Issues:  Normocytic Anemia (Baseline Hgb ~11, 10.6 on admission- Daily CBC  T2DM- Glargine 5 U BID, LDSSI, POCTs x4 AC & HS  HTN- Holding home meds as normotensive  BPH- Holding home  Proscar and Flomax     Code Status: Limited x2, No to compressions and intubation  FEN: Dysphagia - Pureed w/ Nectar thick liquids  PPX: SQH  DISPO: CRISPIN Jasso MD, PGY-1  11/23/21  10:57 AM    This patient has been staffed and discussed with Sherry Gonzalez MD.     Addendum to Resident H& P/Progress note:  I have personally seen,examined and evaluated the patient. I have reviewed the current history, physical findings, labs and assessment and plan and agree with note as documented by resident MD ( Josh Miller)  Discussed the patient's condition with his granddaughter, Joe Hale, and his registered nurse, April  In addition, the patient has stage II sacral area pressure sore (present on admission). The patient reports significant pain and discomfort in his sacral area.   We will start IV morphine 1 mg every 6 hours as needed  We will continue vancomycin IV for now  Prognosis appears to be poor    Samantha Pettit MD, [address]

## 2021-11-23 NOTE — PROGRESS NOTES
Pt noted to have increased respirations, HR increased to 130's temperature noted 101. 8. pt given tylenol suppository as ordered. Pt's granddaughter requesting physician to see pt. Medical resident Dr. Rios Stands at bedside to see pt. Will re-assess pt. Pt repositioned for comfort.

## 2021-11-23 NOTE — PROGRESS NOTES
Speech Language Pathology  Discharge     Chart reviewed. Pt continues to be lethargic and unable to fully participate in treatment. Since pt continues to be in altered state, there is not therapeutic benefit to the pt for dysphagia services given the pt's inability to fully participate. Discussed with RN, April, who is in agreement. Pt is discharged from [id number] [place] [address] Dr. Olena Francisco, [address] [facility] Esau Carmona 40  Speech-Language Pathologist  Pager [phone]

## 2021-11-23 NOTE — PROGRESS NOTES
Occupational Therapy  Facility/Department: [address]  Daily Treatment Note  NAME: Yves Ta  : 1935  MRN: [id number]    Date of Service: 2021    Discharge Recommendations:  Yves Ta scored a 6/24 on the AM-PAC ADL Inpatient form. Current research shows that an AM-PAC score of 17 or less is typically not associated with a discharge to the patient's home setting. Based on the patient's AM-PAC score and their current ADL deficits, it is recommended that the patient have 3-5 sessions per week of Occupational Therapy at d/c to increase the patient's independence. Please see assessment section for further patient specific details. If patient discharges prior to next session this note will serve as a discharge summary. Please see below for the latest assessment towards goals. OT Equipment Recommendations  Other: cirilo lift    Assessment   Performance deficits / Impairments: Decreased functional mobility ; Decreased ADL status; Decreased safe awareness; Decreased endurance; Decreased strength; Decreased balance; Decreased cognition; Decreased posture  Assessment: Pt appeared very fatigued today, dep of 2 with bed mobility and sitting balance with max A of 1-2. Pt unable to transfer sit to stand with 2 trials and dep of 2 with lolly lepe. Recommend inpatient OT at discharge. If pt discharged to  home, he would need 24 hr A of 2, cirilo lift, bedside commode vs use of bed pan, home OT, HHA. Treatment Diagnosis: impaired ADLs/transfers and decreased functional activity tolerance  Prognosis: Fair  REQUIRES OT FOLLOW UP: Yes  Activity Tolerance  Activity Tolerance: Patient limited by fatigue  Activity Tolerance: pt on 6 liters O2, O2 sat in low 90s throughout session  Safety Devices  Safety Devices in place: Yes  Type of devices: Left in bed; Call light within reach;  Bed alarm in place; Nurse notified (granddaughter present)         Patient Diagnosis(es): The primary x, dep of 2, without success, with use of lolly stedy  Bed mobility  Rolling to Left: Dependent/Total  Rolling to Right: Dependent/Total  Supine to Sit: Dependent/Total; 2 Person assistance  Sit to Supine: Dependent/Total; 2 Person assistance  Scooting:  (dep of 2 to scoot to head of bed while supine)                          Cognition  Overall Cognitive Status: Exceptions  Arousal/Alertness: Delayed responses to stimuli  Following Commands: Inconsistently follows commands  Attention Span: Unable to maintain attention  Safety Judgement: Decreased awareness of need for assistance; Decreased awareness of need for safety  Problem Solving: Assistance required to generate solutions; Assistance required to implement solutions; Assistance required to identify errors made; Assistance required to correct errors made; Decreased awareness of errors  Insights: Decreased awareness of deficits  Initiation: Requires cues for all  Sequencing: Requires cues for all                    Type of ROM/Therapeutic Exercise  Type of ROM/Therapeutic Exercise: AAROM  Comment: supine in bed, with head of bed elevated-pt resistive to all LUE exerc, increased flexor and extensor tone vs resistance thoroughout B UEs                    Plan   Plan  Times per week: 2-5  Times per day: Daily  Current Treatment Recommendations: Strengthening, Balance Training, Functional Mobility Training, Endurance Training, Safety Education & Training, Self-Care / ADL, Equipment Evaluation, Education, & procurement  G-Code     OutComes Score                                                  AM-PAC Score        AM-[facility] Inpatient Daily Activity Raw Score: 6 (11/23/21 0935)  AM-PAC Inpatient ADL T-Scale Score : 17.07 (11/23/21 0935)  ADL Inpatient CMS 0-100% Score: 100 (11/23/21 0935)  ADL Inpatient CMS G-Code Modifier : CN (11/23/21 0935)    Goals  Short term goals  Time Frame for Short term goals: Discharge- all goals ongoing  Short term goal 1: supine to sit w/ Mod A (not met)  Short term goal 2: unsupported sitting x 5 minutes EOB CGA (not met)  Short term goal 3: participate in simple grooming task w/ setup, Mod A (not met)  Short term goal 4: sit-stand w/ Min A of 2 - 11/12 met in Carlozmarcin Mckeon, continue for progress to SPT-11/17 goal not met  Patient Goals   Patient goals : dtr/caregiver goal: for pt to require 1 person assist for transfer       Therapy Time   Individual Concurrent Group Co-treatment   Time In 0845         Time Out 0915         Minutes 30              Timed Code Treatment Minutes:  30 Minutes    Total Treatment Minutes:  [id number] [address]

## 2021-11-23 NOTE — PROGRESS NOTES
Physical Therapy  Daily Treatment Note    Discharge Recommendations: Casandra Read scored a 6/24 on the AM-PAC short mobility form. Current research shows that an AM-PAC score of 17 or less is typically not associated with a discharge to the patient's home setting. Based on the patient's AM-PAC score and their current functional mobility deficits, it is recommended that the patient have 3-5 sessions per week of Physical Therapy at d/c to increase the patient's independence. Please see assessment section for further patient specific details. Assessment:  Pt needing heavy assist x 2 for bed mobility at this time. Unable to stand with assist x 2 and Valentin Doom this session. Pt would benefit from continued IP PT to maximize strength and function. Family opting to take pt home. If pt goes home, will need 24 hour assist and jaime lift for transfers OOB. Equipment Needs: Jaime lift (if pt D/Michele home)    HOME HEALTH CARE: LEVEL 1 STANDARD (If D/Michele home)  - Initial home health evaluation to occur within 24-48 hours, in patient home   - Therapy to evaluate with goal of regaining prior level of functioning   - Therapy to evaluate if patient has [id number] [facility] needs for personal care    Chart Reviewed: Yes     Other position/activity restrictions: Up with assist   Additional Pertinent Hx: Pt to ED 11/10 with increased fatigue and AMS. Head CT (-). Chest CT: Worsening of multifocal consolidation compatible with progressive and/or recurrent PNA. PMH:  asthma, HTN, DM, OA, BPH, diverticulosis, back pain, PNA, spinal stenosis      Diagnosis: Acute Encephalopathy   Treatment Diagnosis: Decreased transfers associated with acute encephalopathy. Subjective: Pt in bed. Very sleepy initially, but more awake after moving around. Granddaughter present towards end of session.      Pain: Grimacing noted when sitting EOB (pt with sacral/coccyx wound)    Objective:    Bed mobility  Supine to sit: Dependent (Max assist x 2), HOB up  Scooting: Mod assist to EOB  Sit to Supine: Dependent assist x 2  Scooting: Dependent assist to scoot up in bed in supine/trendelenberg    Transfers  Attempted sit to stand x 2 trials from Marissa Aranda with Dependent assist (Max assist x 2)--unsuccessful. Miinimal effort noted by pt. Balance  Pt sat EOB ~ 9 minutes with Max assist x 1-2 people. Pt with heavy LOB/lean/push posteriorly initially. When holding onto Marissa Aranda bar, pt then with LOB/lean anteriorly. Safety Devices  Pt left with needs in reach. In bed with bed alarm on. RN updated. AM-PAC score  AM-PAC Inpatient Mobility Raw Score : 6  AM-PAC Inpatient T-Scale Score : 23.55  Mobility Inpatient CMS 0-100% Score: 100  Mobility Inpatient CMS G-Code Modifier : CN    Goals: (as determined and assessed by primary PT)  Time Frame for Short term goals: Discharge  Short term goal 1: supine <> sit Min A   Short term goal 2: sit <> stand Min A (met for lolly lepe). new goal: sit<->stand CGA to lolly lepe. Short term goal 3: bed <> chair Mod A      Plan:  Times per week: 2-5;    Current Treatment Recommendations: Transfer Training, Functional Mobility Training, Strengthening, Endurance Training, Balance Training    Therapy Time    Individual  Concurrent  Group  Co-treatment    Time In  845            Time Out  915            Minutes  30              Timed Code Treatment Minutes: 30  Total Treatment Minutes: 30    Will continue per plan of care. If patient is discharged prior to next treatment, this note will serve as the discharge summary.     [place], [place] #[id number]

## 2021-11-23 NOTE — CARE COORDINATION
CM cont to follow for  D/C planning :      - Patient's dgtr Eliane Denis  will decide if patient will go to a SNF. He usually goes to [facility] if needs a SNF.   - Daughter states that she thinks if he is a 2 person assist at the time just before discharge, he may have to go to [facility]. -  If not, he has 24/7 caregivers and skilled home care. PT said that needs a cirilo lift if goes home.      -   CM ordered patient seth, ( dgtr  Looking to getting Linda Bough  steady  Like equip)  for home per Dr. Sergey Gutierrez.     If Patient needs  HF  O2  <  [facility]  Will fatou to supply another  concentrator to be Y 'd into the one he has already at home . Can do up to 7-8 L HF w/ 2 concentrators.     Electronically signed by Meagan Harrington RN on 11/23/2021 at 3:51 PM

## 2021-11-24 NOTE — PROGRESS NOTES
Clinical Pharmacy Progress Note    Vancomycin - Management by Pharmacy    Consult Date(s):  11/23  Consulting Provider(s):  Dr. Evens Zamora / Plan  1) Continued fevers - Vancomycin   Concurrent Antimicrobials: n/a. Previously completed Meropenem x10 day course.  Day of Vanc Therapy: 3   Current Dosing Method: AUC  o Therapeutic Goal: AUC = 400-600 mg/L*hr  o Random level this AM = 10.9mcg/mL while on 1000mg IV q24h. Calculated AUC = 398 with trough 11.5 - subtherapeutic.  o Will increase dose to 1250mg IV q24h, which estimates AUC = 490 and steady state trough = 14.3.  Will continue to monitor clinical condition and make adjustments to regimen as appropriate. Please call with questions--  Thanks--  Amber Skinner, PharmD, BCPS, BCGP  [id number] ([facility])   11/24/2021 9:13 AM      Interval update:  Tmax 101.8 last 24 hrs. Vancomycin restarted yesterday. Repeat blood cultures with no growth thus far. Subjective/Objective:   Mary Kay Mendoza is a 81 y.o. y/o male with a PMHx that includes DM 2, BPH, HTN, HLD, JOANNA, OA, and spinal stenosis who is admitted with lethargy thought to be 2/2 PNA. Pt completed 10-day course of Meropenem 11/22. Also found to have A.fib and HA on CKD 3 (now resolved). Pharmacy is consulted to dose Vancomycin. Current antibiotics:   (11/12-11/22)    (11/12-11/15)  Vancomycin - Pharmacy to dose   1000mg IV q24h (11/22-current)  Ht Readings from Last 1 Encounters:   11/11/21 5' 3\" (1.6 m)     Wt Readings from Last 1 Encounters:   11/16/21 157 lb 6.5 oz (71.4 kg)       Vanc Level(s) / Doses:  Date Time Dose Level / Type of Level Interpretation   11/24 07:41 1000mg q24h Random = 10.9mcg/mL Calculated  with trough 11.5          Note: Serum levels collected for AUC-based dosing may be high if collected in close proximity to the dose administered. This is not necessarily an indicator of toxicity.     Recent Labs     11/22/21  0750 11/23/21  9340 11/24/21  0741   CREATININE 0.9 0.9 0.9   BUN 22* 21* 21*   WBC 3.3* 3.5* 4.1       Estimated Creatinine Clearance: 53 mL/min (based on SCr of 0.9 mg/dL).     Cultures & Sensitivities:  Date Site Micro Susceptibility / Result   11/11 Blood x2 No growth to date    11/12 MRSA Nasal PCR negative    11/22 Blood No growth to date

## 2021-11-24 NOTE — ACP (ADVANCE CARE PLANNING)
Patient's daughter, Eliane Denis, requested information about [address]. Patient does not have advance directives. He does not have capacity at this time, so he cannot complete AD's. Pt's wife is . Legal NOK are pt's son and daughter. Pt is Latter day, with no Amish affiliation. Family and I had Latter day prayer together.

## 2021-11-24 NOTE — FLOWSHEET NOTE
21 1246   Encounter Summary   Services provided to: Patient and family together   Referral/Consult From: Family   Continue Visiting   (es  )   Complexity of Encounter Moderate   Length of Encounter 30 minutes   Advance Care Planning Yes   Spiritual/Confucianist   Type Spiritual support   Assessment Approachable   Intervention Active listening; Prayer; Discussed belief system/Pentecostal practices/stanley; Discussed illness/injury and it's impact   Outcome Engaged in conversation; Receptive   Primary Decision Maker (Healthcare Proxy)   [facility] is: Legal Next of Kin  (son and daughter)   Pt's daughter Cheyenne Higgins had questions about [address]. Patient does not have decision making capacity, so he cannot complete HCPOA documents. Decision making would be for pt's next of kin. Pt's wife is . Pt has one son and one daughter. The adult children would be NOK. Pt/ family are Buddhism, and I had prayer with them.

## 2021-11-24 NOTE — PROGRESS NOTES
Occupational Therapy/Physical Therapy  Unavailable    Off floor for CT Scan. Unavailable for PT/OT treatment. Will follow up as schedule permits or continue per POC. RN aware.     Paige Perkins OTR/L [address], [place]

## 2021-11-24 NOTE — PROGRESS NOTES
Internal Medicine Progress Note    Admit Date: 11/10/2021  Diet: ADULT DIET; Dysphagia - Pureed; Mildly Thick (Nectar); Liquids by spoon only  ADULT ORAL NUTRITION SUPPLEMENT; Breakfast, Lunch; Fortified Pudding Oral Supplement    CC:  AMS    Interval history:  -NAEO  -Febrile again over last 24H, TMax 101.8  -Otherwise HDS, on 6L NC  -This AM, pt appears more obtunded and less responsive  -Small morphine doses ordered for patient for discomfort related to his sacral wound  -Pt now on vanc 2/2 previous course of meropenem and low grade fever  -One time dose of morphine given this morning for patient's ongoing discomfort, Dr. Patti Caban to discuss with family this PM    Medications:     Scheduled Meds:   vancomycin  1,250 mg IntraVENous Q24H    meropenem  1,000 mg IntraVENous Q12H    digoxin  125 mcg IntraVENous Daily    [Held by provider] aspirin  81 mg Oral Daily    [Held by provider] midodrine  5 mg Oral TID WC    ipratropium-albuterol  1 ampule Inhalation BID    lidocaine  1 patch TransDERmal Daily    sodium chloride flush  5-40 mL IntraVENous 2 times per day    heparin (porcine)  5,000 Units SubCUTAneous 3 times per day    insulin glargine  5 Units SubCUTAneous Nightly    insulin lispro  0-6 Units SubCUTAneous TID WC    insulin lispro  0-3 Units SubCUTAneous Nightly    Venelex   Topical BID    sodium chloride (Inhalant)  15 mL Nebulization BID    [Held by provider] finasteride  5 mg Oral Daily    [Held by provider] tamsulosin  0.4 mg Oral Daily     Continuous Infusions:   sodium chloride 75 mL/hr at 11/23/21 1912    sodium chloride 25 mL (11/12/21 1443)    dextrose       PRN Meds:morphine, perflutren lipid microspheres, senna, albuterol, sodium chloride flush, sodium chloride, [DISCONTINUED] acetaminophen **OR** acetaminophen, glucose, dextrose, glucagon (rDNA), dextrose, medicated lip ointment, acetaminophen, docusate    Objective:   Vitals:   T-max:  Patient Vitals for the past 8 hrs:   BP Temp Temp src Pulse Resp SpO2   11/24/21 0814 -- -- -- -- -- 93 %   11/24/21 0743 107/68 98.8 °F (37.1 °C) Axillary 115 (!) 38 93 %   11/24/21 0501 126/65 100.7 °F (38.2 °C) Axillary 114 28 92 %   11/24/21 0408 -- -- -- -- 22 97 %       Intake/Output Summary (Last 24 hours) at 11/24/2021 1059  Last data filed at 11/23/2021 1429  Gross per 24 hour   Intake --   Output 275 ml   Net -275 ml       Physical Exam  Constitutional:       General: He is not in acute distress. HENT:      Head: Normocephalic and atraumatic. Eyes:      General: No scleral icterus. Right eye: No discharge. Left eye: No discharge. Extraocular Movements: Extraocular movements intact. Conjunctiva/sclera: Conjunctivae normal.      Pupils: Pupils are equal, round, and reactive to light. Cardiovascular:      Rate and Rhythm: Regular rhythm. Tachycardia present. Pulses: Normal pulses. Heart sounds: Normal heart sounds. No murmur heard. No friction rub. No gallop. Pulmonary:      Effort: Pulmonary effort is normal. No respiratory distress. Breath sounds: Rales (over bases bilaterally) present. No wheezing. Abdominal:      General: Abdomen is flat. Bowel sounds are normal. There is no distension. Palpations: Abdomen is soft. Musculoskeletal:      Cervical back: Normal range of motion and neck supple. No rigidity. Right lower leg: No edema. Left lower leg: No edema. Skin:     General: Skin is warm and dry. Coloration: Skin is not jaundiced.    Neurological:      Comments: Patient is lethargic         LABS:    CBC:   Recent Labs     11/22/21  0753 11/23/21  0943 11/24/21  0741   WBC 3.3* 3.5* 4.1   HGB 9.9* 10.0* 9.7*   HCT 29.5* 29.6* 29.3*   * 108* 101*   MCV 92.4 91.0 91.7     Renal:    Recent Labs     11/22/21  0753 11/23/21  0943 11/24/21  0741    136 138   K 3.7 4.2 4.2    110 109   CO2 24 21 23   BUN 22* 21* 21*   CREATININE 0.9 0.9 0.9   GLUCOSE 146* 130* 133* CALCIUM 9.1 9.3 9.4   ANIONGAP 7 5 6     Hepatic:   No results for input(s): AST, ALT, BILITOT, BILIDIR, PROT, LABALBU, ALKPHOS in the last 72 hours. Troponin:   No results for input(s): TROPONINI in the last 72 hours. -----------------------------------------------------------------  RAD:   CT CHEST ABDOMEN PELVIS WO CONTRAST   Final Result   1. Small pleural effusions are present bilaterally. Bilateral airspace consolidation and groundglass opacity is present, most likely reflecting acute infectious process. 2. Small volume ascites. 3. 13 mm calculus in the right kidney. XR CHEST PORTABLE   Final Result   1. Diffuse multifocal consolidation, pneumonia pattern is favored over congestive failure in the absence of effusions and peripheral septal thickening. 2. Based on patient's azotemia, follow-up studies are suggested but may have to wait for normalization of renal function to evaluate the presence of adenopathy suspected on computed tomography      CT CHEST WO CONTRAST   Final Result      1. Worsening of multifocal consolidation compatible with progressive and/or recurrent pneumonia. 2.  New severe narrowing and/or mucous plugging occluding the right upper lobe bronchus and partial lobar collapse in addition to consolidation. CT HEAD WO CONTRAST   Final Result      1. No acute intracranial hemorrhage or mass effect. 2.  Mild white matter chronic small vessel ischemia. 3.  Moderate atrophy. XR CHEST PORTABLE   Final Result      Persistent moderate multifocal bilateral airspace disease, slightly worsened from prior study.              Assessment/Plan:   Lethargy 2/2 PNA  Suspected Recurrent Aspiration PNA  CT consistent with recurrent pneumonia, mucous plugging in the right upper lobe/multifocal.  -CT Chest/Abd/P ordered to rule out acute infection or abscess, Lungs still have bilateral consolidations w/o intraabdominal explanation of fever  -ECHO ordered to r/o vegetations  -Meropenem and vanc restarted given recurrent fevers  -Repeat UA unremarkable and BCx2 pending  -Supplemental O2 PRN, wean as tolerated   -3% saline nebulizer treatments, Chest Vest per pulm  -SLP evaluation ordered; diet recommendations made.  -Pulmonology c/s'd, no indication for bronchoscopy at this time, signed off.  -Discussion had with family about PEG tube and they do not want it, Limited x2 (No Compressions, No to Intubation)  -Holding PO meds as patient is currently too somnolent to tolerate PO intake. -Morphine 1mg IV Q6H for discomfort      Afib, paroxsymal  Run of afib noted overnight, resolved s/p fluid bolus and electrolyte replacement  -Will continue to monitor w/ continuous telemetry   -Digoxin maintence dose of 0.125 mg QD IV  -IVF @ 75 ml/hr  -[id number] Score for Atrial Fibrillation Stroke Risk =4. Has bled score is 1  -Dr. Patti Caban discussed with family about starting Anticoagulation as pt was having a lot of falls so we have deferred starting and will just continue ASA     HA on CKD 3b (Resolved)    Chronic Issues:  Normocytic Anemia (Baseline Hgb ~11, 10.6 on admission- Daily CBC  T2DM- Glargine 5 U BID, LDSSI, POCTs x4 AC & HS  HTN- Holding home meds as normotensive  BPH- Holding home  Proscar and Flomax     Code Status: Limited x2, No to compressions and intubation  FEN: Dysphagia - Pureed w/ Nectar thick liquids  PPX: SQH  DISPO: [facility]     Moira Gordon MD, PGY-1  11/24/21  10:59 AM    This patient has been staffed and discussed with Tan Armando MD.     Addendum to Resident H& P/Progress note:  I have personally seen,examined and evaluated the patient. I have reviewed the current history, physical findings, labs and assessment and plan and agree with note as documented by resident MD ( Ben Robison)    2d echo: Limited study. Left ventricular cavity size is normal. There is mild    concentric left ventricular hypertrophy.  Overall left ventricular systolic    function appears normal with an ejection fraction of 60-65 %. No regional    wall motion abnormalities are noted. The mitral valve leaflets are slightly    thickened with normal leaflet mobility. Mitral annular calcification is    present. Aortic valve leaflets appear calcific/thickened but open    adequately. A bubble study was performed and fails to show evidence of right    to left shunting.   Omelia Sovereign is a small left pleural effusion.    The right ventricle is not well visualized.    No evidence of vegetations or masses seen on valves. CT chest/ abdomen/ pelvis:  1. Small pleural effusions are present bilaterally. Bilateral airspace consolidation and groundglass opacity is present, most likely reflecting acute infectious process. 2. Small volume ascites. 3. 13 mm calculus in the right kidney. Will continue current management.      Kip Freedman MD, FACP

## 2021-11-24 NOTE — PLAN OF CARE
Problem: Gas Exchange - Impaired:  Goal: Levels of oxygenation will improve  Description: Levels of oxygenation will improve  Outcome: Ongoing  Note: Pt on 6 L high flow NC throughout shift. Pt >95% on cont pulse ox. Pt has a wet cough. Suctioned as needed. Pt in high fowlers position on a specialty mattress. Problem: Pain:  Goal: Pain level will decrease  Description: Pain level will decrease  Outcome: Ongoing  Note: Prn morphine given for Pt discomfort.

## 2021-11-24 NOTE — PROGRESS NOTES
Palliative Care Chart Review  and Check in Note:     NAME:  Casandra Read  Admit Date: 11/10/2021  Hospital Day:  Hospital Day: 15   Current Code status: Limited    Palliative care is continuing to following Mr. Collado for symptom management,  and goals of care discussion as needed. Patient's chart reviewed today 11/24/21. D/w Dr. Heri Zavala. Saw pt at the bedside, along with daughter Maricel Juares. Pt remains obtunded, now febrile. Maricel Juares reports that they are still not interested in hospice care, want his infection treated with antibiotics prior to making any decisions about goals of care. She has discussed pt's condition with Dr. Anders Durand. She reports that she will reach out to [facility] [place] [facility] if she has any questions or wants to change goals of care. Provided emotional support. The following are the currently established goals/code status, and Symptom management. Goals of care: Continue with current management, per pt's family's wishes. Code status: Limited - DNR/DNI    Discharge plan: TBD pending hospital course and family decision.        ABA Santos - CNP  11/24/21  10:54 AM

## 2021-11-25 NOTE — PLAN OF CARE
Problem: Falls - Risk of:  Goal: Will remain free from falls  Description: Will remain free from falls  11/25/2021 1421 by Kaden Tejeda RN  Outcome: Ongoing  Note: Pt is a fall risk. See Renanuradha Siddiqi Fall Score. Pt bed is in low position, side rails up, call light and belongings are in reach. Bed alarm is on. Pt encouraged to call for assistance. Will continue with hourly rounds for po intake, pain needs, toileting and repositioning as needed. Will continue to monitor for needs       Problem: Skin Integrity:  Goal: Will show no infection signs and symptoms  Description: Will show no infection signs and symptoms  Outcome: Ongoing  Note: Pt has multiple areas of skin breakdown, see flowsheets for documentation. Prevalon boots placed on pt and daughter removed, educated on use of them to offload heels. Pt turned and repositioned q2h. Lai in place, incontinence care provided with each BM. Po intake is poor, receiving supplements with meals. Problem: Gas Exchange - Impaired:  Goal: Levels of oxygenation will improve  Description: Levels of oxygenation will improve  Outcome: Ongoing  Note: Pt was on 15L HFNC this morning. Now on 5L HFNC. Pt has continuous pulse ox in place. Crackles throughout, respiratory therapy attempted NT suction this AM with no success. Problem: Pain:  Goal: Pain level will decrease  Description: Pain level will decrease  Outcome: Ongoing  Note: Pt appears to be comfortable with the exception of labored breathing and tachypnea. Will monitor.

## 2021-11-25 NOTE — PROGRESS NOTES
Resp SpO2   11/25/21 0817 (!) 101/58 97.8 °F (36.6 °C) Axillary 107 (!) 32 97 %       Intake/Output Summary (Last 24 hours) at 11/25/2021 0840  Last data filed at 11/25/2021 0530  Gross per 24 hour   Intake 8292 ml   Output 750 ml   Net 7542 ml       Physical Exam  Constitutional:       General: He is not in acute distress. HENT:      Head: Normocephalic and atraumatic. Eyes:      General: No scleral icterus. Right eye: No discharge. Left eye: No discharge. Extraocular Movements: Extraocular movements intact. Conjunctiva/sclera: Conjunctivae normal.      Pupils: Pupils are equal, round, and reactive to light. Cardiovascular:      Rate and Rhythm: Regular rhythm. Tachycardia present. Pulses: Normal pulses. Heart sounds: Normal heart sounds. No murmur heard. No friction rub. No gallop. Pulmonary:      Effort: Pulmonary effort is normal. No respiratory distress. Breath sounds: Rales (over bases bilaterally) present. No wheezing. Abdominal:      General: Abdomen is flat. Bowel sounds are normal. There is no distension. Palpations: Abdomen is soft. Musculoskeletal:      Cervical back: Normal range of motion and neck supple. No rigidity. Right lower leg: No edema. Left lower leg: No edema. Skin:     General: Skin is warm and dry. Coloration: Skin is not jaundiced. Neurological:      Comments: Patient is lethargic         LABS:    CBC:   Recent Labs     11/23/21  0943 11/24/21  0741   WBC 3.5* 4.1   HGB 10.0* 9.7*   HCT 29.6* 29.3*   * 101*   MCV 91.0 91.7     Renal:    Recent Labs     11/23/21  0943 11/24/21  0741    138   K 4.2 4.2    109   CO2 21 23   BUN 21* 21*   CREATININE 0.9 0.9   GLUCOSE 130* 133*   CALCIUM 9.3 9.4   ANIONGAP 5 6     Hepatic:   No results for input(s): AST, ALT, BILITOT, BILIDIR, PROT, LABALBU, ALKPHOS in the last 72 hours.   Troponin:   No results for input(s): TROPONINI in the last 72 hours.  -----------------------------------------------------------------  RAD:   CT CHEST ABDOMEN PELVIS WO CONTRAST   Final Result   1. Small pleural effusions are present bilaterally. Bilateral airspace consolidation and groundglass opacity is present, most likely reflecting acute infectious process. 2. Small volume ascites. 3. 13 mm calculus in the right kidney. XR CHEST PORTABLE   Final Result   1. Diffuse multifocal consolidation, pneumonia pattern is favored over congestive failure in the absence of effusions and peripheral septal thickening. 2. Based on patient's azotemia, follow-up studies are suggested but may have to wait for normalization of renal function to evaluate the presence of adenopathy suspected on computed tomography      CT CHEST WO CONTRAST   Final Result      1. Worsening of multifocal consolidation compatible with progressive and/or recurrent pneumonia. 2.  New severe narrowing and/or mucous plugging occluding the right upper lobe bronchus and partial lobar collapse in addition to consolidation. CT HEAD WO CONTRAST   Final Result      1. No acute intracranial hemorrhage or mass effect. 2.  Mild white matter chronic small vessel ischemia. 3.  Moderate atrophy. XR CHEST PORTABLE   Final Result      Persistent moderate multifocal bilateral airspace disease, slightly worsened from prior study.              Assessment/Plan:   Lethargy 2/2 PNA  Suspected Recurrent Aspiration PNA  CT consistent with recurrent pneumonia, mucous plugging in the right upper lobe/multifocal.  -CT Chest/Abd/P ordered to rule out acute infection or abscess, Lungs still have bilateral consolidations w/o intraabdominal explanation of fever, additionally demonstrate a 13 mm stone w/o obstruction or evidence of hydronephrosis  -ECHO ordered to r/o vegetations; none identified   -Meropenem and vanc restarted given recurrent fevers  -Repeat UA unremarkable and BCx2 pending  -Supplemental O2 PRN, wean as tolerated   -3% saline nebulizer treatments, Chest Vest per pulm  -SLP evaluation ordered; diet recommendations made.  -Pulmonology c/s'd, no indication for bronchoscopy at this time, signed off.  -Discussion had with family about PEG tube and they do not want it, Limited x2 (No Compressions, No to Intubation)  -Holding PO meds as patient is currently too somnolent to tolerate PO intake. -Morphine 1mg IV Q6H for discomfort      Afib, paroxsymal  Run of afib noted overnight, resolved s/p fluid bolus and electrolyte replacement  -Will continue to monitor w/ continuous telemetry   -Digoxin maintence dose of 0.125 mg QD IV  -IVF @ 75 ml/hr  -[id number] Score for Atrial Fibrillation Stroke Risk =4. Has bled score is 1  -Dr. Shirley Green discussed with family about starting Anticoagulation as pt was having a lot of falls so we have deferred starting and will just continue ASA     HA on CKD 3b (Resolved)    Chronic Issues:  Normocytic Anemia (Baseline Hgb ~11, 10.6 on admission- Daily CBC  T2DM- Glargine 5 U BID, LDSSI, POCTs x4 AC & HS  HTN- Holding home meds as normotensive  BPH- Holding home Proscar and Flomax     Code Status: Limited x2, No to compressions and intubation  FEN: Dysphagia - Pureed w/ Nectar thick liquids  PPX: SQH  DISPO: FABBY Eason MD, PGY-1  11/25/21  8:40 AM    This patient has been staffed and discussed with Bandar Calderón MD.     Addendum to Resident H& P/Progress note:  I have personally seen,examined and evaluated the patient.  I have reviewed the current history, physical findings, labs and assessment and plan and agree with note as documented by resident MD ( Bhavani Rees)  Discussed the patient's condition with his daughter, Varsha Lei, and RN, Nohemy Chambers MD, [address] [place] [address]

## 2021-11-25 NOTE — PLAN OF CARE
Problem: Falls - Risk of:  Goal: Will remain free from falls  Description: Will remain free from falls  Outcome: Ongoing  Note: Bed in lowest/locked position. Bed alarm on. Fall assessment qs. Fall precautions in place per protocol. Call light in reach. Problem: Pain:  Goal: Control of acute pain  Description: Control of acute pain  Outcome: Ongoing  Note: Pain assessed and monitored this shift. Scheduled and/or PRN meds given per MAR. Pain is tolerable, will continue to moitor.

## 2021-11-25 NOTE — PROGRESS NOTES
Clinical Pharmacy Progress Note    Vancomycin - Management by Pharmacy    Consult Date(s):  11/23  Consulting Provider(s):  Dr. Amaya Carson / Plan  1) Continued fevers - Vancomycin   Concurrent Antimicrobials: n/a. Previously completed Meropenem x10 day course.  Day of Vanc Therapy: 3   Current Dosing Method: AUC  o Therapeutic Goal: AUC = 400-600 mg/L*hr  o Dose increased yesterday to 1250mg IV q24h, which estimates AUC = 502 and steady state trough = 14.6. Continue current regimen.  Will continue to monitor clinical condition and make adjustments to regimen as appropriate. Thanks for consulting pharmacy! Please call with questions [phone] [facility]. [facility] Resident   11/25/2021 12:16 PM      Interval update:  Tmax 100.6 last 24 hrs. Vancomycin restarted 11/22. Repeat blood cultures with no growth thus far. Subjective/Objective:   Ethelene Bamberger is a 81 y.o. y/o male with a PMHx that includes DM 2, BPH, HTN, HLD, JOANNA, OA, and spinal stenosis who is admitted with lethargy thought to be 2/2 PNA. Pt completed 10-day course of Meropenem 11/22. Also found to have A.fib and HA on CKD 3 (now resolved). Pharmacy is consulted to dose Vancomycin. Current antibiotics:   (11/12-11/22)    (11/12-11/15)  Vancomycin - Pharmacy to dose   1000mg IV q24h (11/22-11/24)   1250mg IV q24h (11/24-current)  Ht Readings from Last 1 Encounters:   11/11/21 5' 3\" (1.6 m)     Wt Readings from Last 1 Encounters:   11/16/21 157 lb 6.5 oz (71.4 kg)       Vanc Level(s) / Doses:  Date Time Dose Level / Type of Level Interpretation   11/24 07:41 1000mg q24h Random = 10.9mcg/mL Calculated  with trough 11.5          Note: Serum levels collected for AUC-based dosing may be high if collected in close proximity to the dose administered. This is not necessarily an indicator of toxicity.     Recent Labs     11/23/21  0943 11/24/21  0741 11/25/21  1055   CREATININE 0.9 0.9 0.9   BUN 21* 21* 22* WBC 3.5* 4.1 2.8*       Estimated Creatinine Clearance: 53 mL/min (based on SCr of 0.9 mg/dL).     Cultures & Sensitivities:  Date Site Micro Susceptibility / Result   11/11 Blood x2 No growth to date    11/12 MRSA Nasal PCR negative    11/22 Blood No growth to date

## 2021-11-26 NOTE — PROGRESS NOTES
Clinical Pharmacy Progress Note    Vancomycin - Management by Pharmacy    Consult Date(s):  11/23  Consulting Provider(s):  Dr. Paul Rangel / Plan  1) Continued fevers - Vancomycin   Concurrent Antimicrobials: Meropenem   Day of Vanc Therapy: 5   Current Dosing Method: AUC  o Therapeutic Goal: AUC = 400-600 mg/L*hr  o Continue 1250mg IV q24h. Calculated AUC = 545 and steady state trough = 16.4.   o Will check random level in AM tomorrow 11/27 for further evaluate above regimen.  Will continue to monitor clinical condition and make adjustments to regimen as appropriate. Please call with questions--  Thanks--  Sharon Noramn, PharmD, BCPS, BCGP  [id number] ([facility])   11/26/2021 10:08 AM      Interval update:  Tmax 100.6 last 24 hrs. Meropenem restarted. Subjective/Objective:   Juan Aggarwal is a 81 y.o. y/o male with a PMHx that includes DM 2, BPH, HTN, HLD, JOANNA, OA, and spinal stenosis who is admitted with lethargy thought to be 2/2 PNA. Pt completed 10-day course of Meropenem 11/22. Also found to have A.fib and HA on CKD 3 (now resolved). Pharmacy is consulted to dose Vancomycin. Current antibiotics:   (11/12-11/22)    (11/12-11/15)  Meropenem 1000mg IV q12h (11/23-current)  Vancomycin - Pharmacy to dose   1000mg IV q24h (11/22-11/24)   1250mg IV q24h (11/24-current)  Ht Readings from Last 1 Encounters:   11/11/21 5' 3\" (1.6 m)     Wt Readings from Last 1 Encounters:   11/26/21 174 lb 2.6 oz (79 kg)       Vanc Level(s) / Doses:  Date Time Dose Level / Type of Level Interpretation   11/24 07:41 1000mg q24h Random = 10.9mcg/mL Calculated  with trough 11.5   11/27 06:00 1250mg q24h Random = ordered    Note: Serum levels collected for AUC-based dosing may be high if collected in close proximity to the dose administered. This is not necessarily an indicator of toxicity.     Recent Labs     11/24/21  0741 11/25/21  1055 11/26/21  0700   CREATININE 0.9 0.9 1.0   BUN 21* 22* 24*   WBC 4.1 2.8* 3.2*       Estimated Creatinine Clearance: 50 mL/min (based on SCr of 1 mg/dL).     Cultures & Sensitivities:  Date Site Micro Susceptibility / Result   11/11 Blood x2 No growth to date    11/12 MRSA Nasal PCR negative    11/22 Blood No growth to date

## 2021-11-26 NOTE — PLAN OF CARE
Problem: Nutrition  Goal: Optimal nutrition therapy  Outcome: Ongoing     Nutrition Problem #1: Inadequate oral intake  Intervention: Food and/or Nutrient Delivery: Continue Current Diet, Continue Oral Nutrition Supplement  Nutritional Goals: Pt to consume >50% of meals and ONS this adm

## 2021-11-26 NOTE — PROGRESS NOTES
Significant Event: At approximately 6 PM I was summoned to bedside to discuss the patient's current vascular access status. Earlier in the day, I discussed with both nursing staff and the family that the patient's current working peripheral IV in his right forearm was surrounded with what appears to be lymphedema given the fact that it does not pit. Throughout the day, the peripheral IV has been reevaluated and currently does flush appropriately and appears to not have currently infiltrated. Additionally, the arm does not show any current signs of ischemia, acute infection, or vascular compromise based on the patient's intact capillary refill. Given the concerns of the lymphedema surrounding the arm in which the peripheral IV was placed new access was attempted by both nursing staff, the charge nurse, and ultrasound-guided IV placement via an ICU resident. Unfortunately, none of these access attempts were successful. I discussed with the family at bedside that the most reliable method of vascular access would be placing a central line however after lengthy discussion with the pros and cons of this the family deferred the placement of a central line as they did not feel that the pain and discomfort involved with placing a line was in line with the patient's goals of care. I discussed with the family that currently the PICC team is not available and will likely not be available until Monday. I discussed that a viable option would be to continue to use the above described peripheral IV in the patient's right forearm until Monday when more reliable access could hopefully be obtained via the PICC team in order to save the patient the discomfort of having a central line placed despite the fact that it is not necessarily optimal vascular access.   The family was agreeable to this plan, and understood that the peripheral IV while providing some access was not ideal.  I also discussed with the family that if at any time new peripheral IV began to show signs of infiltration or the limb begins to show signs of acute infection, infiltration, or new pitting edema at the use of that line will be discontinued in the situation revolving around his current vascular access would be reevaluated. Patient's family verbalized understanding of this and were agreeable to proceed with the use of the peripheral IV until PICC team can be contacted on Monday to hopefully establish more reliable long-term access. Jase Zavala MD, PGY1

## 2021-11-26 NOTE — PLAN OF CARE
Problem: Falls - Risk of:  Goal: Will remain free from falls  Description: Will remain free from falls  11/26/2021 0131 by Dae Grider, FARHAT  Outcome: Ongoing  Note: Bed in lowest/locked position. Bed alarm on. Fall assessment qs. Fall precautions in place per protocol. Call light in reach. Frequent checks on patient        Problem: Skin Integrity:  Goal: Will show no infection signs and symptoms  Description: Will show no infection signs and symptoms  11/25/2021 1421 by Flores Cortes RN  Outcome: Ongoing  Note: Pt has multiple areas of skin breakdown, see flowsheets for documentation. Prevalon boots placed on pt and daughter removed, educated on use of them to offload heels. Pt turned and repositioned q2h. Lai in place, incontinence care provided with each BM. Po intake is poor, receiving supplements with meals.

## 2021-11-26 NOTE — PROGRESS NOTES
Internal Medicine Progress Note    Admit Date: 11/10/2021  Diet: ADULT DIET; Dysphagia - Pureed; Mildly Thick (Nectar); Liquids by spoon only  ADULT ORAL NUTRITION SUPPLEMENT; Breakfast, Lunch; Fortified Pudding Oral Supplement    CC: AMS    Interval history:  -NAEO  -Febrile again over last 24H, TMax 100.6  -Otherwise HDS, on 6L NC  -This AM, pt has ongoing lethargy w/ minimal responsiveness  -Small morphine doses ordered for patient for discomfort related to his sacral wound  -Ongoing vanc and merrem  -CT studies discussed w/ radiology, stone unlikely to be causing symptoms as it is not causing obstruction.  Gallbladder is distended and will be re-evaluated in the PM.     Medications:     Scheduled Meds:   vancomycin  1,250 mg IntraVENous Q24H    meropenem  1,000 mg IntraVENous Q12H    digoxin  125 mcg IntraVENous Daily    [Held by provider] aspirin  81 mg Oral Daily    [Held by provider] midodrine  5 mg Oral TID WC    ipratropium-albuterol  1 ampule Inhalation BID    lidocaine  1 patch TransDERmal Daily    sodium chloride flush  5-40 mL IntraVENous 2 times per day    heparin (porcine)  5,000 Units SubCUTAneous 3 times per day    insulin glargine  5 Units SubCUTAneous Nightly    insulin lispro  0-6 Units SubCUTAneous TID WC    insulin lispro  0-3 Units SubCUTAneous Nightly    Venelex   Topical BID    sodium chloride (Inhalant)  15 mL Nebulization BID    [Held by provider] finasteride  5 mg Oral Daily    [Held by provider] tamsulosin  0.4 mg Oral Daily     Continuous Infusions:   sodium chloride 75 mL/hr at 11/25/21 1407    sodium chloride 25 mL (11/12/21 1443)    dextrose       PRN Meds:morphine, senna, albuterol, sodium chloride flush, sodium chloride, [DISCONTINUED] acetaminophen **OR** acetaminophen, glucose, dextrose, glucagon (rDNA), dextrose, medicated lip ointment, acetaminophen, docusate    Objective:   Vitals:   T-max:  Patient Vitals for the past 8 hrs:   BP Temp Temp src Pulse Resp SpO2 Weight   11/26/21 0847 -- -- -- -- 24 94 % --   11/26/21 0700 120/71 99.1 °F (37.3 °C) Axillary 115 22 -- --   11/26/21 0446 (!) 109/57 97.4 °F (36.3 °C) Axillary 111 26 97 % 174 lb 2.6 oz (79 kg)       Intake/Output Summary (Last 24 hours) at 11/26/2021 1118  Last data filed at 11/25/2021 1411  Gross per 24 hour   Intake 1879 ml   Output 350 ml   Net 1529 ml       Physical Exam  Constitutional:       General: He is not in acute distress. HENT:      Head: Normocephalic and atraumatic. Eyes:      General: No scleral icterus. Right eye: No discharge. Left eye: No discharge. Extraocular Movements: Extraocular movements intact. Conjunctiva/sclera: Conjunctivae normal.      Pupils: Pupils are equal, round, and reactive to light. Cardiovascular:      Rate and Rhythm: Regular rhythm. Tachycardia present. Pulses: Normal pulses. Heart sounds: Normal heart sounds. No murmur heard. No friction rub. No gallop. Pulmonary:      Effort: Pulmonary effort is normal. No respiratory distress. Breath sounds: Rales (diffuse) present. No wheezing. Comments: On 6L HFNC  Abdominal:      General: Abdomen is flat. Bowel sounds are normal. There is no distension. Palpations: Abdomen is soft. Musculoskeletal:      Cervical back: Normal range of motion and neck supple. No rigidity. Right lower leg: No edema. Left lower leg: No edema. Skin:     General: Skin is warm and dry. Coloration: Skin is not jaundiced.    Neurological:      Comments: Patient is lethargic         LABS:    CBC:   Recent Labs     11/24/21  0741 11/25/21  1055 11/26/21  0700   WBC 4.1 2.8* 3.2*   HGB 9.7* 9.1* 9.5*   HCT 29.3* 27.8* 28.7*   * 102* 114*   MCV 91.7 91.5 91.0     Renal:    Recent Labs     11/24/21  0741 11/25/21  1055 11/26/21  0700    139 141   K 4.2 3.9 4.0    109 110   CO2 23 25 25   BUN 21* 22* 24*   CREATININE 0.9 0.9 1.0   GLUCOSE 133* 146* 158*   CALCIUM 9.4 9.4 9.8   ANIONGAP 6 5 6     Hepatic:   No results for input(s): AST, ALT, BILITOT, BILIDIR, PROT, LABALBU, ALKPHOS in the last 72 hours. Troponin:   No results for input(s): TROPONINI in the last 72 hours. -----------------------------------------------------------------  RAD:   CT CHEST ABDOMEN PELVIS WO CONTRAST   Final Result   1. Small pleural effusions are present bilaterally. Bilateral airspace consolidation and groundglass opacity is present, most likely reflecting acute infectious process. 2. Small volume ascites. 3. 13 mm calculus in the right kidney. XR CHEST PORTABLE   Final Result   1. Diffuse multifocal consolidation, pneumonia pattern is favored over congestive failure in the absence of effusions and peripheral septal thickening. 2. Based on patient's azotemia, follow-up studies are suggested but may have to wait for normalization of renal function to evaluate the presence of adenopathy suspected on computed tomography      CT CHEST WO CONTRAST   Final Result      1. Worsening of multifocal consolidation compatible with progressive and/or recurrent pneumonia. 2.  New severe narrowing and/or mucous plugging occluding the right upper lobe bronchus and partial lobar collapse in addition to consolidation. CT HEAD WO CONTRAST   Final Result      1. No acute intracranial hemorrhage or mass effect. 2.  Mild white matter chronic small vessel ischemia. 3.  Moderate atrophy. XR CHEST PORTABLE   Final Result      Persistent moderate multifocal bilateral airspace disease, slightly worsened from prior study. Assessment/Plan:   Lethargy 2/2 PNA  Suspected Recurrent Aspiration PNA  CT consistent with recurrent pneumonia, mucous plugging in the right upper lobe/multifocal.  -CT Chest/Abd/P ordered to rule out acute infection or abscess, Lungs still have bilateral consolidations w/o obvious signs of infection. Distended gallbladder noted.   -ECHO ordered to r/o vegetations  -Meropenem and vanc restarted given recurrent fevers  -Repeat UA unremarkable and BCx2 NGTD  -Supplemental O2 PRN, wean as tolerated   -3% saline nebulizer treatments, Chest Vest per pulm  -SLP evaluation ordered; diet recommendations made.  -Pulmonology c/s'd, no indication for bronchoscopy at this time, signed off.  -Discussion had with family about PEG tube and they do not want it, Limited x2 (No Compressions, No to Intubation)  -Holding PO meds as patient is currently too somnolent to tolerate PO intake. -Morphine 1mg IV Q6H for discomfort      Afib, paroxsymal  Run of afib noted overnight, resolved s/p fluid bolus and electrolyte replacement  -Will continue to monitor w/ continuous telemetry   -Digoxin maintence dose of 0.125 mg QD IV  -IVF @ 75 ml/hr  -[id number] Score for Atrial Fibrillation Stroke Risk =4. Has bled score is 1  -Dr. Marzena Reich discussed with family about starting Anticoagulation as pt was having a lot of falls so we have deferred starting and will just continue ASA     HA on CKD 3b (Resolved)    Chronic Issues:  Normocytic Anemia (Baseline Hgb ~11, 10.6 on admission- Daily CBC  T2DM- Glargine 5 U BID, LDSSI, POCTs x4 AC & HS  HTN- Holding home meds as normotensive  BPH- Holding home  Proscar and Flomax     Code Status: Limited x2, No to compressions and intubation  FEN: Dysphagia - Pureed w/ Nectar thick liquids  PPX: SQH  DISPO: [facility]     Pancho Mckeon MD, PGY-1  11/26/21  11:18 AM    This patient has been staffed and discussed with John Estrada MD.     Addendum to Resident H& P/Progress note:  I have personally seen,examined and evaluated the patient. I have reviewed the current history, physical findings, labs and assessment and plan and agree with note as documented by resident MD ( Lindie Hammans)  Discussed the patient's condition with his granddaughter.   Discussed CT scan of the abdomen and pelvis findings with interventional radiologist, MD John Burdick, MD, [address]

## 2021-11-26 NOTE — PROGRESS NOTES
than or equal to 7 days    Weight Loss %: 10% loss or greater    Weight loss Time: Greater than or equal to 3 months   Body Fat Loss: Unable to Assess   Body Fat Location: Unable to assess    Body Muscle Loss: Unable to Assess   Body Muscle Loss Location: Unable to assess    Fluid Accumulation: Unable to assess    Fluid Accumulation Location: Unable to assess     Strength: Not Performed; Not Measured     NUTRITION DIAGNOSIS   · Inadequate oral intake related to cognitive or neurological impairment as evidenced by weight loss, poor intake prior to admission      [address] and/or Nutrient Delivery:  Continue Current Diet, Continue Oral Nutrition Supplement  Nutrition Education/Counseling:  No recommendation at this time   Goals:  Pt will consistently consume >50% of meals and supplements throughout adm       Nutrition Monitoring and Evaluation:   Food/Nutrient Intake Outcomes:  Food and Nutrient Intake, Diet Advancement/Tolerance  Physical Signs/Symptoms Outcomes:  Weight, Chewing or Swallowing, Biochemical Data     OBJECTIVE DATA: Significant to nutrition assessment  · Nutrition-Focused Physical Findings: Nutrition Related Findings: BM 11/17; improved edema- +1   · Labs: Reviewed; -   · Meds: Reviewed; NaCl IVF  · Wounds: Wound Type: Stage II (coccyx)     CURRENT NUTRITION THERAPIES  ADULT DIET; Dysphagia - Pureed; Mildly Thick (Nectar); Liquids by spoon only  ADULT ORAL NUTRITION SUPPLEMENT; Breakfast, Lunch;  Fortified Pudding Oral Supplement     PO Intake: Average Meal Intake: Unable to assess   PO Supplement Intake:Average Supplements Intake: None Ordered  IVF: 100 ml/hr     ANTHROPOMETRICS  Current Height: 5' 3\" (160 cm)  Current Weight: 174 lb 2.6 oz (79 kg)    Admission weight: 155 lb (70.3 kg)  Ideal Body Weight (lbs) (Calculated): 124 lbs (Ideal Body Weight (Kg) (Calculated): 56 kg)  Usual Bodyweight 179-182 lbs per EMR   Weight Changes -34.5 lbs in 3 months       BMI BMI (Calculated): 30.9    Wt Readings from Last 50 Encounters:   11/11/21 144 lb 13.5 oz (65.7 kg)   10/20/21 147 lb 7.8 oz (66.9 kg)   08/21/21 179 lb 4.8 oz (81.3 kg)   06/08/21 177 lb (80.3 kg)   03/18/21 180 lb 4.8 oz (81.8 kg)   11/20/20 182 lb 6.4 oz (82.7 kg)       COMPARATIVE STANDARDS  Energy (kcal):  4078-0907 (20-25); Weight Used for Energy Requirements:  Current (65.7 kg)     Protein (g):  56-62 (1.0-1.2); Weight Used for Protein Requirements:  Ideal (56)        Fluid (ml/day):  6499-1487 ml; Method Used for Fluid Requirements:  1 ml/kcal      The patient will still be monitored per nutrition standards of care. Consult dietitian if nutrition interventions essential to patient care is needed.      Monica Galloway [address], [address]  [place]:  [phone]  Office:  [phone]

## 2021-11-26 NOTE — CARE COORDINATION
SW Following, Pt from home w/24/7 caregivers and skilled home care. Pt's dtr Bonnie Poser [phone] is the primary decision maker and will decide on Home vs SNF. Pt has been to [facility] in the past and prefers to return there if placement is needed. DME order for a Pt Lift has been placed. SW submitted a preliminary referral to [facility] in case SNF placement is needed. SW will continue to follow for DC needs/recs.     Electronically signed by JAYME Mccloud, DEANNA on 11/26/2021 at 3:30 PM   [phone]

## 2021-11-27 NOTE — PROGRESS NOTES
Clinical Pharmacy Progress Note    Vancomycin - Management by Pharmacy    Consult Date(s):  11/23  Consulting Provider(s):  Dr. Kaykay Oropeza / Plan  1) Continued fevers - Vancomycin   Concurrent Antimicrobials: Meropenem   Day of Vanc Therapy: 6   Current Dosing Method: AUC  o Therapeutic Goal: AUC = 400-600 mg/L*hr  o Random level this AM = 15.6mcg/mL. Continue 1250mg IV q24h. Calculated AUC = 490 and steady state trough = 14.1 on this regimen.  Will continue to monitor clinical condition and make adjustments to regimen as appropriate. Please call with questions--  Thanks--  Ambika Roblse, PharmD, BCPS, BCGP  [id number] ([facility])   11/27/2021 9:05 AM      Interval update:  Tmax 99.9 last 24 hrs. Subjective/Objective:   Arlin Salazar is a 81 y.o. y/o male with a PMHx that includes DM 2, BPH, HTN, HLD, JOANNA, OA, and spinal stenosis who is admitted with lethargy thought to be 2/2 PNA. Pt completed 10-day course of Meropenem 11/22. Also found to have A.fib and HA on CKD 3 (now resolved). Pharmacy is consulted to dose Vancomycin. Current antibiotics:   (11/12-11/22)    (11/12-11/15)  Meropenem 1000mg IV q12h (11/23-current)  Vancomycin - Pharmacy to dose   1000mg IV q24h (11/22-11/24)   1250mg IV q24h (11/24-current)  Ht Readings from Last 1 Encounters:   11/11/21 5' 3\" (1.6 m)     Wt Readings from Last 1 Encounters:   11/26/21 174 lb 2.6 oz (79 kg)       Vanc Level(s) / Doses:  Date Time Dose Level / Type of Level Interpretation   11/24 07:41 1000mg q24h Random = 10.9mcg/mL Calculated  with trough 11.5   11/27 07:47 1250mg q24h Random = 15.6mcg/mL Calculated  with trough 14.1   Note: Serum levels collected for AUC-based dosing may be high if collected in close proximity to the dose administered. This is not necessarily an indicator of toxicity.     Recent Labs     11/25/21  1055 11/26/21  0700 11/27/21  0747   CREATININE 0.9 1.0 0.9   BUN 22* 24* 23*   WBC 2.8* 3.2* 3.4*       Estimated Creatinine Clearance: 56 mL/min (based on SCr of 0.9 mg/dL).     Cultures & Sensitivities:  Date Site Micro Susceptibility / Result   11/11 Blood x2 No growth to date    11/12 MRSA Nasal PCR negative    11/22 Blood No growth to date

## 2021-11-27 NOTE — PROGRESS NOTES
111 20 95 %   11/27/21 0500 110/74 98 °F (36.7 °C) Axillary 100 22 95 %       Intake/Output Summary (Last 24 hours) at 11/27/2021 1106  Last data filed at 11/27/2021 0219  Gross per 24 hour   Intake --   Output 1150 ml   Net -1150 ml       Physical Exam  Constitutional:       General: He is not in acute distress. HENT:      Head: Normocephalic and atraumatic. Eyes:      General: No scleral icterus. Right eye: No discharge. Left eye: No discharge. Extraocular Movements: Extraocular movements intact. Conjunctiva/sclera: Conjunctivae normal.      Pupils: Pupils are equal, round, and reactive to light. Cardiovascular:      Rate and Rhythm: Regular rhythm. Tachycardia present. Pulses: Normal pulses. Heart sounds: Normal heart sounds. No murmur heard. No friction rub. No gallop. Pulmonary:      Effort: Pulmonary effort is normal. No respiratory distress. Breath sounds: Rales (diffuse) present. No wheezing. Comments: On 6L HFNC  Abdominal:      General: Abdomen is flat. Bowel sounds are normal. There is no distension. Palpations: Abdomen is soft. Musculoskeletal:      Cervical back: Normal range of motion and neck supple. No rigidity. Right lower leg: No edema. Left lower leg: No edema. Skin:     General: Skin is warm and dry. Coloration: Skin is not jaundiced.    Neurological:      Comments: Patient is lethargic         LABS:    CBC:   Recent Labs     11/25/21  1055 11/26/21  0700 11/27/21  0747   WBC 2.8* 3.2* 3.4*   HGB 9.1* 9.5* 9.7*   HCT 27.8* 28.7* 29.8*   * 114* 112*   MCV 91.5 91.0 92.0     Renal:    Recent Labs     11/25/21  1055 11/26/21  0700 11/27/21  0747    141 137   K 3.9 4.0 4.0    110 106   CO2 25 25 27   BUN 22* 24* 23*   CREATININE 0.9 1.0 0.9   GLUCOSE 146* 158* 178*   CALCIUM 9.4 9.8 10.3   ANIONGAP 5 6 4     Hepatic:   Recent Labs     11/27/21  0747   AST 30   ALT 31   BILITOT 0.4   BILIDIR <0.2   PROT 5.9*   LABALBU 1.7*   ALKPHOS 589*     Troponin:   No results for input(s): TROPONINI in the last 72 hours. -----------------------------------------------------------------  RAD:   CT CHEST ABDOMEN PELVIS WO CONTRAST   Final Result   1. Small pleural effusions are present bilaterally. Bilateral airspace consolidation and groundglass opacity is present, most likely reflecting acute infectious process. 2. Small volume ascites. 3. 13 mm calculus in the right kidney. XR CHEST PORTABLE   Final Result   1. Diffuse multifocal consolidation, pneumonia pattern is favored over congestive failure in the absence of effusions and peripheral septal thickening. 2. Based on patient's azotemia, follow-up studies are suggested but may have to wait for normalization of renal function to evaluate the presence of adenopathy suspected on computed tomography      CT CHEST WO CONTRAST   Final Result      1. Worsening of multifocal consolidation compatible with progressive and/or recurrent pneumonia. 2.  New severe narrowing and/or mucous plugging occluding the right upper lobe bronchus and partial lobar collapse in addition to consolidation. CT HEAD WO CONTRAST   Final Result      1. No acute intracranial hemorrhage or mass effect. 2.  Mild white matter chronic small vessel ischemia. 3.  Moderate atrophy. XR CHEST PORTABLE   Final Result      Persistent moderate multifocal bilateral airspace disease, slightly worsened from prior study. Assessment/Plan:   Lethargy 2/2 PNA  Suspected Recurrent Aspiration PNA  CT consistent with recurrent pneumonia, mucous plugging in the right upper lobe/multifocal.  -CT Chest/Abd/P ordered to rule out acute infection or abscess, Lungs still have bilateral consolidations w/o obvious signs of infection. Distended gallbladder noted.   -ECHO ordered to r/o vegetations  -Meropenem and vanc restarted given recurrent fevers  -Repeat UA unremarkable and BCx2 NGTD  -Supplemental O2 PRN, wean as tolerated   -3% saline nebulizer treatments, Chest Vest per pulm  -SLP evaluation ordered; diet recommendations made.  -Pulmonology c/s'd, no indication for bronchoscopy at this time, signed off.  -Discussion had with family about PEG tube and they do not want it, Limited x2 (No Compressions, No to Intubation)  -Holding PO meds as patient is currently too somnolent to tolerate PO intake. -Morphine 1mg IV Q6H for discomfort      Afib, paroxsymal  Run of afib noted overnight, resolved s/p fluid bolus and electrolyte replacement  -Will continue to monitor w/ continuous telemetry   -Digoxin maintence dose of 0.125 mg QD IV  -IVF @ 75 ml/hr  -[id number] Score for Atrial Fibrillation Stroke Risk =4. Has bled score is 1  -Dr. Yessy Gonzalez discussed with family about starting Anticoagulation as pt was having a lot of falls so we have deferred starting and will just continue ASA    Anasarca  Patient with pitting edema peripherally in all 4 extremities. Patient does have good urine output. Possibly secondary to fluid overload versus third spacing from decreased albumin.  -Follow-up LFTs  -We will ask PICC team on Monday to place line if peripherals have gone bad. HA on CKD 3b (Resolved)    Chronic Issues:  Normocytic Anemia (Baseline Hgb ~11, 10.6 on admission- Daily CBC  T2DM- Glargine 5 U BID, LDSSI, POCTs x4 AC & HS  HTN- Holding home meds as normotensive  BPH- Holding home  Proscar and Flomax     Code Status: Limited x2, No to compressions and intubation  FEN: Dysphagia - Pureed w/ Nectar thick liquids  PPX: SQH  DISPO: [facility], Will discuss possible LTAC placement     Keily Colon MD, PGY-2  11/27/21  11:06 AM    This patient has been staffed and discussed with Samir Maher MD.     Addendum to Resident H& P/Progress note:  I have personally seen,examined and evaluated the patient.  I have reviewed the current history, physical findings, labs and assessment and plan and agree with note as documented by resident MD ( Jaime Pradhan)  Albumin-1.7; + severe malnutrition    Discussed the patient's condition with his daughter and granddaughter.     Quillian Gosselin, MD, FACP

## 2021-11-28 NOTE — RT PROTOCOL NOTE
RT Nebulizer Bronchodilator Protocol Note    There is a bronchodilator order in the chart from a provider indicating to follow the RT Bronchodilator Protocol and there is an Initiate RT Bronchodilator Protocol order as well (see protocol at bottom of note). CXR Findings:  No results found. The findings from the last RT Protocol Assessment were as follows:  Smoking: Smoker 15 pack years or more  Respiratory Pattern: Regular pattern and RR 12-20 bpm  Breath Sounds: Inspiratory and expiratory or bilateral wheezing and/or rhonchi  Cough: Weak, non-productive  Indication for Bronchodilator Therapy: Decreased or absent breath sounds  Bronchodilator Assessment Score: 10    Aerosolized bronchodilator medication orders have been revised according to the RT Nebulizer Bronchodilator Protocol below. Respiratory Therapist to perform RT Therapy Protocol Assessment initially then follow the protocol. Repeat RT Therapy Protocol Assessment PRN for score 0-3 or on second treatment, BID, and PRN for scores above 3. No Indications - adjust the frequency to every 6 hours PRN wheezing or bronchospasm, if no treatments needed after 48 hours then discontinue using Per Protocol order mode. If indication present, adjust the RT bronchodilator orders based on the Bronchodilator Assessment Score as indicated below. If a patient is on this medication at home then do not decrease Frequency below that used at home. 0-3 - enter or revise RT bronchodilator order(s) to equivalent RT Bronchodilator order with Frequency of every 4 hours PRN for wheezing or increased work of breathing using Per Protocol order mode. 4-6 - enter or revise RT Bronchodilator order(s) to two equivalent RT bronchodilator orders with one order with BID Frequency and one order with Frequency of every 4 hours PRN wheezing or increased work of breathing using Per Protocol order mode.          7-10 - enter or revise RT Bronchodilator order(s) to two equivalent RT bronchodilator orders with one order with TID Frequency and one order with Frequency of every 4 hours PRN wheezing or increased work of breathing using Per Protocol order mode. 11-13 - enter or revise RT Bronchodilator order(s) to one equivalent RT bronchodilator order with QID Frequency and an Albuterol order with Frequency of every 4 hours PRN wheezing or increased work of breathing using Per Protocol order mode. Greater than 13 - enter or revise RT Bronchodilator order(s) to one equivalent RT bronchodilator order with every 4 hours Frequency and an Albuterol order with Frequency of every 2 hours PRN wheezing or increased work of breathing using Per Protocol order mode. RT to enter RT Home Evaluation for COPD & MDI Assessment order using Per Protocol order mode.     Electronically signed by Bianca Heck RCP on 11/28/2021 at 4:08 PM

## 2021-11-28 NOTE — PROGRESS NOTES
°C) Skin 78 20 96 %       Intake/Output Summary (Last 24 hours) at 11/28/2021 0839  Last data filed at 11/28/2021 0816  Gross per 24 hour   Intake 3540.08 ml   Output 4500 ml   Net -959.92 ml       Physical Exam  Constitutional:       General: He is not in acute distress. HENT:      Head: Normocephalic and atraumatic. Eyes:      General: No scleral icterus. Right eye: No discharge. Left eye: No discharge. Extraocular Movements: Extraocular movements intact. Conjunctiva/sclera: Conjunctivae normal.      Pupils: Pupils are equal, round, and reactive to light. Cardiovascular:      Rate and Rhythm: Regular rhythm. Tachycardia present. Pulses: Normal pulses. Heart sounds: Normal heart sounds. No murmur heard. No friction rub. No gallop. Pulmonary:      Effort: Pulmonary effort is normal. No respiratory distress. Breath sounds: Rales (diffuse) present. No wheezing. Comments: On 5L HFNC  Abdominal:      General: Abdomen is flat. Bowel sounds are normal. There is no distension. Palpations: Abdomen is soft. Musculoskeletal:      Cervical back: Normal range of motion and neck supple. No rigidity. Comments: Diffusely edematous, RA > LA   Skin:     General: Skin is warm and dry. Coloration: Skin is not jaundiced.    Neurological:      Comments: Patient is lethargic         LABS:    CBC:   Recent Labs     11/25/21  1055 11/26/21  0700 11/27/21  0747   WBC 2.8* 3.2* 3.4*   HGB 9.1* 9.5* 9.7*   HCT 27.8* 28.7* 29.8*   * 114* 112*   MCV 91.5 91.0 92.0     Renal:    Recent Labs     11/25/21  1055 11/26/21  0700 11/27/21  0747    141 137   K 3.9 4.0 4.0    110 106   CO2 25 25 27   BUN 22* 24* 23*   CREATININE 0.9 1.0 0.9   GLUCOSE 146* 158* 178*   CALCIUM 9.4 9.8 10.3   ANIONGAP 5 6 4     Hepatic:   Recent Labs     11/27/21  0747   AST 30   ALT 31   BILITOT 0.4   BILIDIR <0.2   PROT 5.9*   LABALBU 1.7*   ALKPHOS 589*     Troponin:   No results for input(s): TROPONINI in the last 72 hours. -----------------------------------------------------------------  RAD:   CT CHEST ABDOMEN PELVIS WO CONTRAST   Final Result   1. Small pleural effusions are present bilaterally. Bilateral airspace consolidation and groundglass opacity is present, most likely reflecting acute infectious process. 2. Small volume ascites. 3. 13 mm calculus in the right kidney. XR CHEST PORTABLE   Final Result   1. Diffuse multifocal consolidation, pneumonia pattern is favored over congestive failure in the absence of effusions and peripheral septal thickening. 2. Based on patient's azotemia, follow-up studies are suggested but may have to wait for normalization of renal function to evaluate the presence of adenopathy suspected on computed tomography      CT CHEST WO CONTRAST   Final Result      1. Worsening of multifocal consolidation compatible with progressive and/or recurrent pneumonia. 2.  New severe narrowing and/or mucous plugging occluding the right upper lobe bronchus and partial lobar collapse in addition to consolidation. CT HEAD WO CONTRAST   Final Result      1. No acute intracranial hemorrhage or mass effect. 2.  Mild white matter chronic small vessel ischemia. 3.  Moderate atrophy. XR CHEST PORTABLE   Final Result      Persistent moderate multifocal bilateral airspace disease, slightly worsened from prior study. Assessment/Plan:   Lethargy 2/2 PNA  Suspected Recurrent Aspiration PNA  CT consistent with recurrent pneumonia, mucous plugging in the right upper lobe/multifocal.  -CT Chest/Abd/P ordered to rule out acute infection or abscess, Lungs still have bilateral consolidations w/o obvious signs of infection. Distended gallbladder noted.   -ECHO ordered to r/o vegetations  -Meropenem and vanc restarted given recurrent fevers, currently Day 18 & 8 respectively  -Repeat UA unremarkable and BCx2 NGTD  -Supplemental O2 PRN, wean as tolerated   -3% saline nebulizer treatments, Chest Vest per pulm  -SLP evaluation ordered; diet recommendations made.  -Pulmonology c/s'd, no indication for bronchoscopy at this time, signed off.  -Holding PO meds as patient is currently too somnolent to tolerate PO intake. -Morphine 1mg IV Q6H for discomfort  -Family has changed mind about PEG tube, GI consult placed for discussion about the procedure      Afib, paroxsymal  Run of afib noted overnight, resolved s/p fluid bolus and electrolyte replacement  -Will continue to monitor w/ continuous telemetry   -Digoxin maintence dose of 0.125 mg QD IV  -IVF @ 75 ml/hr  -[id number] Score for Atrial Fibrillation Stroke Risk =4. Has bled score is 1  -Dr. Esperanza Bender discussed with family about starting Anticoagulation as pt was having a lot of falls so we have deferred starting and will just continue ASA    Anasarca c/b Severe Malnutrition  Patient with pitting edema peripherally in all 4 extremities. Patient does have good urine output. Possibly secondary to fluid overload versus third spacing from decreased albumin.  -Albumin of 1.7  -GI c/s for PEG tube per above  -PICC line requested    HA on CKD 3b (Resolved)    Chronic Issues:  Normocytic Anemia (Baseline Hgb ~11, 10.6 on admission- Daily CBC  T2DM- Glargine 5 U BID, LDSSI, POCTs x4 AC & HS  HTN- Holding home meds as normotensive  BPH- Holding home  Proscar and Flomax     Code Status: Limited x2, No to compressions and intubation  FEN: Dysphagia - Pureed w/ Nectar thick liquids  PPX: SQH  DISPO: GMF, Will discuss possible LTAC placement     Delvin Dean MD, PGY-1  11/28/21  8:39 AM    This patient has been staffed and discussed with Jey Lee MD.     Addendum to Resident H& P/Progress note:  I have personally seen,examined and evaluated the patient.  I have reviewed the current history, physical findings, labs and assessment and plan and agree with note as documented by resident MD ( Nia Borja)      Zita Shah Jackelyn Dewitt MD, Sheila Cardoso

## 2021-11-28 NOTE — PLAN OF CARE
Problem: Falls - Risk of:  Goal: Will remain free from falls  Description: Will remain free from falls  Outcome: Ongoing  Goal: Absence of physical injury  Description: Absence of physical injury  Outcome: Ongoing     Problem: Skin Integrity:  Goal: Will show no infection signs and symptoms  Description: Will show no infection signs and symptoms  Outcome: Ongoing  Goal: Absence of new skin breakdown  Description: Absence of new skin breakdown  Outcome: Ongoing     Problem: Pain:  Goal: Pain level will decrease  Description: Pain level will decrease  Outcome: Ongoing

## 2021-11-28 NOTE — PROGRESS NOTES
Occupational Therapy  Facility/Department: [address]  Daily Treatment Note  NAME: Nya Morin  : 1935  MRN: [id number]    Date of Service: 2021    Discharge Recommendations:  Nya Morin scored a 6/24 on the AM-PAC ADL Inpatient form. Current research shows that an AM-PAC score of 17 or less is typically not associated with a discharge to the patient's home setting. Based on the patient's AM-PAC score and their current ADL deficits, it is recommended that the patient have 3-5 sessions per week of Occupational Therapy at d/c to increase the patient's independence. Please see assessment section for further patient specific details. If patient discharges prior to next session this note will serve as a discharge summary. Please see below for the latest assessment towards goals. OT Equipment Recommendations  Other: cirilo lift if d/c home    Assessment   Performance deficits / Impairments: Decreased functional mobility ; Decreased ADL status; Decreased safe awareness; Decreased endurance; Decreased strength; Decreased balance; Decreased cognition; Decreased posture  Assessment: Pt Dependent x 2 for bed mobility and tolerated sitting EOB at Dependent ~13 min. Pt non verbal this session, did respond to questions with nodding. Pt not safe to transfer. Recommend continued inpt therapy at d/c to maximize functional independence and safety. Continue with POC. Pt's son in-law stating they will decide tomorrow if they will take pt home. If pt d/c home will need assist x 2  and cirilo lift. Continue with POC.   Treatment Diagnosis: impaired ADLs/transfers and decreased functional activity tolerance  Prognosis: Fair  OT Education: OT Role; Plan of Care; Family Education  Patient Education: pt very lethargic and having difficulty keeping eyes open  Barriers to Learning: language - speaks [facility]  REQUIRES OT FOLLOW UP: Yes  Activity Tolerance  Activity Tolerance: Treatment limited secondary to decreased cognition; Patient limited by fatigue  Safety Devices  Safety Devices in place: Yes  Type of devices: Left in bed; Call light within reach; Bed alarm in place; Nurse notified         Patient Diagnosis(es): The primary encounter diagnosis was Altered mental status, unspecified altered mental status type. A diagnosis of Aspiration pneumonia of both lungs, unspecified aspiration pneumonia type, unspecified part of lung ([address] was also pertinent to this visit. has a past medical history of Allergic rhinitis, Asthma, Bilateral cataracts, BPH (benign prostatic hypertrophy), Diabetes mellitus, type 2 ([address] Diverticulosis, Eczema, Hyperlipidemia, Hypertension, Lumbar disc disease, Microalbuminuria, Obstructive sleep apnea, Osteoarthritis, Pneumonia, Post herpetic neuralgia, Recurrent upper respiratory infection (URI), Right shoulder pain, Spinal stenosis, Transient global amnesia, and Vitamin D deficiency. has a past surgical history that includes Prostate surgery (June 13, 2000). Restrictions  Position Activity Restriction  Other position/activity restrictions: Up with assist  Subjective   General  Chart Reviewed: Yes  Patient assessed for rehabilitation services?: Yes  Additional Pertinent Hx: 81 y.o. M presenting w/ AMS and aspiration pneumonia. Hospital Course: CXR: Persistent moderate multifocal bilateral airspace disease; CT head: (-); CT Chest: compatible with progressive and/or recurrent pneumonia, New severe narrowing and/or mucous plugging occluding the right upper lobe bronchus and partial lobar collapse. PMH:  Response to previous treatment: Patient with no complaints from previous session  Family / Caregiver Present: Yes (granddaughter, son in-law)  Referring Practitioner: Orlando Feliciano MD  Diagnosis: Acute Encephalopathy  Subjective  Subjective: Pt (5L O2) supine in bed, agreeable to therapy session with family excited for therapy and requesting more therapy.  Pt's granddaughter just completing feeding and swabbing pt's mouth to clear. General Comment  Comments: Heel mepalex applied to both heels. Pt with PROM BUEs: ~8 shoulder flexion, ~8 horizontal AB/Adduction, ~8 wrist flexion/extesion, ~8 elbow extension/flexion. Pt with edema RUE. Pt asked to squeeze this writer's hand with both hands - pt squeezed with RUE multiple times and not LUE. Pt supine to sit Dependent x 2. Pt seated EOB Dependent for ~13 min with total assist for neck extension. Retrograde massage to right digits/hand. Pt sit to supine Dependent x 2. Pt rolling to left side Dependent and positioned with pillows and wedge to unweight from buttocks. Pt O2 dropping to upper 70s/mid 80s. RN contacted, pt put on 6L O2 and O2 up to 90%. Granddaughter requesting respiratory therapist (RN notified). Call light in reach and bed alarm on.   Vital Signs  Patient Currently in Pain:  (pt reported headache with yes/no question, pt's granddaughter declined pain meds for pt)   Orientation  Orientation  Overall Orientation Status:  (not formally assessed pt nonverbal throughout therapy session)  Objective    ADL  LE Dressing: Dependent/Total (to don  socks)        Balance  Sitting Balance: Dependent/Total (~13 min)  Standing Balance: Unable to assess(comment)  Bed mobility  Rolling to Left: Dependent/Total (Dep x 2)  Supine to Sit: 2 Person assistance; Dependent/Total  Sit to Supine: Dependent/Total; 2 Person assistance  Scooting: Dependent/Total (to scoot to EOB)                          Cognition  Overall Cognitive Status: Exceptions  Arousal/Alertness: Inconsistent responses to stimuli  Following Commands: Inconsistently follows commands  Attention Span: Unable to maintain attention  Safety Judgement: Decreased awareness of need for assistance; Decreased awareness of need for safety  Problem Solving: Assistance required to generate solutions; Assistance required to implement solutions; Assistance required to identify errors made; Assistance required to correct errors made; Decreased awareness of errors  Insights: Not aware of deficits  Initiation: Requires cues for all  Sequencing: Requires cues for all  Cognition Comment: [place] speaking - family at bedside providing cues and encouragement; pt appearing somnolent - keeping eyes closed much of interaction with right eye opening intermittently                                         Plan   Plan  Times per week: 2-5  Times per day: Daily  Current Treatment Recommendations: Strengthening, Balance Training, Functional Mobility Training, Endurance Training, Safety Education & Training, Self-Care / ADL, Equipment Evaluation, Education, & procurement  G-Code     OutComes Score                                                  AM-PAC Score        AM-PAC Inpatient Daily Activity Raw Score: 6 (11/28/21 1438)  AM-PAC Inpatient ADL T-Scale Score : 17.07 (11/28/21 1438)  ADL Inpatient CMS 0-100% Score: 100 (11/28/21 1438)  ADL Inpatient CMS G-Code Modifier : CN (11/28/21 1438)    Goals  Short term goals  Time Frame for Short term goals: Discharge- all goals ongoing  Short term goal 1: supine to sit w/ Mod A (not met) 11/28  Short term goal 2: unsupported sitting x 5 minutes EOB CGA (not met) 11/28  Short term goal 3: participate in simple grooming task w/ setup, Mod A (not met) - not addressed 11/28  Short term goal 4: sit-stand w/ Min A of 2 - 11/12 met in Timothy Aquino, continue for progress to SPT-11/17 goal not met, not safe to attempt 11/28  Patient Goals   Patient goals : dtr/caregiver goal: for pt to require 1 person assist for transfer       Therapy Time   Individual Concurrent Group Co-treatment   Time In 1318         Time Out 1409         Minutes 51         Timed Code Treatment Minutes: [address], [address]

## 2021-11-28 NOTE — PROGRESS NOTES
Physical Therapy  Facility/Department: [address]  Daily Treatment Note  NAME: Roxie Da Silva  : 1935  MRN: [id number]    Date of Service: 2021    Discharge Recommendations:    Roxie Da Silva scored a 6/24 on the AM-PAC short mobility form. Current research shows that an AM-PAC score of 17 or less is typically not associated with a discharge to the patient's home setting. Based on the patient's AM-PAC score and their current functional mobility deficits, it is recommended that the patient have 3-5 sessions per week of Physical Therapy at d/c to increase the patient's independence. Please see assessment section for further patient specific details. PT Equipment Recommendations  Equipment Needed: No (defer to next level of care)    Assessment   Assessment: Patient minimally responsive throughout treatment session; was assisted to perform PROM and bed mobility as outlined above, requiring total assist x 2 for all mobility. Patient demonstrates drop in O2 saturation to upper 70's after return to supine, O2 increased to 6L with several minutes required to rebound to 90%; grand daughter requesting respiratory treatment - RN notified. Tolerance for activity is limited and not safe for out of bed to chair at this time. Recommend skilled PT upon discharge. Will continue to follow while in acute care setting to improve functional mobility. Treatment Diagnosis: Decreased transfers associated with acute encephalopathy. Prognosis: Guarded  PT Education: Equipment; Family Education; Transfer Training; Home Exercise Program; Goals; PT Role; Functional Mobility Training; Plan of Care  Patient Education: lino verbalized understanding. pt will require reinforcement. Barriers to Learning: Language - family present to translate  REQUIRES PT FOLLOW UP: Yes  Activity Tolerance  Activity Tolerance: Patient limited by endurance;  Patient limited by fatigue; Patient limited by cognitive session)  Cognition   Cognition  Overall Cognitive Status: Exceptions  Arousal/Alertness: Inconsistent responses to stimuli  Following Commands: Inconsistently follows commands  Attention Span: Unable to maintain attention  Safety Judgement: Decreased awareness of need for assistance; Decreased awareness of need for safety  Problem Solving: Assistance required to generate solutions; Assistance required to implement solutions; Assistance required to identify errors made; Assistance required to correct errors made; Decreased awareness of errors  Insights: Not aware of deficits  Initiation: Requires cues for all  Sequencing: Requires cues for all  Cognition Comment: [place] speaking - family at bedside providing cues and encouragement; pt appearing somnolent - keeping eyes closed much of interaction with right eye opening intermittently  Objective   Bed mobility  Rolling to Left: Dependent/Total (total assist x 2 for positioning on left side at end of session)  Supine to Sit: 2 Person assistance; Dependent/Total (total assist x 2; head of bed elevated)  Sit to Supine: 2 Person assistance; Dependent/Total (total assist x 2; head of bed flat)  Scooting: Dependent/Total (total assist x 1 to scoot toward edge of bed)  Transfers  Comment: unsafe for out of bed to chair at this time  Ambulation  Ambulation?: No     Balance  Sitting - Static: Poor (dependent to sit EOB x 13 minutes, total assist to hold head upright)  Exercises  Straight Leg Raise: x8 reps bilaterally, PROM  Heelslides: x8 reps bilaterally, PROM  Hip Abduction: x8 reps bilaterally, PROM  Ankle Pumps: x8 reps bilaterally, PROM - clonus noted at end range                      OutComes Score                                                     AM-PAC Score  AM-PAC Inpatient Mobility Raw Score : 6 (11/28/21 1432)  AM-PAC Inpatient T-Scale Score : 23.55 (11/28/21 1432)  Mobility Inpatient CMS 0-100% Score: 100 (11/28/21 1432)  Mobility Inpatient CMS G-Code Modifier : CN (11/28/21 1432)          Goals  Short term goals  Time Frame for Short term goals: Discharge - all ongoing 11/28  Short term goal 1: supine <> sit Min A  Short term goal 2: sit <> stand Min A (met for lolly lepe). new goal: sit<->stand CGA to lolly lepe. Short term goal 3: bed <> chair Mod A  Patient Goals   Patient goals : Not stated, daughter hopes to return home    Plan    Plan  Times per week: 2-5  Current Treatment Recommendations: Transfer Training, Functional Mobility Training, Strengthening, Endurance Training, Balance Training  Safety Devices  Type of devices: Call light within reach, Nurse notified, Bed alarm in place, Left in bed     Therapy Time   Individual Concurrent Group Co-treatment   Time In 1318         Time Out 1409         Minutes 51         Timed Code Treatment Minutes: 46 Minutes     If patient discharges prior to next session this note will serve as a discharge summary. Please see below for the latest assessment towards goals.    [address]

## 2021-11-28 NOTE — PROGRESS NOTES
Clinical Pharmacy Progress Note    Vancomycin - Management by Pharmacy    Consult Date(s):  11/23  Consulting Provider(s):  Dr. Hector Marks / Plan  1) Continued fevers / recurrent aspiration PNA - Vancomycin   Concurrent Antimicrobials: Meropenem   Day of Vanc Therapy: 7   Current Dosing Method: AUC  o Therapeutic Goal: AUC = 400-600 mg/L*hr  o Random level 11/27 = 15.6mcg/mL. Renal function stable. Continue 1250mg IV q24h. Calculated AUC = 490 and steady state trough = 14.1 on this regimen.  Will continue to monitor clinical condition and make adjustments to regimen as appropriate. Please call with questions--  Thanks--  Rachel Reno, PharmD, BCPS, BCGP  [id number] ([facility])   11/28/2021 12:07 PM      Interval update:  Afebrile last 24 hrs    Subjective/Objective:   Kaylyn Iqbal is a 81 y.o. y/o male with a PMHx that includes DM 2, BPH, HTN, HLD, JOANNA, OA, and spinal stenosis who is admitted with lethargy thought to be 2/2 PNA. Pt completed 10-day course of Meropenem 11/22. Also found to have A.fib and HA on CKD 3 (now resolved). Pharmacy is consulted to dose Vancomycin. Current antibiotics:   (11/12-11/22)    (11/12-11/15)  Meropenem 1000mg IV q12h (11/23-current)  Vancomycin - Pharmacy to dose   1000mg IV q24h (11/22-11/24)   1250mg IV q24h (11/24-current)  Ht Readings from Last 1 Encounters:   11/11/21 5' 3\" (1.6 m)     Wt Readings from Last 1 Encounters:   11/26/21 174 lb 2.6 oz (79 kg)       Vanc Level(s) / Doses:  Date Time Dose Level / Type of Level Interpretation   11/24 07:41 1000mg q24h Random = 10.9mcg/mL Calculated  with trough 11.5   11/27 07:47 1250mg q24h Random = 15.6mcg/mL Calculated  with trough 14.1   Note: Serum levels collected for AUC-based dosing may be high if collected in close proximity to the dose administered. This is not necessarily an indicator of toxicity.     Recent Labs     11/26/21  0700 11/27/21  0747 11/28/21  0853   CREATININE 1.0 0.9 1.0   BUN 24* 23* 25*   WBC 3.2* 3.4* 3.3*       Estimated Creatinine Clearance: 50 mL/min (based on SCr of 1 mg/dL).     Cultures & Sensitivities:  Date Site Micro Susceptibility / Result   11/11 Blood x2 No growth to date    11/12 MRSA Nasal PCR negative    11/22 Blood No growth to date

## 2021-11-29 NOTE — PROGRESS NOTES
Clinical Pharmacy Progress Note    Vancomycin - Management by Pharmacy    Consult Date(s):  11/23  Consulting Provider(s):  Dr. Amaya Carson / Plan  1) Continued fevers / recurrent aspiration PNA - Vancomycin   Concurrent Antimicrobials: Meropenem   Day of Vanc Therapy: 8   Current Dosing Method: AUC  o Therapeutic Goal: AUC = 400-600 mg/L*hr  o Random level 11/27 = 15.6mcg/mL. Renal function stable. Continue 1250mg IV q24h. Calculated AUC = 490 and steady state trough = 14.1 on this regimen.  Will continue to monitor clinical condition and make adjustments to regimen as appropriate. · Today is day #8 of Vancomycin and day #18 of Meropenem - recommend re-evaluating for continued need for broad spectrum antibiotics. Will discuss with IM team.    Please call with questions--  Thanks--  Alric Koyanagi, PharmD, BCPS, BCGP  [id number] (\[facility]\"")   11/29/2021 10:49 AM      Interval update:  Afebrile last 24 hrs    Subjective/Objective:   Ethelene Bamberger is a 81 y.o. y/o male with a PMHx that includes DM 2, BPH, HTN, HLD, JOANNA, OA, and spinal stenosis who is admitted with lethargy thought to be 2/2 PNA. Pt completed 10-day course of Meropenem 11/22. Also found to have A.fib and AH on CKD 3 (now resolved). Pharmacy is consulted to dose Vancomycin.     Current antibiotics:   (11/12-11/22)    (11/12-11/15)  Meropenem 1000mg IV q12h (11/23-current)  Vancomycin - Pharmacy to dose   1000mg IV q24h (11/22-11/24)   1250mg IV q24h (11/24-current)  Ht Readings from Last 1 Encounters:   11/11/21 5' 3\" (1.6 m)     Wt Readings from Last 1 Encounters:   11/26/21 174 lb 2.6 oz (79 kg)       Vanc Level(s) / Doses:  Date Time Dose Level / Type of Level Interpretation   11/24 07:41 1000mg q24h Random = 10.9mcg/mL Calculated  with trough 11.5   11/27 07:47 1250mg q24h Random = 15.6mcg/mL Calculated  with trough 14.1   Note: Serum levels collected for AUC-based dosing may be high if collected in close proximity to the dose administered. This is not necessarily an indicator of toxicity. Recent Labs     11/27/21  0747 11/28/21  0853 11/29/21  0710   CREATININE 0.9 1.0 0.9   BUN 23* 25* 27*   WBC 3.4* 3.3* 3.4*       Estimated Creatinine Clearance: 56 mL/min (based on SCr of 0.9 mg/dL).     Cultures & Sensitivities:  Date Site Micro Susceptibility / Result   11/11 Blood x2 No growth to date    11/12 MRSA Nasal PCR negative    11/22 Blood No growth to date

## 2021-11-29 NOTE — PROGRESS NOTES
SpO2   11/29/21 1036 -- -- -- -- 20 99 %   11/29/21 0853 137/81 98.1 °F (36.7 °C) Axillary 106 24 96 %       Intake/Output Summary (Last 24 hours) at 11/29/2021 1347  Last data filed at 11/29/2021 0934  Gross per 24 hour   Intake 640.53 ml   Output 975 ml   Net -334.47 ml       Physical Exam  Constitutional:       General: He is not in acute distress. HENT:      Head: Normocephalic and atraumatic. Eyes:      General: No scleral icterus. Right eye: No discharge. Left eye: No discharge. Extraocular Movements: Extraocular movements intact. Conjunctiva/sclera: Conjunctivae normal.      Pupils: Pupils are equal, round, and reactive to light. Cardiovascular:      Rate and Rhythm: Regular rhythm. Tachycardia present. Pulses: Normal pulses. Heart sounds: Normal heart sounds. No murmur heard. No friction rub. No gallop. Pulmonary:      Effort: Pulmonary effort is normal. No respiratory distress. Breath sounds: Rales (diffuse) present. No wheezing. Comments: On 6L HFNC  Abdominal:      General: Abdomen is flat. Bowel sounds are normal. There is no distension. Palpations: Abdomen is soft. Musculoskeletal:      Cervical back: Normal range of motion and neck supple. No rigidity. Right lower leg: No edema. Left lower leg: No edema. Skin:     General: Skin is warm and dry. Coloration: Skin is not jaundiced.    Neurological:      Comments: Patient is lethargic         LABS:    CBC:   Recent Labs     11/27/21  0747 11/28/21  0853 11/29/21  0710   WBC 3.4* 3.3* 3.4*   HGB 9.7* 9.5* 9.2*   HCT 29.8* 29.1* 28.4*   * 103* 103*   MCV 92.0 92.1 92.7     Renal:    Recent Labs     11/27/21  0747 11/28/21  0853 11/29/21  0710    142 144   K 4.0 4.0 3.8    109 112*   CO2 27 28 28   BUN 23* 25* 27*   CREATININE 0.9 1.0 0.9   GLUCOSE 178* 189* 153*   CALCIUM 10.3 10.3 10.1   ANIONGAP 4 5 4     Hepatic:   Recent Labs     11/27/21  0747   AST 30   ALT and plan and agree with note as documented by resident MD ( Selina Wallace)      Oumou Shaikh MD, Sheila Cardoso

## 2021-11-29 NOTE — CARE COORDINATION
SW following Pt today and spoke with Medical resident Dr. Tomas Patrick this afternoon re: Pt's daughter wants LTACH. SW and Dr. Bren Watson discussed that Pt will most likely NOT qualify for LTACH (needs PEG, IV ABX, and 02 - more likely need long term care or Hospice.) Dr. Bren Watson discussed above with Pt's dgt but she sill asked for a referral to be made to [facility], [facility]. ARTIE called and spoke with Rajesh Foley from [address] [place] [address]. Rajesh Foley agreed that Pt is most likely NOT appropriate AND that it would be hard to get a Precert with Pt's [address] but she stated she will review Pt's clinicals in AM and call Pt's dgt to discuss after her review.  SW will follow up in AM.    Kathrin De Los Santos, [facility]  Case Management  [phone]

## 2021-11-29 NOTE — PROCEDURES
DOUBLE PICC PROCEDURE NOTE  Chart reviewed for allergies, diagnosis, labs, known contraindications, reason for line placement and planned length of treatment. Informed consent noted to be signed and on chart. Insertion procedure discussed with patient/family member. Three patient identifiers - Patient name,   and MRN -  completed &  confirmed verbally. Time out performed Hat, mask and eye shield donned. PICC site cleaned with chlorhexidine wipes then scrubbed with Chloraprep one-step applicator for 30 seconds x 1. Hand Hygiene  performed with 3% Chlorhexidine surgical scrub x1 min prior to  Sterile gloves, sterile gown being donned. Patient draped using maximal sterile barrier technique ( head to toe ). PICC site scrubbed a 2nd time with Chloraprep One-Step applicator x 30 sec. Modified Seldinger  technique/ultrasound assisted and tip locating system utilized for insertion. 1% Lidocaine 5 ml injected intradermal pre-insertion. PICC tip location in the SVC confirmed by ECG technology Sherlock 3CG. Positive brisk blood return obtained from all lumens  and each lumen flushed with 10 mls  0.9% Sterile Sodium Chloride. All lumens flush easily with no resistance. Valve placed on all lumens followed by Alcohol Swab Caps on end of each. Bio-patch in place. Catheter secured with non-sutured locking device per hospital protocol. Sterile Tegaderm applied over PICC site. Sterile field maintained during procedure. PICC insertion, rhythm and positioning wire (utilized prn) accounted  for post procedure and disposed of in sharps. Appearance of site is Clean dry and intact without bleeding or edema. All edges of Tegaderm occlusive. Site marked with date and initials of RN placing line. Teaching performed to pt/family and noted in education section. Bed placed in low position post-procedure.  Top 2 side rails in up position call button in reach. Pt. Response to procedure tolerated well. RN notified of all of the above. A Double Lumen Power Injectable PICC was trimmed at 46 CM and placed per ZULEYKA Basilic vein,  2 cm showing from insertion site. Attempted larger L brachial first. Able to access vessel easily and thread wire but unable to advance introducer. Moved to L basilic. Only able to place double lumen d/t smaller vessel size.

## 2021-11-30 NOTE — PROGRESS NOTES
Internal Medicine Progress Note    Admit Date: 11/10/2021  Diet: ADULT DIET; Dysphagia - Pureed; Mildly Thick (Nectar); Liquids by spoon only  ADULT ORAL NUTRITION SUPPLEMENT; Breakfast, Lunch; Fortified Pudding Oral Supplement    CC:  AMS    Interval history:  -NAEO  -Febrile yesterday to 100.7, otherwise VSS & 6L HFNC  -PICC placed  -Pt unresponsive to stimuli this AM  -PEG consultation with GI yesterday PM, family deferring at this time  -Discussed with family about LTACH, unlikely given his acuity but will speak with [facility]      Medications:     Scheduled Meds:   furosemide  20 mg IntraVENous Once    sodium chloride flush  5-40 mL IntraVENous 2 times per day    ipratropium-albuterol  1 ampule Inhalation TID    vancomycin  1,250 mg IntraVENous Q24H    meropenem  1,000 mg IntraVENous Q12H    digoxin  125 mcg IntraVENous Daily    [Held by provider] aspirin  81 mg Oral Daily    [Held by provider] midodrine  5 mg Oral TID WC    lidocaine  1 patch TransDERmal Daily    sodium chloride flush  5-40 mL IntraVENous 2 times per day    heparin (porcine)  5,000 Units SubCUTAneous 3 times per day    insulin glargine  5 Units SubCUTAneous Nightly    insulin lispro  0-6 Units SubCUTAneous TID WC    insulin lispro  0-3 Units SubCUTAneous Nightly    Venelex   Topical BID    sodium chloride (Inhalant)  15 mL Nebulization BID    [Held by provider] finasteride  5 mg Oral Daily    [Held by provider] tamsulosin  0.4 mg Oral Daily     Continuous Infusions:   sodium chloride      sodium chloride 75 mL/hr at 11/30/21 0548    sodium chloride 25 mL (11/12/21 1443)    dextrose       PRN Meds:sodium chloride flush, sodium chloride, bisacodyl, morphine, senna, albuterol, sodium chloride flush, sodium chloride, [DISCONTINUED] acetaminophen **OR** acetaminophen, glucose, dextrose, glucagon (rDNA), dextrose, medicated lip ointment, acetaminophen, docusate    Objective:   Vitals:   T-max:  Patient Vitals for the past 8 hrs:   BP Temp Temp src Pulse Resp SpO2   11/30/21 0746 103/63 98.2 °F (36.8 °C) Axillary 107 (!) 32 97 %   11/30/21 0435 -- -- -- -- 30 92 %   11/30/21 0434 125/69 99.2 °F (37.3 °C) Axillary 114 (!) 34 93 %   11/30/21 0115 -- -- -- -- (!) 32 93 %       Intake/Output Summary (Last 24 hours) at 11/30/2021 0851  Last data filed at 11/30/2021 0440  Gross per 24 hour   Intake 20 ml   Output 925 ml   Net -905 ml       Physical Exam  Constitutional:       General: He is not in acute distress. HENT:      Head: Normocephalic and atraumatic. Eyes:      General: No scleral icterus. Right eye: No discharge. Left eye: No discharge. Extraocular Movements: Extraocular movements intact. Conjunctiva/sclera: Conjunctivae normal.      Pupils: Pupils are equal, round, and reactive to light. Cardiovascular:      Rate and Rhythm: Regular rhythm. Tachycardia present. Pulses: Normal pulses. Heart sounds: Normal heart sounds. No murmur heard. No friction rub. No gallop. Pulmonary:      Effort: Pulmonary effort is normal. No respiratory distress. Breath sounds: Rales (diffuse) present. No wheezing. Comments: On 5L HFNC  Abdominal:      General: Abdomen is flat. Bowel sounds are normal. There is no distension. Palpations: Abdomen is soft. Musculoskeletal:      Cervical back: Normal range of motion and neck supple. No rigidity. Comments: Diffusely edematous, RA > LA   Skin:     General: Skin is warm and dry. Coloration: Skin is not jaundiced.    Neurological:      Comments: Patient is lethargic         LABS:    CBC:   Recent Labs     11/28/21  0853 11/29/21  0710 11/30/21  0600   WBC 3.3* 3.4* 2.5*   HGB 9.5* 9.2* 10.9*   HCT 29.1* 28.4* 33.6*   * 103* 76*   MCV 92.1 92.7 91.8     Renal:    Recent Labs     11/28/21  0853 11/29/21  0710 11/30/21  0600    144 139   K 4.0 3.8 3.9    112* 108   CO2 28 28 27   BUN 25* 27* 30*   CREATININE 1.0 0.9 1. 2   GLUCOSE 189* 153* 200*   CALCIUM 10.3 10.1 9.8   ANIONGAP 5 4 4     Hepatic:   No results for input(s): AST, ALT, BILITOT, BILIDIR, PROT, LABALBU, ALKPHOS in the last 72 hours. Troponin:   No results for input(s): TROPONINI in the last 72 hours. -----------------------------------------------------------------  RAD:   CT CHEST ABDOMEN PELVIS WO CONTRAST   Final Result   1. Small pleural effusions are present bilaterally. Bilateral airspace consolidation and groundglass opacity is present, most likely reflecting acute infectious process. 2. Small volume ascites. 3. 13 mm calculus in the right kidney. XR CHEST PORTABLE   Final Result   1. Diffuse multifocal consolidation, pneumonia pattern is favored over congestive failure in the absence of effusions and peripheral septal thickening. 2. Based on patient's azotemia, follow-up studies are suggested but may have to wait for normalization of renal function to evaluate the presence of adenopathy suspected on computed tomography      CT CHEST WO CONTRAST   Final Result      1. Worsening of multifocal consolidation compatible with progressive and/or recurrent pneumonia. 2.  New severe narrowing and/or mucous plugging occluding the right upper lobe bronchus and partial lobar collapse in addition to consolidation. CT HEAD WO CONTRAST   Final Result      1. No acute intracranial hemorrhage or mass effect. 2.  Mild white matter chronic small vessel ischemia. 3.  Moderate atrophy. XR CHEST PORTABLE   Final Result      Persistent moderate multifocal bilateral airspace disease, slightly worsened from prior study. Assessment/Plan:   Lethargy 2/2 PNA  Suspected Recurrent Aspiration PNA  CT consistent with recurrent pneumonia, mucous plugging in the right upper lobe/multifocal.  -CT Chest/Abd/P ordered to rule out acute infection or abscess, Lungs still have bilateral consolidations w/o obvious signs of infection.  Distended gallbladder noted.  -ECHO ordered to r/o vegetations  -Meropenem and vanc restarted given recurrent fevers, currently Day 19 & 9 respectively  -Repeat UA unremarkable and BCx2 NGTD  -Supplemental O2 PRN, wean as tolerated   -3% saline nebulizer treatments, Chest Vest per pulm  -SLP evaluation ordered; diet recommendations made.  -Pulmonology c/s'd, no indication for bronchoscopy at this time, signed off.  -Holding PO meds as patient is currently too somnolent to tolerate PO intake. -Morphine 1mg IV Q6H for discomfort      Afib, paroxsymal  Run of afib noted overnight, resolved s/p fluid bolus and electrolyte replacement  -Will continue to monitor w/ continuous telemetry   -Digoxin maintence dose of 0.125 mg QD IV  -IVF @ 50 ml/hr  -[id number] Score for Atrial Fibrillation Stroke Risk =4. Has bled score is 1  -Dr. Bee Buchanan discussed with family about starting Anticoagulation as pt was having a lot of falls so we have deferred starting and will just continue ASA    Anasarca c/b Severe Malnutrition  Patient with pitting edema peripherally in all 4 extremities. Patient does have good urine output.   Possibly secondary to fluid overload versus third spacing from decreased albumin.  -Albumin of 1.7  -GI c/s for PEG tube per above, Family is deferring PEG placement at this time  -PICC line placed  -1x dose of 20 IV lasix given this AM    HA on CKD 3b (Resolved)    Chronic Issues:  Normocytic Anemia (Baseline Hgb ~11, 10.6 on admission- Daily CBC  T2DM- Glargine 5 U BID, LDSSI, POCTs x4 AC & HS  HTN- Holding home meds as normotensive  BPH- Holding home  Proscar and Flomax     Code Status: Limited x2, No to compressions and intubation  FEN: Dysphagia - Pureed w/ Nectar thick liquids  PPX: SQH  DISPO: GMF, Placement discussions ongoing     Dorma Collet, MD, PGY-1  11/30/21  8:51 AM    This patient has been staffed and discussed with Kip Freedman MD.     Addendum to Resident H& P/Progress note:  I have personally seen,examined and evaluated the patient.  I have reviewed the current history, physical findings, labs and assessment and plan and agree with note as documented by resident MD ( )  + Constipation; will try soapsuds enema    Accucheck Glucoses: Recent Labs     11/29/21  1614 11/29/21  1949 11/30/21  0727 11/30/21  1126 11/30/21  1703   POCGLU 242* 250* 198* 173* 199*     Discussed the patient's condition with his daughter, Merlinda Bradley, MD, Meghan Springer

## 2021-11-30 NOTE — PROGRESS NOTES
Clinical Pharmacy Progress Note    Vancomycin - Management by Pharmacy    Consult Date(s):  11/23  Consulting Provider(s):  Dr. Leonel Haynes / Plan  1) Continued fevers / recurrent aspiration PNA - Vancomycin   Concurrent Antimicrobials: Meropenem - day 19   Day of Vanc Therapy: 9   Current Dosing Method: AUC  o Therapeutic Goal: AUC = 400-600 mg/L*hr  o Random level 11/27 = 15.6mcg/mL. Renal function stable. Continue 1250mg IV q24h. Calculated AUC = 490 and steady state trough = 14.1 on this regimen.  o Random level is ordered for 12/1 AM to further evaluate above regimen.  Will continue to monitor clinical condition and make adjustments to regimen as appropriate. Please call with questions--  Thanks--  Gricelda Killian, PharmD, BCPS, BCGP  [id number] ([facility])   11/30/2021 10:17 AM      Interval update:  Tmax 100.7 yesterday afternoon. Unresponsive to stimuli this AM.  Family deferred PEG placement yesterday. Subjective/Objective:   Sofia Rendon is a 81 y.o. y/o male with a PMHx that includes DM 2, BPH, HTN, HLD, JOANNA, OA, and spinal stenosis who is admitted with lethargy thought to be 2/2 PNA. Pt completed 10-day course of Meropenem 11/22. Also found to have A.fib and HA on CKD 3 (now resolved). Pharmacy is consulted to dose Vancomycin.     Current antibiotics:   (11/12-11/22)    (11/12-11/15)  Meropenem 1000mg IV q12h (11/23-current)  Vancomycin - Pharmacy to dose   1000mg IV q24h (11/22-11/24)   1250mg IV q24h (11/24-current)  Ht Readings from Last 1 Encounters:   11/11/21 5' 3\" (1.6 m)     Wt Readings from Last 1 Encounters:   11/26/21 174 lb 2.6 oz (79 kg)       Vanc Level(s) / Doses:  Date Time Dose Level / Type of Level Interpretation   11/24 07:41 1000mg q24h Random = 10.9mcg/mL Calculated  with trough 11.5   11/27 07:47 1250mg q24h Random = 15.6mcg/mL Calculated  with trough 14.1   12/1 06:00 1250mg q24h Random = ordered    Note: Serum levels collected for AUC-based dosing may be high if collected in close proximity to the dose administered. This is not necessarily an indicator of toxicity. Recent Labs     11/28/21  0853 11/29/21  0710 11/30/21  0600   CREATININE 1.0 0.9 1.2   BUN 25* 27* 30*   WBC 3.3* 3.4* 2.5*       Estimated Creatinine Clearance: 42 mL/min (based on SCr of 1.2 mg/dL).     Cultures & Sensitivities:  Date Site Micro Susceptibility / Result   11/11 Blood x2 No growth to date    11/12 MRSA Nasal PCR negative    11/22 Blood No growth to date

## 2021-12-01 NOTE — PROGRESS NOTES
Clinical Pharmacy Progress Note    Vancomycin - Management by Pharmacy    Consult Date(s):  11/23  Consulting Provider(s):  Dr. Candance Bramble / Plan  1) Continued fevers / recurrent aspiration PNA - Vancomycin   Concurrent Antimicrobials: Meropenem - day 20   Day of Vanc Therapy: 10   Current Dosing Method: AUC  o Therapeutic Goal: AUC = 400-600 mg/L*hr  o Random level this AM = 26.8mcg/mL with calculated AUC = 666 and trough 21.5. Pt has started to accumulate Vancomycin. Renal function remains stable.  o Will hold Vancomycin today to allow accumulated Vanc to clear. Will resume at lower dose of 750mg IV q24h tomorrow AM - regimen predicts AUC = 418 and trough 13.5.  Will continue to monitor clinical condition and make adjustments to regimen as appropriate. Please call with questions--  Thanks--  Chanelle Canales, PharmD, BCPS, BCGP  [id number] (hospitals)   12/1/2021 8:53 AM      Interval update:  Tmax 99.9 yesterday afternoon. Family decided against PEG placement. Sats dropped to 77% while on 6L last night - required increase in O2 to 10L HFNC. Concern for ongoing aspiration per GI. Subjective/Objective:   Hong Herrera is a 81 y.o. y/o male with a PMHx that includes DM 2, BPH, HTN, HLD, JOANNA, OA, and spinal stenosis who is admitted with lethargy thought to be 2/2 PNA. Pt completed 10-day course of Meropenem 11/22. Also found to have A.fib and HA on CKD 3 (now resolved). Pharmacy is consulted to dose Vancomycin.     Current antibiotics:   (11/12-11/22)    (11/12-11/15)  Meropenem 1000mg IV q12h (11/23-current)  Vancomycin - Pharmacy to dose   1000mg IV q24h (11/22-11/24)   1250mg IV q24h (11/24-11/30)   750mg IV q24h (to start 12/2)  Ht Readings from Last 1 Encounters:   11/11/21 5' 3\" (1.6 m)     Wt Readings from Last 1 Encounters:   11/26/21 174 lb 2.6 oz (79 kg)       Vanc Level(s) / Doses:  Date Time Dose Level / Type of Level Interpretation   11/24 07:41 1000mg q24h Random = 10.9mcg/mL Calculated  with trough 11.5   11/27 07:47 1250mg q24h Random = 15.6mcg/mL Calculated  with trough 14.1   12/1 06:37 1250mg q24h Random = 26.8mcg/mL Calculated  with trough 21.5   Note: Serum levels collected for AUC-based dosing may be high if collected in close proximity to the dose administered. This is not necessarily an indicator of toxicity. Recent Labs     11/29/21  0710 11/30/21  0600 12/01/21  0637   CREATININE 0.9 1.2 1.1   BUN 27* 30* 31*   WBC 3.4* 2.5* 3.4*       Estimated Creatinine Clearance: 46 mL/min (based on SCr of 1.1 mg/dL).     Cultures & Sensitivities:  Date Site Micro Susceptibility / Result   11/11 Blood x2 No growth to date    11/12 MRSA Nasal PCR negative    11/22 Blood No growth to date

## 2021-12-01 NOTE — PROGRESS NOTES
Occupational Therapy  Facility/Department: [address]  Daily Treatment Note  NAME: Pantera August  : 1935  MRN: [id number]    Date of Service: 2021    Discharge Recommendations: Pantera August scored a 6/24 on the AM-PAC ADL Inpatient form. Current research shows that an AM-PAC score of 17 or less is typically not associated with a discharge to the patient's home setting. Based on the patient's AM-PAC score and their current ADL deficits, it is recommended that the patient have 3-5 sessions per week of Occupational Therapy at d/c to increase the patient's independence. Please see assessment section for further patient specific details. If patient discharges prior to next session this note will serve as a discharge summary. Please see below for the latest assessment towards goals. OT Equipment Recommendations  Equipment Needed: Yes  Other: cirilo lift if d/c home    Assessment   Performance deficits / Impairments: Decreased functional mobility ; Decreased ADL status; Decreased safe awareness; Decreased endurance; Decreased strength; Decreased balance; Decreased cognition; Decreased posture  Assessment: Per RN no OOB activity this date  O2. Completing ROM in bed. Pt non verbal this session, no response eyes closed in most of session. Pt not safe to transfer. Completing PROM UB/LB. Recommend continued inpt therapy at d/c to maximize functional independence and safety. Continue with POC. If pt d/c home will need assist x 2  and cirilo lift. Continue with POC.   Treatment Diagnosis: impaired ADLs/transfers and decreased functional activity tolerance  Patient Education: eyes closed in session  Barriers to Learning: language - speaks [facility]  REQUIRES OT FOLLOW UP: Yes  Activity Tolerance  Activity Tolerance: Treatment limited secondary to decreased cognition; Patient limited by fatigue  Safety Devices  Safety Devices in place: Yes  Type of devices: Left in bed; Call light within reach; Bed alarm in place; Nurse notified         Patient Diagnosis(es): The primary encounter diagnosis was Altered mental status, unspecified altered mental status type. A diagnosis of Aspiration pneumonia of both lungs, unspecified aspiration pneumonia type, unspecified part of lung ([address] was also pertinent to this visit. has a past medical history of Allergic rhinitis, Asthma, Bilateral cataracts, BPH (benign prostatic hypertrophy), Diabetes mellitus, type 2 ([address] Diverticulosis, Eczema, Hyperlipidemia, Hypertension, Lumbar disc disease, Microalbuminuria, Obstructive sleep apnea, Osteoarthritis, Pneumonia, Post herpetic neuralgia, Recurrent upper respiratory infection (URI), Right shoulder pain, Spinal stenosis, Transient global amnesia, and Vitamin D deficiency. has a past surgical history that includes Prostate surgery (June 13, 2000). Restrictions  Position Activity Restriction  Other position/activity restrictions: Up with assist  Subjective   General  Chart Reviewed: Yes  Patient assessed for rehabilitation services?: Yes  Additional Pertinent Hx: 81 y.o. M presenting w/ AMS and aspiration pneumonia. Hospital Course: CXR: Persistent moderate multifocal bilateral airspace disease; CT head: (-); CT Chest: compatible with progressive and/or recurrent pneumonia, New severe narrowing and/or mucous plugging occluding the right upper lobe bronchus and partial lobar collapse. PMH:  Response to previous treatment: Patient with no complaints from previous session  Family / Caregiver Present: Yes (granddaughter, son in-law)  Referring Practitioner: Kyung Edwards MD  Diagnosis: Acute Encephalopathy  Subjective  Subjective: In bed, unable to verbalize agreement to session, eyes closed through most of session, not responsive  General Comment  Comments: Heel mepalex applied to both heels.  Pt with PROM BUEs: ~8 shoulder flexion, ~8 horizontal AB/Adduction, ~8 wrist flexion/extesion, ~8 elbow goals  Time Frame for Short term goals: Discharge- all goals ongoing  Short term goal 1: supine to sit w/ Mod A (not met)  Short term goal 2: unsupported sitting x 5 minutes EOB CGA (not met)  Short term goal 3: participate in simple grooming task w/ setup, Mod A (not met)  Short term goal 4: sit-stand w/ Min A of 2 - 11/12 met in [place] [facility], continue for progress to SPT-11/17 goal not met  Patient Goals   Patient goals : dtr/caregiver goal: for pt to require 1 person assist for transfer       Therapy Time   Individual Concurrent Group Co-treatment   Time In [id number]         Time Out 0858         Minutes 24           Timed Code Treatment Minutes:  24  Total Treatment Minutes:  Cliff Neil 336, OT

## 2021-12-01 NOTE — PROGRESS NOTES
Physical Therapy  Daily Treatment Note    Discharge Recommendations: Katherine Montemayor scored a 6/24 on the AM-PAC short mobility form. Current research shows that an AM-PAC score of 17 or less is typically not associated with a discharge to the patient's home setting. Based on the patient's AM-PAC score and their current functional mobility deficits, it is recommended that the patient have 3-5 sessions per week of Physical Therapy at d/c to increase the patient's independence. Please see assessment section for further patient specific details. Assessment:  Pt not participating with ROM ex this session--sleeping throughout. If pt able to participate, he would benefit from continued IP PT to maximize strength and independence. If pt goes home with family, will need hospital bed and cirilo lift for safe transfers. Equipment Needs: Defer to next level of care    Chart Reviewed: Yes     Other position/activity restrictions: Up with assist   Additional Pertinent Hx: Pt to ED 11/10 with increased fatigue and AMS. Head CT (-). Chest CT: Worsening of multifocal consolidation compatible with progressive and/or recurrent PNA. PMH:  asthma, HTN, DM, OA, BPH, diverticulosis, back pain, PNA, spinal stenosis      Diagnosis: Acute Encephalopathy   Treatment Diagnosis: Decreased transfers associated with acute encephalopathy. RN OKed pt for ROM in bed only. States his oxygen sats were dropping overnight with activity. Subjective: Pt in bed. Sleeping throughout session. Pain: No grimacing noted    Objective:    Exercises  10 reps B LE PROM for: toe flex/ext, ankle dorsi/plantarflexion, heel slides, hip abd/add, hip flexion, hip internal/external rotation  5 reps B gentle heel cord stretch    Safety Devices  Pt left with needs in reach. In bed with bed alarm on. Prevalon boots donned for B LEs. Student nurses present. RN updated.      AM-PAC score  AM-PAC Inpatient Mobility Raw Score : 6  AM-PAC Inpatient T-Scale Score : 23.55  Mobility Inpatient CMS 0-100% Score: 100  Mobility Inpatient CMS G-Code Modifier : CN    Goals: (as determined and assessed by primary PT)  Time Frame for Short term goals: Discharge - all ongoing 11/28  Short term goal 1: supine <> sit Min A   Short term goal 2: sit <> stand Min A (met for lolly lepe). new goal: sit<->stand CGA to lolly lepe. Short term goal 3: bed <> chair Mod A      Plan:  Times per week: 2-5;    Current Treatment Recommendations: Transfer Training, Functional Mobility Training, Strengthening, Endurance Training, Balance Training    Therapy Time    Individual  Concurrent  Group  Co-treatment    Time In  830            Time Out  856            Minutes  26              Timed Code Treatment Minutes: 26  Total Treatment Minutes: 26    Will continue per plan of care. If patient is discharged prior to next treatment, this note will serve as the discharge summary.     Marianne Yanes #[id number]

## 2021-12-01 NOTE — PROGRESS NOTES
Internal Medicine Progress Note    Admit Date: 11/10/2021  Diet: ADULT DIET; Dysphagia - Pureed; Mildly Thick (Nectar); Liquids by spoon only  ADULT ORAL NUTRITION SUPPLEMENT; Breakfast, Lunch; Fortified Pudding Oral Supplement    CC: AMS    Interval history:  -Aspirational episode overnight, intial O2 requirement increased to 10L w/ venturi mask, now back down to 7L HFNC  -Afebrile last 24H, Tachycardia persists and is sinus tach. -Pt minimally unresponsive to stimuli this AM  -PEG consultation with GI again yesterday PM, family deferring at this time  -Will discuss w/ CM today about LTACH  -1x Lasix 20 mg IV again today  -I had a lengthy discussion with daughter at bedside about goals of care. I discussed potential options and limitations for future disposition as well as his current medical course. We explored the patient's goals of care and previous beliefs about medical care. This discussion is ongoing but productive.      Medications:     Scheduled Meds:   furosemide  20 mg IntraVENous Once    [START ON 12/2/2021] vancomycin  750 mg IntraVENous Q24H    sodium chloride flush  5-40 mL IntraVENous 2 times per day    ipratropium-albuterol  1 ampule Inhalation TID    meropenem  1,000 mg IntraVENous Q12H    digoxin  125 mcg IntraVENous Daily    [Held by provider] aspirin  81 mg Oral Daily    [Held by provider] midodrine  5 mg Oral TID WC    lidocaine  1 patch TransDERmal Daily    sodium chloride flush  5-40 mL IntraVENous 2 times per day    heparin (porcine)  5,000 Units SubCUTAneous 3 times per day    insulin glargine  5 Units SubCUTAneous Nightly    insulin lispro  0-6 Units SubCUTAneous TID WC    insulin lispro  0-3 Units SubCUTAneous Nightly    Venelex   Topical BID    sodium chloride (Inhalant)  15 mL Nebulization BID    [Held by provider] finasteride  5 mg Oral Daily    [Held by provider] tamsulosin  0.4 mg Oral Daily     Continuous Infusions:   sodium chloride      [Held by provider] sodium chloride 50 mL/hr at 11/30/21 1625    sodium chloride 25 mL (11/12/21 1443)    dextrose       PRN Meds:sodium chloride flush, sodium chloride, bisacodyl, morphine, senna, albuterol, sodium chloride flush, sodium chloride, [DISCONTINUED] acetaminophen **OR** acetaminophen, glucose, dextrose, glucagon (rDNA), dextrose, medicated lip ointment, acetaminophen, docusate    Objective:   Vitals:   T-max:  Patient Vitals for the past 8 hrs:   BP Temp Temp src Pulse Resp SpO2   12/01/21 0916 -- -- -- -- 22 99 %   12/01/21 0904 127/78 96.4 °F (35.8 °C) Oral 109 (!) 37 96 %   12/01/21 0637 114/60 97.4 °F (36.3 °C) Oral 116 22 96 %   12/01/21 0445 -- -- -- -- 28 97 %   12/01/21 0410 -- -- -- -- 24 96 %   12/01/21 0405 -- -- -- -- 24 98 %   12/01/21 0400 -- -- -- -- 24 98 %   12/01/21 0338 (!) 111/44 98.1 °F (36.7 °C) Axillary 117 28 98 %   12/01/21 0315 -- -- -- -- 24 96 %   12/01/21 0254 -- -- -- -- -- (!) 87 %       Intake/Output Summary (Last 24 hours) at 12/1/2021 1034  Last data filed at 12/1/2021 4607  Gross per 24 hour   Intake 1350 ml   Output 2200 ml   Net -850 ml       Physical Exam  Constitutional:       General: He is not in acute distress. HENT:      Head: Normocephalic and atraumatic. Eyes:      General: No scleral icterus. Right eye: No discharge. Left eye: No discharge. Extraocular Movements: Extraocular movements intact. Conjunctiva/sclera: Conjunctivae normal.      Pupils: Pupils are equal, round, and reactive to light. Cardiovascular:      Rate and Rhythm: Regular rhythm. Tachycardia present. Pulses: Normal pulses. Heart sounds: Normal heart sounds. No murmur heard. No friction rub. No gallop. Pulmonary:      Effort: Pulmonary effort is normal. No respiratory distress. Breath sounds: Rales (diffuse) present. No wheezing. Comments: On 7L HFNC  Abdominal:      General: Abdomen is flat. Bowel sounds are normal. There is no distension.       Palpations: New severe narrowing and/or mucous plugging occluding the right upper lobe bronchus and partial lobar collapse in addition to consolidation. CT HEAD WO CONTRAST   Final Result      1. No acute intracranial hemorrhage or mass effect. 2.  Mild white matter chronic small vessel ischemia. 3.  Moderate atrophy. XR CHEST PORTABLE   Final Result      Persistent moderate multifocal bilateral airspace disease, slightly worsened from prior study. Assessment/Plan:   Lethargy 2/2 PNA  Suspected Recurrent Aspiration PNA  CT consistent with recurrent pneumonia, mucous plugging in the right upper lobe/multifocal.  -CT Chest/Abd/P ordered to rule out acute infection or abscess, Lungs still have bilateral consolidations w/o obvious signs of infection. Distended gallbladder noted. -ECHO ordered to r/o vegetations  -Meropenem and Vanc, currently Day 20 & 10 respectively  -Repeat UA unremarkable and BCx2 NGTD  -Supplemental O2 PRN, wean as tolerated   -3% saline nebulizer treatments, Chest Vest per pulm  -SLP evaluation ordered; diet recommendations made.  -Pulmonology c/s'd, no indication for bronchoscopy at this time, signed off.  -Holding PO meds as patient is currently too somnolent to tolerate PO intake. -Morphine 1mg IV Q6H for discomfort      Afib, paroxsymal  Run of afib noted overnight, resolved s/p fluid bolus and electrolyte replacement  -Will continue to monitor w/ continuous telemetry   -Digoxin maintence dose of 0.125 mg QD IV  -IVF @ 50 ml/hr  -[id number] Score for Atrial Fibrillation Stroke Risk =4. Has bled score is 1  -Dr. Portia Viera discussed with family about starting Anticoagulation as pt was having a lot of falls so we have deferred starting and will just continue ASA    Anasarca c/b Severe Malnutrition  Patient with pitting edema peripherally in all 4 extremities. Patient does have good urine output.   Possibly secondary to fluid overload versus third spacing from decreased albumin.  -Albumin of 1.7  -GI c/s for PEG tube per above, Family is deferring PEG placement at this time  -PICC line placed  -1x dose of 20 IV lasix repeated this AM    HA on CKD 3b (Resolved)    Chronic Issues:  Normocytic Anemia (Baseline Hgb ~11, 10.6 on admission- Daily CBC  T2DM- Glargine 5u BID, LDSSI, POCTs x4 AC & HS  HTN- Holding home meds as normotensive  BPH- Holding home  Proscar and Flomax     Code Status: Limited x2, No to compressions and intubation  FEN: Dysphagia - Pureed w/ Nectar thick liquids  PPX: SQH  DISPO: GMF, dispo discussions ongoing     Ayala Washington MD, PGY-1  12/01/21  10:34 AM    This patient has been staffed and discussed with Milo Collazo MD.     Addendum to Resident H& P/Progress note:  I have personally seen,examined and evaluated the patient. I have reviewed the current history, physical findings, labs and assessment and plan and agree with note as documented by resident MD ( Tom Mackey)  Discussed the patient's condition and d/c plans with his daughter, Rendall Essex, and the , Mae Rdz.     Accucheck Glucoses: Recent Labs     11/30/21  1126 11/30/21  1703 11/30/21  1953 12/01/21  0751 12/01/21  1203   POCGLU 173* 199* 257* 199* 255*     + severe malnutrition; will start IV MVI daily for 3 days    Milo Collazo MD, Meghan Springer

## 2021-12-01 NOTE — CARE COORDINATION
ARTIE spoke with Radha/liaison from [address] who advised that Pt does clinically meet for [facility], [facility] but she still has to get approval from Management and if so, she will start Pt's [address] this afternoon. Nancy Guerra will call SW back later this afternoon. ARTIE discussed above with Dr. Portia Viera. We discussed that if Pt's [facility] does not approval LTACH, family will have to move forward with placement at [facility]. Dr. Portia Viera stated he will meet with dgt now to inform of above plan. SW will continue to follow. Selvin Beal [facility]  Case Management  [phone]      1600-Addendum  Radha from [address] called SW back and stated Pt has been approved to their LTACH so she will submit for Pt's [address] now. SW will follow.     Selvin Beal [place]  Case Management  [phone]

## 2021-12-01 NOTE — PLAN OF CARE
Problem: Falls - Risk of:  Goal: Will remain free from falls  Description: Will remain free from falls  Outcome: Ongoing     Problem: Falls - Risk of:  Goal: Absence of physical injury  Description: Absence of physical injury  Outcome: Ongoing     Problem: Skin Integrity:  Goal: Will show no infection signs and symptoms  Description: Will show no infection signs and symptoms  Outcome: Ongoing     Problem: Skin Integrity:  Goal: Absence of new skin breakdown  Description: Absence of new skin breakdown  Outcome: Ongoing     Problem: Fluid Volume - Deficit:  Goal: Achieves intake and output within specified parameters  Description: Achieves intake and output within specified parameters  Outcome: Ongoing     Problem: Gas Exchange - Impaired:  Goal: Levels of oxygenation will improve  Description: Levels of oxygenation will improve  Outcome: Ongoing

## 2021-12-01 NOTE — PROGRESS NOTES
GI Progress Note    Domi Lopez is a 81 y.o. male patient. 1. Altered mental status, unspecified altered mental status type    2. Aspiration pneumonia of both lungs, unspecified aspiration pneumonia type, unspecified part of lung ([facility] [address]        Admit Date: 11/10/2021    Subjective:       Patient was seen at bed side and re evaluated  RN reported that he is not eating except when daughter tries to feed him. We discussed aspiration risk.  His oxygen saturation dropped to 66 after feeding him, this raises concerns for aspiratrion  Patient is non communicative        Scheduled Meds:   sodium chloride flush  5-40 mL IntraVENous 2 times per day    ipratropium-albuterol  1 ampule Inhalation TID    vancomycin  1,250 mg IntraVENous Q24H    meropenem  1,000 mg IntraVENous Q12H    digoxin  125 mcg IntraVENous Daily    [Held by provider] aspirin  81 mg Oral Daily    [Held by provider] midodrine  5 mg Oral TID WC    lidocaine  1 patch TransDERmal Daily    sodium chloride flush  5-40 mL IntraVENous 2 times per day    heparin (porcine)  5,000 Units SubCUTAneous 3 times per day    insulin glargine  5 Units SubCUTAneous Nightly    insulin lispro  0-6 Units SubCUTAneous TID WC    insulin lispro  0-3 Units SubCUTAneous Nightly    Venelex   Topical BID    sodium chloride (Inhalant)  15 mL Nebulization BID    [Held by provider] finasteride  5 mg Oral Daily    [Held by provider] tamsulosin  0.4 mg Oral Daily       Continuous Infusions:   sodium chloride      sodium chloride 50 mL/hr at 11/30/21 1625    sodium chloride 25 mL (11/12/21 1443)    dextrose         PRN Meds:  sodium chloride flush, sodium chloride, bisacodyl, morphine, senna, albuterol, sodium chloride flush, sodium chloride, [DISCONTINUED] acetaminophen **OR** acetaminophen, glucose, dextrose, glucagon (rDNA), dextrose, medicated lip ointment, acetaminophen, docusate      Objective:       Patient Vitals for the past 24 hrs:   BP Temp Temp src Pulse Resp SpO2   21 1622 106/61 99.9 °F (37.7 °C) Axillary 101 (!) 36 92 %   21 1406 -- -- -- -- -- 97 %   21 1343 -- -- -- 89 (!) 36 96 %   21 1215 (!) 99/54 97.9 °F (36.6 °C) Axillary 102 (!) 36 95 %   21 0919 -- -- -- -- (!) 34 96 %   21 0746 103/63 98.2 °F (36.8 °C) Axillary 107 (!) 32 97 %   21 0435 -- -- -- -- 30 92 %   21 0434 125/69 99.2 °F (37.3 °C) Axillary 114 (!) 34 93 %   21 0115 -- -- -- -- (!) 32 93 %   21 0049 119/66 100.2 °F (37.9 °C) Axillary 107 (!) 36 91 %   21 -- -- -- -- -- 91 %   21 -- -- -- -- -- (!) 85 %   21 -- -- -- -- 30 93 %   21 (!) 162/84 99.6 °F (37.6 °C) Axillary 117 (!) 38 93 %       Exam:    VITALS:  /61   Pulse 101   Temp 99.9 °F (37.7 °C) (Axillary)   Resp (!) 36   Ht 5' 3\" (1.6 m)   Wt 174 lb 2.6 oz (79 kg)   SpO2 92%   BMI 30.85 kg/m²   TEMPERATURE:  Current - Temp: 99.9 °F (37.7 °C); Max - Temp  Av.2 °F (37.3 °C)  Min: 97.9 °F (36.6 °C)  Max: 100.2 °F (37.9 °C). Lab Data:  Recent Labs     21  0853 21  0710 21  0600   WBC 3.3* 3.4* 2.5*   HGB 9.5* 9.2* 10.9*   HCT 29.1* 28.4* 33.6*   MCV 92.1 92.7 91.8   * 103* 76*     Recent Labs     21  0853 21  0710 21  0600    144 139   K 4.0 3.8 3.9    112* 108   CO2 28 28 27   BUN 25* 27* 30*   CREATININE 1.0 0.9 1.2     No results for input(s): AST, ALT, ALB, BILIDIR, BILITOT, ALKPHOS in the last 72 hours. No results for input(s): LIPASE, AMYLASE in the last 72 hours. No results for input(s): PROT, INR in the last 72 hours. No results for input(s): PTT in the last 72 hours. No results for input(s): OCCULTBLD in the last 72 hours.     Radiology review: as in records    Assessment:       Active Problems:    Acute renal failure superimposed on stage 3b chronic kidney disease ([facility] [address]    Acute encephalopathy    Severe malnutrition (HCC)    Aspiration pneumonia of right upper lobe due to regurgitated food ([address]    Volume depletion    Physical deconditioning    Rapid atrial fibrillation (HCC)    Aspiration pneumonia (HCC)    Acute metabolic encephalopathy    Acute on chronic respiratory failure with hypoxia (HCC)    Paroxysmal atrial fibrillation (HCC)    CKD stage 3 due to type 2 diabetes mellitus (HCC)    Slow transit constipation  Resolved Problems:    * No resolved hospital problems. *    Patient remained non communicative. Unable to eat on his own. High aspiration risk if fed    Recommendations:       Discussed with daughter Toney De Anda again this pm. I cautioned her of aspiration risk if she feeds him .  No permission is granted for Peg at present time  Will continue to follow   Discussed with Adriana Munoz MD  11/30/2021  7:46 PM

## 2021-12-01 NOTE — PROGRESS NOTES
Pt O2 dropped to 77% while on 6 L high flow, observed pt using accessory muscles. Increased O2 to 10 L high flow pt O2 85/86. /62  Temp 100.9 On call paged. New orders placed. Respiratory en route to beside. Venturi mask 50% was applied.

## 2021-12-02 NOTE — PROGRESS NOTES
Internal Medicine Progress Note    Admit Date: 11/10/2021  Diet: ADULT DIET; Dysphagia - Pureed; Mildly Thick (Nectar); Liquids by spoon only  ADULT ORAL NUTRITION SUPPLEMENT; Breakfast, Lunch; Fortified Pudding Oral Supplement    CC: AMS    Interval history:  -Afeb, HDS, on 5L NC this AM but increased 2/2 to apneic episode to 15L. O2 weaned after an episode of suction.  -Pt minimally unresponsive to stimuli this AM  -PEG consultation with GI again yesterday PM, family againdeferring at this time  -Will discuss w/ CM today about LTACH.  Patient accepted at [facility] but insurance paperwork is currently ongoing.   -Swelling improved, will schedule IV Lasix      Medications:     Scheduled Meds:   furosemide  20 mg IntraVENous BID    vancomycin  750 mg IntraVENous Y46V    folic acid, thiamine, multi-vitamin with vitamin K infusion   IntraVENous Q24H    sodium chloride flush  5-40 mL IntraVENous 2 times per day    ipratropium-albuterol  1 ampule Inhalation TID    meropenem  1,000 mg IntraVENous Q12H    digoxin  125 mcg IntraVENous Daily    [Held by provider] aspirin  81 mg Oral Daily    [Held by provider] midodrine  5 mg Oral TID WC    lidocaine  1 patch TransDERmal Daily    sodium chloride flush  5-40 mL IntraVENous 2 times per day    heparin (porcine)  5,000 Units SubCUTAneous 3 times per day    insulin glargine  5 Units SubCUTAneous Nightly    insulin lispro  0-6 Units SubCUTAneous TID WC    insulin lispro  0-3 Units SubCUTAneous Nightly    Venelex   Topical BID    sodium chloride (Inhalant)  15 mL Nebulization BID    [Held by provider] finasteride  5 mg Oral Daily    [Held by provider] tamsulosin  0.4 mg Oral Daily     Continuous Infusions:   sodium chloride      [Held by provider] sodium chloride 50 mL/hr at 11/30/21 1625    sodium chloride 25 mL (11/12/21 1443)    dextrose       PRN Meds:sodium chloride flush, sodium chloride, bisacodyl, morphine, senna, albuterol, sodium chloride flush, sodium chloride, [DISCONTINUED] acetaminophen **OR** acetaminophen, glucose, dextrose, glucagon (rDNA), dextrose, medicated lip ointment, acetaminophen, docusate    Objective:   Vitals:   T-max:  Patient Vitals for the past 8 hrs:   BP Temp Temp src Pulse Resp SpO2   12/02/21 0921 -- -- -- -- -- 94 %   12/02/21 0831 (!) 94/57 97.1 °F (36.2 °C) Axillary 103 (!) 31 94 %   12/02/21 0758 -- -- -- 106 (!) 32 94 %   12/02/21 0509 -- -- -- -- (!) 32 95 %   12/02/21 0414 (!) 102/59 97.8 °F (36.6 °C) Axillary 114 (!) 36 94 %       Intake/Output Summary (Last 24 hours) at 12/2/2021 1151  Last data filed at 12/2/2021 0758  Gross per 24 hour   Intake 250 ml   Output 1475 ml   Net -1225 ml       Physical Exam  Constitutional:       General: He is not in acute distress. HENT:      Head: Normocephalic and atraumatic. Eyes:      General: No scleral icterus. Right eye: No discharge. Left eye: No discharge. Extraocular Movements: Extraocular movements intact. Conjunctiva/sclera: Conjunctivae normal.      Pupils: Pupils are equal, round, and reactive to light. Cardiovascular:      Rate and Rhythm: Regular rhythm. Tachycardia present. Pulses: Normal pulses. Heart sounds: Normal heart sounds. No murmur heard. No friction rub. No gallop. Pulmonary:      Effort: Pulmonary effort is normal. No respiratory distress. Breath sounds: Rales (diffuse) present. No wheezing. Comments: On 5L HFNC  Abdominal:      General: Abdomen is flat. Bowel sounds are normal. There is no distension. Palpations: Abdomen is soft. Musculoskeletal:      Cervical back: Normal range of motion and neck supple. No rigidity. Comments: Diffusely edematous, RA > LA   Skin:     General: Skin is warm and dry. Coloration: Skin is not jaundiced.    Neurological:      Comments: Patient is lethargic         LABS:    CBC:   Recent Labs     11/30/21  0600 12/01/21  0637 12/02/21  0509   WBC 2.5* 3.4* 3.4*   HGB 10.9* 9.0* 9.0*   HCT 33.6* 28.0* 27.8*   PLT 76* 88* 95*   MCV 91.8 92.8 91.7     Renal:    Recent Labs     11/30/21  0600 12/01/21  0637 12/02/21  0509    145 146*   K 3.9 4.0 3.8    112* 113*   CO2 27 28 29   BUN 30* 31* 32*   CREATININE 1.2 1.1 0.9   GLUCOSE 200* 203* 162*   CALCIUM 9.8 10.2 10.2   ANIONGAP 4 5 4     Hepatic:   No results for input(s): AST, ALT, BILITOT, BILIDIR, PROT, LABALBU, ALKPHOS in the last 72 hours. Troponin:   No results for input(s): TROPONINI in the last 72 hours. -----------------------------------------------------------------  RAD:   XR CHEST PORTABLE   Final Result      1. Interval increase in widespread airspace disease and probable pleural effusions. CT CHEST ABDOMEN PELVIS WO CONTRAST   Final Result   1. Small pleural effusions are present bilaterally. Bilateral airspace consolidation and groundglass opacity is present, most likely reflecting acute infectious process. 2. Small volume ascites. 3. 13 mm calculus in the right kidney. XR CHEST PORTABLE   Final Result   1. Diffuse multifocal consolidation, pneumonia pattern is favored over congestive failure in the absence of effusions and peripheral septal thickening. 2. Based on patient's azotemia, follow-up studies are suggested but may have to wait for normalization of renal function to evaluate the presence of adenopathy suspected on computed tomography      CT CHEST WO CONTRAST   Final Result      1. Worsening of multifocal consolidation compatible with progressive and/or recurrent pneumonia. 2.  New severe narrowing and/or mucous plugging occluding the right upper lobe bronchus and partial lobar collapse in addition to consolidation. CT HEAD WO CONTRAST   Final Result      1. No acute intracranial hemorrhage or mass effect. 2.  Mild white matter chronic small vessel ischemia. 3.  Moderate atrophy.       XR CHEST PORTABLE   Final Result      Persistent moderate multifocal bilateral airspace disease, slightly worsened from prior study. Assessment/Plan:     Lethargy 2/2 PNA   Recurrent Aspiration PNA  CT consistent with recurrent pneumonia, mucous plugging in the right upper lobe/multifocal.  -CT Chest/Abd/P ordered to rule out acute infection or abscess, Lungs still have bilateral consolidations w/o obvious signs of infection. Distended gallbladder noted. -ECHO ordered to r/o vegetations  -Meropenem and Vanc, currently Day 21 & 11 respectively  -Repeat UA unremarkable and BCx2 NGTD  -Supplemental O2 PRN, wean as tolerated   -3% saline nebulizer treatments, Chest Vest per pulm  -SLP evaluation ordered; diet recommendations made.  -Pulmonology c/s'd, no indication for bronchoscopy at this time, signed off.  -Holding PO meds as patient is currently too somnolent to tolerate PO intake. -Morphine 1mg IV Q6H for discomfort      Afib, paroxsymal  Run of afib noted overnight, resolved s/p fluid bolus and electrolyte replacement  -Will continue to monitor w/ continuous telemetry   -Digoxin maintence dose of 0.125 mg QD IV  -IVF @ 50 ml/hr  -[id number] Score for Atrial Fibrillation Stroke Risk =4. Has bled score is 1  -Dr. Eduardo Connors discussed with family about starting Anticoagulation as pt was having a lot of falls so we have deferred starting and will just continue ASA    Anasarca c/b Severe Malnutrition  Patient with pitting edema peripherally in all 4 extremities. Patient does have good urine output.   Possibly secondary to fluid overload versus third spacing from decreased albumin.  -Albumin of 1.7  -GI c/s for PEG tube per above, Family is deferring PEG placement at this time  -PICC line placed  -Swelling improved w/ diuresis  -Scheduled Lasix, 20 mg IV BID    HA on CKD 3b (Resolved)    Chronic Issues:  Normocytic Anemia (Baseline Hgb ~11, 10.6 on admission- Daily CBC  T2DM- Glargine 5u BID, LDSSI, POCTs x4 AC & HS  HTN- Holding home meds as normotensive  BPH- Holding home  Proscar and Flomax     Code Status: Limited x2, No to compressions and intubation  FEN: Dysphagia - Pureed w/ Nectar thick liquids  PPX: SQH  DISPO: [facility], Possible LTACH dispo today pending ongoing facility acceptance     Alia Walter MD, PGY-1  12/02/21  11:51 AM    This patient has been staffed and discussed with Samantha Pettit MD.     Addendum to Resident H& P/Progress note:  I have personally seen,examined and evaluated the patient.  I have reviewed the current history, physical findings, labs and assessment and plan and agree with note as documented by resident MD ( Odilia Ledezma)      Samantha Pettit MD, Adelina Roa

## 2021-12-02 NOTE — CARE COORDINATION
Called [facility] ([facility]) to find out which location was approved to set up transport for patient. No mention of location in CM notes. Awaiting call back. Electronically signed by Alivia Mcnamara RN on 12/2/2021 at 11:37 AM     Addendum:  [facility] [address] asking for 3:00 pm if possible. Called 2 ambulance companies. [facility] said 5:00 pm earliest. [facility] said 4:30 and needed to call [facility] transport for patient for transport. On phone for a long time to set up transport. [facility] asking for 3:00 pm. Left this with transport at [facility]. Awaiting [facility] transport call. Ordered rapid COVID and informed [employer] ([address]) that we need it asap. Electronically signed by Alivia Mcnamara RN on 12/2/2021 at 12:16 PM     Addendum: [address] [facility],[address] (because no call to this  yet) to find out if they scheduled at transport for this patient. [facility] stated that [place] ambulance will be transporting patient at 3:00 pm to [facility]. Called Sharon ([facility])to let her know.  Electronically signed by Alivia Mcnamara RN on 12/2/2021 at 2:13 PM

## 2021-12-02 NOTE — DISCHARGE SUMMARY
INTERNAL MEDICINE DEPARTMENT AT [address] [place] [address]  DISCHARGE SUMMARY    Patient ID: Roxie Da Silva                                             Discharge Date: 12/2/2021   Patient's PCP: John Estrada MD                                          Discharge Physician: Pancho Mckeon MD  Admit Date: 11/10/2021   Admitting Physician: No admitting provider for patient encounter. PROBLEMS DURING HOSPITALIZATION:  Present on Admission:   Acute encephalopathy   Severe malnutrition (HCC)   Acute renal failure superimposed on stage 3b chronic kidney disease (HCC)   Aspiration pneumonia of right upper lobe due to regurgitated food (HCC)   Volume depletion   Physical deconditioning   Rapid atrial fibrillation (HCC)   Aspiration pneumonia (HCC)   Acute metabolic encephalopathy   Acute on chronic respiratory failure with hypoxia (HCC)   Paroxysmal atrial fibrillation (HCC)   CKD stage 3 due to type 2 diabetes mellitus (HCC)   Slow transit constipation    DISCHARGE DIAGNOSES: Recurrent Aspiration PNA    HPI:   Roxie Da Silva is a 81 y.o., male with PMH of Asthma, DM2, HTN, HLD, JOANNA, recurrent pna, here with 1 day history of lethargy and AMS. The patient had been staying in a SNF facility prior to this admission to recover from a previous episode of pneumonia and upon coming home was noted by family members to be lethargic. Given this lethargy, they brought him to the emergency department to be evaluated here at [facility]  Upon arrival, further imaging studies redemonstrated pneumonia with an aspiration pattern for which the patient was started on empiric antibiotics and pulmonology was consulted. The patient continued to be febrile and demonstrate signs of infection so antibiotics were ultimately broadened to meropenem and subsequently also vancomycin. Patient's respiratory status was monitored over the course of his hospitalization and supplemented with oxygen as necessary.   Despite maximal oxygen and antibiotic therapy, the patient continued to have episodes of aspiration given the fact that his only option for feeding at this time is p.m. We discussed with the family at length that this is likely to cause recurrent aspiration and significantly prolonged the patient's illness, however it will also likely prolong his life compared to no nutrition. The option of a PEG tube was offered to the family however they are deferring further procedures at this time. Patient demonstrated episodes of A. fib with RVR while hospitalized here for which she was given a digoxin loading dose along with maintenance doses of digoxin to maintain a sinus rhythm. Additionally, while hospitalized patient briefly developed an HA which was treated with gentle fluids given his volume status. The patient slowly developed diffuse anasarca secondary to low albumin and ongoing fluid supplementation for which he was given spot doses of Lasix which ultimately resulted in long-term placement treatment. At this time, the patient is to be transferred to an LTAC for long-term care given the acuity of his medical conditions and his ongoing poor prognosis. It does not seem at this time that he would benefit from further hospitalization, however he does require high level of care given his medical complexity. Patient is to be discharged today to an [place] [address] facility pending transportation. At this time, we would recommend the continuation of his antibiotics (Vancomycin 750 mg QD & Meropenem 1,000 mg QD), Digoxin maintenance dose of 125mcg QD, and Lasix 20-40 mg IV QD based on volume status.     Recommended Labs or Other Treatments After Discharge:  -Ongoing need for IV Antibiotics (Meropenem and Vancomycin) at [facility] facility  -IV Digoxin and Lasix  -PRN Respiratory treatments (Ex: Airway Suction)  -IV Electrolyte and possible nutrition supplementation  -Daily Physical therapy to prevent contractures  -Daily Labs (CBC & BMP)     The following issues were addressed during hospitalization:  Lethargy 2/2 PNA  Suspected Recurrent Aspiration PNA  Afib, paroxsymal  Anasarca c/b Severe Malnutrition  HA on CKD 3b  Normocytic Anemia  T2DM  HTN  BPH    Physical Exam:  BP (!) 110/56   Pulse 69   Temp 97.5 °F (36.4 °C) (Oral)   Resp 22   Ht 5' 3\" (1.6 m)   Wt 174 lb 2.6 oz (79 kg)   SpO2 98%   BMI 30.85 kg/m²   Constitutional:       General: He is not in acute distress. HENT:      Head: Normocephalic and atraumatic. Eyes:      General: No scleral icterus. Right eye: No discharge. Left eye: No discharge. Extraocular Movements: Extraocular movements intact. Conjunctiva/sclera: Conjunctivae normal.      Pupils: Pupils are equal, round, and reactive to light. Cardiovascular:      Rate and Rhythm: Regular rhythm. Tachycardia present. Pulses: Normal pulses. Heart sounds: Normal heart sounds. No murmur heard. No friction rub. No gallop. Pulmonary:      Effort: Pulmonary effort is normal. No respiratory distress. Breath sounds: Rales (diffuse) present. No wheezing. Comments: On 5L HFNC  Abdominal:      General: Abdomen is flat. Bowel sounds are normal. There is no distension. Palpations: Abdomen is soft. Musculoskeletal:      Cervical back: Normal range of motion and neck supple. No rigidity. Comments: Diffusely edematous, RA > LA   Skin:     General: Skin is warm and dry. Coloration: Skin is not jaundiced.    Neurological:      Comments: Patient is lethargic     Consults: pulmonary/intensive care and GI  Significant Diagnostic Studies:  CT scan Chest/Abd/Pelvis, Repeated CXR  Treatments: IV hydration, antibiotics: vancomycin and Meropenem, insulin: Lantus and procedures: PICC line  Disposition: long term care facility  Discharged Condition: Stable  Follow Up: Primary Care Physician in two weeks    DISCHARGE MEDICATION:     Medication List      ASK your doctor about these medications aspirin 81 MG chewable tablet     Usman KwikPen 100 UNIT/ML injection pen  Generic drug: insulin glargine  Inject 10 Units into the skin 2 times daily     buPROPion 100 MG tablet  Commonly known as: WELLBUTRIN  Take 1 tablet by mouth daily     carvedilol 6.25 MG tablet  Commonly known as: COREG  TAKE 1 TABLET BY MOUTH TWICE DAILY     diclofenac sodium 1 % Gel  Commonly known as: VOLTAREN  APPLY 2 GRAM TOPICALLY FOUR TIMES DAILY AS NEEDED FOR PAIN     finasteride 5 MG tablet  Commonly known as: PROSCAR  TAKE 1 TABLET BY MOUTH DAILY     folic acid 1 MG tablet  Commonly known as: FOLVITE     FreeStyle Clay 14 Day Sensor Misc  USE EVERY 14 DAYS AS DIRECTED     nitroGLYCERIN 0.4 MG SL tablet  Commonly known as: Nitrostat  Place 1 tablet under the tongue every 5 minutes as needed for Chest pain May repeat every 5 minutes until relief is obtained. If pain persists after taking 3 tabs in a 15-minute period, call 9-1-1 immediately. nystatin [id number] UNIT/ML suspension  Commonly known as: MYCOSTATIN  Take 5 mLs by mouth 4 times daily     OneTouch Ultra strip  Generic drug: blood glucose test strips  TEST ONCE DAILY     Pen Needles 31G X 6 MM Misc  1 each by Does not apply route daily     polyethylene glycol 17 g packet  Commonly known as: GLYCOLAX     sennosides-docusate sodium 8.6-50 MG tablet  Commonly known as: SENOKOT-S     tamsulosin 0.4 MG capsule  Commonly known as: FLOMAX  TAKE 2 CAPSULES BY MOUTH EVERY NIGHT     vitamin D3 125 MCG (5000 UT) Caps  TAKE ONE CAPSULE BY MOUTH EVERY DAY          Activity: activity as tolerated  Diet: regular diet, the aspiration risk has been discussed at length with patient's family and they are insistent feedings PO. They verbalized understanding that he will continue to aspirate with oral feedings.    Wound Care: none needed    Time Spent on discharge is more than 45 minutes    Signed:  Ashley Pickett MD, PGY-1  12/2/2021    Addendum to Resident H& P/Progress note:  I have personally seen,examined and evaluated the patient.  I have reviewed the current history, physical findings, labs and assessment and plan and agree with note as documented by resident MD ( Debbi Lundborg)      Daren Pean MD, [address]

## 2021-12-02 NOTE — CARE COORDINATION
To [facility] today at 3:00 pm transport by [place] [facility] ( by way of [address]). Nurse report:  [phone] or [phone]  Fax: [phone]  Faxed discharge packet to facility.   Electronically signed by Alivia Mcnamara RN on 12/2/2021 at 2:28 PM

## 2021-12-02 NOTE — PLAN OF CARE
Patient discharged to [place] [address]. Report given to [facility]. Daughter and aware of transport time of 15:00.       Problem: Falls - Risk of:  Goal: Will remain free from falls  Description: Will remain free from falls  Outcome: Completed  Goal: Absence of physical injury  Description: Absence of physical injury  Outcome: Completed     Problem: Skin Integrity:  Goal: Will show no infection signs and symptoms  Description: Will show no infection signs and symptoms  Outcome: Completed  Goal: Absence of new skin breakdown  Description: Absence of new skin breakdown  Outcome: Completed     Problem: Fluid Volume - Deficit:  Goal: Achieves intake and output within specified parameters  Description: Achieves intake and output within specified parameters  Outcome: Completed     Problem: Gas Exchange - Impaired:  Goal: Levels of oxygenation will improve  Description: Levels of oxygenation will improve  Outcome: Completed     Problem: Nutrition  Goal: Optimal nutrition therapy  Outcome: Completed     Problem: Pain:  Goal: Pain level will decrease  Description: Pain level will decrease  Outcome: Completed  Goal: Control of acute pain  Description: Control of acute pain  Outcome: Completed  Goal: Control of chronic pain  Description: Control of chronic pain  Outcome: Completed

## 2021-12-02 NOTE — PROGRESS NOTES
GI Progress Note    Pantera August is a 81 y.o. male patient. 1. Altered mental status, unspecified altered mental status type    2.  Aspiration pneumonia of both lungs, unspecified aspiration pneumonia type, unspecified part of lung ([facility] [place] [facility]        Admit Date: 11/10/2021    Subjective:       Lethargic  Family still unwilling to have peg done      ROS:  Cardiovascular ROS: no chest pain or dyspnea on exertion  Gastrointestinal ROS: no abdominal pain, change in bowel habits, or black or bloody stools  Respiratory ROS: does become hypoxic when fed. concen for aspiration    Scheduled Meds:   [START ON 12/2/2021] vancomycin  750 mg IntraVENous Y97C    folic acid, thiamine, multi-vitamin with vitamin K infusion   IntraVENous Q24H    sodium chloride flush  5-40 mL IntraVENous 2 times per day    ipratropium-albuterol  1 ampule Inhalation TID    meropenem  1,000 mg IntraVENous Q12H    digoxin  125 mcg IntraVENous Daily    [Held by provider] aspirin  81 mg Oral Daily    [Held by provider] midodrine  5 mg Oral TID WC    lidocaine  1 patch TransDERmal Daily    sodium chloride flush  5-40 mL IntraVENous 2 times per day    heparin (porcine)  5,000 Units SubCUTAneous 3 times per day    insulin glargine  5 Units SubCUTAneous Nightly    insulin lispro  0-6 Units SubCUTAneous TID WC    insulin lispro  0-3 Units SubCUTAneous Nightly    Venelex   Topical BID    sodium chloride (Inhalant)  15 mL Nebulization BID    [Held by provider] finasteride  5 mg Oral Daily    [Held by provider] tamsulosin  0.4 mg Oral Daily       Continuous Infusions:   sodium chloride      [Held by provider] sodium chloride 50 mL/hr at 11/30/21 1625    sodium chloride 25 mL (11/12/21 1443)    dextrose         PRN Meds:  sodium chloride flush, sodium chloride, bisacodyl, morphine, senna, albuterol, sodium chloride flush, sodium chloride, [DISCONTINUED] acetaminophen **OR** acetaminophen, glucose, dextrose, glucagon (rDNA), dextrose, medicated lip ointment, acetaminophen, docusate      Objective:       Patient Vitals for the past 24 hrs:   BP Temp Temp src Pulse Resp SpO2   21 1955 (!) 113/59 96.7 °F (35.9 °C) Axillary 111 (!) 36 96 %   21 1809 (!) 93/57 96.7 °F (35.9 °C) Axillary 63 22 97 %   21 1616 -- -- -- -- -- 98 %   21 1149 125/75 98.6 °F (37 °C) Oral 110 25 97 %   21 0916 -- -- -- -- 22 99 %   21 0904 127/78 96.4 °F (35.8 °C) Oral 109 (!) 37 96 %   21 0637 114/60 97.4 °F (36.3 °C) Oral 116 22 96 %   21 0445 -- -- -- -- 28 97 %   21 0410 -- -- -- -- 24 96 %   21 0405 -- -- -- -- 24 98 %   21 0400 -- -- -- -- 24 98 %   21 0338 (!) 111/44 98.1 °F (36.7 °C) Axillary 117 28 98 %   21 0315 -- -- -- -- 24 96 %   21 0254 -- -- -- -- -- (!) 87 %   21 0110 -- -- -- -- -- (!) 85 %   21 -- -- -- -- -- (!) 77 %   21 -- -- -- -- 20 (!) 87 %   21 0044 98/60 98.8 °F (37.1 °C) Axillary 106 18 90 %   218 -- -- -- -- -- 95 %   21 2330 -- -- -- -- 28 96 %       Exam:    VITALS:  BP (!) 113/59   Pulse 111   Temp 96.7 °F (35.9 °C) (Axillary)   Resp (!) 36   Ht 5' 3\" (1.6 m)   Wt 174 lb 2.6 oz (79 kg)   SpO2 96%   BMI 30.85 kg/m²   TEMPERATURE:  Current - Temp: 96.7 °F (35.9 °C); Max - Temp  Av.5 °F (36.4 °C)  Min: 96.4 °F (35.8 °C)  Max: 98.8 °F (37.1 °C)    lethargic  Head: Normocephalic, without obvious abnormality, atraumatic  Neck: supple, symmetrical, trachea midline and thyroid not enlarged, symmetric, no tenderness/mass/nodules  CVS:  RRR, Nl s1s2  Lungs CTA Bilaterally, normal effort  Abdomen: soft, non-tender; bowel sounds normal; no masses,  no organomegaly  Extremities: No edema.     Lab Data:  Recent Labs     21  0710 21  0600 21  0637   WBC 3.4* 2.5* 3.4*   HGB 9.2* 10.9* 9.0*   HCT 28.4* 33.6* 28.0*   MCV 92.7 91.8 92.8   * 76* 88*     Recent Labs     21  0710 11/30/21  0600 12/01/21  0637    139 145   K 3.8 3.9 4.0   * 108 112*   CO2 28 27 28   BUN 27* 30* 31*   CREATININE 0.9 1.2 1.1     No results for input(s): AST, ALT, ALB, BILIDIR, BILITOT, ALKPHOS in the last 72 hours. No results for input(s): LIPASE, AMYLASE in the last 72 hours. No results for input(s): PROT, INR in the last 72 hours. No results for input(s): PTT in the last 72 hours. No results for input(s): OCCULTBLD in the last 72 hours. Assessment:       Active Problems:    Acute renal failure superimposed on stage 3b chronic kidney disease (HCC)    Acute encephalopathy    Severe malnutrition (HCC)    Aspiration pneumonia of right upper lobe due to regurgitated food (HCC)    Volume depletion    Physical deconditioning    Rapid atrial fibrillation (HCC)    Aspiration pneumonia (HCC)    Acute metabolic encephalopathy    Acute on chronic respiratory failure with hypoxia (HCC)    Paroxysmal atrial fibrillation ([facility] [address]    CKD stage 3 due to type 2 diabetes mellitus (HCC)    Slow transit constipation  Resolved Problems:    * No resolved hospital problems.  *      Recommendations:       None at present since permission for peg is not granted    Abbey Nowak MD  12/1/2021  9:09 PM

## 2021-12-02 NOTE — PROGRESS NOTES
Clinical Pharmacy Progress Note    Vancomycin - Management by Pharmacy    Consult Date(s):  11/23  Consulting Provider(s):  Dr. Korina Magallon / Plan  1) Continued fevers / recurrent aspiration PNA - Vancomycin   Concurrent Antimicrobials: Meropenem - day 21   Day of Vanc Therapy: 11   Current Dosing Method: AUC  o Therapeutic Goal: AUC = 400-600 mg/L*hr  o Currently on 750mg IV q24h. Regimen was decreased yesterday from 1250mg IV q24h due to accumulation. o Renal function remains stable. Predicted AUC = 367 and trough 11.3 today. o Will plan a repeat level in 1-2 days to check kinetics. Will watch renal function closely as patient has accumulated vancomycin.  Will continue to monitor clinical condition and make adjustments to regimen as appropriate. Please call with questions--  Thanks--  Shane Cohen PharmD., [facility]   12/2/2021 9:13 AM  Wireless: [phone]      Interval update: Working on placement to [place] [address]. Family has decided against PEG placement. Subjective/Objective:   Anna Oakley is a 81 y.o. y/o male with a PMHx that includes DM 2, BPH, HTN, HLD, JOANNA, OA, and spinal stenosis who is admitted with lethargy thought to be 2/2 PNA. Pt completed 10-day course of Meropenem 11/22. Also found to have A.fib and HA on CKD 3 (now resolved). Pharmacy is consulted to dose Vancomycin.     Current antibiotics:   (11/12-11/22)    (11/12-11/15)  Meropenem 1000mg IV q12h (11/23-current)  Vancomycin - Pharmacy to dose   1000mg IV q24h (11/22-11/24)   1250mg IV q24h (11/24-11/30)   750mg IV q24h (12/2-current)  Ht Readings from Last 1 Encounters:   11/11/21 5' 3\" (1.6 m)     Wt Readings from Last 1 Encounters:   11/26/21 174 lb 2.6 oz (79 kg)       Vanc Level(s) / Doses:  Date Time Dose Level / Type of Level Interpretation   11/24 07:41 1000mg q24h Random = 10.9mcg/mL Calculated  with trough 11.5   11/27 07:47 1250mg q24h Random = 15.6mcg/mL Calculated  with trough 14.1   12/1 06:37 1250mg q24h Random = 26.8mcg/mL Calculated  with trough 21.5   Note: Serum levels collected for AUC-based dosing may be high if collected in close proximity to the dose administered. This is not necessarily an indicator of toxicity. Recent Labs     11/30/21  0600 12/01/21  0637 12/02/21  0509   CREATININE 1.2 1.1 0.9   BUN 30* 31* 32*   WBC 2.5* 3.4* 3.4*       Estimated Creatinine Clearance: 56 mL/min (based on SCr of 0.9 mg/dL).     Cultures & Sensitivities:  Date Site Micro Susceptibility / Result   11/11 Blood x2 No growth to date    11/12 MRSA Nasal PCR negative    11/22 Blood No growth to date

## 2021-12-02 NOTE — CARE COORDINATION
Case Management Assessment            Discharge Note                    Date / Time of Note: 12/2/2021 2:59 PM                  Discharge Note Completed by: Viky Duenas RN    Patient Name: Katherine Montemayor   YOB: 1935  Diagnosis: Acute encephalopathy [G93.40]  Altered mental status, unspecified altered mental status type [R41.82]  Aspiration pneumonia of both lungs, unspecified aspiration pneumonia type, unspecified part of lung ([facility] [address] [J69.0]  Aspiration pneumonia, unspecified aspiration pneumonia type, unspecified laterality, unspecified part of lung ([facility] [address] [E75.2]  Acute metabolic encephalopathy [S44.12]   Date / Time: 11/10/2021  7:26 PM    Current PCP: Kiara Rivas MD  Clinic patient: No    Hospitalization in the last 30 days: No    Advance Directives:  Code Status: Limited  [place] DNR form completed and on chart: Not Indicated    Financial:  Payor: Mikal Quintanilla / Plan: Nima Griffin / Product Type: *No Product type* /      Pharmacy:    [facility] [address] [place] [address] [phone] - [facility] [address]  Phone: [phone] Fax: [phone]      Assistance purchasing medications?: Potential Assistance Purchasing Medications: No  Assistance provided by Case Management: None at this time    Does patient want to participate in local refill/ meds to beds program?: No    Meds To Beds General Rules:  1. Can ONLY be done Monday- Friday between 8:30am-5pm  2. Prescription(s) must be in pharmacy by 3pm to be filled same day  3. Copy of patient's insurance/ prescription drug card and patient face sheet must be sent along with the prescription(s)  4. Cost of Rx cannot be added to hospital bill. If financial assistance is needed, please contact unit  or ;  or  CANNOT provide pharmacy voucher for patients co-pays  5.  Patients can then  the prescription on their way out of the hospital at discharge, or pharmacy can deliver to the bedside if staff is available. (payment due at time of pick-up or delivery - cash, check, or card accepted)     Able to afford home medications/ co-pay costs: LTACH    ADLS:  Current PT AM-PAC Score: 6 /24  Current OT AM-PAC Score: 6 /24      DISCHARGE Disposition: [facility]): [address]  Phone: [phone] Fax: [phone]  Fax [phone]    LOC at discharge: [facility] Completed: Yes    Notification completed in HENS/PAS?:  Not Applicable    IMM Completed:   Not Indicated    Transportation:  Transportation PLAN for discharge: EMS transportation   Mode of Transport: Ambulance stretcher - BLS  Reason for medical transport: Confused due to encephalopathy  and requires ambulance transport due to unable to take instruction for safety  Name of [address] [place] [address], [address]: [facility]  Phone: [phone]  Time of Transport: 3:00 PM      Transport form completed: Yes      Additional CM Notes: Transport to [address] [place] [address]. Family aware. The Plan for Transition of Care is related to the following treatment goals of Acute encephalopathy [G93.40]  Altered mental status, unspecified altered mental status type [R41.82]  Aspiration pneumonia of both lungs, unspecified aspiration pneumonia type, unspecified part of lung ([address] [J69.0]  Aspiration pneumonia, unspecified aspiration pneumonia type, unspecified laterality, unspecified part of lung ([address] [W64.0]  Acute metabolic encephalopathy [K40.71]    The Patient and/or patient representative Roberto and his family were provided with a choice of provider and agrees with the discharge plan Yes    Freedom of choice list was provided with basic dialogue that supports the patient's individualized plan of care/goals and shares the quality data associated with the providers.  Yes    Care Transitions patient: No    Alyson Ellsworth, RN  [facility]  Case Management Department  Ph: [phone]

## 2021-12-02 NOTE — PROGRESS NOTES
Signed        Daughter present. Requested patient repositioned high fowlers with chin tuck for breakfast.  Educated daughter on patient's mental status and risk for aspiration due to lethargy and unable to follow commands or open eyes. Daughter states she is going to wake him. Called to room due to desaturation. Lowest 76% and coarse lung sounds. Daughter states patient has not had anything to eat yet. Titrated oxygen to 15L HFNC. Two different residents in to evaluate and RT suctioned patient. Patient improved after suctioning. Will monitor.
